# Patient Record
Sex: MALE | Race: OTHER | NOT HISPANIC OR LATINO | Employment: OTHER | URBAN - METROPOLITAN AREA
[De-identification: names, ages, dates, MRNs, and addresses within clinical notes are randomized per-mention and may not be internally consistent; named-entity substitution may affect disease eponyms.]

---

## 2017-07-03 DIAGNOSIS — N20.1 CALCULUS OF URETER: ICD-10-CM

## 2017-07-25 ENCOUNTER — ANESTHESIA EVENT (OUTPATIENT)
Dept: PERIOP | Facility: HOSPITAL | Age: 57
End: 2017-07-25
Payer: COMMERCIAL

## 2017-07-25 RX ORDER — SODIUM CHLORIDE 9 MG/ML
125 INJECTION, SOLUTION INTRAVENOUS CONTINUOUS
Status: CANCELLED | OUTPATIENT
Start: 2017-07-25

## 2017-07-26 ENCOUNTER — APPOINTMENT (OUTPATIENT)
Dept: PREADMISSION TESTING | Facility: HOSPITAL | Age: 57
End: 2017-07-26
Payer: COMMERCIAL

## 2017-07-26 ENCOUNTER — APPOINTMENT (OUTPATIENT)
Dept: LAB | Facility: HOSPITAL | Age: 57
End: 2017-07-26
Attending: UROLOGY
Payer: COMMERCIAL

## 2017-07-26 ENCOUNTER — TRANSCRIBE ORDERS (OUTPATIENT)
Dept: ADMINISTRATIVE | Facility: HOSPITAL | Age: 57
End: 2017-07-26

## 2017-07-26 ENCOUNTER — HOSPITAL ENCOUNTER (OUTPATIENT)
Dept: NON INVASIVE DIAGNOSTICS | Facility: HOSPITAL | Age: 57
Discharge: HOME | End: 2017-07-26
Attending: UROLOGY
Payer: COMMERCIAL

## 2017-07-26 VITALS
HEIGHT: 67 IN | BODY MASS INDEX: 41.65 KG/M2 | WEIGHT: 265.4 LBS | TEMPERATURE: 98.6 F | DIASTOLIC BLOOD PRESSURE: 78 MMHG | HEART RATE: 83 BPM | RESPIRATION RATE: 18 BRPM | SYSTOLIC BLOOD PRESSURE: 122 MMHG

## 2017-07-26 DIAGNOSIS — Z01.818 PREOP EXAMINATION: ICD-10-CM

## 2017-07-26 DIAGNOSIS — Z01.818 PREOP EXAMINATION: Primary | ICD-10-CM

## 2017-07-26 DIAGNOSIS — N20.1 CALCULUS OF URETER: ICD-10-CM

## 2017-07-26 LAB
ALBUMIN SERPL BCP-MCNC: 3.7 G/DL (ref 3.5–5)
ALP SERPL-CCNC: 52 U/L (ref 46–116)
ALT SERPL W P-5'-P-CCNC: 42 U/L (ref 12–78)
ANION GAP SERPL CALCULATED.3IONS-SCNC: 7 MMOL/L (ref 4–13)
APTT PPP: 29 SECONDS (ref 23–35)
AST SERPL W P-5'-P-CCNC: 25 U/L (ref 5–45)
BASOPHILS # BLD AUTO: 0.07 THOUSANDS/ΜL (ref 0–0.1)
BASOPHILS NFR BLD AUTO: 1 % (ref 0–1)
BILIRUB SERPL-MCNC: 0.51 MG/DL (ref 0.2–1)
BUN SERPL-MCNC: 18 MG/DL (ref 5–25)
CALCIUM SERPL-MCNC: 8.8 MG/DL (ref 8.3–10.1)
CHLORIDE SERPL-SCNC: 106 MMOL/L (ref 100–108)
CO2 SERPL-SCNC: 28 MMOL/L (ref 21–32)
CREAT SERPL-MCNC: 1.53 MG/DL (ref 0.6–1.3)
EOSINOPHIL # BLD AUTO: 0.54 THOUSAND/ΜL (ref 0–0.61)
EOSINOPHIL NFR BLD AUTO: 6 % (ref 0–6)
ERYTHROCYTE [DISTWIDTH] IN BLOOD BY AUTOMATED COUNT: 12.8 % (ref 11.6–15.1)
GFR SERPL CREATININE-BSD FRML MDRD: 49 ML/MIN/1.73SQ M
GLUCOSE SERPL-MCNC: 91 MG/DL (ref 65–140)
HCT VFR BLD AUTO: 40.7 % (ref 36.5–49.3)
HGB BLD-MCNC: 13.8 G/DL (ref 12–17)
INR PPP: 1.11 (ref 0.86–1.16)
LYMPHOCYTES # BLD AUTO: 2.51 THOUSANDS/ΜL (ref 0.6–4.47)
LYMPHOCYTES NFR BLD AUTO: 30 % (ref 14–44)
MCH RBC QN AUTO: 29.2 PG (ref 26.8–34.3)
MCHC RBC AUTO-ENTMCNC: 33.9 G/DL (ref 31.4–37.4)
MCV RBC AUTO: 86 FL (ref 82–98)
MONOCYTES # BLD AUTO: 0.76 THOUSAND/ΜL (ref 0.17–1.22)
MONOCYTES NFR BLD AUTO: 9 % (ref 4–12)
NEUTROPHILS # BLD AUTO: 4.51 THOUSANDS/ΜL (ref 1.85–7.62)
NEUTS SEG NFR BLD AUTO: 54 % (ref 43–75)
NRBC BLD AUTO-RTO: 0 /100 WBCS
PLATELET # BLD AUTO: 334 THOUSANDS/UL (ref 149–390)
PMV BLD AUTO: 10.6 FL (ref 8.9–12.7)
POTASSIUM SERPL-SCNC: 4.5 MMOL/L (ref 3.5–5.3)
PROT SERPL-MCNC: 7.8 G/DL (ref 6.4–8.2)
PROTHROMBIN TIME: 14.3 SECONDS (ref 12.1–14.4)
RBC # BLD AUTO: 4.73 MILLION/UL (ref 3.88–5.62)
SODIUM SERPL-SCNC: 141 MMOL/L (ref 136–145)
WBC # BLD AUTO: 8.39 THOUSAND/UL (ref 4.31–10.16)

## 2017-07-26 PROCEDURE — 36415 COLL VENOUS BLD VENIPUNCTURE: CPT

## 2017-07-26 PROCEDURE — 85730 THROMBOPLASTIN TIME PARTIAL: CPT

## 2017-07-26 PROCEDURE — 80053 COMPREHEN METABOLIC PANEL: CPT

## 2017-07-26 PROCEDURE — 85025 COMPLETE CBC W/AUTO DIFF WBC: CPT

## 2017-07-26 PROCEDURE — 85610 PROTHROMBIN TIME: CPT

## 2017-07-26 PROCEDURE — 93005 ELECTROCARDIOGRAM TRACING: CPT

## 2017-07-26 PROCEDURE — 87086 URINE CULTURE/COLONY COUNT: CPT

## 2017-07-26 RX ORDER — MULTIVITAMIN
2 TABLET ORAL DAILY
COMMUNITY

## 2017-07-26 RX ORDER — CETIRIZINE HYDROCHLORIDE 10 MG/1
10 TABLET ORAL DAILY
COMMUNITY

## 2017-07-26 RX ORDER — IBUPROFEN 200 MG
200-800 TABLET ORAL EVERY 6 HOURS PRN
COMMUNITY
End: 2019-03-06 | Stop reason: HOSPADM

## 2017-07-26 RX ORDER — SIMVASTATIN 40 MG
40 TABLET ORAL
COMMUNITY
End: 2018-08-24 | Stop reason: SDUPTHER

## 2017-07-26 RX ORDER — FLUTICASONE PROPIONATE 50 MCG
SPRAY, SUSPENSION (ML) NASAL
COMMUNITY
Start: 2015-04-24 | End: 2019-11-25

## 2017-07-27 LAB — BACTERIA UR CULT: NORMAL

## 2017-07-28 LAB
ATRIAL RATE: 83 BPM
P AXIS: 35 DEGREES
PR INTERVAL: 132 MS
QRS AXIS: -3 DEGREES
QRSD INTERVAL: 86 MS
QT INTERVAL: 364 MS
QTC INTERVAL: 427 MS
T WAVE AXIS: 7 DEGREES
VENTRICULAR RATE: 83 BPM

## 2017-08-04 ENCOUNTER — HOSPITAL ENCOUNTER (OUTPATIENT)
Facility: HOSPITAL | Age: 57
Setting detail: OUTPATIENT SURGERY
Discharge: HOME | End: 2017-08-04
Attending: UROLOGY | Admitting: UROLOGY
Payer: COMMERCIAL

## 2017-08-04 ENCOUNTER — ANESTHESIA (OUTPATIENT)
Dept: PERIOP | Facility: HOSPITAL | Age: 57
End: 2017-08-04
Payer: COMMERCIAL

## 2017-08-04 ENCOUNTER — APPOINTMENT (OUTPATIENT)
Dept: RADIOLOGY | Facility: HOSPITAL | Age: 57
End: 2017-08-04
Payer: COMMERCIAL

## 2017-08-04 VITALS
OXYGEN SATURATION: 96 % | HEART RATE: 76 BPM | BODY MASS INDEX: 40.49 KG/M2 | WEIGHT: 258 LBS | TEMPERATURE: 97.9 F | SYSTOLIC BLOOD PRESSURE: 134 MMHG | HEIGHT: 67 IN | DIASTOLIC BLOOD PRESSURE: 72 MMHG | RESPIRATION RATE: 16 BRPM

## 2017-08-04 DIAGNOSIS — N20.1 CALCULUS OF URETER: ICD-10-CM

## 2017-08-04 LAB
PLATELET # BLD AUTO: 284 THOUSANDS/UL (ref 149–390)
PMV BLD AUTO: 10.9 FL (ref 8.9–12.7)

## 2017-08-04 PROCEDURE — 85049 AUTOMATED PLATELET COUNT: CPT | Performed by: UROLOGY

## 2017-08-04 PROCEDURE — 82360 CALCULUS ASSAY QUANT: CPT | Performed by: UROLOGY

## 2017-08-04 PROCEDURE — C1769 GUIDE WIRE: HCPCS | Performed by: UROLOGY

## 2017-08-04 PROCEDURE — 74450 X-RAY URETHRA/BLADDER: CPT

## 2017-08-04 PROCEDURE — C2625 STENT, NON-COR, TEM W/DEL SY: HCPCS | Performed by: UROLOGY

## 2017-08-04 DEVICE — STENT KWART RETRO INJECT SET 7FR 145CM: Type: IMPLANTABLE DEVICE | Site: URETER | Status: FUNCTIONAL

## 2017-08-04 RX ORDER — SULFAMETHOXAZOLE AND TRIMETHOPRIM 800; 160 MG/1; MG/1
1 TABLET ORAL DAILY
Qty: 10 TABLET | Refills: 0 | Status: SHIPPED | OUTPATIENT
Start: 2017-08-04 | End: 2017-08-14

## 2017-08-04 RX ORDER — LIDOCAINE HYDROCHLORIDE 10 MG/ML
INJECTION, SOLUTION INFILTRATION; PERINEURAL AS NEEDED
Status: DISCONTINUED | OUTPATIENT
Start: 2017-08-04 | End: 2017-08-04 | Stop reason: SURG

## 2017-08-04 RX ORDER — OXYCODONE HYDROCHLORIDE AND ACETAMINOPHEN 5; 325 MG/1; MG/1
1 TABLET ORAL EVERY 4 HOURS PRN
Status: DISCONTINUED | OUTPATIENT
Start: 2017-08-04 | End: 2017-08-04 | Stop reason: HOSPADM

## 2017-08-04 RX ORDER — ONDANSETRON 2 MG/ML
4 INJECTION INTRAMUSCULAR; INTRAVENOUS EVERY 6 HOURS PRN
Status: DISCONTINUED | OUTPATIENT
Start: 2017-08-04 | End: 2017-08-04 | Stop reason: HOSPADM

## 2017-08-04 RX ORDER — SODIUM CHLORIDE 9 MG/ML
125 INJECTION, SOLUTION INTRAVENOUS CONTINUOUS
Status: DISCONTINUED | OUTPATIENT
Start: 2017-08-04 | End: 2017-08-04 | Stop reason: HOSPADM

## 2017-08-04 RX ORDER — ONDANSETRON 2 MG/ML
4 INJECTION INTRAMUSCULAR; INTRAVENOUS ONCE AS NEEDED
Status: DISCONTINUED | OUTPATIENT
Start: 2017-08-04 | End: 2017-08-04 | Stop reason: HOSPADM

## 2017-08-04 RX ORDER — ROCURONIUM BROMIDE 10 MG/ML
INJECTION, SOLUTION INTRAVENOUS AS NEEDED
Status: DISCONTINUED | OUTPATIENT
Start: 2017-08-04 | End: 2017-08-04 | Stop reason: SURG

## 2017-08-04 RX ORDER — FENTANYL CITRATE 50 UG/ML
INJECTION, SOLUTION INTRAMUSCULAR; INTRAVENOUS AS NEEDED
Status: DISCONTINUED | OUTPATIENT
Start: 2017-08-04 | End: 2017-08-04 | Stop reason: SURG

## 2017-08-04 RX ORDER — MORPHINE SULFATE 4 MG/ML
4 INJECTION, SOLUTION INTRAMUSCULAR; INTRAVENOUS EVERY 4 HOURS PRN
Status: DISCONTINUED | OUTPATIENT
Start: 2017-08-04 | End: 2017-08-04 | Stop reason: HOSPADM

## 2017-08-04 RX ORDER — HEPARIN SODIUM 5000 [USP'U]/ML
5000 INJECTION, SOLUTION INTRAVENOUS; SUBCUTANEOUS ONCE
Status: COMPLETED | OUTPATIENT
Start: 2017-08-04 | End: 2017-08-04

## 2017-08-04 RX ORDER — MAGNESIUM HYDROXIDE 1200 MG/15ML
LIQUID ORAL AS NEEDED
Status: DISCONTINUED | OUTPATIENT
Start: 2017-08-04 | End: 2017-08-04 | Stop reason: HOSPADM

## 2017-08-04 RX ORDER — GLYCOPYRROLATE 0.2 MG/ML
INJECTION INTRAMUSCULAR; INTRAVENOUS AS NEEDED
Status: DISCONTINUED | OUTPATIENT
Start: 2017-08-04 | End: 2017-08-04 | Stop reason: SURG

## 2017-08-04 RX ORDER — MIDAZOLAM HYDROCHLORIDE 1 MG/ML
INJECTION INTRAMUSCULAR; INTRAVENOUS AS NEEDED
Status: DISCONTINUED | OUTPATIENT
Start: 2017-08-04 | End: 2017-08-04 | Stop reason: SURG

## 2017-08-04 RX ORDER — FENTANYL CITRATE/PF 50 MCG/ML
25 SYRINGE (ML) INJECTION
Status: DISCONTINUED | OUTPATIENT
Start: 2017-08-04 | End: 2017-08-04 | Stop reason: HOSPADM

## 2017-08-04 RX ORDER — KETOROLAC TROMETHAMINE 30 MG/ML
INJECTION, SOLUTION INTRAMUSCULAR; INTRAVENOUS AS NEEDED
Status: DISCONTINUED | OUTPATIENT
Start: 2017-08-04 | End: 2017-08-04 | Stop reason: SURG

## 2017-08-04 RX ORDER — ONDANSETRON 2 MG/ML
INJECTION INTRAMUSCULAR; INTRAVENOUS AS NEEDED
Status: DISCONTINUED | OUTPATIENT
Start: 2017-08-04 | End: 2017-08-04 | Stop reason: SURG

## 2017-08-04 RX ORDER — PROPOFOL 10 MG/ML
INJECTION, EMULSION INTRAVENOUS AS NEEDED
Status: DISCONTINUED | OUTPATIENT
Start: 2017-08-04 | End: 2017-08-04 | Stop reason: SURG

## 2017-08-04 RX ORDER — HYDROCODONE BITARTRATE AND ACETAMINOPHEN 5; 325 MG/1; MG/1
1 TABLET ORAL EVERY 6 HOURS PRN
Qty: 20 TABLET | Refills: 0 | Status: SHIPPED | OUTPATIENT
Start: 2017-08-04 | End: 2018-10-16 | Stop reason: ALTCHOICE

## 2017-08-04 RX ORDER — SUCCINYLCHOLINE CHLORIDE 20 MG/ML
INJECTION INTRAMUSCULAR; INTRAVENOUS AS NEEDED
Status: DISCONTINUED | OUTPATIENT
Start: 2017-08-04 | End: 2017-08-04 | Stop reason: SURG

## 2017-08-04 RX ADMIN — ROCURONIUM BROMIDE 10 MG: 10 INJECTION, SOLUTION INTRAVENOUS at 07:45

## 2017-08-04 RX ADMIN — SODIUM CHLORIDE 125 ML/HR: 0.9 INJECTION, SOLUTION INTRAVENOUS at 06:26

## 2017-08-04 RX ADMIN — ROCURONIUM BROMIDE 5 MG: 10 INJECTION, SOLUTION INTRAVENOUS at 07:37

## 2017-08-04 RX ADMIN — FENTANYL CITRATE 50 MCG: 50 INJECTION, SOLUTION INTRAMUSCULAR; INTRAVENOUS at 07:37

## 2017-08-04 RX ADMIN — CEFAZOLIN SODIUM 2000 MG: 2 SOLUTION INTRAVENOUS at 07:42

## 2017-08-04 RX ADMIN — LIDOCAINE HYDROCHLORIDE 60 MG: 10 INJECTION, SOLUTION INFILTRATION; PERINEURAL at 07:37

## 2017-08-04 RX ADMIN — MIDAZOLAM HYDROCHLORIDE 2 MG: 1 INJECTION, SOLUTION INTRAMUSCULAR; INTRAVENOUS at 07:25

## 2017-08-04 RX ADMIN — GLYCOPYRROLATE 0.4 MG: 0.2 INJECTION, SOLUTION INTRAMUSCULAR; INTRAVENOUS at 08:45

## 2017-08-04 RX ADMIN — FENTANYL CITRATE 50 MCG: 50 INJECTION, SOLUTION INTRAMUSCULAR; INTRAVENOUS at 07:45

## 2017-08-04 RX ADMIN — SUCCINYLCHOLINE CHLORIDE 100 MG: 20 INJECTION, SOLUTION INTRAMUSCULAR; INTRAVENOUS at 07:37

## 2017-08-04 RX ADMIN — NEOSTIGMINE METHYLSULFATE 2 MG: 1 INJECTION, SOLUTION INTRAMUSCULAR; INTRAVENOUS; SUBCUTANEOUS at 08:45

## 2017-08-04 RX ADMIN — PROPOFOL 200 MG: 10 INJECTION, EMULSION INTRAVENOUS at 07:37

## 2017-08-04 RX ADMIN — ONDANSETRON HYDROCHLORIDE 4 MG: 2 INJECTION, SOLUTION INTRAVENOUS at 08:04

## 2017-08-04 RX ADMIN — TOBRAMYCIN 130 MG: 40 INJECTION INTRAMUSCULAR; INTRAVENOUS at 07:45

## 2017-08-04 RX ADMIN — HEPARIN SODIUM 5000 UNITS: 5000 INJECTION, SOLUTION INTRAVENOUS; SUBCUTANEOUS at 07:13

## 2017-08-04 RX ADMIN — SODIUM CHLORIDE: 0.9 INJECTION, SOLUTION INTRAVENOUS at 08:40

## 2017-08-04 RX ADMIN — KETOROLAC TROMETHAMINE 30 MG: 30 INJECTION, SOLUTION INTRAMUSCULAR at 08:50

## 2017-08-11 ENCOUNTER — ALLSCRIPTS OFFICE VISIT (OUTPATIENT)
Dept: OTHER | Facility: OTHER | Age: 57
End: 2017-08-11

## 2017-08-11 LAB
BILIRUB UR QL STRIP: NEGATIVE
CLARITY UR: NORMAL
COLOR UR: YELLOW
GLUCOSE (HISTORICAL): NEGATIVE
HGB UR QL STRIP.AUTO: NORMAL
KETONES UR STRIP-MCNC: NEGATIVE MG/DL
LEUKOCYTE ESTERASE UR QL STRIP: NORMAL
NITRITE UR QL STRIP: NEGATIVE
PH UR STRIP.AUTO: 5.5 [PH]
PROT UR STRIP-MCNC: 100 MG/DL
SP GR UR STRIP.AUTO: 1.02
UROBILINOGEN UR QL STRIP.AUTO: 0.2

## 2017-08-16 LAB
CA PHOS MFR STONE: 5 %
CALCIUM OXALATE DIHYDRATE MFR STONE IR: 25 %
COLOR STONE: NORMAL
COM MFR STONE: 55 %
COMMENT-STONE3: NORMAL
COMPOSITION: NORMAL
LABORATORY COMMENT REPORT: NORMAL
NIDUS STONE QL: NORMAL
PHOTO: NORMAL
SIZE STONE: NORMAL MM
STONE ANALYSIS-IMP: NORMAL
URATE MFR STONE: 15 %
WT STONE: 115.5 MG

## 2018-01-22 VITALS
BODY MASS INDEX: 40.49 KG/M2 | DIASTOLIC BLOOD PRESSURE: 82 MMHG | SYSTOLIC BLOOD PRESSURE: 146 MMHG | WEIGHT: 258 LBS | HEIGHT: 67 IN

## 2018-08-24 DIAGNOSIS — E78.5 HYPERLIPIDEMIA, UNSPECIFIED HYPERLIPIDEMIA TYPE: Primary | ICD-10-CM

## 2018-08-24 RX ORDER — SIMVASTATIN 40 MG
40 TABLET ORAL
Qty: 90 TABLET | Refills: 0 | Status: SHIPPED | OUTPATIENT
Start: 2018-08-24 | End: 2018-11-23 | Stop reason: SDUPTHER

## 2018-10-12 ENCOUNTER — OFFICE VISIT (OUTPATIENT)
Dept: FAMILY MEDICINE CLINIC | Facility: CLINIC | Age: 58
End: 2018-10-12
Payer: COMMERCIAL

## 2018-10-12 VITALS
RESPIRATION RATE: 16 BRPM | BODY MASS INDEX: 41.59 KG/M2 | DIASTOLIC BLOOD PRESSURE: 82 MMHG | WEIGHT: 265 LBS | HEART RATE: 90 BPM | OXYGEN SATURATION: 96 % | TEMPERATURE: 98 F | SYSTOLIC BLOOD PRESSURE: 122 MMHG | HEIGHT: 67 IN

## 2018-10-12 DIAGNOSIS — L23.7 POISON IVY DERMATITIS: Primary | ICD-10-CM

## 2018-10-12 PROBLEM — N20.1 CALCULI, URETER: Status: ACTIVE | Noted: 2017-07-03

## 2018-10-12 PROBLEM — K46.9 ABDOMINAL HERNIA: Status: ACTIVE | Noted: 2018-10-12

## 2018-10-12 PROBLEM — H40.9 GLAUCOMA: Status: ACTIVE | Noted: 2018-10-12

## 2018-10-12 PROCEDURE — 99213 OFFICE O/P EST LOW 20 MIN: CPT | Performed by: FAMILY MEDICINE

## 2018-10-12 RX ORDER — TRIAMCINOLONE ACETONIDE 5 MG/G
CREAM TOPICAL 3 TIMES DAILY
Qty: 30 G | Refills: 0 | Status: SHIPPED | OUTPATIENT
Start: 2018-10-12 | End: 2019-01-18 | Stop reason: SDUPTHER

## 2018-10-12 RX ORDER — HYDROXYZINE PAMOATE 25 MG/1
25 CAPSULE ORAL 3 TIMES DAILY PRN
Qty: 30 CAPSULE | Refills: 0 | Status: SHIPPED | OUTPATIENT
Start: 2018-10-12 | End: 2019-01-31

## 2018-10-12 RX ORDER — PREDNISONE 50 MG/1
50 TABLET ORAL DAILY
Qty: 5 TABLET | Refills: 0 | Status: SHIPPED | OUTPATIENT
Start: 2018-10-12 | End: 2018-10-16 | Stop reason: ALTCHOICE

## 2018-10-12 NOTE — PROGRESS NOTES
Assessment/Plan:     Diagnoses and all orders for this visit:    Poison ivy dermatitis  Comments:  Was given a prednisone, triamcinolone cream and Atarax  Orders:  -     predniSONE 50 mg tablet; Take 1 tablet (50 mg total) by mouth daily for 5 days  -     triamcinolone (KENALOG) 0 5 % cream; Apply topically 3 (three) times a day  -     hydrOXYzine pamoate (VISTARIL) 25 mg capsule; Take 1 capsule (25 mg total) by mouth 3 (three) times a day as needed for itching    Other orders  -     Cholecalciferol 2000 units CAPS; Take by mouth          There are no Patient Instructions on file for this visit  Return in about 1 week (around 10/19/2018), or if symptoms worsen or fail to improve  Subjective:      Patient ID: Luigi Oh is a 60 y o  male  Chief Complaint   Patient presents with   Yonas Will is a 60year old who presents complaining of an itchy rash to bilateral upper and lower extremities for the past 4 days  Patient denies fever, chills, chest pain, SOB, cough, or known bug bites  Patient was cutting down bushes 5-6 days ago that may have had poison ivy  Patient applied "Ivy-X" from [address] [place] [address] without relief of symptoms  Patient had poison ivy in the past and reports a similar rash  At that time, he used an OTC medication with resolution of symptoms  No known allergies  No recent changes in soaps or detergents  There are no other complaints at this time  The following portions of the patient's history were reviewed and updated as appropriate: allergies, current medications, past family history, past medical history, past social history, past surgical history and problem list     Review of Systems   Constitutional: Negative for chills, fatigue and fever  HENT: Negative for ear pain, mouth sores and sneezing  Congestion: mild, chronic     Eyes: Negative for pain and itching  Respiratory: Negative for shortness of breath      Cardiovascular: Negative for chest pain and leg swelling  Gastrointestinal: Negative for nausea and vomiting  Musculoskeletal: Negative for myalgias  Neurological: Negative for dizziness, light-headedness and headaches  Current Outpatient Prescriptions   Medication Sig Dispense Refill    Bioflavonoid Products (VITAMIN C) CHEW Chew 2 tablets daily      cetirizine (ZyrTEC) 10 mg tablet Take 10 mg by mouth daily      Cholecalciferol 2000 units CAPS Take by mouth      fluticasone (FLONASE) 50 mcg/act nasal spray Blow nose; shake bottle; place tip in each nostril and tilt towards eye; hold breath and press plunger; do this 2 times daily prn      ibuprofen (MOTRIN) 200 mg tablet Take 200-800 mg by mouth every 6 (six) hours as needed for mild pain      Multiple Vitamin (MULTIVITAMIN) tablet Take 2 tablets by mouth daily      Omega-3 Fatty Acids (FISH OIL PO) Take 1 capsule by mouth daily      simvastatin (ZOCOR) 40 mg tablet Take 1 tablet (40 mg total) by mouth daily at bedtime 90 tablet 0    HYDROcodone-acetaminophen (NORCO) 5-325 mg per tablet Take 1 tablet by mouth every 6 (six) hours as needed for pain for up to 20 doses Max Daily Amount: 4 tablets (Patient not taking: Reported on 10/12/2018 ) 20 tablet 0    hydrOXYzine pamoate (VISTARIL) 25 mg capsule Take 1 capsule (25 mg total) by mouth 3 (three) times a day as needed for itching 30 capsule 0    predniSONE 50 mg tablet Take 1 tablet (50 mg total) by mouth daily for 5 days 5 tablet 0    triamcinolone (KENALOG) 0 5 % cream Apply topically 3 (three) times a day 30 g 0     No current facility-administered medications for this visit  Objective:    /82 (BP Location: Left arm, Patient Position: Sitting, Cuff Size: Large)   Pulse 90   Temp 98 °F (36 7 °C) (Tympanic)   Resp 16   Ht 5' 7" (1 702 m)   Wt 120 kg (265 lb)   SpO2 96%   BMI 41 50 kg/m²        Physical Exam   Constitutional: He is oriented to person, place, and time  He appears well-developed and well-nourished   No distress  HENT:   Head: Normocephalic and atraumatic  Eyes: EOM are normal    Neck: Normal range of motion  Neck supple  Cardiovascular: Normal rate and regular rhythm  Pulmonary/Chest: Effort normal and breath sounds normal    Musculoskeletal: He exhibits no edema  Neurological: He is alert and oriented to person, place, and time  Skin: Skin is warm and dry  Rash (erythmatous confluent papules to bilateral upper and lower extremities) noted  He is not diaphoretic  No pallor                Brittney Kingston MD

## 2018-10-16 ENCOUNTER — OFFICE VISIT (OUTPATIENT)
Dept: FAMILY MEDICINE CLINIC | Facility: CLINIC | Age: 58
End: 2018-10-16
Payer: COMMERCIAL

## 2018-10-16 VITALS
SYSTOLIC BLOOD PRESSURE: 130 MMHG | TEMPERATURE: 97.9 F | BODY MASS INDEX: 41.75 KG/M2 | HEART RATE: 86 BPM | RESPIRATION RATE: 16 BRPM | HEIGHT: 67 IN | DIASTOLIC BLOOD PRESSURE: 70 MMHG | WEIGHT: 266 LBS | OXYGEN SATURATION: 97 %

## 2018-10-16 DIAGNOSIS — Z00.00 HEALTHCARE MAINTENANCE: Primary | ICD-10-CM

## 2018-10-16 DIAGNOSIS — E66.01 CLASS 3 SEVERE OBESITY DUE TO EXCESS CALORIES WITHOUT SERIOUS COMORBIDITY WITH BODY MASS INDEX (BMI) OF 40.0 TO 44.9 IN ADULT (HCC): ICD-10-CM

## 2018-10-16 DIAGNOSIS — L23.7 POISON IVY DERMATITIS: ICD-10-CM

## 2018-10-16 DIAGNOSIS — R73.9 HYPERGLYCEMIA: ICD-10-CM

## 2018-10-16 DIAGNOSIS — E55.9 VITAMIN D INSUFFICIENCY: ICD-10-CM

## 2018-10-16 PROCEDURE — 99396 PREV VISIT EST AGE 40-64: CPT | Performed by: FAMILY MEDICINE

## 2018-10-16 NOTE — PROGRESS NOTES
Assessment/Plan:     Diagnoses and all orders for this visit:    Healthcare maintenance  Comments: It was discussed about immunization, diet, exercise and safety measures  Hyperglycemia  Comments: It was discussed about low carb diet  I am going to check hemoglobin A1c  Orders:  -     Comprehensive metabolic panel; Future  -     Hemoglobin A1C; Future  -     TSH, 3rd generation with Free T4 reflex; Future    Poison ivy dermatitis  Comments:  Improved    Vitamin D insufficiency  -     Vitamin D 25 hydroxy; Future    Class 3 severe obesity due to excess calories without serious comorbidity with body mass index (BMI) of 40 0 to 44 9 in adult [facility])  Comments: It was discussed about low carb diet and regular exercise  There are no Patient Instructions on file for this visit  Return in about 6 months (around 4/16/2019)  Subjective:      Patient ID: Wilma Boswell is a 60 y o  male  Chief Complaint   Patient presents with   Lexie De León is a 60year old male who presents to the office for a physical   Patient denies fever, chills, nausea, vomiting, diarrhea, constipation, chest pain, SOB, dizziness, headache  He states he "feels like there is a hernia" to his mid left abdomen that radiates to the left chest which sometimes takes his breath away  He denies any pain  He does not know how often it happens or when it started  He never tried taking any medication for the sensation  He had an inguinal hernia in the past which felt different  Furthermore, patient is currently being treated for poison ivy which is resolving  Otherwise, patient has no complaints  Patient states he does not eat a healthy diet but tries to watch what he eats  He does not exercise which he attributes to his right knee needing a knee replacement  Appointment scheduled with orthopedic surgeon  Denies use of tobacco or illicit drugs  Patient states he drinks alcohol occasionally socially   He has not had a colonoscopy but understands the importance and plans to have one done  The following portions of the patient's history were reviewed and updated as appropriate: allergies, current medications, past family history, past medical history, past social history, past surgical history and problem list     Review of Systems   Constitutional: Negative for appetite change, chills, fatigue and fever  HENT: Positive for congestion ( occasional, mild)  Negative for ear pain, hearing loss, rhinorrhea and sore throat  Eyes: Negative for pain and discharge  Respiratory: Negative for cough and shortness of breath  Cardiovascular: Negative for chest pain  Gastrointestinal: Negative for abdominal pain, constipation, diarrhea, nausea and vomiting  Genitourinary: Negative for difficulty urinating and dysuria  Musculoskeletal: Negative for back pain  Skin: Positive for rash ( )  Neurological: Negative for dizziness, weakness, light-headedness and headaches           Current Outpatient Prescriptions   Medication Sig Dispense Refill    Bioflavonoid Products (VITAMIN C) CHEW Chew 2 tablets daily      cetirizine (ZyrTEC) 10 mg tablet Take 10 mg by mouth daily      Cholecalciferol 2000 units CAPS Take by mouth      fluticasone (FLONASE) 50 mcg/act nasal spray Blow nose; shake bottle; place tip in each nostril and tilt towards eye; hold breath and press plunger; do this 2 times daily prn      hydrOXYzine pamoate (VISTARIL) 25 mg capsule Take 1 capsule (25 mg total) by mouth 3 (three) times a day as needed for itching 30 capsule 0    ibuprofen (MOTRIN) 200 mg tablet Take 200-800 mg by mouth every 6 (six) hours as needed for mild pain      Multiple Vitamin (MULTIVITAMIN) tablet Take 2 tablets by mouth daily      Omega-3 Fatty Acids (FISH OIL PO) Take 1 capsule by mouth daily      simvastatin (ZOCOR) 40 mg tablet Take 1 tablet (40 mg total) by mouth daily at bedtime 90 tablet 0    triamcinolone (KENALOG) 0 5 % cream Apply topically 3 (three) times a day 30 g 0     No current facility-administered medications for this visit  Objective:    /70 (BP Location: Left arm, Patient Position: Sitting, Cuff Size: Large)   Pulse 86   Temp 97 9 °F (36 6 °C) (Tympanic)   Resp 16   Ht 5' 7" (1 702 m)   Wt 121 kg (266 lb)   SpO2 97%   BMI 41 66 kg/m²        Physical Exam   Constitutional: He is oriented to person, place, and time  He appears well-developed and well-nourished  No distress  HENT:   Head: Normocephalic and atraumatic  Right Ear: Tympanic membrane and ear canal normal    Left Ear: Tympanic membrane and ear canal normal    Mouth/Throat: Uvula is midline, oropharynx is clear and moist and mucous membranes are normal  Mucous membranes are not dry  No uvula swelling  No oropharyngeal exudate or posterior oropharyngeal erythema  Eyes: EOM are normal  Right eye exhibits no discharge  Left eye exhibits no discharge  Neck: Normal range of motion  Cardiovascular: Normal rate, regular rhythm and normal heart sounds  Exam reveals no gallop and no friction rub  No murmur heard  Pulmonary/Chest: Effort normal and breath sounds normal  No respiratory distress  He has no wheezes  He has no rales  He exhibits no tenderness  Abdominal: Soft  Bowel sounds are normal  He exhibits no distension and no mass  There is no tenderness  There is no rebound and no guarding  Musculoskeletal: Normal range of motion  He exhibits no edema or tenderness  Neurological: He is alert and oriented to person, place, and time  No cranial nerve deficit or sensory deficit  He exhibits normal muscle tone  He displays a negative Romberg sign  Skin: Skin is warm and dry  Rash noted  No bruising, no ecchymosis, no laceration and no petechiae noted  Rash is vesicular   He is not diaphoretic      resolving vesicular rash to bilateral upper and lower extremities, no signs of infection              Serena Heath MD

## 2018-10-19 ENCOUNTER — APPOINTMENT (OUTPATIENT)
Dept: LAB | Facility: IMAGING CENTER | Age: 58
End: 2018-10-19
Payer: COMMERCIAL

## 2018-10-19 ENCOUNTER — TRANSCRIBE ORDERS (OUTPATIENT)
Dept: ADMINISTRATIVE | Facility: HOSPITAL | Age: 58
End: 2018-10-19

## 2018-10-19 DIAGNOSIS — R73.9 HYPERGLYCEMIA: ICD-10-CM

## 2018-10-19 DIAGNOSIS — E55.9 VITAMIN D INSUFFICIENCY: ICD-10-CM

## 2018-10-19 LAB
25(OH)D3 SERPL-MCNC: 40 NG/ML (ref 30–100)
ALBUMIN SERPL BCP-MCNC: 3.7 G/DL (ref 3.5–5)
ALP SERPL-CCNC: 52 U/L (ref 46–116)
ALT SERPL W P-5'-P-CCNC: 74 U/L (ref 12–78)
ANION GAP SERPL CALCULATED.3IONS-SCNC: 5 MMOL/L (ref 4–13)
AST SERPL W P-5'-P-CCNC: 31 U/L (ref 5–45)
BILIRUB SERPL-MCNC: 0.89 MG/DL (ref 0.2–1)
BUN SERPL-MCNC: 16 MG/DL (ref 5–25)
CALCIUM SERPL-MCNC: 9.1 MG/DL (ref 8.3–10.1)
CHLORIDE SERPL-SCNC: 106 MMOL/L (ref 100–108)
CO2 SERPL-SCNC: 27 MMOL/L (ref 21–32)
CREAT SERPL-MCNC: 1 MG/DL (ref 0.6–1.3)
GFR SERPL CREATININE-BSD FRML MDRD: 82 ML/MIN/1.73SQ M
GLUCOSE P FAST SERPL-MCNC: 113 MG/DL (ref 65–99)
POTASSIUM SERPL-SCNC: 4.5 MMOL/L (ref 3.5–5.3)
PROT SERPL-MCNC: 7.4 G/DL (ref 6.4–8.2)
SODIUM SERPL-SCNC: 138 MMOL/L (ref 136–145)
TSH SERPL DL<=0.05 MIU/L-ACNC: 1.8 UIU/ML (ref 0.36–3.74)

## 2018-10-19 PROCEDURE — 82306 VITAMIN D 25 HYDROXY: CPT

## 2018-10-19 PROCEDURE — 80053 COMPREHEN METABOLIC PANEL: CPT

## 2018-10-19 PROCEDURE — 36415 COLL VENOUS BLD VENIPUNCTURE: CPT

## 2018-10-19 PROCEDURE — 84443 ASSAY THYROID STIM HORMONE: CPT

## 2018-10-19 PROCEDURE — 83036 HEMOGLOBIN GLYCOSYLATED A1C: CPT

## 2018-10-20 LAB
EST. AVERAGE GLUCOSE BLD GHB EST-MCNC: 148 MG/DL
HBA1C MFR BLD: 6.8 % (ref 4.2–6.3)

## 2018-10-23 ENCOUNTER — OFFICE VISIT (OUTPATIENT)
Dept: OBGYN CLINIC | Facility: HOSPITAL | Age: 58
End: 2018-10-23
Payer: COMMERCIAL

## 2018-10-23 ENCOUNTER — HOSPITAL ENCOUNTER (OUTPATIENT)
Dept: RADIOLOGY | Facility: HOSPITAL | Age: 58
Discharge: HOME | End: 2018-10-23
Attending: ORTHOPAEDIC SURGERY
Payer: COMMERCIAL

## 2018-10-23 VITALS — HEIGHT: 67 IN | BODY MASS INDEX: 42.06 KG/M2 | WEIGHT: 268 LBS

## 2018-10-23 DIAGNOSIS — M25.561 ACUTE PAIN OF BOTH KNEES: ICD-10-CM

## 2018-10-23 DIAGNOSIS — M17.12 PRIMARY OSTEOARTHRITIS OF LEFT KNEE: ICD-10-CM

## 2018-10-23 DIAGNOSIS — M17.11 PRIMARY OSTEOARTHRITIS OF RIGHT KNEE: ICD-10-CM

## 2018-10-23 DIAGNOSIS — M25.562 ACUTE PAIN OF BOTH KNEES: Primary | ICD-10-CM

## 2018-10-23 DIAGNOSIS — M25.561 ACUTE PAIN OF BOTH KNEES: Primary | ICD-10-CM

## 2018-10-23 DIAGNOSIS — M25.562 ACUTE PAIN OF BOTH KNEES: ICD-10-CM

## 2018-10-23 PROCEDURE — 20610 DRAIN/INJ JOINT/BURSA W/O US: CPT | Performed by: ORTHOPAEDIC SURGERY

## 2018-10-23 PROCEDURE — 99203 OFFICE O/P NEW LOW 30 MIN: CPT | Performed by: ORTHOPAEDIC SURGERY

## 2018-10-23 PROCEDURE — 73562 X-RAY EXAM OF KNEE 3: CPT

## 2018-10-23 RX ORDER — BUPIVACAINE HYDROCHLORIDE 2.5 MG/ML
2 INJECTION, SOLUTION INFILTRATION; PERINEURAL
Status: COMPLETED | OUTPATIENT
Start: 2018-10-23 | End: 2018-10-23

## 2018-10-23 RX ORDER — METHYLPREDNISOLONE ACETATE 40 MG/ML
2 INJECTION, SUSPENSION INTRA-ARTICULAR; INTRALESIONAL; INTRAMUSCULAR; SOFT TISSUE
Status: COMPLETED | OUTPATIENT
Start: 2018-10-23 | End: 2018-10-23

## 2018-10-23 RX ADMIN — METHYLPREDNISOLONE ACETATE 2 ML: 40 INJECTION, SUSPENSION INTRA-ARTICULAR; INTRALESIONAL; INTRAMUSCULAR; SOFT TISSUE at 16:49

## 2018-10-23 RX ADMIN — METHYLPREDNISOLONE ACETATE 2 ML: 40 INJECTION, SUSPENSION INTRA-ARTICULAR; INTRALESIONAL; INTRAMUSCULAR; SOFT TISSUE at 16:48

## 2018-10-23 RX ADMIN — BUPIVACAINE HYDROCHLORIDE 2 ML: 2.5 INJECTION, SOLUTION INFILTRATION; PERINEURAL at 16:49

## 2018-10-23 RX ADMIN — BUPIVACAINE HYDROCHLORIDE 2 ML: 2.5 INJECTION, SOLUTION INFILTRATION; PERINEURAL at 16:48

## 2018-10-23 NOTE — PROGRESS NOTES
Assessment/Plan:  Assessment/Plan   Problem List Items Addressed This Visit     None      Visit Diagnoses     Acute pain of both knees    -  Primary    Relevant Orders    XR knee 3 vw right non injury    XR knee 3 vw left non injury    Primary osteoarthritis of right knee        Relevant Orders    Large joint arthrocentesis    Primary osteoarthritis of left knee        Relevant Orders    Large joint arthrocentesis        Bilateral knee DJD with right greater than left  Both knees with varus deformity    · WBAT  · Bilateral knee steroid injection in the office today  · Consider right total knee replacement in the future  · Follow up in 3 months   · Discussed pros and cons of operative and non operative intervention with patient and he will follow up with me within the next 3 months        Subjective:   Patient ID: Theresa Mosquera is a 60 y o  male  HPI     Patient comes in today for evaluation for bilateral knee pain, worse on the right  He notes his pain has been going on since 2005 with no injury  Patient he has a left knee scope 2001 and had right knee visco supplementation with no relief  Patient states he is having constant stabbing sharp medial and lateral joint line bilaterally  Pain is worse walking or standing for a long period of time, and going up and down steps  He has tried OTC NSAIDs with no relief  The following portions of the patient's history were reviewed and updated as appropriate: allergies, current medications, past family history, past medical history, past social history, past surgical history and problem list     Review of Systems   Constitutional: Negative  HENT: Negative  Eyes: Negative  Respiratory: Negative  Cardiovascular: Negative  Gastrointestinal: Negative  Endocrine: Negative  Musculoskeletal: Negative  Skin: Negative  Neurological: Negative  Psychiatric/Behavioral: Negative          Objective:  Past Medical History:   Diagnosis Date    Anesthesia complication     difficulty awakening- dyspnea    Anxiety     Arthritis     Cleft hard palate     Glaucoma suspect, both eyes     Hyperlipidemia     Kidney stone     left    Right inguinal hernia     Right ureteral stone     Seasonal allergies     Wears dentures     partial upper    Wears glasses      Past Surgical History:   Procedure Laterality Date    CLEFT PALATE REPAIR      INGUINAL HERNIA REPAIR Right     KNEE CARTILAGE SURGERY Left     [place] CYSTO/URETERO W/LITHOTRIPSY &INDWELL STENT INSRT Right 8/4/2017    Procedure: CYSTOSCOPY URETEROSCOPY WITH LITHOTRIPSY HOLMIUM LASER, RETROGRADE PYELOGRAM AND INSERTION STENT URETERAL;  Surgeon: Malena Dias MD;  Location: [facility];  Service: Urology   [place]   Family History   Problem Relation Age of Onset    Diabetes Mother     Heart disease Mother     Hypertension Mother    [facility] Esophageal cancer Father     Diabetes Sister     Other Sister      Social History     Social History    Marital status: /Civil Union     Spouse name: N/A    Number of children: N/A    Years of education: N/A     Occupational History    Not on file       Social History Main Topics    Smoking status: Never Smoker    Smokeless tobacco: Never Used      Comment: no passive smoke exposure    Alcohol use Yes      Comment: 1x wk    Drug use: No    Sexual activity: Not on file     Other Topics Concern    Not on file     Social History Narrative    No narrative on file     Current Outpatient Prescriptions:     Bioflavonoid Products (VITAMIN C) CHEW, Chew 2 tablets daily, Disp: , Rfl:     cetirizine (ZyrTEC) 10 mg tablet, Take 10 mg by mouth daily, Disp: , Rfl:     Cholecalciferol 2000 units CAPS, Take by mouth, Disp: , Rfl:     fluticasone (FLONASE) 50 mcg/act nasal spray, Blow nose; shake bottle; place tip in each nostril and tilt towards eye; hold breath and press plunger; do this 2 times daily prn, Disp: , Rfl:     hydrOXYzine pamoate (VISTARIL) 25 mg capsule, Take 1 capsule (25 mg total) by mouth 3 (three) times a day as needed for itching, Disp: 30 capsule, Rfl: 0    ibuprofen (MOTRIN) 200 mg tablet, Take 200-800 mg by mouth every 6 (six) hours as needed for mild pain, Disp: , Rfl:     Multiple Vitamin (MULTIVITAMIN) tablet, Take 2 tablets by mouth daily, Disp: , Rfl:     Omega-3 Fatty Acids (FISH OIL PO), Take 1 capsule by mouth daily, Disp: , Rfl:     simvastatin (ZOCOR) 40 mg tablet, Take 1 tablet (40 mg total) by mouth daily at bedtime, Disp: 90 tablet, Rfl: 0    triamcinolone (KENALOG) 0 5 % cream, Apply topically 3 (three) times a day, Disp: 30 g, Rfl: 0   Allergies   Allergen Reactions    No Active Allergies      Ortho Exam  Right knee exam:  No lacerations, no abrasions  No erythema  Straight leg with crepitus  Mild TTP over medial and lateral joint line  Positive Patella grind test  Varus deformity B/L   NVID     Left knee exam:  No abrasions, no open wounds  Stable to coronal sagittal plane stress  Medial joint line tenderness to palpation  Full range of motion  Neurologically and vascularly intact distally     Physical Exam   Constitutional: He is oriented to person, place, and time  He appears well-developed and well-nourished  HENT:   Right Ear: External ear normal    Left Ear: External ear normal    Eyes: Pupils are equal, round, and reactive to light  Cardiovascular: Normal rate and regular rhythm  Pulmonary/Chest: Effort normal and breath sounds normal    Neurological: He is alert and oriented to person, place, and time  Skin: Skin is warm and dry  Psychiatric: He has a normal mood and affect  His behavior is normal  Thought content normal        I have personally reviewed pertinent films in PACS  XR Right knee findings: Severe arthritis  XR Left knee findings:  Moderate arthritis   Both of the arthritic changes of mostly over the medial tibial femoral compartment    Large joint arthrocentesis  Date/Time: 10/23/2018 4:48 PM  Consent given by: patient  Site marked: site marked  Timeout: Immediately prior to procedure a time out was called to verify the correct patient, procedure, equipment, support staff and site/side marked as required   Supporting Documentation  Indications: pain   Procedure Details  Location: knee - R knee  Preparation: Patient was prepped and draped in the usual sterile fashion  Needle size: 22 G  Medications administered: 2 mL bupivacaine 0 25 %; 2 mL methylPREDNISolone acetate 40 mg/mL    Patient tolerance: patient tolerated the procedure well with no immediate complications  Dressing:  Sterile dressing applied  Large joint arthrocentesis  Date/Time: 10/23/2018 4:49 PM  Consent given by: patient  Site marked: site marked  Timeout: Immediately prior to procedure a time out was called to verify the correct patient, procedure, equipment, support staff and site/side marked as required   Supporting Documentation  Indications: pain   Procedure Details  Location: knee - L knee  Preparation: Patient was prepped and draped in the usual sterile fashion  Approach: anterolateral  Medications administered: 2 mL bupivacaine 0 25 %; 2 mL methylPREDNISolone acetate 40 mg/mL    Patient tolerance: patient tolerated the procedure well with no immediate complications  Dressing:  Sterile dressing applied      Scribe Attestation    I,:   Yolanda Dang am acting as a scribe while in the presence of the attending physician :        I,:   Aba Cox MD personally performed the services described in this documentation    as scribed in my presence :

## 2018-11-02 ENCOUNTER — OFFICE VISIT (OUTPATIENT)
Dept: FAMILY MEDICINE CLINIC | Facility: CLINIC | Age: 58
End: 2018-11-02
Payer: COMMERCIAL

## 2018-11-02 VITALS
RESPIRATION RATE: 16 BRPM | HEIGHT: 67 IN | WEIGHT: 263.8 LBS | HEART RATE: 81 BPM | SYSTOLIC BLOOD PRESSURE: 138 MMHG | TEMPERATURE: 98.1 F | OXYGEN SATURATION: 93 % | BODY MASS INDEX: 41.4 KG/M2 | DIASTOLIC BLOOD PRESSURE: 70 MMHG

## 2018-11-02 DIAGNOSIS — E78.49 OTHER HYPERLIPIDEMIA: ICD-10-CM

## 2018-11-02 DIAGNOSIS — Z23 INFLUENZA VACCINE NEEDED: Primary | ICD-10-CM

## 2018-11-02 DIAGNOSIS — I10 ESSENTIAL HYPERTENSION: ICD-10-CM

## 2018-11-02 DIAGNOSIS — E66.01 CLASS 3 SEVERE OBESITY DUE TO EXCESS CALORIES WITH SERIOUS COMORBIDITY AND BODY MASS INDEX (BMI) OF 40.0 TO 44.9 IN ADULT (HCC): ICD-10-CM

## 2018-11-02 DIAGNOSIS — E11.9 TYPE 2 DIABETES MELLITUS WITHOUT COMPLICATION, WITHOUT LONG-TERM CURRENT USE OF INSULIN (HCC): ICD-10-CM

## 2018-11-02 PROCEDURE — 1036F TOBACCO NON-USER: CPT | Performed by: FAMILY MEDICINE

## 2018-11-02 PROCEDURE — 99214 OFFICE O/P EST MOD 30 MIN: CPT | Performed by: FAMILY MEDICINE

## 2018-11-02 PROCEDURE — 3075F SYST BP GE 130 - 139MM HG: CPT | Performed by: FAMILY MEDICINE

## 2018-11-02 PROCEDURE — 3008F BODY MASS INDEX DOCD: CPT | Performed by: FAMILY MEDICINE

## 2018-11-02 PROCEDURE — 90686 IIV4 VACC NO PRSV 0.5 ML IM: CPT

## 2018-11-02 PROCEDURE — 90471 IMMUNIZATION ADMIN: CPT

## 2018-11-02 PROCEDURE — 3078F DIAST BP <80 MM HG: CPT | Performed by: FAMILY MEDICINE

## 2018-11-02 NOTE — PROGRESS NOTES
Assessment/Plan:     Diagnoses and all orders for this visit:    Type 2 diabetes mellitus without complication, without long-term current use of insulin ([facility] [address] )  Comments: It was discussed with patient about following low carb diet  Repeat blood work in 3 months and consider metformin next visit  Orders:  -     Hemoglobin A1C; Future  -     Comprehensive metabolic panel; Future    Other hyperlipidemia  Comments:  Stable, continue same  Essential hypertension  Comments:  Stable, continue same  Class 3 severe obesity due to excess calories with serious comorbidity and body mass index (BMI) of 40 0 to 44 9 in adult [facility])          Patient Instructions   Basic Carbohydrate Counting   AMBULATORY CARE:   Carbohydrate counting  is a way to plan your meals by counting the amount of carbohydrate in foods  Carbohydrates are the sugars, starches, and fiber found in fruit, grains, vegetables, and milk products  Carbohydrates increase your blood sugar levels  Carbohydrate counting can help you eat the right amount of carbohydrate to keep your blood sugar levels under control  What you need to know about planning meals using carbohydrate counting:  · A dietitian or healthcare provider will help you develop a healthy meal plan that works best for you  You will be taught how much carbohydrate to eat or drink for each meal and snack  Your meal plan will be based on your age, weight, usual food intake, and physical activity level  If you have diabetes, it will also include your blood sugar levels and diabetes medicine  Once you know how much carbohydrate you should eat, you can decide what type of food you want to eat  · You will need to know what foods contain carbohydrate and how much they contain  Keep track of the amount of carbohydrate in meals and snacks in order to follow your meal plan  Do not avoid carbohydrates or skip meals   Your blood sugar may fall too low if you do not eat enough carbohydrate or you skip meals   Foods that contain carbohydrate:   · Breads:  Each serving of food listed below contains about 15 g of carbohydrate   ¨ 1 slice of bread (1 ounce) or 1 flour or corn tortilla (6 inch)    ¨ ½ of a hamburger bun or ¼ of a large bagel (about 1 ounce)    ¨ 1 pancake (about 4 inches across and ¼ inch thick)    · Cereals and grains:  Serving sizes of ready-to-eat cereals vary  Look at the serving size and the total carbohydrate amount listed on the food label  Each serving of food listed below contains about 15 g of carbohydrate   ¨ ¾ cup of dry, unsweetened, ready-to-eat cereal or ¼ cup of low-fat granola     ¨ ½ cup of oatmeal or other cooked cereal     ¨ ? cup of cooked rice or pasta    · Starchy vegetables and beans:  Each serving of food listed below contains about 15 g of carbohydrate   ¨ ½ cup of corn, green peas, sweet potatoes, or mashed potatoes    ¨ ¼ of a large baked potato    ¨ ½ cup of beans, lentils, and peas (garbanzo, dolan, kidney, white, split, black-eyed)    · Crackers and snacks:  Each serving of food listed below contains about 15 g of carbohydrate   ¨ 3 marycruz cracker squares or 8 animal crackers     ¨ 6 saltine-type crackers    ¨ 3 cups of popcorn or ¾ ounce of pretzels, potato chips, or tortilla chips    · Fruit:  Each serving of food listed below contains about 15 g of carbohydrate   ¨ 1 small (4 ounce) piece of fresh fruit or ¾ to 1 cup of fresh fruit    ¨ ½ cup of canned or frozen fruit, packed in natural juice    ¨ ½ cup (4 ounces) of unsweetened fruit juice    ¨ 2 tablespoons of dried fruit    · Desserts or sugary foods:  Each serving of food listed below contains about 15 g of carbohydrate       ¨ 2-inch square unfrosted cake or brownie     ¨ 2 small cookies    ¨ ½ cup of ice cream, frozen yogurt, or nondairy frozen yogurt    ¨ ¼ cup of sherbet or sorbet    ¨ 1 tablespoon of regular syrup, jam, or jelly    ¨ 2 tablespoons of light syrup    · Milk and yogurt:  Foods from the milk group contain about 12 g of carbohydrate per serving  ¨ 1 cup of fat-free or low-fat milk    ¨ 1 cup of soy milk    ¨ ? cup of fat-free, yogurt sweetened with artificial sweetener    · Non-starchy vegetables:  Each serving contains about 5 g of carbohydrate   Three servings of non-starch vegetables count as 1 carbohydrate serving  ¨ ½ cup of cooked vegetables or 1 cup of raw vegetables  This includes beets, broccoli, cabbage, cauliflower, cucumber, mushrooms, tomatoes, and zucchini    ¨ ½ cup of vegetable juice  How to use carbohydrate counting to plan meals:   · Count carbohydrate amounts using serving sizes:      ¨ Pasta dinner example: You plan to have pasta, tossed salad, and an 8-ounce glass of milk  Your healthcare provider tells you that you may have 4 carbohydrate servings for dinner  One carbohydrate serving of pasta is ? cup  One cup of pasta will equal 3 carbohydrate servings  An 8-ounce glass of milk will count as 1 carbohydrate serving  These amounts of food would equal 4 carbohydrate servings  One cup of tossed salad does not count toward your carbohydrate servings  · Count carbohydrate amounts using food labels:  Find the total amount of carbohydrate in a packaged food by reading the food label  Food labels tell you the serving size of the food and the total carbohydrate amount in each serving  Find the serving size on the food label and then decide how many servings you will eat  Multiply the number of servings you plan to eat by the carbohydrate amount per serving  ¨ Granola bar snack example: Your meal plan allows you to have 2 carbohydrate servings (30 grams) of carbohydrate for a snack  You plan to eat 1 package of granola bars, which contains 2 bars  According to the food label, the serving size of food in this package is 1 bar  Each serving (1 bar) contains 25 grams of carbohydrate  The total amount of carbohydrate in this package of granola bars would be 50 g   Based on your meal plan, you should eat only 1 bar  Follow up with your healthcare provider as directed:  Write down your questions so you remember to ask them during your visits  © 2017 [address]  Information is for End User's use only and may not be sold, redistributed or otherwise used for commercial purposes  All illustrations and images included in CareNotes® are the copyrighted property of [employer]  or Ady Johnson  The above information is an  only  It is not intended as medical advice for individual conditions or treatments  Talk to your doctor, nurse or pharmacist before following any medical regimen to see if it is safe and effective for you  Return in about 3 months (around 2/2/2019)  Subjective:      Patient ID: Garrett Shearer is a 60 y o  male  Chief Complaint   Patient presents with    discuss b/w results       He is here today to discuss his blood work results the he had blood work done recently showed A1c 6 8  He was never diagnosed with diabetes before  He does not watch his diet and he does not exercise  All the rest of the blood work came back stable  The following portions of the patient's history were reviewed and updated as appropriate: allergies, current medications, past family history, past medical history, past social history, past surgical history and problem list     Review of Systems   Constitutional: Negative for chills and fever  HENT: Negative for trouble swallowing  Eyes: Negative for visual disturbance  Respiratory: Negative for cough and shortness of breath  Cardiovascular: Negative for chest pain and palpitations  Gastrointestinal: Negative for abdominal pain, blood in stool and vomiting  Endocrine: Negative for cold intolerance and heat intolerance  Genitourinary: Negative for difficulty urinating and dysuria  Musculoskeletal: Negative for gait problem  Skin: Negative for rash  Neurological: Negative for dizziness, syncope and headaches  Hematological: Negative for adenopathy  Psychiatric/Behavioral: Negative for behavioral problems  Current Outpatient Prescriptions   Medication Sig Dispense Refill    Bioflavonoid Products (VITAMIN C) CHEW Chew 2 tablets daily      cetirizine (ZyrTEC) 10 mg tablet Take 10 mg by mouth daily      Cholecalciferol 2000 units CAPS Take by mouth      fluticasone (FLONASE) 50 mcg/act nasal spray Blow nose; shake bottle; place tip in each nostril and tilt towards eye; hold breath and press plunger; do this 2 times daily prn      ibuprofen (MOTRIN) 200 mg tablet Take 200-800 mg by mouth every 6 (six) hours as needed for mild pain      Multiple Vitamin (MULTIVITAMIN) tablet Take 2 tablets by mouth daily      Omega-3 Fatty Acids (FISH OIL PO) Take 1 capsule by mouth daily      simvastatin (ZOCOR) 40 mg tablet Take 1 tablet (40 mg total) by mouth daily at bedtime 90 tablet 0    hydrOXYzine pamoate (VISTARIL) 25 mg capsule Take 1 capsule (25 mg total) by mouth 3 (three) times a day as needed for itching (Patient not taking: Reported on 11/2/2018 ) 30 capsule 0    triamcinolone (KENALOG) 0 5 % cream Apply topically 3 (three) times a day (Patient not taking: Reported on 11/2/2018 ) 30 g 0     No current facility-administered medications for this visit  Objective:    /70 (BP Location: Left arm, Patient Position: Sitting, Cuff Size: Large)   Pulse 81   Temp 98 1 °F (36 7 °C) (Tympanic)   Resp 16   Ht 5' 7" (1 702 m)   Wt 120 kg (263 lb 12 8 oz)   SpO2 93%   BMI 41 32 kg/m²        Physical Exam   Constitutional: He is oriented to person, place, and time  He appears well-developed and well-nourished  HENT:   Head: Normocephalic and atraumatic  Eyes: Pupils are equal, round, and reactive to light  EOM are normal    Neck: Normal range of motion  Neck supple  Cardiovascular: Normal rate, regular rhythm and normal heart sounds  Pulmonary/Chest: Effort normal and breath sounds normal    Abdominal: Soft  Bowel sounds are normal    Musculoskeletal: Normal range of motion  He exhibits no edema  Lymphadenopathy:     He has no cervical adenopathy  Neurological: He is alert and oriented to person, place, and time  No cranial nerve deficit  Skin: Skin is warm  No rash noted  Psychiatric: He has a normal mood and affect  Nursing note and vitals reviewed               Serena Heath MD

## 2018-11-02 NOTE — PATIENT INSTRUCTIONS
Basic Carbohydrate Counting   AMBULATORY CARE:   Carbohydrate counting  is a way to plan your meals by counting the amount of carbohydrate in foods  Carbohydrates are the sugars, starches, and fiber found in fruit, grains, vegetables, and milk products  Carbohydrates increase your blood sugar levels  Carbohydrate counting can help you eat the right amount of carbohydrate to keep your blood sugar levels under control  What you need to know about planning meals using carbohydrate counting:  · A dietitian or healthcare provider will help you develop a healthy meal plan that works best for you  You will be taught how much carbohydrate to eat or drink for each meal and snack  Your meal plan will be based on your age, weight, usual food intake, and physical activity level  If you have diabetes, it will also include your blood sugar levels and diabetes medicine  Once you know how much carbohydrate you should eat, you can decide what type of food you want to eat  · You will need to know what foods contain carbohydrate and how much they contain  Keep track of the amount of carbohydrate in meals and snacks in order to follow your meal plan  Do not avoid carbohydrates or skip meals  Your blood sugar may fall too low if you do not eat enough carbohydrate or you skip meals  Foods that contain carbohydrate:   · Breads:  Each serving of food listed below contains about 15 g of carbohydrate   ¨ 1 slice of bread (1 ounce) or 1 flour or corn tortilla (6 inch)    ¨ ½ of a hamburger bun or ¼ of a large bagel (about 1 ounce)    ¨ 1 pancake (about 4 inches across and ¼ inch thick)    · Cereals and grains:  Serving sizes of ready-to-eat cereals vary  Look at the serving size and the total carbohydrate amount listed on the food label  Each serving of food listed below contains about 15 g of carbohydrate       ¨ ¾ cup of dry, unsweetened, ready-to-eat cereal or ¼ cup of low-fat granola     ¨ ½ cup of oatmeal or other cooked cereal ¨ ? cup of cooked rice or pasta    · Starchy vegetables and beans:  Each serving of food listed below contains about 15 g of carbohydrate   ¨ ½ cup of corn, green peas, sweet potatoes, or mashed potatoes    ¨ ¼ of a large baked potato    ¨ ½ cup of beans, lentils, and peas (garbanzo, dolan, kidney, white, split, black-eyed)    · Crackers and snacks:  Each serving of food listed below contains about 15 g of carbohydrate   ¨ 3 marycruz cracker squares or 8 animal crackers     ¨ 6 saltine-type crackers    ¨ 3 cups of popcorn or ¾ ounce of pretzels, potato chips, or tortilla chips    · Fruit:  Each serving of food listed below contains about 15 g of carbohydrate   ¨ 1 small (4 ounce) piece of fresh fruit or ¾ to 1 cup of fresh fruit    ¨ ½ cup of canned or frozen fruit, packed in natural juice    ¨ ½ cup (4 ounces) of unsweetened fruit juice    ¨ 2 tablespoons of dried fruit    · Desserts or sugary foods:  Each serving of food listed below contains about 15 g of carbohydrate   ¨ 2-inch square unfrosted cake or brownie     ¨ 2 small cookies    ¨ ½ cup of ice cream, frozen yogurt, or nondairy frozen yogurt    ¨ ¼ cup of sherbet or sorbet    ¨ 1 tablespoon of regular syrup, jam, or jelly    ¨ 2 tablespoons of light syrup    · Milk and yogurt:  Foods from the milk group contain about 12 g of carbohydrate per serving  ¨ 1 cup of fat-free or low-fat milk    ¨ 1 cup of soy milk    ¨ ? cup of fat-free, yogurt sweetened with artificial sweetener    · Non-starchy vegetables:  Each serving contains about 5 g of carbohydrate   Three servings of non-starch vegetables count as 1 carbohydrate serving  ¨ ½ cup of cooked vegetables or 1 cup of raw vegetables  This includes beets, broccoli, cabbage, cauliflower, cucumber, mushrooms, tomatoes, and zucchini    ¨ ½ cup of vegetable juice  How to use carbohydrate counting to plan meals:   · Count carbohydrate amounts using serving sizes:      ¨ Pasta dinner example:   You plan to have pasta, tossed salad, and an 8-ounce glass of milk  Your healthcare provider tells you that you may have 4 carbohydrate servings for dinner  One carbohydrate serving of pasta is ? cup  One cup of pasta will equal 3 carbohydrate servings  An 8-ounce glass of milk will count as 1 carbohydrate serving  These amounts of food would equal 4 carbohydrate servings  One cup of tossed salad does not count toward your carbohydrate servings  · Count carbohydrate amounts using food labels:  Find the total amount of carbohydrate in a packaged food by reading the food label  Food labels tell you the serving size of the food and the total carbohydrate amount in each serving  Find the serving size on the food label and then decide how many servings you will eat  Multiply the number of servings you plan to eat by the carbohydrate amount per serving  ¨ Granola bar snack example: Your meal plan allows you to have 2 carbohydrate servings (30 grams) of carbohydrate for a snack  You plan to eat 1 package of granola bars, which contains 2 bars  According to the food label, the serving size of food in this package is 1 bar  Each serving (1 bar) contains 25 grams of carbohydrate  The total amount of carbohydrate in this package of granola bars would be 50 g  Based on your meal plan, you should eat only 1 bar  Follow up with your healthcare provider as directed:  Write down your questions so you remember to ask them during your visits  © 2017 [address] [place] Information is for End User's use only and may not be sold, redistributed or otherwise used for commercial purposes  All illustrations and images included in CareNotes® are the copyrighted property of [employer]  or Ady Johnson  The above information is an  only  It is not intended as medical advice for individual conditions or treatments   Talk to your doctor, nurse or pharmacist before following any medical regimen to see if it is safe and effective for you

## 2018-11-23 DIAGNOSIS — E78.5 HYPERLIPIDEMIA, UNSPECIFIED HYPERLIPIDEMIA TYPE: ICD-10-CM

## 2018-11-26 RX ORDER — SIMVASTATIN 40 MG
40 TABLET ORAL
Qty: 90 TABLET | Refills: 0 | Status: SHIPPED | OUTPATIENT
Start: 2018-11-26 | End: 2019-03-08 | Stop reason: SDUPTHER

## 2019-01-18 ENCOUNTER — TELEPHONE (OUTPATIENT)
Dept: FAMILY MEDICINE CLINIC | Facility: CLINIC | Age: 59
End: 2019-01-18

## 2019-01-18 DIAGNOSIS — L23.7 POISON IVY DERMATITIS: ICD-10-CM

## 2019-01-19 RX ORDER — TRIAMCINOLONE ACETONIDE 5 MG/G
CREAM TOPICAL 3 TIMES DAILY
Qty: 30 G | Refills: 0 | Status: SHIPPED | OUTPATIENT
Start: 2019-01-19 | End: 2019-01-31

## 2019-01-30 DIAGNOSIS — B37.9 CANDIDIASIS: Primary | ICD-10-CM

## 2019-01-30 RX ORDER — KETOCONAZOLE 20 MG/G
CREAM TOPICAL DAILY
Qty: 60 G | Refills: 1 | Status: SHIPPED | OUTPATIENT
Start: 2019-01-30 | End: 2019-01-31

## 2019-01-31 ENCOUNTER — TELEPHONE (OUTPATIENT)
Dept: PREADMISSION TESTING | Facility: HOSPITAL | Age: 59
End: 2019-01-31

## 2019-01-31 ENCOUNTER — OFFICE VISIT (OUTPATIENT)
Dept: OBGYN CLINIC | Facility: HOSPITAL | Age: 59
End: 2019-01-31
Payer: COMMERCIAL

## 2019-01-31 VITALS
BODY MASS INDEX: 41.57 KG/M2 | WEIGHT: 265.4 LBS | SYSTOLIC BLOOD PRESSURE: 125 MMHG | HEART RATE: 85 BPM | DIASTOLIC BLOOD PRESSURE: 83 MMHG

## 2019-01-31 DIAGNOSIS — M17.11 PRIMARY OSTEOARTHRITIS OF RIGHT KNEE: Primary | ICD-10-CM

## 2019-01-31 DIAGNOSIS — M17.12 PRIMARY OSTEOARTHRITIS OF LEFT KNEE: ICD-10-CM

## 2019-01-31 PROCEDURE — 99213 OFFICE O/P EST LOW 20 MIN: CPT | Performed by: ORTHOPAEDIC SURGERY

## 2019-01-31 RX ORDER — TRAMADOL HYDROCHLORIDE 50 MG/1
50 TABLET ORAL EVERY 6 HOURS PRN
Qty: 30 TABLET | Refills: 0 | Status: SHIPPED | OUTPATIENT
Start: 2019-01-31 | End: 2019-03-06 | Stop reason: HOSPADM

## 2019-01-31 RX ORDER — ASCORBIC ACID 500 MG
500 TABLET ORAL DAILY
Qty: 30 TABLET | Refills: 0 | Status: SHIPPED | OUTPATIENT
Start: 2019-01-31 | End: 2019-02-13

## 2019-01-31 RX ORDER — FERROUS SULFATE TAB EC 324 MG (65 MG FE EQUIVALENT) 324 (65 FE) MG
324 TABLET DELAYED RESPONSE ORAL
Qty: 60 TABLET | Refills: 0 | Status: CANCELLED | OUTPATIENT
Start: 2019-01-31

## 2019-01-31 RX ORDER — FOLIC ACID 1 MG/1
1 TABLET ORAL DAILY
Qty: 30 TABLET | Refills: 0 | Status: SHIPPED | OUTPATIENT
Start: 2019-01-31 | End: 2019-02-27 | Stop reason: SDUPTHER

## 2019-01-31 RX ORDER — FOLIC ACID 1 MG/1
1 TABLET ORAL DAILY
Qty: 30 TABLET | Refills: 0 | Status: CANCELLED | OUTPATIENT
Start: 2019-01-31

## 2019-01-31 RX ORDER — ASCORBIC ACID 500 MG
500 TABLET ORAL DAILY
Qty: 30 TABLET | Refills: 0 | Status: CANCELLED | OUTPATIENT
Start: 2019-01-31

## 2019-01-31 RX ORDER — FERROUS SULFATE TAB EC 324 MG (65 MG FE EQUIVALENT) 324 (65 FE) MG
324 TABLET DELAYED RESPONSE ORAL
Qty: 60 TABLET | Refills: 0 | Status: SHIPPED | OUTPATIENT
Start: 2019-01-31 | End: 2019-05-21

## 2019-01-31 NOTE — PROGRESS NOTES
Assessment:  1  Primary osteoarthritis of right knee  Case request operating room: ARTHROPLASTY KNEE TOTAL    Basic metabolic panel    CBC and differential    APTT    Protime-INR    Type and screen    Case request operating room: ARTHROPLASTY KNEE TOTAL   2  Primary osteoarthritis of left knee         Plan:      Weight Bearing  as Tolerated   Discussed with patient and his wife in detail surgery for right total knee replacement  Discussed risks and benefits about the surgery  Patient agrees with plans and would like to proceed forward   Patient will need to get medical clearance for surgery    Follow  Up PO   Patient told of pros and cons of operative and non-operative intervention  Complications include infection, bleeding, scaring, nerve injury, vascular injury, continued pain, decreased range of motion, fracture, need for further surgery, DVT, PE, death, loss of limb, not obtaining results patient wished  he understood the pros and cons and consented for operative intervention  Will plan for primary right total knee arthroplasty and all associated procedures    Medical clearance    Will follow up with patient post-operatively     Discussed plan with patient and he is in agreement with treatment plan  The above stated was discussed in layman's terms and the patient expressed understanding  All questions were answered to the patient's satisfaction  Subjective:   Eliazar Kennedy is a 60 y o  male who presents bilateral knee pain pain is worse on the right  Patient had bilateral knee steroid injections on 10/23/2018 and states he had about 2 months relief from the injections  Patient states he is having constant anterior medial achy pain  Pain is worse with standing, kneeling, going up and down steps  Patient has taken NSAIDs with no relief  Patient did have right knee visco supplementation injection with no relief         Review of systems negative unless otherwise specified in HPI    Past Medical History:   Diagnosis Date    Anesthesia complication     difficulty awakening- dyspnea    Anxiety     Arthritis     Cleft hard palate     Glaucoma suspect, both eyes     Hyperlipidemia     Kidney stone     left    Right inguinal hernia     Right ureteral stone     Seasonal allergies     Wears dentures     partial upper    Wears glasses        Past Surgical History:   Procedure Laterality Date    CLEFT PALATE REPAIR      INGUINAL HERNIA REPAIR Right     KNEE CARTILAGE SURGERY Left     WY CYSTO/URETERO W/LITHOTRIPSY &INDWELL STENT INSRT Right 8/4/2017    Procedure: CYSTOSCOPY URETEROSCOPY WITH LITHOTRIPSY HOLMIUM LASER, RETROGRADE PYELOGRAM AND INSERTION STENT URETERAL;  Surgeon: Glo Humphreys MD;  Location: [facility];  Service: Urology       Family History   Problem Relation Age of Onset    Diabetes Mother     Heart disease Mother     Hypertension Mother    [facility] Esophageal cancer Father     Diabetes Sister     Other Sister        Social History     Occupational History    Not on file       Social History Main Topics    Smoking status: Never Smoker    Smokeless tobacco: Never Used      Comment: no passive smoke exposure    Alcohol use Yes      Comment: 1x wk    Drug use: No    Sexual activity: Not on file         Current Outpatient Prescriptions:     Bioflavonoid Products (VITAMIN C) CHEW, Chew 2 tablets daily, Disp: , Rfl:     cetirizine (ZyrTEC) 10 mg tablet, Take 10 mg by mouth daily, Disp: , Rfl:     Cholecalciferol 2000 units CAPS, Take by mouth, Disp: , Rfl:     fluticasone (FLONASE) 50 mcg/act nasal spray, Blow nose; shake bottle; place tip in each nostril and tilt towards eye; hold breath and press plunger; do this 2 times daily prn, Disp: , Rfl:     ibuprofen (MOTRIN) 200 mg tablet, Take 200-800 mg by mouth every 6 (six) hours as needed for mild pain, Disp: , Rfl:     Multiple Vitamin (MULTIVITAMIN) tablet, Take 2 tablets by mouth daily, Disp: , Rfl:     Omega-3 Fatty Acids (FISH OIL PO), Take 1 capsule by mouth daily, Disp: , Rfl:     simvastatin (ZOCOR) 40 mg tablet, TAKE 1 TABLET (40 MG TOTAL) BY MOUTH DAILY AT BEDTIME, Disp: 90 tablet, Rfl: 0    Allergies   Allergen Reactions    No Active Allergies             Vitals:    01/31/19 1356   BP: 125/83   Pulse: 85       Objective:            Physical Exam  · General: Awake, Alert, Oriented  · Eyes: Pupils equal, round and reactive to light  · Heart: regular rate and rhythm  · Lungs: No audible wheezing  · Abdomen: soft                    Ortho Exam  Right knee exam   No lacerations, no abrasions,  No erythema  No effusion  Tenderness to palpation over the medial lateral joint line  Stable to varus  valgus stress  There is a varus deformity  Gets near full extension  Neurovascularly Intact Distally     Diagnostics, reviewed and taken today if performed as documented:    None performed        Procedures, if performed today:    Procedures    None performed      Scribe Attestation    I,:   Joey Miller am acting as a scribe while in the presence of the attending physician :        I,:   Lucy Lambert MD personally performed the services described in this documentation    as scribed in my presence :              Portions of the record may have been created with voice recognition software  Occasional wrong word or "sound a like" substitutions may have occurred due to the inherent limitations of voice recognition software  Read the chart carefully and recognize, using context, where substitutions have occurred

## 2019-02-11 ENCOUNTER — OFFICE VISIT (OUTPATIENT)
Dept: LAB | Facility: HOSPITAL | Age: 59
End: 2019-02-11
Attending: ORTHOPAEDIC SURGERY
Payer: COMMERCIAL

## 2019-02-11 ENCOUNTER — APPOINTMENT (OUTPATIENT)
Dept: LAB | Facility: HOSPITAL | Age: 59
End: 2019-02-11
Payer: COMMERCIAL

## 2019-02-11 DIAGNOSIS — E11.9 TYPE 2 DIABETES MELLITUS WITHOUT COMPLICATION, WITHOUT LONG-TERM CURRENT USE OF INSULIN (HCC): ICD-10-CM

## 2019-02-11 DIAGNOSIS — M17.11 PRIMARY OSTEOARTHRITIS OF RIGHT KNEE: ICD-10-CM

## 2019-02-11 LAB
ABO GROUP BLD: NORMAL
ALBUMIN SERPL BCP-MCNC: 4.1 G/DL (ref 3.5–5)
ALP SERPL-CCNC: 53 U/L (ref 46–116)
ALT SERPL W P-5'-P-CCNC: 45 U/L (ref 12–78)
ANION GAP SERPL CALCULATED.3IONS-SCNC: 5 MMOL/L (ref 4–13)
APTT PPP: 28 SECONDS (ref 26–38)
AST SERPL W P-5'-P-CCNC: 24 U/L (ref 5–45)
ATRIAL RATE: 73 BPM
BASOPHILS # BLD AUTO: 0.08 THOUSANDS/ΜL (ref 0–0.1)
BASOPHILS NFR BLD AUTO: 1 % (ref 0–1)
BILIRUB SERPL-MCNC: 0.72 MG/DL (ref 0.2–1)
BLD GP AB SCN SERPL QL: NEGATIVE
BUN SERPL-MCNC: 15 MG/DL (ref 5–25)
CALCIUM SERPL-MCNC: 9.1 MG/DL (ref 8.3–10.1)
CHLORIDE SERPL-SCNC: 107 MMOL/L (ref 100–108)
CO2 SERPL-SCNC: 26 MMOL/L (ref 21–32)
CREAT SERPL-MCNC: 0.92 MG/DL (ref 0.6–1.3)
CRP SERPL QL: <3 MG/L
EOSINOPHIL # BLD AUTO: 0.46 THOUSAND/ΜL (ref 0–0.61)
EOSINOPHIL NFR BLD AUTO: 6 % (ref 0–6)
ERYTHROCYTE [DISTWIDTH] IN BLOOD BY AUTOMATED COUNT: 12.5 % (ref 11.6–15.1)
EST. AVERAGE GLUCOSE BLD GHB EST-MCNC: 146 MG/DL
FERRITIN SERPL-MCNC: 108 NG/ML (ref 8–388)
GFR SERPL CREATININE-BSD FRML MDRD: 91 ML/MIN/1.73SQ M
GLUCOSE SERPL-MCNC: 126 MG/DL (ref 65–140)
HBA1C MFR BLD: 6.7 % (ref 4.2–6.3)
HCT VFR BLD AUTO: 46 % (ref 36.5–49.3)
HGB BLD-MCNC: 15.3 G/DL (ref 12–17)
IMM GRANULOCYTES # BLD AUTO: 0.02 THOUSAND/UL (ref 0–0.2)
IMM GRANULOCYTES NFR BLD AUTO: 0 % (ref 0–2)
INR PPP: 1.01 (ref 0.86–1.17)
IRON SATN MFR SERPL: 39 %
IRON SERPL-MCNC: 152 UG/DL (ref 65–175)
LYMPHOCYTES # BLD AUTO: 2.37 THOUSANDS/ΜL (ref 0.6–4.47)
LYMPHOCYTES NFR BLD AUTO: 31 % (ref 14–44)
MCH RBC QN AUTO: 29.9 PG (ref 26.8–34.3)
MCHC RBC AUTO-ENTMCNC: 33.3 G/DL (ref 31.4–37.4)
MCV RBC AUTO: 90 FL (ref 82–98)
MONOCYTES # BLD AUTO: 0.55 THOUSAND/ΜL (ref 0.17–1.22)
MONOCYTES NFR BLD AUTO: 7 % (ref 4–12)
NEUTROPHILS # BLD AUTO: 4.18 THOUSANDS/ΜL (ref 1.85–7.62)
NEUTS SEG NFR BLD AUTO: 55 % (ref 43–75)
NRBC BLD AUTO-RTO: 0 /100 WBCS
P AXIS: 16 DEGREES
PLATELET # BLD AUTO: 257 THOUSANDS/UL (ref 149–390)
PMV BLD AUTO: 11.8 FL (ref 8.9–12.7)
POTASSIUM SERPL-SCNC: 4.7 MMOL/L (ref 3.5–5.3)
PR INTERVAL: 124 MS
PROT SERPL-MCNC: 7.9 G/DL (ref 6.4–8.2)
PROTHROMBIN TIME: 13.4 SECONDS (ref 11.8–14.2)
QRS AXIS: -5 DEGREES
QRSD INTERVAL: 90 MS
QT INTERVAL: 380 MS
QTC INTERVAL: 418 MS
RBC # BLD AUTO: 5.12 MILLION/UL (ref 3.88–5.62)
RH BLD: POSITIVE
SODIUM SERPL-SCNC: 138 MMOL/L (ref 136–145)
SPECIMEN EXPIRATION DATE: NORMAL
T WAVE AXIS: 16 DEGREES
TIBC SERPL-MCNC: 387 UG/DL (ref 250–450)
VENTRICULAR RATE: 73 BPM
WBC # BLD AUTO: 7.66 THOUSAND/UL (ref 4.31–10.16)

## 2019-02-11 PROCEDURE — 83540 ASSAY OF IRON: CPT

## 2019-02-11 PROCEDURE — 82728 ASSAY OF FERRITIN: CPT

## 2019-02-11 PROCEDURE — 85610 PROTHROMBIN TIME: CPT

## 2019-02-11 PROCEDURE — 93005 ELECTROCARDIOGRAM TRACING: CPT

## 2019-02-11 PROCEDURE — 80053 COMPREHEN METABOLIC PANEL: CPT

## 2019-02-11 PROCEDURE — 85025 COMPLETE CBC W/AUTO DIFF WBC: CPT

## 2019-02-11 PROCEDURE — 86900 BLOOD TYPING SEROLOGIC ABO: CPT

## 2019-02-11 PROCEDURE — 86850 RBC ANTIBODY SCREEN: CPT

## 2019-02-11 PROCEDURE — 86901 BLOOD TYPING SEROLOGIC RH(D): CPT

## 2019-02-11 PROCEDURE — 36415 COLL VENOUS BLD VENIPUNCTURE: CPT

## 2019-02-11 PROCEDURE — 83550 IRON BINDING TEST: CPT

## 2019-02-11 PROCEDURE — 85730 THROMBOPLASTIN TIME PARTIAL: CPT

## 2019-02-11 PROCEDURE — 83036 HEMOGLOBIN GLYCOSYLATED A1C: CPT

## 2019-02-11 PROCEDURE — 86140 C-REACTIVE PROTEIN: CPT

## 2019-02-11 PROCEDURE — 93010 ELECTROCARDIOGRAM REPORT: CPT | Performed by: INTERNAL MEDICINE

## 2019-02-11 NOTE — PRE-PROCEDURE INSTRUCTIONS
Pre-Surgery Instructions:   Medication Instructions    ascorbic acid (VITAMIN C) 500 mg tablet Instructed patient per Anesthesia Guidelines  LAST DOSE 3/3/19    Bioflavonoid Products (VITAMIN C) CHEW Instructed patient per Anesthesia Guidelines  LAST DOSE 3/3/19    cetirizine (ZyrTEC) 10 mg tablet Instructed patient per Anesthesia Guidelines  MAY TAKE THE DAY OF SURGERY WITH SMALL SIP OF WATER    Cholecalciferol 2000 units CAPS Instructed patient per Anesthesia Guidelines  LAST DOSE 2/24/19    ferrous sulfate 324 (65 Fe) mg Instructed patient per Anesthesia Guidelines  LAST DOSE 3/3/19    fluticasone (FLONASE) 50 mcg/act nasal spray Instructed patient per Anesthesia Guidelines  MAY USE THE DAY OF SURGERY     folic acid (FOLVITE) 1 mg tablet Instructed patient per Anesthesia Guidelines  LAST DOSE 3/3/19    ibuprofen (MOTRIN) 200 mg tablet Instructed patient per Anesthesia Guidelines  LAST DOSE 2/24/19    Multiple Vitamin (MULTIVITAMIN) tablet Instructed patient per Anesthesia Guidelines  LAST DOSE 2/24/19    Omega-3 Fatty Acids (FISH OIL PO) Instructed patient per Anesthesia Guidelines  LAST DOSE 2/24/19    simvastatin (ZOCOR) 40 mg tablet Instructed patient per Anesthesia Guidelines  LAST DOSE 3/3/19 PM     traMADol (ULTRAM) 50 mg tablet Instructed patient per Anesthesia Guidelines  LAST DOSE 3/3/19    NO MOTRIN ALEVE ADVIL IBUPROFEN OR ASPIRIN AFTER 2/24/19  FOLLOW BATHING INSTRUCTIONS PROVIDED AND INCENTIVE SPIROMETER INSTRUCTIONS  NSAID Med Class     Stop taking this medication at least 3 days prior to surgery/procedure  Opioid Med Class     Continue to take this medication on your normal schedule  If this is an oral medication and you take it in the morning, then you may take this medicine with a sip of water  Statin Med Class     Continue to take this medication on your normal schedule    If this is an oral medication and you take it in the morning, then you may take this medicine with a sip of water

## 2019-02-13 ENCOUNTER — TELEPHONE (OUTPATIENT)
Dept: FAMILY MEDICINE CLINIC | Facility: CLINIC | Age: 59
End: 2019-02-13

## 2019-02-13 ENCOUNTER — TELEPHONE (OUTPATIENT)
Dept: OBGYN CLINIC | Facility: HOSPITAL | Age: 59
End: 2019-02-13

## 2019-02-13 ENCOUNTER — OFFICE VISIT (OUTPATIENT)
Dept: FAMILY MEDICINE CLINIC | Facility: CLINIC | Age: 59
End: 2019-02-13
Payer: COMMERCIAL

## 2019-02-13 VITALS
RESPIRATION RATE: 16 BRPM | HEART RATE: 86 BPM | DIASTOLIC BLOOD PRESSURE: 70 MMHG | SYSTOLIC BLOOD PRESSURE: 124 MMHG | HEIGHT: 67 IN | OXYGEN SATURATION: 94 % | BODY MASS INDEX: 41.63 KG/M2 | TEMPERATURE: 98 F | WEIGHT: 265.25 LBS

## 2019-02-13 DIAGNOSIS — M17.11 PRIMARY OSTEOARTHRITIS OF RIGHT KNEE: Primary | ICD-10-CM

## 2019-02-13 DIAGNOSIS — Z23 IMMUNIZATION DUE: ICD-10-CM

## 2019-02-13 DIAGNOSIS — Z12.11 COLON CANCER SCREENING: ICD-10-CM

## 2019-02-13 DIAGNOSIS — Z01.818 PRE-OP EXAMINATION: ICD-10-CM

## 2019-02-13 DIAGNOSIS — Z91.89 ENCOUNTER FOR HEPATITIS C VIRUS SCREENING TEST FOR HIGH RISK PATIENT: ICD-10-CM

## 2019-02-13 DIAGNOSIS — Z11.59 ENCOUNTER FOR HEPATITIS C VIRUS SCREENING TEST FOR HIGH RISK PATIENT: ICD-10-CM

## 2019-02-13 PROCEDURE — 99214 OFFICE O/P EST MOD 30 MIN: CPT | Performed by: FAMILY MEDICINE

## 2019-02-13 NOTE — PROGRESS NOTES
Assessment/Plan:     Diagnoses and all orders for this visit:    Primary osteoarthritis of right knee  Comments:    He is getting surgery on  March 4th    Pre-op examination  Comments:   he is an acceptable risk for the proposed procedure    Colon cancer screening  -     Occult Blood, Fecal Immunochemical; Future    Immunization due  -     PNEUMOCOCCAL POLYSACCHARIDE VACCINE 23-VALENT =>3YO SQ IM    Encounter for hepatitis C virus screening test for high risk patient  -     Hepatitis C antibody; Future          There are no Patient Instructions on file for this visit  Return if symptoms worsen or fail to improve  Subjective:      Patient ID: Theresa Mosquera is a 60 y o  male  Chief Complaint   Patient presents with    Pre-op Exam     Right knee replacement 3/4/19       Patient states that his right knee will be operating on March 4th, total knee operation  Patient states that since 2005 he's been having difficulty ambulating  He states that x-rays were preformed and no cartilage in joint space  EKG was performed Monday, normal sinus rhythm, normal results  Patient states he's been experiencing some mild congestion since Sunday  Has runny nose, and occasional productive cough  Denies otalgia, HA  Denies: SOB, syncopal episodes, chest pain  Abdominal discomfort, N/V/D    P: Tramadol, Ibuprofen, and alieve  All of which would bring minor relief but requires surgery  The following portions of the patient's history were reviewed and updated as appropriate: allergies, current medications, past family history, past medical history, past social history, past surgical history and problem list     Review of Systems   Constitutional: Negative for chills and fever  HENT: Negative for trouble swallowing  Eyes: Negative for visual disturbance  Respiratory: Negative for cough and shortness of breath  Cardiovascular: Negative for chest pain and palpitations     Gastrointestinal: Negative for abdominal pain, blood in stool and vomiting  Endocrine: Negative for cold intolerance and heat intolerance  Genitourinary: Negative for difficulty urinating and dysuria  Musculoskeletal: Positive for gait problem  Skin: Negative for rash  Neurological: Negative for dizziness, syncope and headaches  Hematological: Negative for adenopathy  Psychiatric/Behavioral: Negative for behavioral problems  Current Outpatient Medications   Medication Sig Dispense Refill    Bioflavonoid Products (VITAMIN C) CHEW Chew 2 tablets daily      cetirizine (ZyrTEC) 10 mg tablet Take 10 mg by mouth daily      Cholecalciferol 2000 units CAPS Take by mouth      enoxaparin (LOVENOX) 30 mg/0 3 mL Inject 0 4 mL (40 mg total) under the skin daily 28 Syringe 0    ferrous sulfate 324 (65 Fe) mg Take 1 tablet (324 mg total) by mouth 2 (two) times a day before meals 60 tablet 0    fluticasone (FLONASE) 50 mcg/act nasal spray Blow nose; shake bottle; place tip in each nostril and tilt towards eye; hold breath and press plunger; do this 2 times daily prn      folic acid (FOLVITE) 1 mg tablet Take 1 tablet (1 mg total) by mouth daily 30 tablet 0    ibuprofen (MOTRIN) 200 mg tablet Take 200-800 mg by mouth every 6 (six) hours as needed for mild pain      Multiple Vitamin (MULTIVITAMIN) tablet Take 2 tablets by mouth daily      Omega-3 Fatty Acids (FISH OIL PO) Take 1 capsule by mouth daily      simvastatin (ZOCOR) 40 mg tablet TAKE 1 TABLET (40 MG TOTAL) BY MOUTH DAILY AT BEDTIME 90 tablet 0    traMADol (ULTRAM) 50 mg tablet Take 1 tablet (50 mg total) by mouth every 6 (six) hours as needed for moderate pain 30 tablet 0     No current facility-administered medications for this visit          Objective:    /70 (BP Location: Left arm, Patient Position: Sitting, Cuff Size: Large)   Pulse 86   Temp 98 °F (36 7 °C) (Tympanic)   Resp 16   Ht 5' 7" (1 702 m)   Wt 120 kg (265 lb 4 oz)   SpO2 94%   BMI 41 54 kg/m² Physical Exam   Constitutional: He is oriented to person, place, and time  He appears well-developed and well-nourished  HENT:   Head: Normocephalic and atraumatic  Eyes: Pupils are equal, round, and reactive to light  EOM are normal    Neck: Normal range of motion  Neck supple  Cardiovascular: Normal rate, regular rhythm and normal heart sounds  Pulmonary/Chest: Effort normal and breath sounds normal    Abdominal: Soft  Bowel sounds are normal    Musculoskeletal: Normal range of motion  He exhibits no edema  Lymphadenopathy:     He has no cervical adenopathy  Neurological: He is alert and oriented to person, place, and time  No cranial nerve deficit  Skin: Skin is warm  No rash noted  Psychiatric: He has a normal mood and affect  Nursing note and vitals reviewed               Luis Fernando Whiteside MD

## 2019-02-13 NOTE — H&P (VIEW-ONLY)
Assessment/Plan:     Diagnoses and all orders for this visit:    Primary osteoarthritis of right knee  Comments:    He is getting surgery on  March 4th    Pre-op examination  Comments:   he is an acceptable risk for the proposed procedure    Colon cancer screening  -     Occult Blood, Fecal Immunochemical; Future    Immunization due  -     PNEUMOCOCCAL POLYSACCHARIDE VACCINE 23-VALENT =>3YO SQ IM    Encounter for hepatitis C virus screening test for high risk patient  -     Hepatitis C antibody; Future          There are no Patient Instructions on file for this visit  Return if symptoms worsen or fail to improve  Subjective:      Patient ID: Miladys Pérez is a 60 y o  male  Chief Complaint   Patient presents with    Pre-op Exam     Right knee replacement 3/4/19       Patient states that his right knee will be operating on March 4th, total knee operation  Patient states that since 2005 he's been having difficulty ambulating  He states that x-rays were preformed and no cartilage in joint space  EKG was performed Monday, normal sinus rhythm, normal results  Patient states he's been experiencing some mild congestion since Sunday  Has runny nose, and occasional productive cough  Denies otalgia, HA  Denies: SOB, syncopal episodes, chest pain  Abdominal discomfort, N/V/D    P: Tramadol, Ibuprofen, and alieve  All of which would bring minor relief but requires surgery  The following portions of the patient's history were reviewed and updated as appropriate: allergies, current medications, past family history, past medical history, past social history, past surgical history and problem list     Review of Systems   Constitutional: Negative for chills and fever  HENT: Negative for trouble swallowing  Eyes: Negative for visual disturbance  Respiratory: Negative for cough and shortness of breath  Cardiovascular: Negative for chest pain and palpitations     Gastrointestinal: Negative for abdominal pain, blood in stool and vomiting  Endocrine: Negative for cold intolerance and heat intolerance  Genitourinary: Negative for difficulty urinating and dysuria  Musculoskeletal: Positive for gait problem  Skin: Negative for rash  Neurological: Negative for dizziness, syncope and headaches  Hematological: Negative for adenopathy  Psychiatric/Behavioral: Negative for behavioral problems  Current Outpatient Medications   Medication Sig Dispense Refill    Bioflavonoid Products (VITAMIN C) CHEW Chew 2 tablets daily      cetirizine (ZyrTEC) 10 mg tablet Take 10 mg by mouth daily      Cholecalciferol 2000 units CAPS Take by mouth      enoxaparin (LOVENOX) 30 mg/0 3 mL Inject 0 4 mL (40 mg total) under the skin daily 28 Syringe 0    ferrous sulfate 324 (65 Fe) mg Take 1 tablet (324 mg total) by mouth 2 (two) times a day before meals 60 tablet 0    fluticasone (FLONASE) 50 mcg/act nasal spray Blow nose; shake bottle; place tip in each nostril and tilt towards eye; hold breath and press plunger; do this 2 times daily prn      folic acid (FOLVITE) 1 mg tablet Take 1 tablet (1 mg total) by mouth daily 30 tablet 0    ibuprofen (MOTRIN) 200 mg tablet Take 200-800 mg by mouth every 6 (six) hours as needed for mild pain      Multiple Vitamin (MULTIVITAMIN) tablet Take 2 tablets by mouth daily      Omega-3 Fatty Acids (FISH OIL PO) Take 1 capsule by mouth daily      simvastatin (ZOCOR) 40 mg tablet TAKE 1 TABLET (40 MG TOTAL) BY MOUTH DAILY AT BEDTIME 90 tablet 0    traMADol (ULTRAM) 50 mg tablet Take 1 tablet (50 mg total) by mouth every 6 (six) hours as needed for moderate pain 30 tablet 0     No current facility-administered medications for this visit          Objective:    /70 (BP Location: Left arm, Patient Position: Sitting, Cuff Size: Large)   Pulse 86   Temp 98 °F (36 7 °C) (Tympanic)   Resp 16   Ht 5' 7" (1 702 m)   Wt 120 kg (265 lb 4 oz)   SpO2 94%   BMI 41 54 kg/m² Physical Exam   Constitutional: He is oriented to person, place, and time  He appears well-developed and well-nourished  HENT:   Head: Normocephalic and atraumatic  Eyes: Pupils are equal, round, and reactive to light  EOM are normal    Neck: Normal range of motion  Neck supple  Cardiovascular: Normal rate, regular rhythm and normal heart sounds  Pulmonary/Chest: Effort normal and breath sounds normal    Abdominal: Soft  Bowel sounds are normal    Musculoskeletal: Normal range of motion  He exhibits no edema  Lymphadenopathy:     He has no cervical adenopathy  Neurological: He is alert and oriented to person, place, and time  No cranial nerve deficit  Skin: Skin is warm  No rash noted  Psychiatric: He has a normal mood and affect  Nursing note and vitals reviewed               Gonsalo Diamond MD

## 2019-02-14 ENCOUNTER — TELEPHONE (OUTPATIENT)
Dept: OBGYN CLINIC | Facility: HOSPITAL | Age: 59
End: 2019-02-14

## 2019-02-14 NOTE — TELEPHONE ENCOUNTER
RX for enoxaparin doseage conflicts with directions  I clarified with Santos Jane, per him the RX should be for lovenox 40mg/0 4mL, SIG inject 0 4mL under skin daily after surgery  Called [facility] in [place] and spoke with their Rp  I updated him that the dosage is supposed to be lovenox 40mg/0 4mL with the SIG reading 'inject 0 4mL under skin daily after surgery'  [facility] was able to update Rx with 40mg dose and SIG  Rph deneis having any questions  Preoperative Elective Admission Assessment-Spoke with pt's macy     Living Situation: Pt lives in a ranch home with his Tuyet cavazos  Home Layout: Handicap accessible Ranch style home, walk-in shower with grab bars                      Steps: None, pt has a handicap ramp to enter the home  First Floor Setup: Yes  Post-op Caregiver: Ramirez Kerns  Post-op Transport: Ramirez Kerns  Outpatient Physical Therapy Site: [facility]  DME: Pt has a RW, cane and BSC  Patient's Current Level of Function: Pt currently is ambulating independently and is independent with his ADLs  Medication Management: Pt's macy puts meds in a pillbox for pt                      Preferred Pharmacy: [facility] in [place]                     Blood Management Vitamins: Pt confirms he is taking daily iron, MV, vitamin c and folic acid                     Post-op anticoagulant: Lovenox at [facility] to be picked up, Tuyet Saldaña advised to pick this up prior to pt arrival on DOS  DC Plan: Pt plans to be DCd to home and plans to attend outpatient PT at [facility]                     Barriers to DC identified preoperatively:     BMI: 41 57 at [facility] on 1/31  Caresense: Pt enrolled                     RAPT: Score 12, already in caresense                      ACE/ARB Form: N/A GFR 91                     HOOS/KOOS: Score 36 931, already in caresense       Patient Education:  Pt educated on post-op pain, early mobilization (POD0), indication for/use of incentive spirometer (10x/hour while awake) and indication for/use of foot/leg pumps (18 hours/day)  educated that our goal, if at all possible, is to appropriately discharge patient based off their post-op function while striving to maintain maximal independence  If possible, the goal is to discharge patient to home and for them to attend outpatient physical therapy  I educated patient on the many benefits of outpt PT(Including maintaining independence, additional resources at outpt site, better outcomes etc  )  Also educated on how home PT vs  outpt PT is determined (while inpt)  Pt denies having any questions at this time  Pt encouraged to call me with any questions, concerns or issues

## 2019-02-16 ENCOUNTER — APPOINTMENT (OUTPATIENT)
Dept: LAB | Facility: IMAGING CENTER | Age: 59
End: 2019-02-16
Payer: COMMERCIAL

## 2019-02-16 ENCOUNTER — TRANSCRIBE ORDERS (OUTPATIENT)
Dept: ADMINISTRATIVE | Facility: HOSPITAL | Age: 59
End: 2019-02-16

## 2019-02-16 DIAGNOSIS — Z11.59 ENCOUNTER FOR HEPATITIS C VIRUS SCREENING TEST FOR HIGH RISK PATIENT: ICD-10-CM

## 2019-02-16 DIAGNOSIS — Z12.11 COLON CANCER SCREENING: ICD-10-CM

## 2019-02-16 DIAGNOSIS — Z91.89 ENCOUNTER FOR HEPATITIS C VIRUS SCREENING TEST FOR HIGH RISK PATIENT: ICD-10-CM

## 2019-02-16 PROCEDURE — 36415 COLL VENOUS BLD VENIPUNCTURE: CPT

## 2019-02-16 PROCEDURE — 86803 HEPATITIS C AB TEST: CPT

## 2019-02-17 LAB — HCV AB SER QL: NORMAL

## 2019-02-25 ENCOUNTER — EVALUATION (OUTPATIENT)
Dept: PHYSICAL THERAPY | Age: 59
End: 2019-02-25
Payer: COMMERCIAL

## 2019-02-25 DIAGNOSIS — G89.29 CHRONIC PAIN OF RIGHT KNEE: Primary | ICD-10-CM

## 2019-02-25 DIAGNOSIS — Z01.818 PRE-OP EXAM: ICD-10-CM

## 2019-02-25 DIAGNOSIS — M25.561 CHRONIC PAIN OF RIGHT KNEE: Primary | ICD-10-CM

## 2019-02-25 PROCEDURE — 97161 PT EVAL LOW COMPLEX 20 MIN: CPT | Performed by: PHYSICAL THERAPIST

## 2019-02-25 PROCEDURE — 97110 THERAPEUTIC EXERCISES: CPT | Performed by: PHYSICAL THERAPIST

## 2019-02-25 NOTE — PROGRESS NOTES
PT Evaluation     Today's date: 2019  Patient name: Azeem Wilson  : 1960  MRN: [id number]  Referring provider: Antonino Miranda MD  Dx:   Encounter Diagnosis     ICD-10-CM    1  Chronic pain of right knee M25 561     G89 29    2  Pre-op exam Z01 818        Start Time: 1500  Stop Time: 1545  Total time in clinic (min): 45 minutes    Assessment  Assessment details: Azeem Wilson is a 60 y o  male who presents with signs and symptoms consistent of right hip OA  Patient presents with pain, decreased strength, decreased ROM and decreased joint mobility  Due to these impairments, Patient has difficulty performing a/iadls and work-related activities  Pt will undergo RAMON on 3/6/19 and be re-evaluated on initial therapy session  TUG (11 seconds), Virtual home assessment, ambulation/stair training, and HEP were performed during this session  Pt vocalized understanding of all education this session  Patient would benefit from skilled physical therapy to address the impairments, improve their level of function, and to improve their overall quality of life  Impairments: abnormal or restricted ROM, abnormal movement, impaired physical strength, lacks appropriate home exercise program, pain with function and weight-bearing intolerance  Understanding of Dx/Px/POC: good   Prognosis: good    Goals  Short Term Goals: to be achieved by 4 weeks  1) Patient to be independent with basic HEP  2) Decrease pain to 1/10 at its worst   3) Increase Flexion ROM by 5-10 degrees   4) Increase LE strength by 1/2 MMT grade in all deficient planes      Long Term Goals: to be achieved by discharge  1) FOTO equal to or greater than 54   2) Ambulation to improve to maximal level of function  3) Stair negotiation will improve to reciprocal   4) Sit to stand transfers will improve to maximal level of function     Plan  Patient would benefit from: skilled physical therapy  Planned modality interventions: cryotherapy  Planned therapy interventions: patient education, strengthening, stretching, therapeutic activities, therapeutic exercise, home exercise program, neuromuscular re-education, functional ROM exercises and transfer training  Frequency: Twice a week for 8 weeks follwoing surgery  Treatment plan discussed with: patient        Subjective Evaluation    History of Present Illness  Mechanism of injury: Pt has had knee pain for 15 years  Pt has had bilateral knee pain starting in October  Pt has had 2 injections which relieved symptoms for 2 months but they symptoms have returned   Symptoms are worse in right knee and patient will undergo R TKA surgery on 3/4/19 performed by Dr Christian Gonzalez  Pt has PMH of BMI, and elevated A1C  Pain  Current pain ratin  At best pain ratin  At worst pain ratin  Quality: sharp    Patient Goals  Patient goals for therapy: increased strength and decreased pain  Patient goal: Dancing, squatting         Objective     Active Range of Motion   Left Knee   Flexion: 115 degrees   Extension: 0 degrees     Right Knee   Flexion: 100 degrees   Extension: 0 degrees     Passive Range of Motion   Left Knee   Flexion: 135 degrees     Right Knee   Flexion: 125 degrees with pain    Strength/Myotome Testing     Left Hip   Planes of Motion   Flexion: 4+  Extension: 4+  Abduction: 4+  Adduction: 4+  External rotation: 4+  Internal rotation: 4+    Right Hip   Planes of Motion   Flexion: 5  Extension: 5  Abduction: 5  Adduction: 5  External rotation: 5  Internal rotation: 5    Functional Assessment        Comments  Pt demonstrates poor form during squats with pain around 75 degrees of knee flexion     TU sec  SLS: Unable on RLE          Precautions: HTN    Daily Treatment Diary     Manual                                                                                   Exercise Diary              LAQ HEP            Heel slides HEP            Quad sets HEP            Glute sets HEP            Ankle pumps HEP Modalities

## 2019-02-27 DIAGNOSIS — M17.11 PRIMARY OSTEOARTHRITIS OF RIGHT KNEE: ICD-10-CM

## 2019-02-27 RX ORDER — FOLIC ACID 1 MG/1
TABLET ORAL
Qty: 30 TABLET | Refills: 0 | Status: SHIPPED | OUTPATIENT
Start: 2019-02-27 | End: 2019-05-21

## 2019-02-28 ENCOUNTER — TELEPHONE (OUTPATIENT)
Dept: OBGYN CLINIC | Facility: HOSPITAL | Age: 59
End: 2019-02-28

## 2019-02-28 NOTE — TELEPHONE ENCOUNTER
Caller: patient  Call back number: [phone]  Patient's doctor: Dr Amber Mejia    Patient is asking if he can have a script for xanax until his surgery on 3/5  He states he is very nervous about it   Please advise

## 2019-03-03 ENCOUNTER — ANESTHESIA EVENT (OUTPATIENT)
Dept: PERIOP | Facility: HOSPITAL | Age: 59
DRG: 470 | End: 2019-03-03
Payer: COMMERCIAL

## 2019-03-04 ENCOUNTER — HOSPITAL ENCOUNTER (INPATIENT)
Facility: HOSPITAL | Age: 59
LOS: 2 days | Discharge: HOME | DRG: 470 | End: 2019-03-06
Attending: ORTHOPAEDIC SURGERY | Admitting: ORTHOPAEDIC SURGERY
Payer: COMMERCIAL

## 2019-03-04 ENCOUNTER — ANESTHESIA (OUTPATIENT)
Dept: PERIOP | Facility: HOSPITAL | Age: 59
DRG: 470 | End: 2019-03-04
Payer: COMMERCIAL

## 2019-03-04 DIAGNOSIS — Z96.651 STATUS POST TOTAL RIGHT KNEE REPLACEMENT: Primary | ICD-10-CM

## 2019-03-04 LAB
ABO GROUP BLD: NORMAL
BLD GP AB SCN SERPL QL: NEGATIVE
GLUCOSE SERPL-MCNC: 122 MG/DL (ref 65–140)
RH BLD: POSITIVE
SPECIMEN EXPIRATION DATE: NORMAL

## 2019-03-04 PROCEDURE — 86901 BLOOD TYPING SEROLOGIC RH(D): CPT | Performed by: ORTHOPAEDIC SURGERY

## 2019-03-04 PROCEDURE — 97110 THERAPEUTIC EXERCISES: CPT

## 2019-03-04 PROCEDURE — 82948 REAGENT STRIP/BLOOD GLUCOSE: CPT

## 2019-03-04 PROCEDURE — G8978 MOBILITY CURRENT STATUS: HCPCS

## 2019-03-04 PROCEDURE — 86850 RBC ANTIBODY SCREEN: CPT | Performed by: ORTHOPAEDIC SURGERY

## 2019-03-04 PROCEDURE — C1776 JOINT DEVICE (IMPLANTABLE): HCPCS | Performed by: ORTHOPAEDIC SURGERY

## 2019-03-04 PROCEDURE — 97163 PT EVAL HIGH COMPLEX 45 MIN: CPT

## 2019-03-04 PROCEDURE — C1713 ANCHOR/SCREW BN/BN,TIS/BN: HCPCS | Performed by: ORTHOPAEDIC SURGERY

## 2019-03-04 PROCEDURE — 27447 TOTAL KNEE ARTHROPLASTY: CPT | Performed by: ORTHOPAEDIC SURGERY

## 2019-03-04 PROCEDURE — 0SRC0J9 REPLACEMENT OF RIGHT KNEE JOINT WITH SYNTHETIC SUBSTITUTE, CEMENTED, OPEN APPROACH: ICD-10-PCS | Performed by: ORTHOPAEDIC SURGERY

## 2019-03-04 PROCEDURE — 86900 BLOOD TYPING SEROLOGIC ABO: CPT | Performed by: ORTHOPAEDIC SURGERY

## 2019-03-04 PROCEDURE — G8979 MOBILITY GOAL STATUS: HCPCS

## 2019-03-04 PROCEDURE — C9290 INJ, BUPIVACAINE LIPOSOME: HCPCS | Performed by: STUDENT IN AN ORGANIZED HEALTH CARE EDUCATION/TRAINING PROGRAM

## 2019-03-04 DEVICE — SMARTSET HV HIGH VISCOSITY BONE CEMENT 40G
Type: IMPLANTABLE DEVICE | Site: KNEE | Status: FUNCTIONAL
Brand: SMARTSET

## 2019-03-04 DEVICE — ATTUNE KNEE SYSTEM FEMORAL POSTERIOR STABILIZED SIZE 8 RIGHT CEMENTED
Type: IMPLANTABLE DEVICE | Site: KNEE | Status: FUNCTIONAL
Brand: ATTUNE

## 2019-03-04 DEVICE — ATTUNE KNEE SYSTEM TIBIAL INSERT ROTATING PLATFORM POSTERIOR STABILIZED SIZE 8 8MM AOX
Type: IMPLANTABLE DEVICE | Site: KNEE | Status: FUNCTIONAL
Brand: ATTUNE

## 2019-03-04 DEVICE — ATTUNE PATELLA MEDIALIZED DOME 38MM CEMENTED AOX
Type: IMPLANTABLE DEVICE | Site: KNEE | Status: FUNCTIONAL
Brand: ATTUNE

## 2019-03-04 DEVICE — ATTUNE KNEE SYSTEM TIBIAL BASE ROTATING PLATFORM SIZE 8 CEMENTED
Type: IMPLANTABLE DEVICE | Site: KNEE | Status: FUNCTIONAL
Brand: ATTUNE

## 2019-03-04 RX ORDER — DOCUSATE SODIUM 100 MG/1
100 CAPSULE, LIQUID FILLED ORAL 2 TIMES DAILY
Qty: 10 CAPSULE | Refills: 0 | Status: SHIPPED | OUTPATIENT
Start: 2019-03-04 | End: 2019-05-21

## 2019-03-04 RX ORDER — MIDAZOLAM HYDROCHLORIDE 1 MG/ML
INJECTION INTRAMUSCULAR; INTRAVENOUS AS NEEDED
Status: DISCONTINUED | OUTPATIENT
Start: 2019-03-04 | End: 2019-03-04 | Stop reason: SURG

## 2019-03-04 RX ORDER — LIDOCAINE HYDROCHLORIDE 10 MG/ML
INJECTION, SOLUTION INFILTRATION; PERINEURAL AS NEEDED
Status: DISCONTINUED | OUTPATIENT
Start: 2019-03-04 | End: 2019-03-04 | Stop reason: SURG

## 2019-03-04 RX ORDER — DOCUSATE SODIUM 100 MG/1
100 CAPSULE, LIQUID FILLED ORAL 2 TIMES DAILY
Status: DISCONTINUED | OUTPATIENT
Start: 2019-03-04 | End: 2019-03-06 | Stop reason: HOSPADM

## 2019-03-04 RX ORDER — BUPIVACAINE HYDROCHLORIDE 5 MG/ML
INJECTION, SOLUTION PERINEURAL
Status: COMPLETED | OUTPATIENT
Start: 2019-03-04 | End: 2019-03-04

## 2019-03-04 RX ORDER — SODIUM CHLORIDE, SODIUM LACTATE, POTASSIUM CHLORIDE, CALCIUM CHLORIDE 600; 310; 30; 20 MG/100ML; MG/100ML; MG/100ML; MG/100ML
125 INJECTION, SOLUTION INTRAVENOUS CONTINUOUS
Status: DISCONTINUED | OUTPATIENT
Start: 2019-03-04 | End: 2019-03-06 | Stop reason: HOSPADM

## 2019-03-04 RX ORDER — FENTANYL CITRATE 50 UG/ML
INJECTION, SOLUTION INTRAMUSCULAR; INTRAVENOUS AS NEEDED
Status: DISCONTINUED | OUTPATIENT
Start: 2019-03-04 | End: 2019-03-04 | Stop reason: SURG

## 2019-03-04 RX ORDER — TRAMADOL HYDROCHLORIDE 50 MG/1
50 TABLET ORAL EVERY 6 HOURS PRN
Qty: 30 TABLET | Refills: 0 | Status: SHIPPED | OUTPATIENT
Start: 2019-03-04 | End: 2019-03-14

## 2019-03-04 RX ORDER — SENNOSIDES 8.6 MG
650 CAPSULE ORAL EVERY 8 HOURS PRN
Qty: 30 TABLET | Refills: 0 | Status: SHIPPED | OUTPATIENT
Start: 2019-03-04 | End: 2019-04-03

## 2019-03-04 RX ORDER — FERROUS SULFATE 325(65) MG
325 TABLET ORAL 2 TIMES DAILY WITH MEALS
Status: DISCONTINUED | OUTPATIENT
Start: 2019-03-04 | End: 2019-03-06 | Stop reason: HOSPADM

## 2019-03-04 RX ORDER — CALCIUM CARBONATE 200(500)MG
1000 TABLET,CHEWABLE ORAL DAILY PRN
Status: DISCONTINUED | OUTPATIENT
Start: 2019-03-04 | End: 2019-03-06 | Stop reason: HOSPADM

## 2019-03-04 RX ORDER — TRANEXAMIC ACID 100 MG/ML
INJECTION, SOLUTION INTRAVENOUS AS NEEDED
Status: DISCONTINUED | OUTPATIENT
Start: 2019-03-04 | End: 2019-03-04 | Stop reason: HOSPADM

## 2019-03-04 RX ORDER — SODIUM CHLORIDE 9 MG/ML
100 INJECTION, SOLUTION INTRAVENOUS CONTINUOUS
Status: DISPENSED | OUTPATIENT
Start: 2019-03-04 | End: 2019-03-05

## 2019-03-04 RX ORDER — OXYCODONE HYDROCHLORIDE 10 MG/1
10 TABLET ORAL EVERY 4 HOURS PRN
Status: DISCONTINUED | OUTPATIENT
Start: 2019-03-04 | End: 2019-03-06 | Stop reason: HOSPADM

## 2019-03-04 RX ORDER — LORATADINE 10 MG/1
10 TABLET ORAL DAILY
Status: DISCONTINUED | OUTPATIENT
Start: 2019-03-04 | End: 2019-03-06 | Stop reason: HOSPADM

## 2019-03-04 RX ORDER — OXYCODONE HYDROCHLORIDE 5 MG/1
5 TABLET ORAL EVERY 4 HOURS PRN
Status: DISCONTINUED | OUTPATIENT
Start: 2019-03-04 | End: 2019-03-06 | Stop reason: HOSPADM

## 2019-03-04 RX ORDER — SENNOSIDES 8.6 MG
1 TABLET ORAL DAILY
Status: DISCONTINUED | OUTPATIENT
Start: 2019-03-05 | End: 2019-03-06 | Stop reason: HOSPADM

## 2019-03-04 RX ORDER — ACETAMINOPHEN 325 MG/1
975 TABLET ORAL ONCE
Status: COMPLETED | OUTPATIENT
Start: 2019-03-04 | End: 2019-03-04

## 2019-03-04 RX ORDER — ACETAMINOPHEN 325 MG/1
650 TABLET ORAL EVERY 6 HOURS PRN
Status: DISCONTINUED | OUTPATIENT
Start: 2019-03-04 | End: 2019-03-06 | Stop reason: HOSPADM

## 2019-03-04 RX ORDER — GABAPENTIN 300 MG/1
300 CAPSULE ORAL ONCE
Status: COMPLETED | OUTPATIENT
Start: 2019-03-04 | End: 2019-03-04

## 2019-03-04 RX ORDER — CHLORHEXIDINE GLUCONATE 0.12 MG/ML
15 RINSE ORAL ONCE
Status: COMPLETED | OUTPATIENT
Start: 2019-03-04 | End: 2019-03-04

## 2019-03-04 RX ORDER — METOCLOPRAMIDE HYDROCHLORIDE 5 MG/ML
10 INJECTION INTRAMUSCULAR; INTRAVENOUS ONCE AS NEEDED
Status: DISCONTINUED | OUTPATIENT
Start: 2019-03-04 | End: 2019-03-04 | Stop reason: HOSPADM

## 2019-03-04 RX ORDER — ONDANSETRON 2 MG/ML
4 INJECTION INTRAMUSCULAR; INTRAVENOUS ONCE AS NEEDED
Status: DISCONTINUED | OUTPATIENT
Start: 2019-03-04 | End: 2019-03-04 | Stop reason: HOSPADM

## 2019-03-04 RX ORDER — CEFAZOLIN SODIUM 1 G/3ML
INJECTION, POWDER, FOR SOLUTION INTRAMUSCULAR; INTRAVENOUS AS NEEDED
Status: DISCONTINUED | OUTPATIENT
Start: 2019-03-04 | End: 2019-03-04 | Stop reason: SURG

## 2019-03-04 RX ORDER — FENTANYL CITRATE/PF 50 MCG/ML
25 SYRINGE (ML) INJECTION
Status: DISCONTINUED | OUTPATIENT
Start: 2019-03-04 | End: 2019-03-04 | Stop reason: HOSPADM

## 2019-03-04 RX ORDER — OXYCODONE HYDROCHLORIDE 5 MG/1
TABLET ORAL
Qty: 30 TABLET | Refills: 0 | Status: SHIPPED | OUTPATIENT
Start: 2019-03-04 | End: 2019-05-21

## 2019-03-04 RX ORDER — FOLIC ACID 1 MG/1
1000 TABLET ORAL DAILY
Status: DISCONTINUED | OUTPATIENT
Start: 2019-03-04 | End: 2019-03-06 | Stop reason: HOSPADM

## 2019-03-04 RX ORDER — ONDANSETRON 2 MG/ML
INJECTION INTRAMUSCULAR; INTRAVENOUS AS NEEDED
Status: DISCONTINUED | OUTPATIENT
Start: 2019-03-04 | End: 2019-03-04 | Stop reason: SURG

## 2019-03-04 RX ORDER — PROPOFOL 10 MG/ML
INJECTION, EMULSION INTRAVENOUS CONTINUOUS PRN
Status: DISCONTINUED | OUTPATIENT
Start: 2019-03-04 | End: 2019-03-04 | Stop reason: SURG

## 2019-03-04 RX ORDER — HYDROMORPHONE HCL/PF 1 MG/ML
0.2 SYRINGE (ML) INJECTION
Status: DISCONTINUED | OUTPATIENT
Start: 2019-03-04 | End: 2019-03-04 | Stop reason: HOSPADM

## 2019-03-04 RX ORDER — TRAMADOL HYDROCHLORIDE 50 MG/1
50 TABLET ORAL EVERY 6 HOURS SCHEDULED
Status: DISCONTINUED | OUTPATIENT
Start: 2019-03-04 | End: 2019-03-06 | Stop reason: HOSPADM

## 2019-03-04 RX ORDER — ASCORBIC ACID 500 MG
500 TABLET ORAL DAILY
Status: DISCONTINUED | OUTPATIENT
Start: 2019-03-04 | End: 2019-03-06 | Stop reason: HOSPADM

## 2019-03-04 RX ORDER — BUPIVACAINE HYDROCHLORIDE 7.5 MG/ML
INJECTION, SOLUTION INTRASPINAL AS NEEDED
Status: DISCONTINUED | OUTPATIENT
Start: 2019-03-04 | End: 2019-03-04 | Stop reason: SURG

## 2019-03-04 RX ORDER — MAGNESIUM HYDROXIDE 1200 MG/15ML
LIQUID ORAL AS NEEDED
Status: DISCONTINUED | OUTPATIENT
Start: 2019-03-04 | End: 2019-03-04 | Stop reason: HOSPADM

## 2019-03-04 RX ORDER — ROPIVACAINE HYDROCHLORIDE 2 MG/ML
INJECTION, SOLUTION EPIDURAL; INFILTRATION; PERINEURAL
Status: COMPLETED | OUTPATIENT
Start: 2019-03-04 | End: 2019-03-04

## 2019-03-04 RX ADMIN — CEFAZOLIN SODIUM 2000 MG: 10 INJECTION, POWDER, FOR SOLUTION INTRAVENOUS at 23:18

## 2019-03-04 RX ADMIN — DOCUSATE SODIUM 100 MG: 100 CAPSULE, LIQUID FILLED ORAL at 18:04

## 2019-03-04 RX ADMIN — FENTANYL CITRATE 50 MCG: 50 INJECTION, SOLUTION INTRAMUSCULAR; INTRAVENOUS at 07:31

## 2019-03-04 RX ADMIN — SODIUM CHLORIDE, SODIUM LACTATE, POTASSIUM CHLORIDE, AND CALCIUM CHLORIDE: .6; .31; .03; .02 INJECTION, SOLUTION INTRAVENOUS at 09:10

## 2019-03-04 RX ADMIN — BUPIVACAINE 20 ML: 13.3 INJECTION, SUSPENSION, LIPOSOMAL INFILTRATION at 07:35

## 2019-03-04 RX ADMIN — OXYCODONE HYDROCHLORIDE 10 MG: 10 TABLET ORAL at 20:43

## 2019-03-04 RX ADMIN — OXYCODONE HYDROCHLORIDE 10 MG: 10 TABLET ORAL at 12:31

## 2019-03-04 RX ADMIN — GABAPENTIN 300 MG: 300 CAPSULE ORAL at 06:04

## 2019-03-04 RX ADMIN — BUPIVACAINE HYDROCHLORIDE IN DEXTROSE 1.8 ML: 7.5 INJECTION, SOLUTION SUBARACHNOID at 07:55

## 2019-03-04 RX ADMIN — MIDAZOLAM 2 MG: 1 INJECTION INTRAMUSCULAR; INTRAVENOUS at 07:31

## 2019-03-04 RX ADMIN — TRAMADOL HYDROCHLORIDE 50 MG: 50 TABLET, COATED ORAL at 12:31

## 2019-03-04 RX ADMIN — BUPIVACAINE HYDROCHLORIDE 5 ML: 5 INJECTION, SOLUTION PERINEURAL at 07:30

## 2019-03-04 RX ADMIN — SODIUM CHLORIDE 100 ML/HR: 0.9 INJECTION, SOLUTION INTRAVENOUS at 23:00

## 2019-03-04 RX ADMIN — FENTANYL CITRATE 25 MCG: 50 INJECTION, SOLUTION INTRAMUSCULAR; INTRAVENOUS at 11:05

## 2019-03-04 RX ADMIN — PHENYLEPHRINE HYDROCHLORIDE 100 MCG: 10 INJECTION INTRAVENOUS at 09:14

## 2019-03-04 RX ADMIN — CHLORHEXIDINE GLUCONATE 0.12% ORAL RINSE 15 ML: 1.2 LIQUID ORAL at 06:04

## 2019-03-04 RX ADMIN — PHENYLEPHRINE HYDROCHLORIDE 100 MCG: 10 INJECTION INTRAVENOUS at 09:46

## 2019-03-04 RX ADMIN — PHENYLEPHRINE HYDROCHLORIDE 30 MCG/MIN: 10 INJECTION INTRAVENOUS at 08:04

## 2019-03-04 RX ADMIN — ROPIVACAINE HYDROCHLORIDE 20 ML: 2 INJECTION, SOLUTION EPIDURAL; INFILTRATION at 07:30

## 2019-03-04 RX ADMIN — PHENYLEPHRINE HYDROCHLORIDE 100 MCG: 10 INJECTION INTRAVENOUS at 08:58

## 2019-03-04 RX ADMIN — TRAMADOL HYDROCHLORIDE 50 MG: 50 TABLET, COATED ORAL at 23:18

## 2019-03-04 RX ADMIN — PHENYLEPHRINE HYDROCHLORIDE 100 MCG: 10 INJECTION INTRAVENOUS at 08:30

## 2019-03-04 RX ADMIN — TRANEXAMIC ACID 1500 MG: 100 INJECTION, SOLUTION INTRAVENOUS at 08:10

## 2019-03-04 RX ADMIN — ONDANSETRON 4 MG: 2 INJECTION INTRAMUSCULAR; INTRAVENOUS at 09:51

## 2019-03-04 RX ADMIN — LIDOCAINE HYDROCHLORIDE 3 ML: 10 INJECTION, SOLUTION INFILTRATION; PERINEURAL at 07:55

## 2019-03-04 RX ADMIN — SODIUM CHLORIDE 100 ML/HR: 0.9 INJECTION, SOLUTION INTRAVENOUS at 11:00

## 2019-03-04 RX ADMIN — TRAMADOL HYDROCHLORIDE 50 MG: 50 TABLET, COATED ORAL at 18:04

## 2019-03-04 RX ADMIN — PROPOFOL 80 MCG/KG/MIN: 10 INJECTION, EMULSION INTRAVENOUS at 08:00

## 2019-03-04 RX ADMIN — OXYCODONE HYDROCHLORIDE 10 MG: 10 TABLET ORAL at 16:32

## 2019-03-04 RX ADMIN — FOLIC ACID 1000 MCG: 1 TABLET ORAL at 12:30

## 2019-03-04 RX ADMIN — FENTANYL CITRATE 50 MCG: 50 INJECTION, SOLUTION INTRAMUSCULAR; INTRAVENOUS at 07:46

## 2019-03-04 RX ADMIN — CEFAZOLIN 2000 MG: 1 INJECTION, POWDER, FOR SOLUTION INTRAVENOUS at 07:46

## 2019-03-04 RX ADMIN — PHENYLEPHRINE HYDROCHLORIDE 100 MCG: 10 INJECTION INTRAVENOUS at 08:35

## 2019-03-04 RX ADMIN — ACETAMINOPHEN 975 MG: 325 TABLET ORAL at 06:04

## 2019-03-04 RX ADMIN — PHENYLEPHRINE HYDROCHLORIDE 100 MCG: 10 INJECTION INTRAVENOUS at 08:18

## 2019-03-04 RX ADMIN — SODIUM CHLORIDE, SODIUM LACTATE, POTASSIUM CHLORIDE, AND CALCIUM CHLORIDE: .6; .31; .03; .02 INJECTION, SOLUTION INTRAVENOUS at 07:30

## 2019-03-04 RX ADMIN — CEFAZOLIN SODIUM 2000 MG: 10 INJECTION, POWDER, FOR SOLUTION INTRAVENOUS at 16:32

## 2019-03-04 RX ADMIN — FENTANYL CITRATE 25 MCG: 50 INJECTION, SOLUTION INTRAMUSCULAR; INTRAVENOUS at 10:53

## 2019-03-04 NOTE — PLAN OF CARE
Problem: PHYSICAL THERAPY ADULT  Goal: Performs mobility at highest level of function for planned discharge setting  See evaluation for individualized goals  Description  Treatment/Interventions: Functional transfer training, LE strengthening/ROM, Therapeutic exercise, Endurance training, Patient/family training, Equipment eval/education, Bed mobility, Gait training, Spoke to nursing, OT  Equipment Recommended: Obie Castleman       See flowsheet documentation for full assessment, interventions and recommendations  Note:   Prognosis: Good  Problem List: Decreased strength, Decreased range of motion, Decreased endurance, Impaired balance, Decreased mobility, Decreased coordination, Orthopedic restrictions, Pain  Assessment: Pt is 60 y o  male seen for PT evaluation s/p admit to [facility] on 3/4/2019 w/ Primary osteoarthritis of right knee  S/p elective right total knee arthroplasty on 3/4/2019  PT consulted to assess pt's functional mobility and d/c needs  Order placed for PT eval and tx, w/ up with A, ambulate with walker order  Per Ortho, pt cleared for activity - beginning POD #0  Comorbidities affecting pt's physical performance at time of assessment include: hyperglycemia, obesity, glaucoma  PTA, pt was ambulates unrestricted distances and all terrain and works full time  Personal factors affecting pt at time of IE include: ambulating w/ assistive device, inability to ambulate household distances, inability to perform current job functions, inability to perform IADLs and inability to perform ADLs  Please find objective findings from PT assessment regarding body systems outlined above with impairments and limitations including weakness, decreased ROM, impaired balance, decreased endurance, impaired coordination, gait deviations, pain, decreased activity tolerance, decreased functional mobility tolerance, fall risk and orthopedic restrictions   The following objective measures performed on IE also reveal limitations: Barthel Index: 45/100  Pt's clinical presentation is currently unstable/unpredictable seen in pt's presentation of abnormal labs, pain, telemetry, supplemental oxygen, drains  Pt tolerated ambulation short distance with RW and A x1  He reported increased pain and "burning" on the anterior aspect of his knee with mobility  Pt to benefit from continued PT tx to address deficits as defined above and maximize level of functional independent mobility and consistency  From PT/mobility standpoint, recommendation at time of d/c would be OP PT pending progress in order to facilitate return to PLOF  Barriers to Discharge: None     Recommendation: Outpatient PT          See flowsheet documentation for full assessment

## 2019-03-04 NOTE — ANESTHESIA PROCEDURE NOTES
Spinal Block    Patient location during procedure: OR  Start time: 3/4/2019 7:55 AM  Reason for block: primary anesthetic  Staffing  Anesthesiologist: Prabha Schafer MD  Resident/CRNA: Melchor Martines CRNA  Performed: anesthesiologist   Preanesthetic Checklist  Completed: patient identified, site marked, surgical consent, pre-op evaluation, timeout performed, IV checked, risks and benefits discussed and monitors and equipment checked  Spinal Block  Patient position: sitting  Prep: Betadine  Patient monitoring: heart rate, continuous pulse ox and frequent blood pressure checks  Approach: midline  Location: L3-4  Injection technique: single-shot  Needle  Needle type: pencil-tip   Needle gauge: 24 G  Needle length: 10 cm  Assessment  Sensory level: T4  Injection Assessment:  negative aspiration for heme, no paresthesia on injection and positive aspiration for clear CSF    Post-procedure:  site cleaned  Additional Notes  x2 attempts

## 2019-03-04 NOTE — ANESTHESIA PREPROCEDURE EVALUATION
Review of Systems/Medical History  Patient summary reviewed  Chart reviewed  History of anesthetic complications ("slow wake up" after lithotripsy)     Cardiovascular  Hyperlipidemia,    Pulmonary  Negative pulmonary ROS No sleep apnea (denies) ,        GI/Hepatic  Negative GI/hepatic ROS          Kidney stones,        Endo/Other  Diabetes well controlled type 2 Diet controlled,   Comment: Hx cleft palate repair Obesity (BMI 41)  morbid obesity   GYN       Hematology  Negative hematology ROS      Musculoskeletal    Arthritis     Neurology  Negative neurology ROS      Psychology   Anxiety,              Physical Exam    Airway    Mallampati score: I  TM Distance: >3 FB  Neck ROM: full     Dental   Comment: Extremely loose upper incisors; partial removed  Residual palate defects,     Cardiovascular      Pulmonary      Other Findings      Lab Results   Component Value Date    WBC 7 66 02/11/2019    HGB 15 3 02/11/2019     02/11/2019     Lab Results   Component Value Date    K 4 7 02/11/2019    BUN 15 02/11/2019    CREATININE 0 92 02/11/2019     Lab Results   Component Value Date    PTT 28 02/11/2019      Lab Results   Component Value Date    INR 1 01 02/11/2019     Blood type A+/antibody neg  Lab Results   Component Value Date    HGBA1C 6 7 (H) 02/11/2019       Anesthesia Plan  ASA Score- 3     Anesthesia Type- spinal and regional with ASA Monitors  Additional Monitors:   Airway Plan:     Comment: Backup GA/airway instrumentation discussed  Plan Factors-    Induction- intravenous  Postoperative Plan-     Informed Consent- Anesthetic plan and risks discussed with patient and spouse  I personally reviewed this patient with the CRNA  Discussed and agreed on the Anesthesia Plan with the CRNA  James Jones

## 2019-03-04 NOTE — SOCIAL WORK
[employer] CM for DC Planning:     CM received voicemail from Efrain with [employer]  The patient's [employer] RN CM for DC planning is Avinash Morgan  He is available for assistance Monday thru Friday 8 am to 4:30 pm EST at [phone]

## 2019-03-04 NOTE — PHYSICAL THERAPY NOTE
Physical Therapy Evaluation     Patient's Name: Eliazar Prime    Admitting Diagnosis  Primary osteoarthritis of right knee [M17 11]    Problem List  Patient Active Problem List   Diagnosis    Poison ivy dermatitis    Calculi, ureter    Candidiasis    Glaucoma    Abdominal hernia    Hyperlipidemia    Class 3 severe obesity due to excess calories without serious comorbidity with body mass index (BMI) of 40 0 to 44 9 in adult ([facility] [address] )    Osteoarthritis    Hyperglycemia    Vitamin D insufficiency    Healthcare maintenance    Primary osteoarthritis of right knee       Past Medical History  Past Medical History:   Diagnosis Date    Anesthesia complication     difficulty awakening- dyspnea    Anxiety     Arthritis     Cleft hard palate     Glaucoma suspect, both eyes     Hyperlipidemia     Kidney stone     left    Right inguinal hernia     Right ureteral stone     Seasonal allergies     Wears dentures     partial upper    Wears glasses        Past Surgical History  Past Surgical History:   Procedure Laterality Date    CLEFT PALATE REPAIR      INGUINAL HERNIA REPAIR Right     KNEE CARTILAGE SURGERY Left     DC CYSTO/URETERO W/LITHOTRIPSY &INDWELL STENT INSRT Right 8/4/2017    Procedure: CYSTOSCOPY URETEROSCOPY WITH LITHOTRIPSY HOLMIUM LASER, RETROGRADE PYELOGRAM AND INSERTION STENT URETERAL;  Surgeon: Corina Donnelly MD;  Location: [facility];  Service: Urology          03/04/19 5316   Note Type   Note type Eval/Treat   Pain Assessment   Pain Assessment 0-10   Pain Score Worst Possible Pain   Pain Type Surgical pain   Pain Location Knee   Pain Orientation Right   Home Living   Type of [facility] One level;Ramped entrance; Able to live on main level with bedroom/bathroom   Bathroom Shower/Tub Walk-in shower   Bathroom Toilet Raised   Bathroom Equipment Grab bars in shower; Shower chair;Grab bars around toilet;Commode   Bathroom Accessibility Accessible via wheelchair   [address] Walker;Cane   Prior Function   Level of [place] Independent with ADLs and functional mobility   Lives With Significant other   Receives Help From Family   ADL Assistance Independent   IADLs Independent   Falls in the last 6 months 0   Vocational Full time employment   Comments PTA, pt was I with ADLs, IADLs, driving, and functional mobility without the use of an AD  He resides with his fiance who is home 24/7 and able to assist on d/c  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  Restrictions/Precautions   Weight Bearing Precautions Per Order Yes   RLE Weight Bearing Per Order WBAT   General   Family/Caregiver Present Yes   Cognition   Overall Cognitive Status WFL   Arousal/Participation Alert   Orientation Level Oriented X4   Memory Within functional limits   Following Commands Follows all commands and directions without difficulty   RLE Assessment   RLE Assessment X   Strength RLE   RLE Overall Strength 3-/5   LLE Assessment   LLE Assessment WFL   Bed Mobility   Supine to Sit 4  Minimal assistance   Additional items Assist x 1; Increased time required;LE management   Additional Comments Seated in bedside chair at end of session with all needs within reach   Transfers   Sit to Stand 4  Minimal assistance   Additional items Assist x 1; Increased time required;Verbal cues   Stand to Sit 4  Minimal assistance   Additional items Assist x 1; Increased time required;Verbal cues   Stand pivot 4  Minimal assistance   Additional items Assist x 1; Increased time required;Verbal cues   Ambulation/Elevation   Gait pattern Antalgic; Improper Weight shift;Decreased R stance; Short stride; Step to;Excessively slow   Gait Assistance 4  Minimal assist   Additional items Assist x 1;Verbal cues; Tactile cues   Assistive Device Rolling walker   Distance 15'   Balance   Static Sitting Good   Dynamic Sitting Fair +   Static Standing Fair   Dynamic Standing Poor +   Ambulatory Poor +   Endurance Deficit   Endurance Deficit Yes Endurance Deficit Description pain   Activity Tolerance   Activity Tolerance Patient limited by pain   Nurse Made Aware Yes, RN cleared pt for PT    Assessment   Prognosis Good   Problem List Decreased strength;Decreased range of motion;Decreased endurance; Impaired balance;Decreased mobility; Decreased coordination;Orthopedic restrictions;Pain   Assessment Pt is 60 y o  male seen for PT evaluation s/p admit to [facility] on 3/4/2019 w/ Primary osteoarthritis of right knee  S/p elective right total knee arthroplasty on 3/4/2019  PT consulted to assess pt's functional mobility and d/c needs  Order placed for PT eval and tx, w/ up with A, ambulate with walker order  Per Ortho, pt cleared for activity - beginning POD #0  Comorbidities affecting pt's physical performance at time of assessment include: hyperglycemia, obesity, glaucoma  PTA, pt was ambulates unrestricted distances and all terrain and works full time  Personal factors affecting pt at time of IE include: ambulating w/ assistive device, inability to ambulate household distances, inability to perform current job functions, inability to perform IADLs and inability to perform ADLs  Please find objective findings from PT assessment regarding body systems outlined above with impairments and limitations including weakness, decreased ROM, impaired balance, decreased endurance, impaired coordination, gait deviations, pain, decreased activity tolerance, decreased functional mobility tolerance, fall risk and orthopedic restrictions  The following objective measures performed on IE also reveal limitations: Barthel Index: 45/100  Pt's clinical presentation is currently unstable/unpredictable seen in pt's presentation of abnormal labs, pain, telemetry, supplemental oxygen, drains  Pt tolerated ambulation short distance with RW and A x1  He reported increased pain and "burning" on the anterior aspect of his knee with mobility    Pt to benefit from continued PT tx to address deficits as defined above and maximize level of functional independent mobility and consistency  From PT/mobility standpoint, recommendation at time of d/c would be OP PT pending progress in order to facilitate return to PLOF  Barriers to Discharge None   Goals   Patient Goals For the pain to go away   STG Expiration Date 03/14/19   Short Term Goal #1 Pt will demonstrate: 1) increased BLE strength >/= 1 grade for improved transfers 2) supine <> sit transfer with mod I 3) sit <> stand transfer with mod I 4) increased dynamic balance >/= 1 grade to decrease risk for falls 5) Amb >/= 300' with mod I using least restrictive AD 6) I with HEP for RLE strengthening 7) R knee flexion ROM >/=90 degrees for improved gait 8) R knee extension ROM </=-5 degrees for improved gait   Treatment Day 1   Plan   Treatment/Interventions Functional transfer training;LE strengthening/ROM; Therapeutic exercise; Endurance training;Patient/family training;Equipment eval/education; Bed mobility;Gait training;Spoke to nursing;OT   PT Frequency Twice a day;7x/wk   Recommendation   Recommendation Outpatient PT   Equipment Recommended Walker   Barthel Index   Feeding 10   Bathing 0   Grooming Score 5   Dressing Score 5   Bladder Score 0   Bowels Score 10   Toilet Use Score 5   Transfers (Bed/Chair) Score 10   Mobility (Level Surface) Score 0   Stairs Score 0   Barthel Index Score 45       PT Treatment  Time In: 1312  Time Out: 1327  Total Time: 15    Pt was agreeable to PT treatment session  He tolerated supine TKR therex 1 set, 5 reps with AAROM  He demonstrated increased difficulty with hip abduction requiring multiple rest breaks  He also demonstrated ER of the RLE and required cues improved posturing through exercise  Pt would continue to benefit from skilled PT to improve strength, endurance, balance, and mobility to return to PLOF      Jose Rabago, PT, DPT

## 2019-03-04 NOTE — PLAN OF CARE
Problem: DISCHARGE PLANNING - CARE MANAGEMENT  Goal: Discharge to post-acute care or home with appropriate resources  Description  INTERVENTIONS:  - Conduct assessment to determine patient/family and health care team treatment goals, and need for post-acute services based on payer coverage, community resources, and patient preferences, and barriers to discharge  - Address psychosocial, clinical, and financial barriers to discharge as identified in assessment in conjunction with the patient/family and health care team  - Arrange appropriate level of post-acute services according to patient's   needs and preference and payer coverage in collaboration with the physician and health care team  - Communicate with and update the patient/family, physician, and health care team regarding progress on the discharge plan  - Arrange appropriate transportation to post-acute venues  -D/C with appropriate resources when medically stable   Outcome: Progressing

## 2019-03-04 NOTE — ANESTHESIA PROCEDURE NOTES
Peripheral Block    Patient location during procedure: holding area  Start time: 3/4/2019 7:35 AM  Reason for block: at surgeon's request and post-op pain management  Staffing  Anesthesiologist: Lucia Gasca MD  Performed: anesthesiologist   Preanesthetic Checklist  Completed: patient identified, site marked, surgical consent, pre-op evaluation, timeout performed, IV checked, risks and benefits discussed and monitors and equipment checked  Peripheral Block  Patient position: left lateral decubitus  Prep: ChloraPrep  Patient monitoring: heart rate, continuous pulse ox and frequent blood pressure checks  Anesthesia block type: IPACK    Laterality: right  Injection technique: single-shot  Procedures: ultrasound guidedropivacaine (NAROPIN) 0 2% perineural infiltration, 20 mL  Needle  Needle type: Stimuplex   Needle gauge: 22 G  Needle length: 10 cm  Needle localization: ultrasound guidance  Assessment  Paresthesia pain: none  Heart rate change: no  Slow fractionated injection: yes  Post-procedure:  site cleaned  patient tolerated the procedure well with no immediate complications

## 2019-03-04 NOTE — SOCIAL WORK
CM met with pt and his fiance, Robin Shane ([phone])  Pt reported that he lived at home and was [place]  Pt denied a LW/POA  Pt stated that he lives in a one story handicap accessible house with ramp  Pt reported that he has a RW, BSC, and shower chair  Pt denied any previous VNA or SNF  Pt stated that he uses [facility] in [place] and his PCP is through [facility]  Pt denied any previous MH or SA inpatient stays  Pt is employed and plans to return home with fiance to transport  CM will follow for D/C needs  CM reviewed d/c planning process including the following: identifying help at home, patient preference for d/c planning needs, Discharge Lounge, [facility] Meds to Bed program, availability of treatment team to discuss questions or concerns patient and/or family may have regarding understanding medications and recognizing signs and symptoms once discharged  CM also encouraged patient to follow up with all recommended appointments after discharge  Patient advised of importance for patient and family to participate in managing patients medical well being

## 2019-03-04 NOTE — PLAN OF CARE
Problem: Potential for Falls  Goal: Patient will remain free of falls  Description  INTERVENTIONS:  - Assess patient frequently for physical needs  -  Identify cognitive and physical deficits and behaviors that affect risk of falls    -  [place] fall precautions as indicated by assessment   - Educate patient/family on patient safety including physical limitations  - Instruct patient to call for assistance with activity based on assessment  - Modify environment to reduce risk of injury  - Consider OT/PT consult to assist with strengthening/mobility  Outcome: Progressing     Problem: MUSCULOSKELETAL - ADULT  Goal: Maintain or return mobility to safest level of function  Description  INTERVENTIONS:  - Assess patient's ability to carry out ADLs; assess patient's baseline for ADL function and identify physical deficits which impact ability to perform ADLs (bathing, care of mouth/teeth, toileting, grooming, dressing, etc )  - Assess/evaluate cause of self-care deficits   - Assess range of motion  - Assess patient's mobility; develop plan if impaired  - Assess patient's need for assistive devices and provide as appropriate  - Encourage maximum independence but intervene and supervise when necessary  - Involve family in performance of ADLs  - Assess for home care needs following discharge   - Request OT consult to assist with ADL evaluation and planning for discharge  - Provide patient education as appropriate  Outcome: Progressing  Goal: Maintain proper alignment of affected body part  Description  INTERVENTIONS:  - Support, maintain and protect limb and body alignment  - Provide pt/fam with appropriate education  Outcome: Progressing     Problem: PAIN - ADULT  Goal: Verbalizes/displays adequate comfort level or baseline comfort level  Description  Interventions:  - Encourage patient to monitor pain and request assistance  - Assess pain using appropriate pain scale  - Administer analgesics based on type and severity of pain and evaluate response  - Implement non-pharmacological measures as appropriate and evaluate response  - Consider cultural and social influences on pain and pain management  - Notify physician/advanced practitioner if interventions unsuccessful or patient reports new pain  Outcome: Progressing

## 2019-03-04 NOTE — OP NOTE
Brief Op Note  Robert Perez  MRN: [id number]      Unit/Bed#: Operating Room    PreOp Dx: right knee DJD      Postop Dx: right knee DJD      Procedure: right total knee arthroplasty      Surgeon: MD Abbi Barry MD ,Dejon Anaya PA-C ,        Fluids:       EBL:       Drains: Hemovac x 1      Specimens: No       Complications: No       Condition: stable transferred to postanesthesia care unit      Implants: Depuy Attune RP  Femoral, size: 8  Tibial tray: 8  Polyethylene: 8  Patellar button: 33      79 y o male, presents at this time, secondary to treatment of right knee DJD with varus deformity which has failed conservative treatment  The patient was told of all the pros and cons of operative and nonoperative intervention  Some of the complications of operative intervention include infection, bleeding, scarring, nerve injury, vascular injury, fracture, continued pain, decreased range of motion, DVT, PE death, loss of limb, need for further surgery, not obtain an results  The patient wished  Patient did consent for operative intervention for this pathology  Patient was brought to the operating room  Patient was anesthetized as anesthesia team  Patient was prepped and draped in normal sterile fashion  After this was done, we did conduct a time out to make sure correct  Patient was in the room, prepped marked and draped  Implants were in the room, Rep  For the implants were present, DVT prophylaxis and antibiotics were addressed  Midline incision was made over the anterior knee after going through skin, fatty tissue, fascia was identified  Flaps were created both medially and laterally  Medial parapatellar incision was made  Excision of Hoffa fat pad was conducted  At this time because this was a varus knee, we did release over the medial aspect including medial osteophytes and deep MCL   We're able to excise the fatty tissue from the anterior aspect of the distal femur  We were able to at this time, flex the knee with the patella everted  Intramedullary reamer was used  Intramedullary guide for the femur was then placed to determine how much of the distal femur to cut and also, valgus cut angle  Pins were then placed  Intramedullary guide was removed  Distal femoral cut was made  Attention was now to the proximal tibia  Extramedullary guide was used  After making sure that we took the appropriate amount from the medial and lateral side with the aid of a measuring device, the jig was held in place with pins  Protection of the medial and lateral ligaments, as well as the popliteal region, was done  Proximal tibial cut was made  Extension gaps were evaluated  Size of the femur was then determined with the aid of a guide  After this was done, we placed the appropriate sized block  These were held down with pins  Anterior and posterior cuts were made  Anterior, posterior, chamfer cuts were made  We did check our flexion gap and was noted to be appropriate  This was a PCL sacrificing device being used Therefore a box cut was made  Tibial tray size was determined  This was held down with 2 small screws  The reamer and the punch was then used with the appropriate guide to make sure that these were all done, the appropriate manner  Guide was removed  Trial femoral component was placed  Trial tibial polyethylene was placed  Patient was noted to be stable  On coronal and sagittal planes  Patellar button size was determined  This was placed on the medial aspect of the patella  Holes were drilled for our true patella button to be cemented on  We tracked the knee, and we noted that the patella was tracking appropriately over the trochlear of the trial implants  After this was done we did drill holes in our trial femoral component  We then removed  All trial components  Copious irrigation was used at the operative site   We did place some bone within the intramedullary canal of the femur  I discussed the cement was used and all components were placed, including the femur, tibia, and patella button  A trial tibial Polyethylene was placed  After cement had dried, removed the trial polyethylene and removed any excess cement that was in the popliteal region  Copious irrigation was used  The tibial polyethylene was then placed  Patient was placed in the appropriate ranges of motion and patient's Knee was noted to be stable  Tourniquet was discontinued  Hemostasis was obtained  Hemovac was placed, exiting the lateral distal thigh region  #1 Vicryl sutures were used to reapproximate the parapatellar incision  2-0 Vicryl sutures for the subcutaneous closure  This was reinforced with staples  Wounds were cleaned and dried  Hemovac was placed to suction  Acticoat, 4 x 4, ABDs and web roll was placed prior to Ace bandage be placed from the foot  All the way to the mid thigh region    Patient was awakened as anesthesia team noted to be a stable condition and transferred to postanesthesia care unit

## 2019-03-04 NOTE — ANESTHESIA POSTPROCEDURE EVALUATION
Post-Op Assessment Note    CV Status:  Stable  Pain Score: 0    Pain management: adequate  Multimodal analgesia used between 6 hours prior to anesthesia start to PACU discharge    Mental Status:  Awake and somnolent   Hydration Status:  Euvolemic   PONV Controlled:  Controlled   Airway Patency:  Patent   Post Op Vitals Reviewed: Yes      Staff: CRNA   Comments: Pt in PACU, answering questions appropriately  No c/o pain or nausea  Airway patent, VSS             BP   91/64 (72)   Temp   97 9   Pulse   82   Resp   18   SpO2   94% on 3LNC

## 2019-03-04 NOTE — ANESTHESIA PROCEDURE NOTES
Peripheral Block    Patient location during procedure: holding area  Start time: 3/4/2019 7:35 AM  Reason for block: at surgeon's request and post-op pain management  Staffing  Anesthesiologist: Neena Moss MD  Performed: anesthesiologist   Preanesthetic Checklist  Completed: patient identified, site marked, surgical consent, pre-op evaluation, timeout performed, IV checked, risks and benefits discussed and monitors and equipment checked  Peripheral Block  Patient position: supine  Prep: ChloraPrep  Patient monitoring: heart rate, continuous pulse ox and frequent blood pressure checks  Block type: adductor canal block  Laterality: right  Injection technique: single-shot  Procedures: ultrasound guided  Ultrasound permanent image savedbupivacaine (MARCAINE) 0 5 % perineural infiltration, 5 mL  Needle  Needle type: Stimuplex   Needle gauge: 22 G  Needle length: 10 cm  Needle localization: ultrasound guidance  Assessment  Injection assessment: incremental injection, local visualized surrounding nerve on ultrasound, no paresthesia on injection and negative aspiration for heme  Paresthesia pain: none  Heart rate change: no  Slow fractionated injection: yes  Post-procedure:  site cleaned  patient tolerated the procedure well with no immediate complications

## 2019-03-05 LAB
ERYTHROCYTE [DISTWIDTH] IN BLOOD BY AUTOMATED COUNT: 12.7 % (ref 11.6–15.1)
HCT VFR BLD AUTO: 39.2 % (ref 36.5–49.3)
HGB BLD-MCNC: 12.6 G/DL (ref 12–17)
MCH RBC QN AUTO: 29.6 PG (ref 26.8–34.3)
MCHC RBC AUTO-ENTMCNC: 32.1 G/DL (ref 31.4–37.4)
MCV RBC AUTO: 92 FL (ref 82–98)
PLATELET # BLD AUTO: 220 THOUSANDS/UL (ref 149–390)
PMV BLD AUTO: 10.8 FL (ref 8.9–12.7)
RBC # BLD AUTO: 4.26 MILLION/UL (ref 3.88–5.62)
WBC # BLD AUTO: 12.02 THOUSAND/UL (ref 4.31–10.16)

## 2019-03-05 PROCEDURE — G8987 SELF CARE CURRENT STATUS: HCPCS

## 2019-03-05 PROCEDURE — 97535 SELF CARE MNGMENT TRAINING: CPT

## 2019-03-05 PROCEDURE — 97530 THERAPEUTIC ACTIVITIES: CPT

## 2019-03-05 PROCEDURE — G8989 SELF CARE D/C STATUS: HCPCS

## 2019-03-05 PROCEDURE — 99024 POSTOP FOLLOW-UP VISIT: CPT | Performed by: ORTHOPAEDIC SURGERY

## 2019-03-05 PROCEDURE — 97110 THERAPEUTIC EXERCISES: CPT

## 2019-03-05 PROCEDURE — G8988 SELF CARE GOAL STATUS: HCPCS

## 2019-03-05 PROCEDURE — 97166 OT EVAL MOD COMPLEX 45 MIN: CPT

## 2019-03-05 PROCEDURE — 97116 GAIT TRAINING THERAPY: CPT

## 2019-03-05 PROCEDURE — 85027 COMPLETE CBC AUTOMATED: CPT | Performed by: PHYSICIAN ASSISTANT

## 2019-03-05 RX ADMIN — ENOXAPARIN SODIUM 40 MG: 40 INJECTION SUBCUTANEOUS at 08:21

## 2019-03-05 RX ADMIN — TRAMADOL HYDROCHLORIDE 50 MG: 50 TABLET, COATED ORAL at 23:09

## 2019-03-05 RX ADMIN — OXYCODONE HYDROCHLORIDE 10 MG: 10 TABLET ORAL at 03:14

## 2019-03-05 RX ADMIN — TRAMADOL HYDROCHLORIDE 50 MG: 50 TABLET, COATED ORAL at 05:26

## 2019-03-05 RX ADMIN — OXYCODONE HYDROCHLORIDE 10 MG: 10 TABLET ORAL at 12:22

## 2019-03-05 RX ADMIN — FOLIC ACID 1000 MCG: 1 TABLET ORAL at 08:20

## 2019-03-05 RX ADMIN — DOCUSATE SODIUM 100 MG: 100 CAPSULE, LIQUID FILLED ORAL at 17:35

## 2019-03-05 RX ADMIN — TRAMADOL HYDROCHLORIDE 50 MG: 50 TABLET, COATED ORAL at 12:22

## 2019-03-05 RX ADMIN — TRAMADOL HYDROCHLORIDE 50 MG: 50 TABLET, COATED ORAL at 17:35

## 2019-03-05 RX ADMIN — FERROUS SULFATE TAB 325 MG (65 MG ELEMENTAL FE) 325 MG: 325 (65 FE) TAB at 08:20

## 2019-03-05 RX ADMIN — STANDARDIZED SENNA CONCENTRATE 8.6 MG: 8.6 TABLET ORAL at 08:20

## 2019-03-05 RX ADMIN — OXYCODONE HYDROCHLORIDE 10 MG: 10 TABLET ORAL at 08:20

## 2019-03-05 RX ADMIN — OXYCODONE HYDROCHLORIDE 10 MG: 10 TABLET ORAL at 16:52

## 2019-03-05 RX ADMIN — OXYCODONE HYDROCHLORIDE AND ACETAMINOPHEN 500 MG: 500 TABLET ORAL at 08:22

## 2019-03-05 RX ADMIN — DOCUSATE SODIUM 100 MG: 100 CAPSULE, LIQUID FILLED ORAL at 08:20

## 2019-03-05 RX ADMIN — FERROUS SULFATE TAB 325 MG (65 MG ELEMENTAL FE) 325 MG: 325 (65 FE) TAB at 16:52

## 2019-03-05 RX ADMIN — LORATADINE 10 MG: 10 TABLET ORAL at 08:20

## 2019-03-05 NOTE — PHYSICAL THERAPY NOTE
Physical Therapy Progress Note     03/05/19 3991   Pain Assessment   Pain Assessment 0-10   Pain Score 8   Pain Location Knee   Pain Orientation Right   Restrictions/Precautions   RLE Weight Bearing Per Order WBAT   Other Precautions Pain; Fall Risk;Telemetry   Subjective   Subjective Pt encountered seated in recliner  Reports increased stiffness at beginning of session, but otherwise pain is controlled  Pt confident about ability to go home when medically cleared  Transfers   Sit to Stand 5  Supervision   Additional items Assist x 1; Armrests; Increased time required   Stand to Sit 5  Supervision   Additional items Assist x 1; Armrests; Increased time required;Verbal cues   Ambulation/Elevation   Gait pattern Antalgic;Decreased foot clearance;Decreased R stance; Inconsistent erna; Short stride; Step to;Excessively slow   Gait Assistance 5  Supervision   Additional items Assist x 1;Verbal cues   Assistive Device Rolling walker   Distance 90' x 2   Balance   Static Sitting Good   Static Standing Fair   Ambulatory Fair -   Endurance Deficit   Endurance Deficit Yes   Endurance Deficit Description pain, fatigue, observed SOB with mobility   Activity Tolerance   Activity Tolerance Patient tolerated treatment well;Patient limited by pain; Patient limited by fatigue   Nurse Made Aware NIDIA Castro   Assessment   Prognosis Good   Problem List Decreased strength;Decreased range of motion;Decreased endurance; Impaired balance;Decreased mobility; Decreased coordination;Orthopedic restrictions;Pain   Assessment Pt continues to demonstrate improvement with functional mobility & progressed ambulation during this session  Performed all transfers without physical assist, but did require instructions for sequencing and hand placement to sit to improve safety  Pt ambulated increased distances with extended seated rest in between to recover    Pt demonstrated muscle guarding RLE throughout, but was able to progress to slight K flexion during swing phase after instruction  Pt demonstrated increased SOB & elevated HR after 1st trial, but did not complain of symptoms of syncope  Improved during 2nd session with instructions for pacing and deep breathing throughout  Upon return to room, discussed home setup, POC, HEP exercises, & other potential obstacles to discharge  Pt with no questions or concerns at this time  Pt has demonstrated sufficient progress to return home with family support and outpatient PT follow up to progress mobiilty & strength to PLOF  Barriers to Discharge None   Goals   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 and go to car shows   STG Expiration Date 03/14/19   Short Term Goal #1 as per PT assessment:  Pt will demonstrate: 1) increased BLE strength >/= 1 grade for improved transfers 2) supine <> sit transfer with mod I 3) sit <> stand transfer with mod I 4) increased dynamic balance >/= 1 grade to decrease risk for falls 5) Amb >/= 300' with mod I using least restrictive AD 6) I with HEP for RLE strengthening 7) R knee flexion ROM >/=90 degrees for improved gait 8) R knee extension ROM </=-5 degrees for improved gait   Treatment Day 3   Plan   Treatment/Interventions Functional transfer training;LE strengthening/ROM; Therapeutic exercise; Endurance training;Patient/family training;Bed mobility; Equipment eval/education;Gait training   Progress Progressing toward goals   PT Frequency 7x/wk; Twice a day   Recommendation   Recommendation Outpatient PT; Home with family support   Equipment Recommended Walker   PT - OK to Discharge Yes     Treatment time:  [address], PTA

## 2019-03-05 NOTE — PHYSICAL THERAPY NOTE
PHYSICAL THERAPY TREATMENT NOTE    Patient Name: Reji Rowe  SDJYA'U Date: 3/5/2019     03/05/19 0955   Pain Assessment   Pain Assessment 0-10   Pain Score 7   Pain Type Surgical pain   Pain Location Knee   Hospital Pain Intervention(s) Ambulation/increased activity;Repositioned   Response to Interventions tolerated   Restrictions/Precautions   Weight Bearing Precautions Per Order Yes   RLE Weight Bearing Per Order WBAT   Other Precautions Telemetry; Fall Risk;Pain   General   Chart Reviewed Yes   Response to Previous Treatment Patient with no complaints from previous session  Family/Caregiver Present Yes   Cognition   Overall Cognitive Status WFL   Arousal/Participation Cooperative   Attention Within functional limits   Comments Pt identified by name and   Subjective   Subjective Pt agrees to PT treatment  Bed Mobility   Supine to Sit Unable to assess  (OOB in chair pre/post session)   Transfers   Sit to Stand 4  Minimal assistance   Additional items Assist x 1; Increased time required;Verbal cues   Stand to Sit 4  Minimal assistance   Additional items Assist x 1; Increased time required;Verbal cues   Stand pivot 4  Minimal assistance   Additional items Assist x 1; Increased time required;Verbal cues  (with RW)   Ambulation/Elevation   Gait pattern Antalgic; Improper Weight shift; Short stride; Step to;Excessively slow;Decreased foot clearance   Gait Assistance 4  Minimal assist   Additional items Assist x 1;Verbal cues   Assistive Device Rolling walker   Distance 100` x1   Balance   Static Sitting Good   Dynamic Sitting Fair +   Static Standing Fair   Dynamic Standing Fair -   Ambulatory Fair -   Endurance Deficit   Endurance Deficit Yes   Endurance Deficit Description limited ambulation distance, fatigue   Activity Tolerance   Activity Tolerance Patient limited by fatigue;Patient limited by pain   Nurse Made Aware Per RN, pt appropriate to treat   Exercises   Quad Sets Supine;5 reps;AROM; Bilateral Heelslides Supine;5 reps;AROM; Right   Glute Sets Supine;5 reps;AROM; Bilateral   Hip Abduction Supine;10 reps;AROM; Bilateral   Hip Adduction Supine;10 reps;AROM; Bilateral   Ankle Pumps Supine;5 reps;AROM; Bilateral   Assessment   Prognosis Good   Problem List Decreased strength;Decreased range of motion;Decreased endurance; Impaired balance;Decreased mobility; Decreased coordination;Orthopedic restrictions;Pain   Assessment Pt tolerated treatment well and is progressing with overall functional mobility  Able to ambulate increased distance with min A and use of RW  Educated on importance of heel toe pattern during gait cycle  HEP handout given in room and educated on all exercises  Able to perform minimal exercises for instruction/recall purposes  Educated pt on car transfers with demonstration  Will continue to benefit from ongoing skilled PT to maximize his functional mobility and increase his level of independence  Anticipating pt will be able to go home with family support and OPPT at time of discharge  Goals   Patient Goals Pt would like to go home  STG Expiration Date 03/14/19   Treatment Day 2   Plan   Treatment/Interventions Functional transfer training;LE strengthening/ROM; Therapeutic exercise; Endurance training;Patient/family training;Equipment eval/education; Bed mobility;Gait training   Progress Progressing toward goals   PT Frequency Twice a day;7x/wk   Recommendation   Recommendation Outpatient PT; Home with family support   Equipment Recommended Waleska Cantu PT,DPT

## 2019-03-05 NOTE — RESTORATIVE TECHNICIAN NOTE
Restorative Specialist Mobility Note       Activity: Bedrest, Dangle, Stand at bedside, Turn, Chair     Assistive Device: Front wheel walker     Ambulation Response: Tolerated well  Repositioned: Sitting, Up in chair              Anti-Embolism Device On:  Bilateral, Foot pump

## 2019-03-05 NOTE — UTILIZATION REVIEW
Initial Clinical Review    Age/Sex: 60 y o  male admitted on 3/4 for elective surgery - OR    Surgery Date: 3/4    Procedure: S/P Right Total Knee Arthroplasty    Anesthesia: Spinal, regional    Admission Orders: Date/Time/Statement: Inpatient 3/4/19 @ 1122 Med Surg    Orders Placed This Encounter   Procedures    Inpatient Admission     Standing Status:   Standing     Number of Occurrences:   1     Order Specific Question:   Admitting Physician     Answer:   Dawson Mckeon [[id number]]     Order Specific Question:   Level of Care     Answer:   Med Surg [[id number]]     Order Specific Question:   Estimated length of stay     Answer:   More than 2 Midnights     Order Specific Question:   Certification     Answer:   I certify that inpatient services are medically necessary for this patient for a duration of greater than two midnights  See H&P and MD Progress Notes for additional information about the patient's course of treatment  Vital Signs: /77   Pulse 99   Temp 98 4 °F (36 9 °C)   Resp 18   Ht 5' 7" (1 702 m)   Wt 120 kg (265 lb)   SpO2 (!) 87%   BMI 41 50 kg/m²      Diet:        Diet Orders   (From admission, onward)            Start     Ordered    03/04/19 1122  Diet Regular; Regular House  Diet effective now     Question Answer Comment   Diet Type Regular    Regular Regular House    RD to adjust diet per protocol?  Yes        03/04/19 1121        Mobility: Activity as tolerated  PT/OT eval and treat    DVT Prophylaxis: Foot Pump    Scheduled Meds:  Current Facility-Administered Medications:  Cefazolin  Intravenous x2   ascorbic acid 500 mg Oral Daily   docusate sodium 100 mg Oral BID   enoxaparin 40 mg Subcutaneous Daily   ferrous sulfate 325 mg Oral BID With Meals   folic acid 7,923 mcg Oral Daily   loratadine 10 mg Oral Daily   senna 1 tablet Oral Daily   traMADol 50 mg Oral Q6H [address]     Continuous Infusions:  lactated ringers 125 mL/hr Last Rate: Stopped (03/04/19 1021)     PRN Meds:  Rosa De La Garza acetaminophen    calcium carbonate    morphine injection    oxyCODONE po x5

## 2019-03-05 NOTE — OCCUPATIONAL THERAPY NOTE
[address]  Evaluation     Patient Name: Luh Stacy  Today's Date: 3/5/2019  Problem List  Patient Active Problem List   Diagnosis    Poison ivy dermatitis    Calculi, ureter    Candidiasis    Glaucoma    Abdominal hernia    Hyperlipidemia    Class 3 severe obesity due to excess calories without serious comorbidity with body mass index (BMI) of 40 0 to 44 9 in adult ([facility] [address] )    Osteoarthritis    Hyperglycemia    Vitamin D insufficiency    Healthcare maintenance    Primary osteoarthritis of right knee     Past Medical History  Past Medical History:   Diagnosis Date    Anesthesia complication     difficulty awakening- dyspnea    Anxiety     Arthritis     Cleft hard palate     Glaucoma suspect, both eyes     Hyperlipidemia     Kidney stone     left    Right inguinal hernia     Right ureteral stone     Seasonal allergies     Wears dentures     partial upper    Wears glasses      Past Surgical History  Past Surgical History:   Procedure Laterality Date    CLEFT PALATE REPAIR      INGUINAL HERNIA REPAIR Right     KNEE CARTILAGE SURGERY Left     [place] CYSTO/URETERO W/LITHOTRIPSY &INDWELL STENT INSRT Right 8/4/2017    Procedure: CYSTOSCOPY URETEROSCOPY WITH LITHOTRIPSY HOLMIUM LASER, RETROGRADE PYELOGRAM AND INSERTION STENT URETERAL;  Surgeon: Sandeep Rivas MD;  [facility];  Service: Urology    [place] TOTAL KNEE ARTHROPLASTY Right 3/4/2019    Procedure: TOTAL KNEE ARTHROPLASTY;  Surgeon: Britt Aiken MD;  [facility];  Service: Orthopedics             03/05/19 0938   Note Type   Note type Eval only   Restrictions/Precautions   Weight Bearing Precautions Per Order Yes   RLE Weight Bearing Per Order WBAT   Other Precautions Telemetry; Fall Risk;Pain   Pain Assessment   Pain Assessment 0-10   Pain Score 7   Pain Type Surgical pain;Acute pain   Pain Location Knee   Pain Orientation Right; Anterior   Hospital Pain Intervention(s) Ambulation/increased activity;Repositioned;Cold applied   Response to Interventions Tolerated   Home Living   Type of [address] One level;Ramped entrance   Bathroom Shower/Tub Walk-in shower   Bathroom Toilet Raised   Bathroom Equipment Grab bars in shower;Commode   Home Equipment Walker;Cane   Prior Function   Level of [place] Independent with ADLs and functional mobility   Lives With Spouse; Family   Receives Help From Family;Friend(s)   ADL Assistance Independent   IADLs Independent   Falls in the last 6 months 0   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[employer] )   Lifestyle   Autonomy Independent with ADls and IADls   Reciprocal Relationships Lives with supportive fiance   Service to Others Works full-time   Intrinsic Gratification Spending time with friends and family   Subjective   Subjective "it hurts when i move more"   ADL   Where Assessed Chair   Eating Assistance 7  Independent   Grooming Assistance 5  Supervision/Setup   Grooming Deficit Supervision/safety; Increased time to complete;Standing with assistive device   UB Bathing Assistance 5  Supervision/Setup   UB Bathing Deficit Steadying;Supervision/safety; Increased time to complete;Setup   LB Bathing Assistance 4  Minimal Assistance   LB Bathing Deficit Steadying;Setup;Supervision/safety; Increased time to complete   UB Dressing Assistance 5  Supervision/Setup   UB Dressing Deficit Supervision/safety; Increased time to complete   LB Dressing Assistance 4  Minimal Assistance   LB Dressing Deficit Requires assistive device for steadying;Steadying;Setup; Increased time to complete;Pull up over hips;Don/doff R sock   Toileting Assistance  4  Minimal Assistance   Toileting Deficit Increased time to complete;Steadying;Setup   Functional Assistance 4  Minimal Assistance   Functional Deficit Steadying;Setup; Increased time to complete   Bed Mobility   Additional Comments Pt received OOB in recliner chair and seated in recliner end of session Transfers   Sit to Stand 4  Minimal assistance   Additional items Assist x 1; Increased time required;Verbal cues   Stand to Sit 4  Minimal assistance   Additional items Assist x 1; Increased time required;Verbal cues   Additional Comments VCs for hand placement   Functional Mobility   Functional Mobility 4  Minimal assistance   Additional items Rolling walker   Balance   Static Sitting Good   Dynamic Sitting Fair +   Static Standing Fair   Dynamic Standing Fair   Ambulatory Fair -   Activity Tolerance   Activity Tolerance Patient limited by pain   Nurse Made Aware yes   RUE Assessment   RUE Assessment WFL   LUE Assessment   LUE Assessment WFL   Cognition   Overall Cognitive Status WFL   Arousal/Participation Cooperative; Alert   Attention Within functional limits   Orientation Level Oriented X4   Memory Within functional limits   Following Commands Follows all commands and directions without difficulty   Assessment   Limitation Decreased ADL status; Decreased endurance;Decreased self-care trans;Decreased high-level ADLs   Prognosis Good   Assessment Pt is a 59 yo male admitted with a primary diagnosis of osteoarthritis of the R knee  S/p R TKR 3/6/19  Pt has a PMH of anxiety, arthritis, cleft hard palate, glaucoma suspect, R inguinal hernia, R ureteral stone, and kidney stone  Pt lives in a 2 story house with fiance who will be home and will assist as needed  PTA pt was independent with ADLs/IADLs and worked full-time  Currently, pt is overall min assist with ADLs 2* fatigue, sitting/standing balance, risk of falls, actvitiy tolerance/endurance, pain, and home setup  Pt has adequete home support and does not have acute OT needs  D/c OT  From OT standpoint, pt is recommended home with family support upon d/c     Recommendation   OT Discharge Recommendation Home with family support   OT - OK to Discharge Yes  (when medically cleared)   Barthel Index   Feeding 10   Bathing 0   Grooming Score 5   Dressing Score 5 Bladder Score 10   Bowels Score 10   Toilet Use Score 5   Transfers (Bed/Chair) Score 10   Mobility (Level Surface) Score 0   Stairs Score 0   Barthel Index Score 55   Modified Douglas Scale   Modified Douglas Scale 4     CHARI Batista

## 2019-03-05 NOTE — PROGRESS NOTES
Subjective: No acute events overnight  No acute distress        Objective:  Lab Results   Component Value Date/Time    WBC 12 02 (H) 03/05/2019 05:24 AM    HGB 12 6 03/05/2019 05:24 AM       Vitals:    03/05/19 0308   BP: 108/78   Pulse: 99   Resp: 19   Temp: 99 32 °F (37 4 °C)   SpO2: 91%     Right lower extremity  Dressing c/d/i at knee   Drain intact holding suction  + ankle df/pf, ehl/fhl motor functions  Sensation intact sp/dp distribution  Foot warm/well perfused    Assessment: 58M POD1 s/p R TKA    Plan:  WBAt RLE  Drain out later today  Pain control  DVT ppx - lovenox, mechanical  PT  Dispo pending PT plan, pain control    Noel Melrose  03/05/19

## 2019-03-05 NOTE — CONSULTS
Consultation - Acute Pain Service   Nghia Awan 60 y o  male MRN: [id number]  Unit/Bed#: CW3 340-01 Encounter: [id number]               Assessment/Plan     Assessment:   50-year-old male status post right TKA postoperative day 1, with right adductor canal and Ipack block for postop pain control  I have reviewed the patient's controlled substance dispensing history in the Prescription Drug Monitoring Program before prescribing any controlled substances: no active prescriptions  Has had tramadol in the past  Tolerates tylenol and motrin products in the past      Current pain regimen includes:  - scheduled Tramadol 50 mg p o  Q 6 hours  - Tylenol p r n   - morphine 2 mg IV q 3 hours p r n   - oxycodone 5-10 mg p o  Q 4 hours p r n  Moderate to severe pain    right adductor canal block and Ipack blocks are still in effect  Patient has tolerated PT session  With use of Exparel, patient may receive pain control for up to 3 days time postop  He does ask for oxycodone regularly to "stay on top of the pain" since he arrived on the floor  He asks for it when he has moderate pain  He was informed that his blocks may wear off within 24-48 hrs, and his pain escalate, and that he should rely more on PO pain regimen  Pt is willing to continue tramadol while admitted    Plan:   - continue scheduled tramadol  - modify Tylenol order to schedule dosing  - continue oxycodone p r n   - continue morphine IV p r n  severe breakthrough pain    APS sign off  Thank you for the Consult    Please call  / Consuelo Reid ( [facility] [phone]) with any further questions    History of Present Illness    Admit Date:  3/4/2019  Hospital Day:  1 day  Primary Service:  Orthopedic Surgery  Attending Provider:  Haleigh García MD  Reason for Consult / Principal Problem:  Acute postop pain, opiate naive patient, received regional blocks for postop pain control  Hx and PE limited by:  None  HPI: Nghia Awan is a 60y o  year old male who presents with (see above assessment section)    Inpatient consult to Acute Pain Service  Consult performed by:  Yanely Doty MD  Consult ordered by: Arelis Mendez MD          Review of Systems    Historical Information   Past Medical History:   Diagnosis Date    Anesthesia complication     difficulty awakening- dyspnea    Anxiety     Arthritis     Cleft hard palate     Glaucoma suspect, both eyes     Hyperlipidemia     Kidney stone     left    Right inguinal hernia     Right ureteral stone     Seasonal allergies     Wears dentures     partial upper    Wears glasses      Past Surgical History:   Procedure Laterality Date    CLEFT PALATE REPAIR      INGUINAL HERNIA REPAIR Right     KNEE CARTILAGE SURGERY Left     [place] CYSTO/URETERO W/LITHOTRIPSY &INDWELL STENT INSRT Right 8/4/2017    Procedure: CYSTOSCOPY URETEROSCOPY WITH LITHOTRIPSY HOLMIUM LASER, RETROGRADE PYELOGRAM AND INSERTION STENT URETERAL;  Surgeon: Lianet Baltazar MD;  [facility];  Service: Urology    [place] TOTAL KNEE ARTHROPLASTY Right 3/4/2019    Procedure: TOTAL KNEE ARTHROPLASTY;  Surgeon: Jessy Manning MD;  [facility];  Service: Orthopedics     Social History   Social History     Substance and Sexual Activity   Alcohol Use Yes    Alcohol/week: 0 6 oz    Types: 1 Glasses of wine per week    Frequency: 2-4 times a month    Drinks per session: 1 or 2    Comment: 1x wk     Social History     Substance and Sexual Activity   Drug Use No     Social History     Tobacco Use   Smoking Status Never Smoker   Smokeless Tobacco Never Used   Tobacco Comment    no passive smoke exposure     Family History: non-contributory    Meds/Allergies   all current active meds have been reviewed, current meds:   Current Facility-Administered Medications   Medication Dose Route Frequency    acetaminophen (TYLENOL) tablet 650 mg  650 mg Oral Q6H PRN    ascorbic acid (VITAMIN C) tablet 500 mg  500 mg Oral Daily    calcium carbonate (TUMS) chewable tablet 1,000 mg  1,000 mg Oral Daily PRN    docusate sodium (COLACE) capsule 100 mg  100 mg Oral BID    enoxaparin (LOVENOX) subcutaneous injection 40 mg  40 mg Subcutaneous Daily    ferrous sulfate tablet 325 mg  325 mg Oral BID With Meals    folic acid (FOLVITE) tablet 1,000 mcg  1,000 mcg Oral Daily    lactated ringers infusion  125 mL/hr Intravenous Continuous    loratadine (CLARITIN) tablet 10 mg  10 mg Oral Daily    morphine injection 2 mg  2 mg Intravenous Q3H PRN    oxyCODONE (ROXICODONE) immediate release tablet 10 mg  10 mg Oral Q4H PRN    oxyCODONE (ROXICODONE) IR tablet 5 mg  5 mg Oral Q4H PRN    senna (SENOKOT) tablet 8 6 mg  1 tablet Oral Daily    sodium chloride 0 9 % infusion  100 mL/hr Intravenous Continuous    traMADol (ULTRAM) tablet 50 mg  50 mg Oral Q6H [address]    and PTA meds:   Prior to Admission Medications   Prescriptions Last Dose Informant Patient Reported? Taking?    Bioflavonoid Products (VITAMIN C) CHEW 3/3/2019 at Unknown time Self Yes Yes   Sig: Chew 2 tablets daily   Cholecalciferol 2000 units CAPS 2/25/2019 Self Yes Yes   Sig: Take by mouth   Multiple Vitamin (MULTIVITAMIN) tablet 2/25/2019 Self Yes Yes   Sig: Take 2 tablets by mouth daily   Omega-3 Fatty Acids (FISH OIL PO) 2/25/2019 Self Yes Yes   Sig: Take 1 capsule by mouth daily   cetirizine (ZyrTEC) 10 mg tablet 3/4/2019 at Unknown time Self Yes Yes   Sig: Take 10 mg by mouth daily   enoxaparin (LOVENOX) 30 mg/0 3 mL  Self No No   Sig: Inject 0 4 mL (40 mg total) under the skin daily   ferrous sulfate 324 (65 Fe) mg 3/3/2019 at Unknown time Self No Yes   Sig: Take 1 tablet (324 mg total) by mouth 2 (two) times a day before meals   fluticasone (FLONASE) 50 mcg/act nasal spray More than a month at Unknown time Self Yes No   Sig: Blow nose; shake bottle; place tip in each nostril and tilt towards eye; hold breath and press plunger; do this 2 times daily prn   folic acid (FOLVITE) 1 mg tablet 3/3/2019 at Unknown time  No Yes   Sig: TAKE 1 TABLET BY MOUTH EVERY DAY   ibuprofen (MOTRIN) 200 mg tablet 2/25/2019 Self Yes Yes   Sig: Take 200-800 mg by mouth every 6 (six) hours as needed for mild pain   simvastatin (ZOCOR) 40 mg tablet 3/3/2019 at Unknown time Self No Yes   Sig: TAKE 1 TABLET (40 MG TOTAL) BY MOUTH DAILY AT BEDTIME   traMADol (ULTRAM) 50 mg tablet 2/27/2019 Self No Yes   Sig: Take 1 tablet (50 mg total) by mouth every 6 (six) hours as needed for moderate pain      Facility-Administered Medications: None       Allergies   Allergen Reactions    No Active Allergies        Objective   Temp:  [97 2 °F (36 2 °C)-99 32 °F (37 4 °C)] 98 4 °F (36 9 °C)  HR:  [] 103  Resp:  [15-21] 18  BP: ()/(60-85) 116/78    Intake/Output Summary (Last 24 hours) at 3/5/2019 0904  Last data filed at 3/5/2019 2023  Gross per 24 hour   Intake 3651 67 ml   Output 2580 ml   Net 1071 67 ml       Physical Exam    Lab Results:   I have personally reviewed pertinent labs  , CBC:   Lab Results   Component Value Date    WBC 12 02 (H) 03/05/2019    HGB 12 6 03/05/2019    HCT 39 2 03/05/2019    MCV 92 03/05/2019     03/05/2019    MCH 29 6 03/05/2019    MCHC 32 1 03/05/2019    RDW 12 7 03/05/2019    MPV 10 8 03/05/2019   , CMP: No results found for: SODIUM, K, CL, CO2, ANIONGAP, BUN, CREATININE, GLUCOSE, CALCIUM, AST, ALT, ALKPHOS, PROT, BILITOT, EGFR  Imaging Studies: I have personally reviewed pertinent reports  EKG, Pathology, and Other Studies: I have personally reviewed pertinent reports  Counseling / Coordination of Care  Total floor / unit time spent today 20 minutes  Greater than 50% of total time was spent with the patient and / or family counseling and / or coordination of care   A description of the counseling / coordination of care:

## 2019-03-05 NOTE — PLAN OF CARE
Problem: PHYSICAL THERAPY ADULT  Goal: Performs mobility at highest level of function for planned discharge setting  See evaluation for individualized goals  Description  Treatment/Interventions: Functional transfer training, LE strengthening/ROM, Therapeutic exercise, Endurance training, Patient/family training, Equipment eval/education, Bed mobility, Gait training, Spoke to nursing, OT  Equipment Recommended: Spring Montero       See flowsheet documentation for full assessment, interventions and recommendations     Outcome: Progressing

## 2019-03-05 NOTE — PLAN OF CARE
Problem: PHYSICAL THERAPY ADULT  Goal: Performs mobility at highest level of function for planned discharge setting  See evaluation for individualized goals  Description  Treatment/Interventions: Functional transfer training, LE strengthening/ROM, Therapeutic exercise, Endurance training, Patient/family training, Equipment eval/education, Bed mobility, Gait training, Spoke to nursing, OT  Equipment Recommended: Carla Jackson       See flowsheet documentation for full assessment, interventions and recommendations  Note:   Prognosis: Good  Problem List: Decreased strength, Decreased range of motion, Decreased endurance, Impaired balance, Decreased mobility, Decreased coordination, Orthopedic restrictions, Pain  Assessment: Pt tolerated treatment well and is progressing with overall functional mobility  Able to ambulate increased distance with min A and use of RW  Educated on importance of heel toe pattern during gait cycle  HEP handout given in room and educated on all exercises  Able to perform minimal exercises for instruction/recall purposes  Educated pt on car transfers with demonstration  Will continue to benefit from ongoing skilled PT to maximize his functional mobility and increase his level of independence  Anticipating pt will be able to go home with family support and OPPT at time of discharge  Barriers to Discharge: None     Recommendation: Outpatient PT, Home with family support          See flowsheet documentation for full assessment

## 2019-03-05 NOTE — MALNUTRITION/BMI
This medical record reflects one or more clinical indicators suggestive of malnutrition and/or morbid obesity  Malnutrition Findings:              BMI Findings:  BMI Classifications: Morbid Obesity 40-44 9     Body mass index is 41 5 kg/m²  See Nutrition note dated 3/5/19 for additional details  Completed nutrition assessment is viewable in the nutrition documentation

## 2019-03-05 NOTE — PHYSICAL THERAPY NOTE
Physical Therapy Progress Note     03/05/19 8139   Pain Assessment   Pain Assessment 0-10   Pain Score 8   Pain Location Knee   Pain Orientation Right   Restrictions/Precautions   RLE Weight Bearing Per Order WBAT   Other Precautions Pain; Fall Risk;Telemetry   Subjective   Subjective Pt encountered seated in recliner  Reports increased stiffness at beginning of session, but otherwise pain is controlled  Pt confident about ability to go home when medically cleared  Transfers   Sit to Stand 5  Supervision   Additional items Assist x 1; Armrests; Increased time required   Stand to Sit 5  Supervision   Additional items Assist x 1; Armrests; Increased time required;Verbal cues   Ambulation/Elevation   Gait pattern Antalgic;Decreased foot clearance;Decreased R stance; Inconsistent erna; Short stride; Step to;Excessively slow   Gait Assistance 5  Supervision   Additional items Assist x 1;Verbal cues   Assistive Device Rolling walker   Distance 90' x 2   Balance   Static Sitting Good   Static Standing Fair   Ambulatory Fair -   Endurance Deficit   Endurance Deficit Yes   Endurance Deficit Description pain, fatigue, observed SOB with mobility   Activity Tolerance   Activity Tolerance Patient tolerated treatment well;Patient limited by pain; Patient limited by fatigue   Nurse Made Aware NIDIA Castro   Assessment   Prognosis Good   Problem List Decreased strength;Decreased range of motion;Decreased endurance; Impaired balance;Decreased mobility; Decreased coordination;Orthopedic restrictions;Pain   Assessment Pt continues to demonstrate improvement with functional mobility & progressed ambulation during this session  Performed all transfers without physical assist, but did require instructions for sequencing and hand placement to sit to improve safety  Pt ambulated increased distances with extended seated rest in between to recover    Pt demonstrated muscle guarding RLE throughout, but was able to progress to slight K flexion during swing phase after instruction  Pt demonstrated increased SOB & elevated HR after 1st trial, but did not complain of symptoms of syncope  Improved during 2nd session with instructions for pacing and deep breathing throughout  Upon return to room, discussed home setup, POC, HEP exercises, & other potential obstacles to discharge  Pt with no questions or concerns at this time  Pt has demonstrated sufficient progress to return home with family support and outpatient PT follow up to progress mobiilty & strength to PLOF  Barriers to Discharge None   Goals   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 and go to car shows   STG Expiration Date 03/14/19   Short Term Goal #1 as per PT assessment:  Pt will demonstrate: 1) increased BLE strength >/= 1 grade for improved transfers 2) supine <> sit transfer with mod I 3) sit <> stand transfer with mod I 4) increased dynamic balance >/= 1 grade to decrease risk for falls 5) Amb >/= 300' with mod I using least restrictive AD 6) I with HEP for RLE strengthening 7) R knee flexion ROM >/=90 degrees for improved gait 8) R knee extension ROM </=-5 degrees for improved gait   Treatment Day 3   Plan   Treatment/Interventions Functional transfer training;LE strengthening/ROM; Therapeutic exercise; Endurance training;Patient/family training;Bed mobility; Equipment eval/education;Gait training   Progress Progressing toward goals   PT Frequency 7x/wk; Twice a day   Recommendation   Recommendation Outpatient PT; Home with family support   Equipment Recommended Peng Gill   PT - OK to Discharge Yes     Ervin Newman, PTA

## 2019-03-06 VITALS
SYSTOLIC BLOOD PRESSURE: 118 MMHG | DIASTOLIC BLOOD PRESSURE: 69 MMHG | BODY MASS INDEX: 41.59 KG/M2 | WEIGHT: 265 LBS | OXYGEN SATURATION: 93 % | HEIGHT: 67 IN | RESPIRATION RATE: 18 BRPM | TEMPERATURE: 98.6 F | HEART RATE: 98 BPM

## 2019-03-06 PROBLEM — Z96.651 STATUS POST RIGHT KNEE REPLACEMENT: Status: ACTIVE | Noted: 2019-03-06

## 2019-03-06 LAB
ERYTHROCYTE [DISTWIDTH] IN BLOOD BY AUTOMATED COUNT: 12.8 % (ref 11.6–15.1)
HCT VFR BLD AUTO: 37.8 % (ref 36.5–49.3)
HGB BLD-MCNC: 12.4 G/DL (ref 12–17)
MCH RBC QN AUTO: 29.6 PG (ref 26.8–34.3)
MCHC RBC AUTO-ENTMCNC: 32.8 G/DL (ref 31.4–37.4)
MCV RBC AUTO: 90 FL (ref 82–98)
PLATELET # BLD AUTO: 185 THOUSANDS/UL (ref 149–390)
PMV BLD AUTO: 11.1 FL (ref 8.9–12.7)
RBC # BLD AUTO: 4.19 MILLION/UL (ref 3.88–5.62)
WBC # BLD AUTO: 11.96 THOUSAND/UL (ref 4.31–10.16)

## 2019-03-06 PROCEDURE — 85027 COMPLETE CBC AUTOMATED: CPT | Performed by: PHYSICIAN ASSISTANT

## 2019-03-06 RX ORDER — SENNOSIDES 8.6 MG
1 TABLET ORAL DAILY
Qty: 120 EACH | Refills: 0 | Status: SHIPPED | OUTPATIENT
Start: 2019-03-06 | End: 2019-05-21

## 2019-03-06 RX ORDER — DOCUSATE SODIUM 100 MG/1
100 CAPSULE, LIQUID FILLED ORAL 2 TIMES DAILY
Qty: 10 CAPSULE | Refills: 0 | Status: SHIPPED | OUTPATIENT
Start: 2019-03-06 | End: 2019-03-11

## 2019-03-06 RX ADMIN — STANDARDIZED SENNA CONCENTRATE 8.6 MG: 8.6 TABLET ORAL at 08:06

## 2019-03-06 RX ADMIN — OXYCODONE HYDROCHLORIDE 10 MG: 10 TABLET ORAL at 03:14

## 2019-03-06 RX ADMIN — LORATADINE 10 MG: 10 TABLET ORAL at 08:06

## 2019-03-06 RX ADMIN — TRAMADOL HYDROCHLORIDE 50 MG: 50 TABLET, COATED ORAL at 05:43

## 2019-03-06 RX ADMIN — ACETAMINOPHEN 650 MG: 325 TABLET ORAL at 03:14

## 2019-03-06 RX ADMIN — FOLIC ACID 1000 MCG: 1 TABLET ORAL at 08:06

## 2019-03-06 RX ADMIN — OXYCODONE HYDROCHLORIDE AND ACETAMINOPHEN 500 MG: 500 TABLET ORAL at 08:06

## 2019-03-06 RX ADMIN — ENOXAPARIN SODIUM 40 MG: 40 INJECTION SUBCUTANEOUS at 08:06

## 2019-03-06 RX ADMIN — FERROUS SULFATE TAB 325 MG (65 MG ELEMENTAL FE) 325 MG: 325 (65 FE) TAB at 08:06

## 2019-03-06 RX ADMIN — OXYCODONE HYDROCHLORIDE 5 MG: 5 TABLET ORAL at 12:05

## 2019-03-06 RX ADMIN — DOCUSATE SODIUM 100 MG: 100 CAPSULE, LIQUID FILLED ORAL at 08:06

## 2019-03-06 NOTE — DISCHARGE SUMMARY
ORTHOPEDICS DISCHARGE SUMMARY  Reji Rowe 60 y o  male MRN: [id number]  Unit/Bed#: CW3 340-01    Attending Physician: Andrew Thompson    Admitting diagnosis: Primary osteoarthritis of right knee [M17 11]    Discharge diagnosis: Primary osteoarthritis of right knee [M17 11]    Date of admission: 3/4/2019    Date of discharge: 03/06/19         Procedure: R TKA    HPI:  This is a 60y o  year old male that presented to the office with signs and symptoms of right knee osteoarthritis  They tried and failed conservative treatment measures and wished to proceed with surgical intervention  The risks, benefits, and complications of the procedure were discussed with the patient and informed consent was obtained  Hospital Course: The patient was admitted to the hospital on 3/4/2019 and underwent an uncomplicated right total knee arthroplasty  They were transferred to the floor after a brief stay in the post-anesthesia care unit  Their pain was well managed with IV and oral pain medications  They began therapy on post operative day #1  Lovenox was also started for DVT prophylaxis post operative day #1  Hemovac drain was removed on POD1  His post operative course was uneventful  On discharge date pt was cleared by PT and the medicine team and determined to be safe for discharge  Daily discussion was had with the patient, nursing staff, orthopaedic team, and family members if present  All questions were answered to the patients satisifaction  0   Lab Value Date/Time    HGB 12 4 03/06/2019 0542    HGB 12 6 03/05/2019 0524    HGB 15 3 02/11/2019 1052    HGB 13 8 07/26/2017 1553       Greater than 2 gram drop which qualifies for diagnosis of acute blood loss anemia  Vital signs remained stable and pt was resuscitated with IVF as needed     Discharge Instructions: The patient was discharged weight bearing as tolerated to the right lower extremity  Lovenox will be continued for 28 days  Continue PT/OT   Take pain medications as instructed  Discharge Medications: For the complete list of discharge medications, please refer to the patient's medication reconciliation

## 2019-03-06 NOTE — PLAN OF CARE
Problem: Potential for Falls  Goal: Patient will remain free of falls  Description  INTERVENTIONS:  - Assess patient frequently for physical needs  -  Identify cognitive and physical deficits and behaviors that affect risk of falls    -  Hugo fall precautions as indicated by assessment   - Educate patient/family on patient safety including physical limitations  - Instruct patient to call for assistance with activity based on assessment  - Modify environment to reduce risk of injury  - Consider OT/PT consult to assist with strengthening/mobility  Outcome: Progressing     Problem: MUSCULOSKELETAL - ADULT  Goal: Maintain or return mobility to safest level of function  Description  INTERVENTIONS:  - Assess patient's ability to carry out ADLs; assess patient's baseline for ADL function and identify physical deficits which impact ability to perform ADLs (bathing, care of mouth/teeth, toileting, grooming, dressing, etc )  - Assess/evaluate cause of self-care deficits   - Assess range of motion  - Assess patient's mobility; develop plan if impaired  - Assess patient's need for assistive devices and provide as appropriate  - Encourage maximum independence but intervene and supervise when necessary  - Involve family in performance of ADLs  - Assess for home care needs following discharge   - Request OT consult to assist with ADL evaluation and planning for discharge  - Provide patient education as appropriate  Outcome: Progressing  Goal: Maintain proper alignment of affected body part  Description  INTERVENTIONS:  - Support, maintain and protect limb and body alignment  - Provide pt/fam with appropriate education  Outcome: Progressing     Problem: PAIN - ADULT  Goal: Verbalizes/displays adequate comfort level or baseline comfort level  Description  Interventions:  - Encourage patient to monitor pain and request assistance  - Assess pain using appropriate pain scale  - Administer analgesics based on type and severity of pain and evaluate response  - Implement non-pharmacological measures as appropriate and evaluate response  - Consider cultural and social influences on pain and pain management  - Notify physician/advanced practitioner if interventions unsuccessful or patient reports new pain  Outcome: Progressing     Problem: DISCHARGE PLANNING - CARE MANAGEMENT  Goal: Discharge to post-acute care or home with appropriate resources  Description  INTERVENTIONS:  - Conduct assessment to determine patient/family and health care team treatment goals, and need for post-acute services based on payer coverage, community resources, and patient preferences, and barriers to discharge  - Address psychosocial, clinical, and financial barriers to discharge as identified in assessment in conjunction with the patient/family and health care team  - Arrange appropriate level of post-acute services according to patient's   needs and preference and payer coverage in collaboration with the physician and health care team  - Communicate with and update the patient/family, physician, and health care team regarding progress on the discharge plan  - Arrange appropriate transportation to post-acute venues  -D/C with appropriate resources when medically stable   Outcome: Progressing     Problem: Nutrition/Hydration-ADULT  Goal: Nutrient/Hydration intake appropriate for improving, restoring or maintaining nutritional needs  Description  Monitor and assess patient's nutrition/hydration status for malnutrition (ex- brittle hair, bruises, dry skin, pale skin and conjunctiva, muscle wasting, smooth red tongue, and disorientation)  Collaborate with interdisciplinary team and initiate plan and interventions as ordered  Monitor patient's weight and dietary intake as ordered or per policy  Utilize nutrition screening tool and intervene per policy  Determine patient's food preferences and provide high-protein, high-caloric foods as appropriate       INTERVENTIONS:  - Monitor oral intake, urinary output, labs, and treatment plans  - Assess nutrition and hydration status and recommend course of action  - Evaluate amount of meals eaten  - Assist patient with eating if necessary   - Allow adequate time for meals  - Recommend/ encourage appropriate diets, oral nutritional supplements, and vitamin/mineral supplements  - Order, calculate, and assess calorie counts as needed  - Recommend, monitor, and adjust tube feedings and TPN/PPN based on assessed needs  - Assess need for intravenous fluids  - Provide specific nutrition/hydration education as appropriate  - Include patient/family/caregiver in decisions related to nutrition  Outcome: Progressing

## 2019-03-07 ENCOUNTER — TELEPHONE (OUTPATIENT)
Dept: OBGYN CLINIC | Facility: HOSPITAL | Age: 59
End: 2019-03-07

## 2019-03-07 ENCOUNTER — OFFICE VISIT (OUTPATIENT)
Dept: PHYSICAL THERAPY | Age: 59
End: 2019-03-07
Payer: COMMERCIAL

## 2019-03-07 DIAGNOSIS — M25.561 CHRONIC PAIN OF RIGHT KNEE: Primary | ICD-10-CM

## 2019-03-07 DIAGNOSIS — Z96.651 STATUS POST TOTAL RIGHT KNEE REPLACEMENT: ICD-10-CM

## 2019-03-07 DIAGNOSIS — G89.29 CHRONIC PAIN OF RIGHT KNEE: Primary | ICD-10-CM

## 2019-03-07 PROCEDURE — 97110 THERAPEUTIC EXERCISES: CPT | Performed by: PHYSICAL THERAPIST

## 2019-03-07 PROCEDURE — 97161 PT EVAL LOW COMPLEX 20 MIN: CPT | Performed by: PHYSICAL THERAPIST

## 2019-03-07 NOTE — PROGRESS NOTES
PT Evaluation     Today's date: 3/7/2019  Patient name: Aman Lazo  : 1960  MRN: [id number]  Referring provider: Obey Gleason MD  Dx:   Encounter Diagnosis     ICD-10-CM    1  Chronic pain of right knee M25 561     G89 29    2  Status post total right knee replacement Z96 651        Start Time: 1000  Stop Time: 1100  Total time in clinic (min): 60 minutes    Assessment  Assessment details: Aman Lazo is a 60 y o  male who presents with decreased ROM, strength and experiences pain secondary to Left TKA  Pt is currently ambulating with RW and was advised to bring [facility] for gait training next session  Due to impairments, patient has difficulty ambulating community distances, and navigating stairs  Pt shows no signs of DVT or infection at incision sight  Girth measurements will be taken next visit  Patient would benefit from skilled physical therapy to address the impairments, improve their level of function, and to improve their overall quality of life  Impairments: abnormal or restricted ROM, abnormal movement, impaired physical strength, lacks appropriate home exercise program, pain with function and weight-bearing intolerance  Understanding of Dx/Px/POC: good   Prognosis: good    Goals  Short Term Goals: to be achieved by 4 weeks  1) Patient to be independent with basic HEP  2) Decrease pain to 1/10 at its worst   3) Increase Flexion ROM by 5-10 degrees   4) Increase LE strength by 1/2 MMT grade in all deficient planes      Long Term Goals: to be achieved by discharge  1) FOTO equal to or greater than 54   2) Ambulation to improve to maximal level of function  3) Stair negotiation will improve to reciprocal   4) Sit to stand transfers will improve to maximal level of function     Plan  Patient would benefit from: skilled physical therapy  Planned modality interventions: cryotherapy  Planned therapy interventions: patient education, strengthening, stretching, therapeutic activities, therapeutic exercise, home exercise program, neuromuscular re-education, functional ROM exercises and transfer training  Frequency: Twice a week for 8 weeks   Treatment plan discussed with: patient        Subjective Evaluation    History of Present Illness  Mechanism of injury: Pt has had knee pain for 15 years  Pt has had bilateral knee pain starting in October  Pt has had 2 injections which relieved symptoms for 2 months but they symptoms have returned   Symptoms are worse in right knee and patient will undergo R TKA surgery on 3/6/19 performed by Dr Pedro Luis Leone  Pt has PMH of BMI, and elevated A1C      3/7/19: Pt underwent TKA yesterday  Pt reports no signs of DVT's or infections  PT reports anticipated surgical pain  Pain  Current pain ratin  At best pain ratin  At worst pain ratin  Quality: throbbing    Patient Goals  Patient goals for therapy: increased strength and decreased pain  Patient goal: Dancing, squatting         Objective     Active Range of Motion   Left Knee   Flexion: 115 degrees   Extension: 0 degrees     Right Knee   Flexion: 75 degrees with pain  Extension: -13 degrees with pain    Passive Range of Motion   Left Knee   Flexion: 135 degrees     Right Knee   Flexion: 90 degrees with pain  Extension: -10 degrees with pain    Strength/Myotome Testing     Left Hip   Planes of Motion   Flexion: 4-  Extension: 4-  Abduction: 4-  Adduction: 4-  External rotation: 4-  Internal rotation: 4-    Right Hip   Planes of Motion   Flexion: 5  Extension: 5  Abduction: 5  Adduction: 5  External rotation: 5  Internal rotation: 5    Left Knee   Flexion: 5  Extension: 5    Right Knee   Flexion: 4-  Extension: 4-    Functional Assessment        Comments  Pt demonstrates poor form during squats with pain around 60 degrees of knee flexion     TU sec with RW  SLS: Unable on RLE      Flowsheet Rows      Most Recent Value   PT/OT G-Codes   Current Score  29   Projected Score  60   G code set  Other PT/OT Primary Precautions: HTN    Daily Treatment Diary     Manual                                                                                   Exercise Diary  3/7            LAQ HEP            Heel slides HEP            Quad sets HEP            Glute sets HEP            Ankle pumps HEP            Mini squats HEP                                                                                                                                                                                                      Modalities

## 2019-03-07 NOTE — TELEPHONE ENCOUNTER
Pt contacted today to conduct a postoperative follow up call assessment  Pt reports "I feel like i'm doing well " Pt reports current 8 out of 10 pain scale, taking tramadol and tylenol, has not taken oxycodone today and took it last night before bed  Pt reports swelling is the "same" as time of DC and reports currently elevating and "i'm going to ice it up " Pt reports incision is dry, denies drainage, reports "no bloody spots "     Pt reports ambulating with the walker and reports it is going "good" pt reports knee is "sore in the back " Pt denies any falls since DC  Pt had first PT today and reports it went "good, it hurt, they worked me pretty good "  Pt reports at PT they got "90% bend" of the knee  Pt reports taking post op lovenox injections and that they are going "good " Pt denies any bleeding or any questions  Pt reports having LBM today, "finally," and taking colace 2x a day for assistance  Pt denies nausea, vomiting, or abdominal pain  Pt denies chest pain, SOB, dizziness, fever, and/or calf pain  Pt denies any questions or concerns at this time  pt encouraged to call me with questions, concerns or issues

## 2019-03-08 ENCOUNTER — TRANSITIONAL CARE MANAGEMENT (OUTPATIENT)
Dept: FAMILY MEDICINE CLINIC | Facility: CLINIC | Age: 59
End: 2019-03-08

## 2019-03-08 DIAGNOSIS — E78.5 HYPERLIPIDEMIA, UNSPECIFIED HYPERLIPIDEMIA TYPE: ICD-10-CM

## 2019-03-08 RX ORDER — SIMVASTATIN 40 MG
40 TABLET ORAL
Qty: 90 TABLET | Refills: 0 | Status: SHIPPED | OUTPATIENT
Start: 2019-03-08 | End: 2019-06-23 | Stop reason: SDUPTHER

## 2019-03-11 ENCOUNTER — TRANSCRIBE ORDERS (OUTPATIENT)
Dept: ADMINISTRATIVE | Facility: HOSPITAL | Age: 59
End: 2019-03-11

## 2019-03-11 ENCOUNTER — APPOINTMENT (OUTPATIENT)
Dept: LAB | Facility: IMAGING CENTER | Age: 59
End: 2019-03-11
Payer: COMMERCIAL

## 2019-03-11 ENCOUNTER — OFFICE VISIT (OUTPATIENT)
Dept: FAMILY MEDICINE CLINIC | Facility: CLINIC | Age: 59
End: 2019-03-11
Payer: COMMERCIAL

## 2019-03-11 VITALS
HEART RATE: 98 BPM | DIASTOLIC BLOOD PRESSURE: 78 MMHG | HEIGHT: 67 IN | BODY MASS INDEX: 40.3 KG/M2 | SYSTOLIC BLOOD PRESSURE: 138 MMHG | TEMPERATURE: 98.1 F | WEIGHT: 256.8 LBS | OXYGEN SATURATION: 94 % | RESPIRATION RATE: 16 BRPM

## 2019-03-11 DIAGNOSIS — Z96.651 STATUS POST RIGHT KNEE REPLACEMENT: ICD-10-CM

## 2019-03-11 DIAGNOSIS — M17.11 PRIMARY OSTEOARTHRITIS OF RIGHT KNEE: Primary | ICD-10-CM

## 2019-03-11 DIAGNOSIS — D72.828 OTHER ELEVATED WHITE BLOOD CELL (WBC) COUNT: ICD-10-CM

## 2019-03-11 DIAGNOSIS — M17.11 PRIMARY OSTEOARTHRITIS OF RIGHT KNEE: ICD-10-CM

## 2019-03-11 DIAGNOSIS — R03.0 ELEVATED BLOOD-PRESSURE READING WITHOUT DIAGNOSIS OF HYPERTENSION: ICD-10-CM

## 2019-03-11 DIAGNOSIS — K59.03 DRUG-INDUCED CONSTIPATION: ICD-10-CM

## 2019-03-11 PROBLEM — D72.829 LEUCOCYTOSIS: Status: ACTIVE | Noted: 2019-03-11

## 2019-03-11 LAB
BASOPHILS # BLD AUTO: 0.09 THOUSANDS/ΜL (ref 0–0.1)
BASOPHILS NFR BLD AUTO: 1 % (ref 0–1)
EOSINOPHIL # BLD AUTO: 0.54 THOUSAND/ΜL (ref 0–0.61)
EOSINOPHIL NFR BLD AUTO: 5 % (ref 0–6)
ERYTHROCYTE [DISTWIDTH] IN BLOOD BY AUTOMATED COUNT: 12.6 % (ref 11.6–15.1)
HCT VFR BLD AUTO: 43.8 % (ref 36.5–49.3)
HGB BLD-MCNC: 14.2 G/DL (ref 12–17)
IMM GRANULOCYTES # BLD AUTO: 0.06 THOUSAND/UL (ref 0–0.2)
IMM GRANULOCYTES NFR BLD AUTO: 1 % (ref 0–2)
LYMPHOCYTES # BLD AUTO: 2.08 THOUSANDS/ΜL (ref 0.6–4.47)
LYMPHOCYTES NFR BLD AUTO: 20 % (ref 14–44)
MCH RBC QN AUTO: 29.5 PG (ref 26.8–34.3)
MCHC RBC AUTO-ENTMCNC: 32.4 G/DL (ref 31.4–37.4)
MCV RBC AUTO: 91 FL (ref 82–98)
MONOCYTES # BLD AUTO: 0.83 THOUSAND/ΜL (ref 0.17–1.22)
MONOCYTES NFR BLD AUTO: 8 % (ref 4–12)
NEUTROPHILS # BLD AUTO: 6.62 THOUSANDS/ΜL (ref 1.85–7.62)
NEUTS SEG NFR BLD AUTO: 65 % (ref 43–75)
NRBC BLD AUTO-RTO: 0 /100 WBCS
PLATELET # BLD AUTO: 374 THOUSANDS/UL (ref 149–390)
PMV BLD AUTO: 11.4 FL (ref 8.9–12.7)
RBC # BLD AUTO: 4.82 MILLION/UL (ref 3.88–5.62)
WBC # BLD AUTO: 10.22 THOUSAND/UL (ref 4.31–10.16)

## 2019-03-11 PROCEDURE — 99496 TRANSJ CARE MGMT HIGH F2F 7D: CPT | Performed by: FAMILY MEDICINE

## 2019-03-11 PROCEDURE — 85025 COMPLETE CBC W/AUTO DIFF WBC: CPT

## 2019-03-11 PROCEDURE — 36415 COLL VENOUS BLD VENIPUNCTURE: CPT

## 2019-03-11 NOTE — PROGRESS NOTES
Assessment/Plan:     Diagnoses and all orders for this visit:    Primary osteoarthritis of right knee  Comments:  Status post right knee replacement  Continue with pain medication  Continue to follow up with Ortho  Orders:  -     CBC and differential; Future    Status post right knee replacement  Comments:  Continue with physical therapy and pain medication  He is to continue to follow up with Ortho  Orders:  -     CBC and differential; Future    Other elevated white blood cell (WBC) count  Comments:  I am going to repeat his blood work  Elevated blood-pressure reading without diagnosis of hypertension  Comments: It was discussed about low-sodium diet  He is to monitor his blood pressure at home  Come back if it continues to be elevated  Drug-induced constipation  Comments:  He was told to take stool softener and encourage protein vegetable  Increase oral hydration  I would consider laxative if not better  There are no Patient Instructions on file for this visit  Return if symptoms worsen or fail to improve  Subjective:      Patient ID: Robert Perez is a 60 y o  male  Chief Complaint   Patient presents with    Transition of Care Management     right tkr at [facility] from 3/4-3/6       He is here today for follow-up post hospital status post right knee replacement  His white blood count was elevated upon discharge  He continues with pain in his right knee  He was given pain medication from his orthopedic  He complained of constipation since started pain medication  His blood pressure was slightly elevated today but he stated it because he is in pain  The following portions of the patient's history were reviewed and updated as appropriate: allergies, current medications, past family history, past medical history, past social history, past surgical history and problem list     Review of Systems   Constitutional: Negative for chills and fever     HENT: Negative for trouble swallowing  Eyes: Negative for visual disturbance  Respiratory: Negative for cough and shortness of breath  Cardiovascular: Negative for chest pain and palpitations  Gastrointestinal: Positive for constipation  Negative for abdominal pain, blood in stool and vomiting  Endocrine: Negative for cold intolerance and heat intolerance  Genitourinary: Negative for difficulty urinating and dysuria  Musculoskeletal: Positive for arthralgias  Negative for gait problem  Skin: Negative for rash  Neurological: Negative for dizziness, syncope and headaches  Hematological: Negative for adenopathy  Psychiatric/Behavioral: Negative for behavioral problems           Current Outpatient Medications   Medication Sig Dispense Refill    acetaminophen (TYLENOL) 650 mg CR tablet Take 1 tablet (650 mg total) by mouth every 8 (eight) hours as needed for mild pain for up to 30 days 30 tablet 0    Bioflavonoid Products (VITAMIN C) CHEW Chew 2 tablets daily      cetirizine (ZyrTEC) 10 mg tablet Take 10 mg by mouth daily      Cholecalciferol 2000 units CAPS Take by mouth      docusate sodium (COLACE) 100 mg capsule Take 1 capsule (100 mg total) by mouth 2 (two) times a day 10 capsule 0    enoxaparin (LOVENOX) 40 mg/0 4 mL 1 subcutaneous injection daily x 28 days AFTER surgery 28 Syringe 0    ferrous sulfate 324 (65 Fe) mg Take 1 tablet (324 mg total) by mouth 2 (two) times a day before meals 60 tablet 0    fluticasone (FLONASE) 50 mcg/act nasal spray Blow nose; shake bottle; place tip in each nostril and tilt towards eye; hold breath and press plunger; do this 2 times daily prn      Multiple Vitamin (MULTIVITAMIN) tablet Take 2 tablets by mouth daily      Omega-3 Fatty Acids (FISH OIL PO) Take 1 capsule by mouth daily      oxyCODONE (ROXICODONE) 5 mg immediate release tablet 1-2 tabs po q 6 hrs prn pain 30 tablet 0    senna (SENOKOT) 8 6 mg Take 1 tablet (8 6 mg total) by mouth daily 120 each 0    simvastatin (ZOCOR) 40 mg tablet Take 1 tablet (40 mg total) by mouth daily at bedtime 90 tablet 0    traMADol (ULTRAM) 50 mg tablet Take 1 tablet (50 mg total) by mouth every 6 (six) hours as needed for moderate pain for up to 10 days 30 tablet 0    folic acid (FOLVITE) 1 mg tablet TAKE 1 TABLET BY MOUTH EVERY DAY (Patient not taking: Reported on 3/11/2019) 30 tablet 0     No current facility-administered medications for this visit  Objective:    /78 (BP Location: Left arm, Patient Position: Sitting, Cuff Size: Large)   Pulse 98   Temp 98 1 °F (36 7 °C) (Tympanic)   Resp 16   Ht 5' 7" (1 702 m)   Wt 116 kg (256 lb 12 8 oz)   SpO2 94%   BMI 40 22 kg/m²        Physical Exam   Constitutional: He is oriented to person, place, and time  He appears well-developed and well-nourished  HENT:   Head: Normocephalic and atraumatic  Eyes: Pupils are equal, round, and reactive to light  EOM are normal    Neck: Normal range of motion  Neck supple  Cardiovascular: Normal rate, regular rhythm and normal heart sounds  Pulmonary/Chest: Effort normal and breath sounds normal    Abdominal: Soft  Bowel sounds are normal    Musculoskeletal: Normal range of motion  He exhibits no edema  Lymphadenopathy:     He has no cervical adenopathy  Neurological: He is alert and oriented to person, place, and time  No cranial nerve deficit  Skin: Skin is warm  No rash noted  Psychiatric: He has a normal mood and affect  Nursing note and vitals reviewed               Henrene Libman, MD

## 2019-03-12 ENCOUNTER — OFFICE VISIT (OUTPATIENT)
Dept: PHYSICAL THERAPY | Age: 59
End: 2019-03-12
Payer: COMMERCIAL

## 2019-03-12 DIAGNOSIS — M25.561 CHRONIC PAIN OF RIGHT KNEE: Primary | ICD-10-CM

## 2019-03-12 DIAGNOSIS — G89.29 CHRONIC PAIN OF RIGHT KNEE: Primary | ICD-10-CM

## 2019-03-12 DIAGNOSIS — Z96.651 STATUS POST TOTAL RIGHT KNEE REPLACEMENT: ICD-10-CM

## 2019-03-12 DIAGNOSIS — Z01.818 PRE-OP EXAM: ICD-10-CM

## 2019-03-12 PROCEDURE — 97110 THERAPEUTIC EXERCISES: CPT | Performed by: PHYSICAL THERAPIST

## 2019-03-12 PROCEDURE — 97112 NEUROMUSCULAR REEDUCATION: CPT | Performed by: PHYSICAL THERAPIST

## 2019-03-12 PROCEDURE — 97140 MANUAL THERAPY 1/> REGIONS: CPT | Performed by: PHYSICAL THERAPIST

## 2019-03-12 NOTE — PROGRESS NOTES
Daily Note     Today's date: 3/12/2019  Patient name: Sarah Beth Berry  : 1960  MRN: [id number]  Referring provider: Ora Jenkins MD  Dx:   Encounter Diagnosis     ICD-10-CM    1  Chronic pain of right knee M25 561     G89 29    2  Status post total right knee replacement Z96 651    3  Pre-op exam Z01 818        Start Time: 9443  Stop Time: 1165  Total time in clinic (min): 45 minutes    Subjective: Pt reports no new symptoms  Objective: See treatment diary below  Girth measurements:  Top of scar: 51 cm  Joint line: 48 cm      Assessment: Tolerated treatment well  Pt's POC was advanced to include more ROM exercises to increase flexion and extension to improve patient's tolerance to ambulation  Pt demonstrates improvements in ROM in all planes  Patient demonstrated fatigue post treatment and would benefit from continued PT      Plan: Continue per plan of care        Precautions: HTN    Daily Treatment Diary     Manual  3/12            PROM JF                                                                    Exercise Diary  3/7 3/12           LAQ HEP nv           Heel slides HEP 20*5"           Quad sets HEP 20*5"           Glute sets HEP nv           Ankle pumps HEP D/c           Mini squats HEP 2*10           Standing hip abd  2*10           RB  rocking 5 min                                                                                                                                                                           Modalities

## 2019-03-14 ENCOUNTER — OFFICE VISIT (OUTPATIENT)
Dept: PHYSICAL THERAPY | Age: 59
End: 2019-03-14
Payer: COMMERCIAL

## 2019-03-14 DIAGNOSIS — Z96.651 STATUS POST TOTAL RIGHT KNEE REPLACEMENT: ICD-10-CM

## 2019-03-14 DIAGNOSIS — M25.561 CHRONIC PAIN OF RIGHT KNEE: Primary | ICD-10-CM

## 2019-03-14 DIAGNOSIS — G89.29 CHRONIC PAIN OF RIGHT KNEE: Primary | ICD-10-CM

## 2019-03-14 PROCEDURE — 97112 NEUROMUSCULAR REEDUCATION: CPT | Performed by: PHYSICAL THERAPIST

## 2019-03-14 PROCEDURE — 97110 THERAPEUTIC EXERCISES: CPT | Performed by: PHYSICAL THERAPIST

## 2019-03-14 PROCEDURE — 97140 MANUAL THERAPY 1/> REGIONS: CPT | Performed by: PHYSICAL THERAPIST

## 2019-03-14 NOTE — PROGRESS NOTES
Daily Note     Today's date: 3/14/2019  Patient name: Sarah Beth Berry  : 1960  MRN: [id number]  Referring provider: Ora Jenkins MD  Dx:   Encounter Diagnosis     ICD-10-CM    1  Chronic pain of right knee M25 561     G89 29    2  Status post total right knee replacement Z96 651        Start Time: 1445  Stop Time: 1530  Total time in clinic (min): 45 minutes    Subjective: Patient reports that he was able to lift his leg up      Objective: See treatment diary below  Girth measurements:  Top of scar: 51 cm  Joint line: 48 cm      Assessment: Tolerated treatment well  Improved knee motion with being able to perform near full revolutions forward position on recumbent bike  Normal knee positioning with feet over side of table  Plan: Continue per plan of care  Precautions: HTN    Daily Treatment Diary     Manual  3/12 3/14           PROM JF FB                                                                   Exercise Diary  3/7 3/12 3/14          LAQ HEP nv 2*20          Heel slides HEP 20*5" 20*5"          Quad sets HEP 20*5" 20*5"          Glute sets HEP nv 20*5"          Ankle pumps HEP D/c           Mini squats HEP 2*10 2*10          Standing hip abd  2*10 2*10 ea  RB  rocking 5 min 8 min   rock                                                                                                                                                                          Modalities

## 2019-03-18 ENCOUNTER — OFFICE VISIT (OUTPATIENT)
Dept: PHYSICAL THERAPY | Age: 59
End: 2019-03-18
Payer: COMMERCIAL

## 2019-03-18 DIAGNOSIS — Z01.818 PRE-OP EXAM: ICD-10-CM

## 2019-03-18 DIAGNOSIS — M25.561 CHRONIC PAIN OF RIGHT KNEE: Primary | ICD-10-CM

## 2019-03-18 DIAGNOSIS — Z96.651 STATUS POST TOTAL RIGHT KNEE REPLACEMENT: ICD-10-CM

## 2019-03-18 DIAGNOSIS — G89.29 CHRONIC PAIN OF RIGHT KNEE: Primary | ICD-10-CM

## 2019-03-18 PROCEDURE — 97140 MANUAL THERAPY 1/> REGIONS: CPT | Performed by: PHYSICAL THERAPIST

## 2019-03-18 PROCEDURE — 97110 THERAPEUTIC EXERCISES: CPT | Performed by: PHYSICAL THERAPIST

## 2019-03-18 PROCEDURE — 97112 NEUROMUSCULAR REEDUCATION: CPT | Performed by: PHYSICAL THERAPIST

## 2019-03-18 NOTE — PROGRESS NOTES
Daily Note     Today's date: 3/18/2019  Patient name: Lupe Reno  : 1960  MRN: [id number]  Referring provider: Felisha Jacques MD  Dx:   Encounter Diagnosis     ICD-10-CM    1  Chronic pain of right knee M25 561     G89 29    2  Status post total right knee replacement Z96 651    3  Pre-op exam Z01 818        Start Time: 8466  Stop Time: 1302  Total time in clinic (min): 39 minutes    Subjective: Pt reports no new symptoms  Objective: See treatment diary below      Assessment: Tolerated treatment well  Pt's POC was advanced to include gait training with SPC  Pt was able to ambulate without increase symptoms but ambulates with increased knee flexion and required verbal cues to ambulate without compensation  Patient demonstrated fatigue post treatment and would benefit from continued PT      Plan: Continue per plan of care  Precautions: HTN    Daily Treatment Diary     Manual  3/12 3/14 3/18          PROM JF FB JF                                                                  Exercise Diary  3/7 3/12 3/14 3/18         LAQ HEP nv 2*20 3*10         Heel slides HEP 20*5" 20*5" 20*5"         Quad sets HEP 20*5" 20*5" 20*5"         Glute sets HEP nv 20*5"          Ankle pumps HEP D/c           Mini squats HEP 2*10 2*10 2*10         Standing hip abd  2*10 2*10 ea  2*10         RB  rocking 5 min 8 min   rock 5 min rock         Gait training SPC    10 mins                                                                                                                                                            Modalities

## 2019-03-20 ENCOUNTER — OFFICE VISIT (OUTPATIENT)
Dept: PHYSICAL THERAPY | Age: 59
End: 2019-03-20
Payer: COMMERCIAL

## 2019-03-20 DIAGNOSIS — M25.561 CHRONIC PAIN OF RIGHT KNEE: Primary | ICD-10-CM

## 2019-03-20 DIAGNOSIS — Z96.651 STATUS POST TOTAL RIGHT KNEE REPLACEMENT: ICD-10-CM

## 2019-03-20 DIAGNOSIS — G89.29 CHRONIC PAIN OF RIGHT KNEE: Primary | ICD-10-CM

## 2019-03-20 PROCEDURE — 97140 MANUAL THERAPY 1/> REGIONS: CPT

## 2019-03-20 PROCEDURE — 97112 NEUROMUSCULAR REEDUCATION: CPT

## 2019-03-20 PROCEDURE — 97110 THERAPEUTIC EXERCISES: CPT

## 2019-03-20 NOTE — PROGRESS NOTES
Daily Note     Today's date: 3/20/2019  Patient name: Reji Rowe  : 1960  MRN: [id number]  Referring provider: Marvin Jones MD  Dx:   Encounter Diagnosis     ICD-10-CM    1  Chronic pain of right knee M25 561     G89 29    2  Status post total right knee replacement Z96 651                   Subjective:  Pt c/o R knee pain, pt reports sleeping better last night, about 4 hrs    Objective: See treatment diary below      Assessment:  Fair quad recruitment noted on R, staples intact(to be removed tomorrow), mod swelling noted, pt felt better and more mobile after treatment, gait with SPC steady with decreased wt shift to R      Plan: Continue per plan of care  Progress treament per protocol  Precautions: HTN    Daily Treatment Diary     Manual  3/12 3/14 3/18 3/20/19         PROM JF FB JF VK                                                                 Exercise Diary  3/7 3/12 3/14 3/18 3/20/19        LAQ HEP nv 2*20 3*10 3x10x5"        Heel slides HEP 20*5" 20*5" 20*5" 20x5"        Quad sets HEP 20*5" 20*5" 20*5" 25x5"        Glute sets HEP nv 20*5"  25x5"        Ankle pumps HEP D/c           Mini squats HEP 2*10 2*10 2*10 2x10x5"        Standing hip abd  2*10 2*10 ea  2*10 2x10        RB  rocking 5 min 8 min   rock 5 min rock upright bike 5 min        Gait training SPC    10 mins 10 min        HR/TR     3x10 ea                                                                                                                                              Modalities

## 2019-03-21 ENCOUNTER — OFFICE VISIT (OUTPATIENT)
Dept: OBGYN CLINIC | Facility: HOSPITAL | Age: 59
End: 2019-03-21

## 2019-03-21 ENCOUNTER — HOSPITAL ENCOUNTER (OUTPATIENT)
Dept: RADIOLOGY | Facility: HOSPITAL | Age: 59
Discharge: HOME | End: 2019-03-21
Attending: ORTHOPAEDIC SURGERY
Payer: COMMERCIAL

## 2019-03-21 VITALS
WEIGHT: 249 LBS | BODY MASS INDEX: 39 KG/M2 | DIASTOLIC BLOOD PRESSURE: 79 MMHG | SYSTOLIC BLOOD PRESSURE: 119 MMHG | HEART RATE: 118 BPM

## 2019-03-21 DIAGNOSIS — Z47.1 AFTERCARE FOLLOWING RIGHT KNEE JOINT REPLACEMENT SURGERY: ICD-10-CM

## 2019-03-21 DIAGNOSIS — Z96.651 AFTERCARE FOLLOWING RIGHT KNEE JOINT REPLACEMENT SURGERY: Primary | ICD-10-CM

## 2019-03-21 DIAGNOSIS — Z96.651 AFTERCARE FOLLOWING RIGHT KNEE JOINT REPLACEMENT SURGERY: ICD-10-CM

## 2019-03-21 DIAGNOSIS — Z47.1 AFTERCARE FOLLOWING RIGHT KNEE JOINT REPLACEMENT SURGERY: Primary | ICD-10-CM

## 2019-03-21 PROCEDURE — 73560 X-RAY EXAM OF KNEE 1 OR 2: CPT

## 2019-03-21 PROCEDURE — 99024 POSTOP FOLLOW-UP VISIT: CPT | Performed by: ORTHOPAEDIC SURGERY

## 2019-03-21 RX ORDER — NAPROXEN 500 MG/1
500 TABLET ORAL 2 TIMES DAILY WITH MEALS
Qty: 60 TABLET | Refills: 1 | Status: SHIPPED | OUTPATIENT
Start: 2019-03-21 | End: 2019-08-16

## 2019-03-21 RX ORDER — TRAMADOL HYDROCHLORIDE 50 MG/1
50 TABLET ORAL EVERY 6 HOURS PRN
Qty: 60 TABLET | Refills: 0 | Status: SHIPPED | OUTPATIENT
Start: 2019-03-21 | End: 2019-08-16

## 2019-03-21 RX ORDER — DIPHENHYDRAMINE HCL 25 MG
25 TABLET ORAL
Qty: 30 TABLET | Refills: 0 | Status: SHIPPED | OUTPATIENT
Start: 2019-03-21 | End: 2019-11-25

## 2019-03-21 NOTE — PROGRESS NOTES
Assessment:  1  Aftercare following right knee joint replacement surgery  XR knee 1 or 2 vw right       Plan:     Weight Bearing  as Tolerated   Staples removed in the office today and steri-strips were applied   Continue physical therapy    Refilled Tramadol 50 mg    Prescribed Naprosyn 500 prn for pain and benadryl at night    Follow up in 2 months         The above stated was discussed in layman's terms and the patient expressed understanding  All questions were answered to the patient's satisfaction  Subjective:   Wilma Boswell is a 60 y o  male who presents right total knee  2 weeks arthoplasty on 3/4/19  Patient states he is doing better from the surgery  He is currently in physical therapy with sessions well  He is ambulating with a cane at home and a walker outside the house  He notes some numbness over the lateral knee  He denies any chills or fevers         Review of systems negative unless otherwise specified in HPI    Past Medical History:   Diagnosis Date    Anesthesia complication     difficulty awakening- dyspnea    Anxiety     Arthritis     Cleft hard palate     Glaucoma suspect, both eyes     Hyperlipidemia     Kidney stone     left    Right inguinal hernia     Right ureteral stone     Seasonal allergies     Wears dentures     partial upper    Wears glasses        Past Surgical History:   Procedure Laterality Date    CLEFT PALATE REPAIR      INGUINAL HERNIA REPAIR Right     KNEE CARTILAGE SURGERY Left     ID CYSTO/URETERO W/LITHOTRIPSY &INDWELL STENT INSRT Right 8/4/2017    Procedure: CYSTOSCOPY URETEROSCOPY WITH LITHOTRIPSY HOLMIUM LASER, RETROGRADE PYELOGRAM AND INSERTION STENT URETERAL;  Surgeon: Svetlana Redding MD;  Location: [facility];  Service: Urology    ID TOTAL KNEE ARTHROPLASTY Right 3/4/2019    Procedure: TOTAL KNEE ARTHROPLASTY;  Surgeon: Nora Mayberry MD;  Location: [facility];  Service: Orthopedics       Family History   Problem Relation Age of Onset    Diabetes Mother     Heart disease Mother     Hypertension Mother     Esophageal cancer Father     Diabetes Sister     Other Sister        Social History     Occupational History    Not on file   Tobacco Use    Smoking status: Never Smoker    Smokeless tobacco: Never Used    Tobacco comment: no passive smoke exposure   Substance and Sexual Activity    Alcohol use:  Yes     Alcohol/week: 0 6 oz     Types: 1 Glasses of wine per week     Frequency: 2-4 times a month     Drinks per session: 1 or 2     Comment: 1x wk    Drug use: No    Sexual activity: Yes     Partners: Female         Current Outpatient Medications:     acetaminophen (TYLENOL) 650 mg CR tablet, Take 1 tablet (650 mg total) by mouth every 8 (eight) hours as needed for mild pain for up to 30 days, Disp: 30 tablet, Rfl: 0    Bioflavonoid Products (VITAMIN C) CHEW, Chew 2 tablets daily, Disp: , Rfl:     cetirizine (ZyrTEC) 10 mg tablet, Take 10 mg by mouth daily, Disp: , Rfl:     Cholecalciferol 2000 units CAPS, Take by mouth, Disp: , Rfl:     docusate sodium (COLACE) 100 mg capsule, Take 1 capsule (100 mg total) by mouth 2 (two) times a day, Disp: 10 capsule, Rfl: 0    enoxaparin (LOVENOX) 40 mg/0 4 mL, 1 subcutaneous injection daily x 28 days AFTER surgery, Disp: 28 Syringe, Rfl: 0    ferrous sulfate 324 (65 Fe) mg, Take 1 tablet (324 mg total) by mouth 2 (two) times a day before meals, Disp: 60 tablet, Rfl: 0    fluticasone (FLONASE) 50 mcg/act nasal spray, Blow nose; shake bottle; place tip in each nostril and tilt towards eye; hold breath and press plunger; do this 2 times daily prn, Disp: , Rfl:     folic acid (FOLVITE) 1 mg tablet, TAKE 1 TABLET BY MOUTH EVERY DAY, Disp: 30 tablet, Rfl: 0    Multiple Vitamin (MULTIVITAMIN) tablet, Take 2 tablets by mouth daily, Disp: , Rfl:     Omega-3 Fatty Acids (FISH OIL PO), Take 1 capsule by mouth daily, Disp: , Rfl:     oxyCODONE (ROXICODONE) 5 mg immediate release tablet, 1-2 tabs po q 6 hrs prn pain, Disp: 30 tablet, Rfl: 0    senna (SENOKOT) 8 6 mg, Take 1 tablet (8 6 mg total) by mouth daily, Disp: 120 each, Rfl: 0    simvastatin (ZOCOR) 40 mg tablet, Take 1 tablet (40 mg total) by mouth daily at bedtime, Disp: 90 tablet, Rfl: 0    Allergies   Allergen Reactions    No Active Allergies             Vitals:    03/21/19 1326   BP: 119/79   Pulse: (!) 118       Objective:            Physical Exam  · General: Awake, Alert, Oriented  · Eyes: Pupils equal, round and reactive to light  · Heart: regular rate and rhythm  · Lungs: No audible wheezing  · Abdomen: soft                    Ortho Exam  Left knee:  Staples intact over the incision site  No sign of drainage  Incision healing well   Flexion 90°  Patient lacks about 10° of extension which is a little better than preop  Mild  swelling   Neurovascularly Intact Distally     Diagnostics, reviewed and taken today if performed as documented: The attending physician has personally reviewed the pertinent films in PACS and interpretation is as follows:  XR Right knee: adequate alignment of implant  S/P TKA     Procedures, if performed today:    Procedures    None performed      Scribe Attestation    I,:   Dolores Ireland am acting as a scribe while in the presence of the attending physician :        I,:   Christopher Rios MD personally performed the services described in this documentation    as scribed in my presence :              Portions of the record may have been created with voice recognition software  Occasional wrong word or "sound a like" substitutions may have occurred due to the inherent limitations of voice recognition software  Read the chart carefully and recognize, using context, where substitutions have occurred

## 2019-03-25 ENCOUNTER — OFFICE VISIT (OUTPATIENT)
Dept: PHYSICAL THERAPY | Age: 59
End: 2019-03-25
Payer: COMMERCIAL

## 2019-03-25 DIAGNOSIS — G89.29 CHRONIC PAIN OF RIGHT KNEE: Primary | ICD-10-CM

## 2019-03-25 DIAGNOSIS — M25.561 CHRONIC PAIN OF RIGHT KNEE: Primary | ICD-10-CM

## 2019-03-25 DIAGNOSIS — Z96.651 STATUS POST TOTAL RIGHT KNEE REPLACEMENT: ICD-10-CM

## 2019-03-25 DIAGNOSIS — Z01.818 PRE-OP EXAM: ICD-10-CM

## 2019-03-25 PROCEDURE — 97110 THERAPEUTIC EXERCISES: CPT | Performed by: PHYSICAL THERAPIST

## 2019-03-25 PROCEDURE — 97140 MANUAL THERAPY 1/> REGIONS: CPT | Performed by: PHYSICAL THERAPIST

## 2019-03-25 PROCEDURE — 97112 NEUROMUSCULAR REEDUCATION: CPT | Performed by: PHYSICAL THERAPIST

## 2019-03-25 NOTE — PROGRESS NOTES
Daily Note     Today's date: 3/25/2019  Patient name: Denise Abraham  : 1960  MRN: [id number]  Referring provider: Hendrick Goltz, MD  Dx:   Encounter Diagnosis     ICD-10-CM    1  Chronic pain of right knee M25 561     G89 29    2  Status post total right knee replacement Z96 651    3  Pre-op exam Z01 818        Start Time: 9767  Stop Time: 1100  Total time in clinic (min): 45 minutes    Subjective: Pt reports improvements in symptoms  Objective: See treatment diary below      Assessment: Tolerated treatment well  Pt's POC was advanced to include more closed chain functional strengthening exercises to increase tolerance to transfers  Patient demonstrated fatigue post treatment and would benefit from continued PT      Plan: Continue per plan of care  Precautions: HTN    Daily Treatment Diary     Manual  3/12 3/14 3/18 3/20/19 3/25        PROM JF FB JF VK JF                                                                Exercise Diary  3/7 3/12 3/14 3/18 3/20/19 3/25       LAQ HEP nv 2*20 3*10 3x10x5" 3*10*5" 2#       Heel slides HEP 20*5" 20*5" 20*5" 20x5"        Quad sets HEP 20*5" 20*5" 20*5" 25x5" 20*5"       Glute sets HEP nv 20*5"  25x5" D/c       Ankle pumps HEP D/c    D/c       Mini squats HEP 2*10 2*10 2*10 2x10x5" 3*10       Standing hip abd  2*10 2*10 ea  2*10 2x10 2*10       RB  rocking 5 min 8 min   rock 5 min rock upright bike 5 min Upright 10 mins       Gait training SPC    10 mins 10 min        HR/TR     3x10 ea        Step-ups      6" 3*10       STS      2*10                                                                                                                   Modalities

## 2019-03-28 ENCOUNTER — OFFICE VISIT (OUTPATIENT)
Dept: PHYSICAL THERAPY | Age: 59
End: 2019-03-28
Payer: COMMERCIAL

## 2019-03-28 DIAGNOSIS — Z96.651 STATUS POST TOTAL RIGHT KNEE REPLACEMENT: ICD-10-CM

## 2019-03-28 DIAGNOSIS — Z01.818 PRE-OP EXAM: ICD-10-CM

## 2019-03-28 DIAGNOSIS — G89.29 CHRONIC PAIN OF RIGHT KNEE: Primary | ICD-10-CM

## 2019-03-28 DIAGNOSIS — M25.561 CHRONIC PAIN OF RIGHT KNEE: Primary | ICD-10-CM

## 2019-03-28 PROCEDURE — 97110 THERAPEUTIC EXERCISES: CPT | Performed by: PHYSICAL THERAPIST

## 2019-03-28 PROCEDURE — 97112 NEUROMUSCULAR REEDUCATION: CPT | Performed by: PHYSICAL THERAPIST

## 2019-03-28 PROCEDURE — 97140 MANUAL THERAPY 1/> REGIONS: CPT | Performed by: PHYSICAL THERAPIST

## 2019-03-28 NOTE — PROGRESS NOTES
Daily Note     Today's date: 3/28/2019  Patient name: Luh Stacy  : 1960  MRN: [id number]  Referring provider: Colt Ochoa MD  Dx:   Encounter Diagnosis     ICD-10-CM    1  Chronic pain of right knee M25 561     G89 29    2  Status post total right knee replacement Z96 651    3  Pre-op exam Z01 818        Start Time: 4303  Stop Time: 1230  Total time in clinic (min): 45 minutes    Subjective: Pt reports improvements with knee  Objective: See treatment diary below      Assessment: Tolerated treatment well  Pt's POC was progressed to include more closed chain exercises to increase tolerance to functional tasks  Pt's improvements are demonstrated in [address]  Patient demonstrated fatigue post treatment and would benefit from continued PT      Plan: Continue per plan of care  Precautions: HTN    Daily Treatment Diary     Manual  3/12 3/14 3/18 3/20/19 3/25 3/28       PROM JF FB JF VK JF JF                                                               Exercise Diary  3/7 3/12 3/14 3/18 3/20/19 3/25 3/28      LAQ HEP nv 2*20 3*10 3x10x5" 3*10*5" 2# 3*10*5" 2#      Heel slides HEP 20*5" 20*5" 20*5" 20x5"  20*5"      Quad sets HEP 20*5" 20*5" 20*5" 25x5" 20*5" 20*5"      Glute sets HEP nv 20*5"  25x5" D/c       Ankle pumps HEP D/c    D/c       Mini squats HEP 2*10 2*10 2*10 2x10x5" 3*10 3*10      Standing hip abd  2*10 2*10 ea  2*10 2x10 2*10 2*10 b/l      RB  rocking 5 min 8 min   rock 5 min rock upright bike 5 min Upright 10 mins Upright 5 mins lvl 9      Gait training SPC    10 mins 10 min        HR/TR     3x10 ea        Step-ups      6" 3*10 6" 3*10      STS      2*10 3*10      Hamstring curls       3*10* 2#                                                                                                     Modalities

## 2019-04-01 ENCOUNTER — OFFICE VISIT (OUTPATIENT)
Dept: PHYSICAL THERAPY | Age: 59
End: 2019-04-01
Payer: COMMERCIAL

## 2019-04-01 DIAGNOSIS — M25.561 CHRONIC PAIN OF RIGHT KNEE: Primary | ICD-10-CM

## 2019-04-01 DIAGNOSIS — Z01.818 PRE-OP EXAM: ICD-10-CM

## 2019-04-01 DIAGNOSIS — G89.29 CHRONIC PAIN OF RIGHT KNEE: Primary | ICD-10-CM

## 2019-04-01 DIAGNOSIS — Z96.651 STATUS POST TOTAL RIGHT KNEE REPLACEMENT: ICD-10-CM

## 2019-04-01 PROCEDURE — 97110 THERAPEUTIC EXERCISES: CPT | Performed by: PHYSICAL THERAPIST

## 2019-04-01 PROCEDURE — 97140 MANUAL THERAPY 1/> REGIONS: CPT | Performed by: PHYSICAL THERAPIST

## 2019-04-01 PROCEDURE — 97112 NEUROMUSCULAR REEDUCATION: CPT | Performed by: PHYSICAL THERAPIST

## 2019-04-02 ENCOUNTER — TELEPHONE (OUTPATIENT)
Dept: OBGYN CLINIC | Facility: HOSPITAL | Age: 59
End: 2019-04-02

## 2019-04-03 ENCOUNTER — TELEPHONE (OUTPATIENT)
Dept: FAMILY MEDICINE CLINIC | Facility: CLINIC | Age: 59
End: 2019-04-03

## 2019-04-04 ENCOUNTER — OFFICE VISIT (OUTPATIENT)
Dept: PHYSICAL THERAPY | Age: 59
End: 2019-04-04
Payer: COMMERCIAL

## 2019-04-04 DIAGNOSIS — Z96.651 STATUS POST TOTAL RIGHT KNEE REPLACEMENT: ICD-10-CM

## 2019-04-04 DIAGNOSIS — Z01.818 PRE-OP EXAM: ICD-10-CM

## 2019-04-04 DIAGNOSIS — M25.561 CHRONIC PAIN OF RIGHT KNEE: Primary | ICD-10-CM

## 2019-04-04 DIAGNOSIS — G89.29 CHRONIC PAIN OF RIGHT KNEE: Primary | ICD-10-CM

## 2019-04-04 PROCEDURE — 97112 NEUROMUSCULAR REEDUCATION: CPT | Performed by: PHYSICAL THERAPIST

## 2019-04-04 PROCEDURE — 97140 MANUAL THERAPY 1/> REGIONS: CPT | Performed by: PHYSICAL THERAPIST

## 2019-04-04 PROCEDURE — 97110 THERAPEUTIC EXERCISES: CPT | Performed by: PHYSICAL THERAPIST

## 2019-04-08 ENCOUNTER — OFFICE VISIT (OUTPATIENT)
Dept: PHYSICAL THERAPY | Age: 59
End: 2019-04-08
Payer: COMMERCIAL

## 2019-04-08 DIAGNOSIS — M25.561 CHRONIC PAIN OF RIGHT KNEE: Primary | ICD-10-CM

## 2019-04-08 DIAGNOSIS — G89.29 CHRONIC PAIN OF RIGHT KNEE: Primary | ICD-10-CM

## 2019-04-08 DIAGNOSIS — Z01.818 PRE-OP EXAM: ICD-10-CM

## 2019-04-08 DIAGNOSIS — Z96.651 STATUS POST TOTAL RIGHT KNEE REPLACEMENT: ICD-10-CM

## 2019-04-08 PROCEDURE — 97140 MANUAL THERAPY 1/> REGIONS: CPT | Performed by: PHYSICAL THERAPIST

## 2019-04-08 PROCEDURE — 97110 THERAPEUTIC EXERCISES: CPT | Performed by: PHYSICAL THERAPIST

## 2019-04-08 PROCEDURE — 97112 NEUROMUSCULAR REEDUCATION: CPT | Performed by: PHYSICAL THERAPIST

## 2019-04-11 ENCOUNTER — OFFICE VISIT (OUTPATIENT)
Dept: PHYSICAL THERAPY | Age: 59
End: 2019-04-11
Payer: COMMERCIAL

## 2019-04-11 DIAGNOSIS — G89.29 CHRONIC PAIN OF RIGHT KNEE: Primary | ICD-10-CM

## 2019-04-11 DIAGNOSIS — Z96.651 STATUS POST TOTAL RIGHT KNEE REPLACEMENT: ICD-10-CM

## 2019-04-11 DIAGNOSIS — Z01.818 PRE-OP EXAM: ICD-10-CM

## 2019-04-11 DIAGNOSIS — M25.561 CHRONIC PAIN OF RIGHT KNEE: Primary | ICD-10-CM

## 2019-04-11 PROCEDURE — 97110 THERAPEUTIC EXERCISES: CPT | Performed by: PHYSICAL THERAPIST

## 2019-04-11 PROCEDURE — 97112 NEUROMUSCULAR REEDUCATION: CPT | Performed by: PHYSICAL THERAPIST

## 2019-04-11 PROCEDURE — 97140 MANUAL THERAPY 1/> REGIONS: CPT | Performed by: PHYSICAL THERAPIST

## 2019-04-15 ENCOUNTER — OFFICE VISIT (OUTPATIENT)
Dept: PHYSICAL THERAPY | Age: 59
End: 2019-04-15
Payer: COMMERCIAL

## 2019-04-15 DIAGNOSIS — Z01.818 PRE-OP EXAM: ICD-10-CM

## 2019-04-15 DIAGNOSIS — G89.29 CHRONIC PAIN OF RIGHT KNEE: Primary | ICD-10-CM

## 2019-04-15 DIAGNOSIS — M25.561 CHRONIC PAIN OF RIGHT KNEE: Primary | ICD-10-CM

## 2019-04-15 DIAGNOSIS — Z96.651 STATUS POST TOTAL RIGHT KNEE REPLACEMENT: ICD-10-CM

## 2019-04-15 PROCEDURE — 97112 NEUROMUSCULAR REEDUCATION: CPT | Performed by: PHYSICAL THERAPIST

## 2019-04-15 PROCEDURE — 97140 MANUAL THERAPY 1/> REGIONS: CPT | Performed by: PHYSICAL THERAPIST

## 2019-04-15 PROCEDURE — 97110 THERAPEUTIC EXERCISES: CPT | Performed by: PHYSICAL THERAPIST

## 2019-04-18 ENCOUNTER — EVALUATION (OUTPATIENT)
Dept: PHYSICAL THERAPY | Age: 59
End: 2019-04-18
Payer: COMMERCIAL

## 2019-04-18 DIAGNOSIS — Z01.818 PRE-OP EXAM: ICD-10-CM

## 2019-04-18 DIAGNOSIS — G89.29 CHRONIC PAIN OF RIGHT KNEE: Primary | ICD-10-CM

## 2019-04-18 DIAGNOSIS — M25.561 CHRONIC PAIN OF RIGHT KNEE: Primary | ICD-10-CM

## 2019-04-18 DIAGNOSIS — Z96.651 STATUS POST TOTAL RIGHT KNEE REPLACEMENT: ICD-10-CM

## 2019-04-18 PROCEDURE — 97140 MANUAL THERAPY 1/> REGIONS: CPT | Performed by: PHYSICAL THERAPIST

## 2019-04-18 PROCEDURE — 97110 THERAPEUTIC EXERCISES: CPT | Performed by: PHYSICAL THERAPIST

## 2019-04-18 PROCEDURE — 97112 NEUROMUSCULAR REEDUCATION: CPT | Performed by: PHYSICAL THERAPIST

## 2019-04-22 ENCOUNTER — OFFICE VISIT (OUTPATIENT)
Dept: PHYSICAL THERAPY | Age: 59
End: 2019-04-22

## 2019-04-22 DIAGNOSIS — M25.561 CHRONIC PAIN OF RIGHT KNEE: Primary | ICD-10-CM

## 2019-04-22 DIAGNOSIS — Z96.651 STATUS POST TOTAL RIGHT KNEE REPLACEMENT: ICD-10-CM

## 2019-04-22 DIAGNOSIS — G89.29 CHRONIC PAIN OF RIGHT KNEE: Primary | ICD-10-CM

## 2019-04-22 DIAGNOSIS — Z01.818 PRE-OP EXAM: ICD-10-CM

## 2019-04-26 ENCOUNTER — TELEPHONE (OUTPATIENT)
Dept: OBGYN CLINIC | Facility: HOSPITAL | Age: 59
End: 2019-04-26

## 2019-05-20 ENCOUNTER — TELEPHONE (OUTPATIENT)
Dept: OBGYN CLINIC | Facility: HOSPITAL | Age: 59
End: 2019-05-20

## 2019-05-21 ENCOUNTER — HOSPITAL ENCOUNTER (OUTPATIENT)
Dept: RADIOLOGY | Facility: HOSPITAL | Age: 59
Discharge: HOME | End: 2019-05-21
Attending: ORTHOPAEDIC SURGERY
Payer: COMMERCIAL

## 2019-05-21 ENCOUNTER — OFFICE VISIT (OUTPATIENT)
Dept: OBGYN CLINIC | Facility: HOSPITAL | Age: 59
End: 2019-05-21

## 2019-05-21 VITALS
WEIGHT: 265 LBS | SYSTOLIC BLOOD PRESSURE: 150 MMHG | DIASTOLIC BLOOD PRESSURE: 83 MMHG | BODY MASS INDEX: 41.5 KG/M2 | HEART RATE: 77 BPM

## 2019-05-21 DIAGNOSIS — M25.561 ACUTE PAIN OF RIGHT KNEE: ICD-10-CM

## 2019-05-21 DIAGNOSIS — Z47.1 AFTERCARE FOLLOWING RIGHT KNEE JOINT REPLACEMENT SURGERY: Primary | ICD-10-CM

## 2019-05-21 DIAGNOSIS — Z96.651 AFTERCARE FOLLOWING RIGHT KNEE JOINT REPLACEMENT SURGERY: Primary | ICD-10-CM

## 2019-05-21 PROCEDURE — 73560 X-RAY EXAM OF KNEE 1 OR 2: CPT

## 2019-05-21 PROCEDURE — 99024 POSTOP FOLLOW-UP VISIT: CPT | Performed by: ORTHOPAEDIC SURGERY

## 2019-05-21 RX ORDER — NAPROXEN 500 MG/1
500 TABLET ORAL 2 TIMES DAILY WITH MEALS
Qty: 60 TABLET | Refills: 1 | Status: SHIPPED | OUTPATIENT
Start: 2019-05-21 | End: 2019-07-19 | Stop reason: SDUPTHER

## 2019-05-21 RX ORDER — NAPROXEN 500 MG/1
500 TABLET ORAL 2 TIMES DAILY WITH MEALS
Qty: 60 TABLET | Refills: 1 | Status: CANCELLED | OUTPATIENT
Start: 2019-05-21

## 2019-05-28 ENCOUNTER — EVALUATION (OUTPATIENT)
Dept: PHYSICAL THERAPY | Age: 59
End: 2019-05-28
Payer: COMMERCIAL

## 2019-05-28 DIAGNOSIS — M25.561 ACUTE PAIN OF RIGHT KNEE: Primary | ICD-10-CM

## 2019-05-28 DIAGNOSIS — Z47.1 AFTERCARE FOLLOWING RIGHT KNEE JOINT REPLACEMENT SURGERY: ICD-10-CM

## 2019-05-28 DIAGNOSIS — Z96.651 AFTERCARE FOLLOWING RIGHT KNEE JOINT REPLACEMENT SURGERY: ICD-10-CM

## 2019-05-28 PROCEDURE — 97164 PT RE-EVAL EST PLAN CARE: CPT | Performed by: PHYSICAL THERAPIST

## 2019-05-28 PROCEDURE — 97110 THERAPEUTIC EXERCISES: CPT | Performed by: PHYSICAL THERAPIST

## 2019-05-31 ENCOUNTER — OFFICE VISIT (OUTPATIENT)
Dept: PHYSICAL THERAPY | Age: 59
End: 2019-05-31
Payer: COMMERCIAL

## 2019-05-31 DIAGNOSIS — Z47.1 AFTERCARE FOLLOWING RIGHT KNEE JOINT REPLACEMENT SURGERY: ICD-10-CM

## 2019-05-31 DIAGNOSIS — M25.561 ACUTE PAIN OF RIGHT KNEE: Primary | ICD-10-CM

## 2019-05-31 DIAGNOSIS — Z96.651 AFTERCARE FOLLOWING RIGHT KNEE JOINT REPLACEMENT SURGERY: ICD-10-CM

## 2019-05-31 PROCEDURE — 97112 NEUROMUSCULAR REEDUCATION: CPT | Performed by: PHYSICAL THERAPIST

## 2019-05-31 PROCEDURE — 97110 THERAPEUTIC EXERCISES: CPT | Performed by: PHYSICAL THERAPIST

## 2019-05-31 PROCEDURE — 97140 MANUAL THERAPY 1/> REGIONS: CPT | Performed by: PHYSICAL THERAPIST

## 2019-06-03 ENCOUNTER — OFFICE VISIT (OUTPATIENT)
Dept: PHYSICAL THERAPY | Age: 59
End: 2019-06-03
Payer: COMMERCIAL

## 2019-06-03 DIAGNOSIS — Z47.1 AFTERCARE FOLLOWING RIGHT KNEE JOINT REPLACEMENT SURGERY: ICD-10-CM

## 2019-06-03 DIAGNOSIS — M25.561 ACUTE PAIN OF RIGHT KNEE: Primary | ICD-10-CM

## 2019-06-03 DIAGNOSIS — Z96.651 AFTERCARE FOLLOWING RIGHT KNEE JOINT REPLACEMENT SURGERY: ICD-10-CM

## 2019-06-03 PROCEDURE — 97140 MANUAL THERAPY 1/> REGIONS: CPT

## 2019-06-03 PROCEDURE — 97110 THERAPEUTIC EXERCISES: CPT

## 2019-06-03 PROCEDURE — 97112 NEUROMUSCULAR REEDUCATION: CPT

## 2019-06-06 ENCOUNTER — OFFICE VISIT (OUTPATIENT)
Dept: PHYSICAL THERAPY | Age: 59
End: 2019-06-06
Payer: COMMERCIAL

## 2019-06-06 DIAGNOSIS — Z96.651 AFTERCARE FOLLOWING RIGHT KNEE JOINT REPLACEMENT SURGERY: ICD-10-CM

## 2019-06-06 DIAGNOSIS — Z47.1 AFTERCARE FOLLOWING RIGHT KNEE JOINT REPLACEMENT SURGERY: ICD-10-CM

## 2019-06-06 DIAGNOSIS — M25.561 ACUTE PAIN OF RIGHT KNEE: Primary | ICD-10-CM

## 2019-06-06 PROCEDURE — 97140 MANUAL THERAPY 1/> REGIONS: CPT

## 2019-06-06 PROCEDURE — 97112 NEUROMUSCULAR REEDUCATION: CPT

## 2019-06-06 PROCEDURE — 97110 THERAPEUTIC EXERCISES: CPT

## 2019-06-10 ENCOUNTER — OFFICE VISIT (OUTPATIENT)
Dept: PHYSICAL THERAPY | Age: 59
End: 2019-06-10
Payer: COMMERCIAL

## 2019-06-10 DIAGNOSIS — Z47.1 AFTERCARE FOLLOWING RIGHT KNEE JOINT REPLACEMENT SURGERY: ICD-10-CM

## 2019-06-10 DIAGNOSIS — Z96.651 AFTERCARE FOLLOWING RIGHT KNEE JOINT REPLACEMENT SURGERY: ICD-10-CM

## 2019-06-10 DIAGNOSIS — M25.561 ACUTE PAIN OF RIGHT KNEE: Primary | ICD-10-CM

## 2019-06-10 PROCEDURE — 97140 MANUAL THERAPY 1/> REGIONS: CPT | Performed by: PHYSICAL THERAPIST

## 2019-06-10 PROCEDURE — 97110 THERAPEUTIC EXERCISES: CPT | Performed by: PHYSICAL THERAPIST

## 2019-06-10 PROCEDURE — 97112 NEUROMUSCULAR REEDUCATION: CPT | Performed by: PHYSICAL THERAPIST

## 2019-06-13 ENCOUNTER — OFFICE VISIT (OUTPATIENT)
Dept: PHYSICAL THERAPY | Age: 59
End: 2019-06-13
Payer: COMMERCIAL

## 2019-06-13 DIAGNOSIS — Z96.651 AFTERCARE FOLLOWING RIGHT KNEE JOINT REPLACEMENT SURGERY: ICD-10-CM

## 2019-06-13 DIAGNOSIS — Z47.1 AFTERCARE FOLLOWING RIGHT KNEE JOINT REPLACEMENT SURGERY: ICD-10-CM

## 2019-06-13 DIAGNOSIS — M25.561 ACUTE PAIN OF RIGHT KNEE: Primary | ICD-10-CM

## 2019-06-13 PROCEDURE — 97110 THERAPEUTIC EXERCISES: CPT | Performed by: PHYSICAL THERAPIST

## 2019-06-13 PROCEDURE — 97140 MANUAL THERAPY 1/> REGIONS: CPT | Performed by: PHYSICAL THERAPIST

## 2019-06-13 PROCEDURE — 97112 NEUROMUSCULAR REEDUCATION: CPT | Performed by: PHYSICAL THERAPIST

## 2019-06-17 ENCOUNTER — OFFICE VISIT (OUTPATIENT)
Dept: PHYSICAL THERAPY | Age: 59
End: 2019-06-17
Payer: COMMERCIAL

## 2019-06-17 DIAGNOSIS — M25.561 ACUTE PAIN OF RIGHT KNEE: Primary | ICD-10-CM

## 2019-06-17 DIAGNOSIS — Z96.651 AFTERCARE FOLLOWING RIGHT KNEE JOINT REPLACEMENT SURGERY: ICD-10-CM

## 2019-06-17 DIAGNOSIS — Z47.1 AFTERCARE FOLLOWING RIGHT KNEE JOINT REPLACEMENT SURGERY: ICD-10-CM

## 2019-06-17 PROCEDURE — 97112 NEUROMUSCULAR REEDUCATION: CPT

## 2019-06-17 PROCEDURE — 97110 THERAPEUTIC EXERCISES: CPT

## 2019-06-17 PROCEDURE — 97140 MANUAL THERAPY 1/> REGIONS: CPT

## 2019-06-18 ENCOUNTER — TELEPHONE (OUTPATIENT)
Dept: OBGYN CLINIC | Facility: HOSPITAL | Age: 59
End: 2019-06-18

## 2019-06-20 ENCOUNTER — OFFICE VISIT (OUTPATIENT)
Dept: PHYSICAL THERAPY | Age: 59
End: 2019-06-20
Payer: COMMERCIAL

## 2019-06-20 DIAGNOSIS — M25.561 ACUTE PAIN OF RIGHT KNEE: Primary | ICD-10-CM

## 2019-06-20 DIAGNOSIS — Z47.1 AFTERCARE FOLLOWING RIGHT KNEE JOINT REPLACEMENT SURGERY: ICD-10-CM

## 2019-06-20 DIAGNOSIS — Z96.651 AFTERCARE FOLLOWING RIGHT KNEE JOINT REPLACEMENT SURGERY: ICD-10-CM

## 2019-06-20 PROCEDURE — 97140 MANUAL THERAPY 1/> REGIONS: CPT | Performed by: PHYSICAL THERAPIST

## 2019-06-20 PROCEDURE — 97110 THERAPEUTIC EXERCISES: CPT | Performed by: PHYSICAL THERAPIST

## 2019-06-20 PROCEDURE — 97112 NEUROMUSCULAR REEDUCATION: CPT | Performed by: PHYSICAL THERAPIST

## 2019-06-23 DIAGNOSIS — E78.5 HYPERLIPIDEMIA, UNSPECIFIED HYPERLIPIDEMIA TYPE: ICD-10-CM

## 2019-06-24 ENCOUNTER — OFFICE VISIT (OUTPATIENT)
Dept: PHYSICAL THERAPY | Age: 59
End: 2019-06-24
Payer: COMMERCIAL

## 2019-06-24 DIAGNOSIS — M25.561 ACUTE PAIN OF RIGHT KNEE: Primary | ICD-10-CM

## 2019-06-24 DIAGNOSIS — Z96.651 AFTERCARE FOLLOWING RIGHT KNEE JOINT REPLACEMENT SURGERY: ICD-10-CM

## 2019-06-24 DIAGNOSIS — Z47.1 AFTERCARE FOLLOWING RIGHT KNEE JOINT REPLACEMENT SURGERY: ICD-10-CM

## 2019-06-24 PROCEDURE — 97140 MANUAL THERAPY 1/> REGIONS: CPT | Performed by: PHYSICAL THERAPIST

## 2019-06-24 PROCEDURE — 97112 NEUROMUSCULAR REEDUCATION: CPT | Performed by: PHYSICAL THERAPIST

## 2019-06-24 PROCEDURE — 97110 THERAPEUTIC EXERCISES: CPT | Performed by: PHYSICAL THERAPIST

## 2019-06-24 RX ORDER — SIMVASTATIN 40 MG
40 TABLET ORAL
Qty: 90 TABLET | Refills: 0 | Status: SHIPPED | OUTPATIENT
Start: 2019-06-24 | End: 2019-09-22 | Stop reason: SDUPTHER

## 2019-06-27 ENCOUNTER — OFFICE VISIT (OUTPATIENT)
Dept: PHYSICAL THERAPY | Age: 59
End: 2019-06-27
Payer: COMMERCIAL

## 2019-06-27 DIAGNOSIS — M25.561 ACUTE PAIN OF RIGHT KNEE: Primary | ICD-10-CM

## 2019-06-27 DIAGNOSIS — Z47.1 AFTERCARE FOLLOWING RIGHT KNEE JOINT REPLACEMENT SURGERY: ICD-10-CM

## 2019-06-27 DIAGNOSIS — Z96.651 AFTERCARE FOLLOWING RIGHT KNEE JOINT REPLACEMENT SURGERY: ICD-10-CM

## 2019-06-27 PROCEDURE — 97110 THERAPEUTIC EXERCISES: CPT | Performed by: PHYSICAL THERAPIST

## 2019-06-27 PROCEDURE — 97140 MANUAL THERAPY 1/> REGIONS: CPT | Performed by: PHYSICAL THERAPIST

## 2019-06-27 PROCEDURE — 97112 NEUROMUSCULAR REEDUCATION: CPT | Performed by: PHYSICAL THERAPIST

## 2019-07-01 ENCOUNTER — OFFICE VISIT (OUTPATIENT)
Dept: PHYSICAL THERAPY | Age: 59
End: 2019-07-01
Payer: COMMERCIAL

## 2019-07-01 DIAGNOSIS — Z47.1 AFTERCARE FOLLOWING RIGHT KNEE JOINT REPLACEMENT SURGERY: ICD-10-CM

## 2019-07-01 DIAGNOSIS — Z96.651 AFTERCARE FOLLOWING RIGHT KNEE JOINT REPLACEMENT SURGERY: ICD-10-CM

## 2019-07-01 DIAGNOSIS — M25.561 ACUTE PAIN OF RIGHT KNEE: Primary | ICD-10-CM

## 2019-07-01 PROCEDURE — 97112 NEUROMUSCULAR REEDUCATION: CPT | Performed by: PHYSICAL THERAPY ASSISTANT

## 2019-07-01 PROCEDURE — 97140 MANUAL THERAPY 1/> REGIONS: CPT | Performed by: PHYSICAL THERAPY ASSISTANT

## 2019-07-01 PROCEDURE — 97110 THERAPEUTIC EXERCISES: CPT | Performed by: PHYSICAL THERAPY ASSISTANT

## 2019-07-03 ENCOUNTER — OFFICE VISIT (OUTPATIENT)
Dept: PHYSICAL THERAPY | Age: 59
End: 2019-07-03
Payer: COMMERCIAL

## 2019-07-03 DIAGNOSIS — M25.561 ACUTE PAIN OF RIGHT KNEE: Primary | ICD-10-CM

## 2019-07-03 DIAGNOSIS — Z47.1 AFTERCARE FOLLOWING RIGHT KNEE JOINT REPLACEMENT SURGERY: ICD-10-CM

## 2019-07-03 DIAGNOSIS — Z96.651 AFTERCARE FOLLOWING RIGHT KNEE JOINT REPLACEMENT SURGERY: ICD-10-CM

## 2019-07-03 PROCEDURE — 97112 NEUROMUSCULAR REEDUCATION: CPT

## 2019-07-03 PROCEDURE — 97110 THERAPEUTIC EXERCISES: CPT

## 2019-07-03 NOTE — PROGRESS NOTES
Daily Note     Today's date: 7/3/2019  Patient name: Oral Jj  : 1960  MRN: [id number]  Referring provider: Hugo Moreno MD  Dx:   Encounter Diagnosis     ICD-10-CM    1  Acute pain of right knee M25 561    2  Aftercare following right knee joint replacement surgery Z47 1     Z96 651                   Subjective: jpt reports he will be scheduling L TKR soon    Objective: See treatment diary below      Assessment: Tolerated treatment well  Patient would benefit from continued PT      Plan: Continue per plan of care  Progress treatment as tolerated             Precautions: Right knee TKA    Daily Treatment Diary     Manual              PROM JF                                                                    Exercise Diary  6/13/19 6/17/19 6/20 6/24 6/27 7/1 7/3/19   SLR 7 5# 3*10 7 5# 7 5# 3*10 7 5# 3*10 7 5# 3*10 7 5#  3x10 7 5# 3x10   Sidelying hip abd 1# 3x10 1# 3x10 2 5 3*10 2 5# 3*10 2 5# 3*10 2 5#  3x10 3# 3x10   Concetric/eccentric LAQ PT resist 3/10 3x10 3x10 3*10 3*10 3x10 b/l 3x10 b/l   Foot taps 2*10 ea 2x10 ea 3*10 ea 3*10 ea 3*10 ea 3x10 ea 3x10 ea   Leg press  B 125-155#/  SLS 75-95# 3x10 ea Y028-418# 3x10; 65#-85# 3x10  [id number]# 3x10; 65#-85# 3x10  H694-963-692# 3x10; 65#-85# 3x1 [id number]# 3x10; 65#-85# 3x1 B 125,155,  200# 3x10  SL 65,75,85#  3x10 B 200# 3x10; SLS each 75#   Monster walks + lateral  gtb 3 laps ea gtb 3 laps ea gtb 3 laps ea gtb 3 laps ea gtb 3 laps ea Blue TB  3 laps ea BTB 3 laps ea   TKE 40# 3x10x5" 40# 3x10x5" 40# 3x10x5" 40# 3x10x5" 40# 3x10x5" 40#  3x10 5" 40# 3x10x5"   Bosu Squats 3*10 3x10 3*10 3*10 3*10 3x10 3x10   LAQ blk 3x10 blk 3x10 blk 3x10       STS SLS    2*10 b/l 3*10 b/l 3x10 b/l 3x10 b/l   Upright bike      7' 7'                                                                                                 Modalities

## 2019-07-08 ENCOUNTER — OFFICE VISIT (OUTPATIENT)
Dept: PHYSICAL THERAPY | Age: 59
End: 2019-07-08
Payer: COMMERCIAL

## 2019-07-08 DIAGNOSIS — Z47.1 AFTERCARE FOLLOWING RIGHT KNEE JOINT REPLACEMENT SURGERY: ICD-10-CM

## 2019-07-08 DIAGNOSIS — M25.561 ACUTE PAIN OF RIGHT KNEE: Primary | ICD-10-CM

## 2019-07-08 DIAGNOSIS — Z96.651 AFTERCARE FOLLOWING RIGHT KNEE JOINT REPLACEMENT SURGERY: ICD-10-CM

## 2019-07-08 PROCEDURE — 97140 MANUAL THERAPY 1/> REGIONS: CPT | Performed by: PHYSICAL THERAPIST

## 2019-07-08 PROCEDURE — 97112 NEUROMUSCULAR REEDUCATION: CPT | Performed by: PHYSICAL THERAPIST

## 2019-07-08 PROCEDURE — 97110 THERAPEUTIC EXERCISES: CPT | Performed by: PHYSICAL THERAPIST

## 2019-07-08 NOTE — PROGRESS NOTES
Daily Note     Today's date: 2019  Patient name: Santos Marie  : 1960  MRN: [id number]  Referring provider: Darrel Jenkins MD  Dx:   Encounter Diagnosis     ICD-10-CM    1  Acute pain of right knee M25 561    2  Aftercare following right knee joint replacement surgery Z47 1     Z96 651        Start Time: 0845  Stop Time: 09  Total time in clinic (min): 45 minutes    Subjective: Pt reports no new symptoms  Objective: See treatment diary below      Assessment: Tolerated treatment well  Pt demonstrates improvements with quad strength during stair navigation  Patient demonstrated fatigue post treatment and would benefit from continued PT      Plan: Continue per plan of care        Precautions: Right knee TKA    Daily Treatment Diary     Manual              PROM JF                                                                    Exercise Diary  7/8      7/3/19   SLR 7 5# 3x10      7 5# 3x10   Sidelying hip abd 3# 3x10      3# 3x10   Concetric/eccentric LAQ PT resist 3*10 b/l      3x10 b/l   Foot taps       3x10 ea   Leg press  B 200# 3x10; SLS each 75#      B 200# 3x10; SLS each 75#   Monster walks + lateral  BTB 3 laps ea      BTB 3 laps ea   TKE       40# 3x10x5"   Bosu Squats 3*10      3x10   LAQ          STS SLS 3x10 b/l      3x10 b/l   Upright bike 7'      7'                                                                                                 Modalities

## 2019-07-09 ENCOUNTER — TELEPHONE (OUTPATIENT)
Dept: OBGYN CLINIC | Facility: HOSPITAL | Age: 59
End: 2019-07-09

## 2019-07-09 NOTE — TELEPHONE ENCOUNTER
Due to the many appointments needed and scheduling issues regarding total joint arthroplasties patient should come to office for scheduling, please let me know if you are having issues finding a spot on the schedule

## 2019-07-09 NOTE — TELEPHONE ENCOUNTER
Caller: Gina Precise  Call Back Number: [phone]  Provider: Dr Светлана Alvarez    Patient has called and knows he would like to start the process for Total Knee Arthroplasty of Left Knee    Patient is asking for either Dr Adrian Johnson, or nurse to call him if this is possible      Please advise, thank you

## 2019-07-10 NOTE — TELEPHONE ENCOUNTER
Left patient a detailed message regarding follow up needed to be scheduled prior to scheduling LT TKA

## 2019-07-11 ENCOUNTER — EVALUATION (OUTPATIENT)
Dept: PHYSICAL THERAPY | Age: 59
End: 2019-07-11
Payer: COMMERCIAL

## 2019-07-11 DIAGNOSIS — M25.561 ACUTE PAIN OF RIGHT KNEE: Primary | ICD-10-CM

## 2019-07-11 DIAGNOSIS — Z47.1 AFTERCARE FOLLOWING RIGHT KNEE JOINT REPLACEMENT SURGERY: ICD-10-CM

## 2019-07-11 DIAGNOSIS — Z96.651 AFTERCARE FOLLOWING RIGHT KNEE JOINT REPLACEMENT SURGERY: ICD-10-CM

## 2019-07-11 PROCEDURE — 97112 NEUROMUSCULAR REEDUCATION: CPT | Performed by: PHYSICAL THERAPIST

## 2019-07-11 PROCEDURE — 97110 THERAPEUTIC EXERCISES: CPT | Performed by: PHYSICAL THERAPIST

## 2019-07-11 NOTE — PROGRESS NOTES
PT Re-Evaluation     Today's date: 2019  Patient name: Bhavani Gilman  : 1960  MRN: [id number]  Referring provider: Shonda Rodrigues MD  Dx:   Encounter Diagnosis     ICD-10-CM    1  Acute pain of right knee M25 561 Ambulatory referral to Physical Therapy   2  Aftercare following right knee joint replacement surgery Z47 1 Ambulatory referral to Physical Therapy    Z96 651        Start Time: 845  Stop Time: 930  Total time in clinic (min): 45 minutes    Assessment  Assessment details: Bhavani Gilman is a 60 y o  male who presents with signs and symptoms consistent of  Right TKA  Patient presents with improvements in pain,  strength and  ROM  Due to these improvments, Patient has less difficulty performing a/iadls and work-related activities  Pt demonstrates decreased balance during SLS and durng tandem stance  Patient would benefit from skilled physical therapy to address the impairments, improve their level of function, and to improve their overall quality of life  Impairments: abnormal or restricted ROM, impaired balance, impaired physical strength, lacks appropriate home exercise program and pain with function  Understanding of Dx/Px/POC: good   Prognosis: good    Goals  Short Term Goals: to be achieved by 4 weeks MET  1) Patient to be independent with basic HEP  2) Decrease pain to 1/10 at its worst   3) Increase LE strength by 1/2 MMT grade in all deficient planes  Long Term Goals: to be achieved by discharge Partially met   1) FOTO equal to or greater than 58   2) Ambulation to improve to maximal level of function  3) Stair negotiation will improve to reciprocal   4) Sit to stand transfers will improve to maximal level of function     Plan  Patient would benefit from: skilled physical therapy, increased balance training to reduce risk of falls     Planned therapy interventions: abdominal trunk stabilization, neuromuscular re-education, manual therapy, transfer training, therapeutic training, therapeutic exercise, therapeutic activities, strengthening, stretching, home exercise program and gait training  Frequency: Twice a week for 12 weeks  Treatment plan discussed with: patient        Subjective Evaluation    History of Present Illness  Mechanism of injury: Pt is 10 weeks post R TKA  Pt is doing well functionally but still has quad weakness and would like to get L TKA surgery performed as soon as surgeon would be willing to perform surgery  Pt's main deficits are with community ambulation and high level activities  19: Pt reports 75% improvements in functional mobility and pain in knee since starting physical therapy  Pain  Current pain ratin  At best pain ratin  At worst pain ratin    Patient Goals  Patient goals for therapy: decreased pain, independence with ADLs/IADLs, improved balance and increased strength          Objective     Active Range of Motion     Right Knee   Flexion: 125 degrees   Extension: 0 degrees     Strength/Myotome Testing     Right Hip   Planes of Motion   Flexion: 4+  Extension: 5  Abduction: 5  Adduction: 5  External rotation: 5  Internal rotation: 5    Right Knee   Flexion: 4+  Extension: 4+  Quadriceps contraction: good    Functional Assessment        Comments  Pt ambulates with externally rotated hip to compensate for weakness       Functional assessment:  SLS: 12 secs mod sway  Tandem stance: 22 secs mod sway      Precautions: Right knee TKA    Daily Treatment Diary     Precautions: Right knee TKA    Daily Treatment Diary     Manual              PROM JF                                                                    Exercise Diary  7/11      7/3/19   SLR 7 5# 3x10      7 5# 3x10   Sidelying hip abd 3# 3x10      3# 3x10   Concetric/eccentric LAQ PT resist 3*10       3x10 b/l   Foot taps       3x10 ea   Leg press  B 200# 3x10; SLS each 75#      B 200# 3x10; SLS each 75#   Monster walks + lateral  BTB 3 laps ea      BTB 3 laps ea   TKE       40# 3x10x5"   Bosu Squats 3*10      3x10   LAQ          STS SLS 3x10 b/l      3x10 b/l   Upright bike 7'      7'                                                                                                 Modalities

## 2019-07-15 ENCOUNTER — OFFICE VISIT (OUTPATIENT)
Dept: PHYSICAL THERAPY | Age: 59
End: 2019-07-15
Payer: COMMERCIAL

## 2019-07-15 DIAGNOSIS — Z96.651 AFTERCARE FOLLOWING RIGHT KNEE JOINT REPLACEMENT SURGERY: ICD-10-CM

## 2019-07-15 DIAGNOSIS — Z47.1 AFTERCARE FOLLOWING RIGHT KNEE JOINT REPLACEMENT SURGERY: ICD-10-CM

## 2019-07-15 DIAGNOSIS — M25.561 ACUTE PAIN OF RIGHT KNEE: Primary | ICD-10-CM

## 2019-07-15 PROCEDURE — 97112 NEUROMUSCULAR REEDUCATION: CPT | Performed by: PHYSICAL THERAPIST

## 2019-07-15 PROCEDURE — 97140 MANUAL THERAPY 1/> REGIONS: CPT | Performed by: PHYSICAL THERAPIST

## 2019-07-15 PROCEDURE — 97110 THERAPEUTIC EXERCISES: CPT | Performed by: PHYSICAL THERAPIST

## 2019-07-15 NOTE — PROGRESS NOTES
Daily Note     Today's date: 7/15/2019  Patient name: Christine Fregoso  : 1960  MRN: [id number]  Referring provider: Sheila Oreilly MD  Dx:   Encounter Diagnosis     ICD-10-CM    1  Acute pain of right knee M25 561    2  Aftercare following right knee joint replacement surgery Z47 1     Z96 651        Start Time: 0845  Stop Time: 930  Total time in clinic (min): 45 minutes    Subjective: Pt reports improvements with strength during functional transfers  Objective: See treatment diary below      Assessment: Tolerated treatment well  Pt's POC was progressed to include more dynamic balance activities to decrease risk of falls  Patient demonstrated fatigue post treatment and would benefit from continued PT      Plan: Continue per plan of care        Precautions: Right knee TKA    Daily Treatment Diary     Manual              PROM JF                                                                    Exercise Diary  7/8 7/15     7/3/19   SLR 7 5# 3x10 8# 3*10     7 5# 3x10   Sidelying hip abd 3# 3x10 3# 3*10     3# 3x10   Concetric/eccentric LAQ PT resist 3*10 b/l 3*10 b/l     3x10 b/l   Foot taps       3x10 ea   Leg press  B 200# 3x10; SLS each 75# B 200# 3x10; SLS each 75#     B 200# 3x10; SLS each 75#   Monster walks + lateral  BTB 3 laps ea BTB 3 laps ea     BTB 3 laps ea   TKE  3*10*35#     40# 3x10x5"   Bosu Squats 3*10 3*10     3x10   LAQ          STS SLS 3x10 b/l 3x10 b/l     3x10 b/l   Upright bike 7' 7'     7'   Biodex   4x LOS lv 3                                                                                             Modalities

## 2019-07-18 ENCOUNTER — OFFICE VISIT (OUTPATIENT)
Dept: PHYSICAL THERAPY | Age: 59
End: 2019-07-18
Payer: COMMERCIAL

## 2019-07-18 DIAGNOSIS — Z47.1 AFTERCARE FOLLOWING RIGHT KNEE JOINT REPLACEMENT SURGERY: ICD-10-CM

## 2019-07-18 DIAGNOSIS — M25.561 ACUTE PAIN OF RIGHT KNEE: Primary | ICD-10-CM

## 2019-07-18 DIAGNOSIS — Z96.651 AFTERCARE FOLLOWING RIGHT KNEE JOINT REPLACEMENT SURGERY: ICD-10-CM

## 2019-07-18 PROCEDURE — 97112 NEUROMUSCULAR REEDUCATION: CPT | Performed by: PHYSICAL MEDICINE & REHABILITATION

## 2019-07-18 PROCEDURE — 97110 THERAPEUTIC EXERCISES: CPT | Performed by: PHYSICAL MEDICINE & REHABILITATION

## 2019-07-18 NOTE — PROGRESS NOTES
Daily Note     Today's date: 2019  Patient name: Sky Appiah  : 1960  MRN: [id number]  Referring provider: Gabby Cain MD  Dx:   Encounter Diagnosis     ICD-10-CM    1  Acute pain of right knee M25 561    2  Aftercare following right knee joint replacement surgery Z47 1     Z96 [id number]                   Subjective: Patient offers no new complaints  Objective: See treatment diary below      Assessment: Tolerated treatment well  Patient demonstrates fatigue post intervention  Patient has f/u with surgeon next week, is likely ready for transition to independent [facility] at that time  Plan: Continue per plan of care        Precautions: Right knee TKA    Daily Treatment Diary     Manual              PROM JF                                                                    Exercise Diary  7/8 7/15 7/18    7/3/19   SLR 7 5# 3x10 8# 3*10 8# 3x10    7 5# 3x10   Sidelying hip abd 3# 3x10 3# 3*10 3#, 3x10    3# 3x10   Concetric/eccentric LAQ PT resist 3*10 b/l 3*10 b/l 3x10    3x10 b/l   Foot taps       3x10 ea   Leg press  B 200# 3x10; SLS each 75# B 200# 3x10; SLS each 75# B 125# x10, 155# x10, 200# x10, SLS 85-95# 3x10     B 200# 3x10; SLS each 75#   Monster walks + lateral  BTB 3 laps ea BTB 3 laps ea BTB 3 laps ea    BTB 3 laps ea   TKE  3*10*35# 3x10, 40#    40# 3x10x5"   Bosu Squats 3*10 3*10 3x10    3x10   LAQ          STS SLS 3x10 b/l 3x10 b/l 3x10 ea    3x10 b/l   Upright bike 7' 7' 5'    7'   Biodex   4x LOS lv 3  4x, LOS L6 adjusted by pt                                                                                           Modalities

## 2019-07-19 DIAGNOSIS — Z47.1 AFTERCARE FOLLOWING RIGHT KNEE JOINT REPLACEMENT SURGERY: ICD-10-CM

## 2019-07-19 DIAGNOSIS — Z96.651 AFTERCARE FOLLOWING RIGHT KNEE JOINT REPLACEMENT SURGERY: ICD-10-CM

## 2019-07-22 ENCOUNTER — EVALUATION (OUTPATIENT)
Dept: PHYSICAL THERAPY | Age: 59
End: 2019-07-22
Payer: COMMERCIAL

## 2019-07-22 DIAGNOSIS — Z96.651 AFTERCARE FOLLOWING RIGHT KNEE JOINT REPLACEMENT SURGERY: ICD-10-CM

## 2019-07-22 DIAGNOSIS — M25.561 ACUTE PAIN OF RIGHT KNEE: Primary | ICD-10-CM

## 2019-07-22 DIAGNOSIS — Z47.1 AFTERCARE FOLLOWING RIGHT KNEE JOINT REPLACEMENT SURGERY: ICD-10-CM

## 2019-07-22 PROCEDURE — 97112 NEUROMUSCULAR REEDUCATION: CPT | Performed by: PHYSICAL THERAPIST

## 2019-07-22 PROCEDURE — 97110 THERAPEUTIC EXERCISES: CPT | Performed by: PHYSICAL THERAPIST

## 2019-07-22 PROCEDURE — 97140 MANUAL THERAPY 1/> REGIONS: CPT | Performed by: PHYSICAL THERAPIST

## 2019-07-22 NOTE — PROGRESS NOTES
Daily Note     Today's date: 2019  Patient name: Colt Wing  : 1960  MRN: [id number]  Referring provider: Jina Potter MD  Dx:   Encounter Diagnosis     ICD-10-CM    1  Acute pain of right knee M25 561    2  Aftercare following right knee joint replacement surgery Z47 1     Z96 [id number]        Start Time: 0845  Stop Time: 0935  Total time in clinic (min): 50 minutes    Subjective: Pt reports improvements with functional transfers  Objective: See treatment diary below      Assessment: Tolerated treatment well  Pt's POC was progressed to include increased reps of closed chain resistance training to increase tolerance to community ambulation  Patient demonstrated fatigue post treatment and would benefit from continued PT      Plan: Continue per plan of care        Precautions: Right knee TKA    Daily Treatment Diary     Manual             PROM JF                                                                    Exercise Diary  7/8 7/15 7/18 7/22   7/3/19   SLR 7 5# 3x10 8# 3*10 8# 3x10 8# 3x10   7 5# 3x10   Sidelying hip abd 3# 3x10 3# 3*10 3#, 3x10 3#, 3x10   3# 3x10   Concetric/eccentric LAQ PT resist 3*10 b/l 3*10 b/l 3x10 3x10   3x10 b/l   Foot taps       3x10 ea   Leg press  B 200# 3x10; SLS each 75# B 200# 3x10; SLS each 75# B 125# x10, 155# x10, 200# x10, SLS 85-95# 3x10  B 125# x10, 155# x10, 200# x10, SLS 85-95# 3x10    B 200# 3x10; SLS each 75#   Monster walks + lateral  BTB 3 laps ea BTB 3 laps ea BTB 3 laps ea BTB 3 laps ea   BTB 3 laps ea   TKE  3*10*35# 3x10, 40# 3x10, 40#   40# 3x10x5"   Bosu Squats 3*10 3*10 3x10 3*10   3x10   LAQ          STS SLS 3x10 b/l 3x10 b/l 3x10 ea 3x10 ea   3x10 b/l   Upright bike 7' 7' 5'    7'   Biodex   4x LOS lv 3  4x, LOS L6 adjusted by pt 4x, LOS L12 adjusted by pt                                                                                          Modalities

## 2019-07-23 ENCOUNTER — OFFICE VISIT (OUTPATIENT)
Dept: OBGYN CLINIC | Facility: HOSPITAL | Age: 59
End: 2019-07-23
Payer: COMMERCIAL

## 2019-07-23 ENCOUNTER — HOSPITAL ENCOUNTER (OUTPATIENT)
Dept: RADIOLOGY | Facility: HOSPITAL | Age: 59
Discharge: HOME | End: 2019-07-23
Attending: ORTHOPAEDIC SURGERY
Payer: COMMERCIAL

## 2019-07-23 VITALS
WEIGHT: 271.6 LBS | SYSTOLIC BLOOD PRESSURE: 144 MMHG | BODY MASS INDEX: 42.54 KG/M2 | DIASTOLIC BLOOD PRESSURE: 81 MMHG | HEART RATE: 84 BPM

## 2019-07-23 DIAGNOSIS — M25.561 ACUTE PAIN OF RIGHT KNEE: ICD-10-CM

## 2019-07-23 DIAGNOSIS — M17.12 PRIMARY OSTEOARTHRITIS OF LEFT KNEE: Primary | ICD-10-CM

## 2019-07-23 PROCEDURE — 99214 OFFICE O/P EST MOD 30 MIN: CPT | Performed by: ORTHOPAEDIC SURGERY

## 2019-07-23 PROCEDURE — 73560 X-RAY EXAM OF KNEE 1 OR 2: CPT

## 2019-07-23 RX ORDER — CHLORHEXIDINE GLUCONATE 4 G/100ML
SOLUTION TOPICAL DAILY PRN
Status: CANCELLED | OUTPATIENT
Start: 2019-07-23

## 2019-07-23 RX ORDER — ACETAMINOPHEN 325 MG/1
975 TABLET ORAL ONCE
Status: CANCELLED | OUTPATIENT
Start: 2019-07-23 | End: 2019-07-23

## 2019-07-23 RX ORDER — ASCORBIC ACID 500 MG
500 TABLET ORAL DAILY
Qty: 60 TABLET | Refills: 0 | Status: SHIPPED | OUTPATIENT
Start: 2019-07-23 | End: 2019-09-05 | Stop reason: SDUPTHER

## 2019-07-23 RX ORDER — FOLIC ACID 1 MG/1
1 TABLET ORAL DAILY
Qty: 30 TABLET | Refills: 0 | Status: SHIPPED | OUTPATIENT
Start: 2019-07-23 | End: 2019-09-05 | Stop reason: SDUPTHER

## 2019-07-23 RX ORDER — SODIUM CHLORIDE 9 MG/ML
125 INJECTION, SOLUTION INTRAVENOUS CONTINUOUS
Status: CANCELLED | OUTPATIENT
Start: 2019-07-23

## 2019-07-23 RX ORDER — FERROUS SULFATE TAB EC 324 MG (65 MG FE EQUIVALENT) 324 (65 FE) MG
324 TABLET DELAYED RESPONSE ORAL
Qty: 60 TABLET | Refills: 0 | Status: SHIPPED | OUTPATIENT
Start: 2019-07-23 | End: 2019-09-05 | Stop reason: SDUPTHER

## 2019-07-23 RX ORDER — CEFAZOLIN SODIUM 2 G/50ML
2000 SOLUTION INTRAVENOUS ONCE
Status: CANCELLED | OUTPATIENT
Start: 2019-07-23 | End: 2019-07-23

## 2019-07-23 RX ORDER — CHLORHEXIDINE GLUCONATE 0.12 MG/ML
15 RINSE ORAL ONCE
Status: CANCELLED | OUTPATIENT
Start: 2019-07-23 | End: 2019-07-23

## 2019-07-23 NOTE — LETTER
July 23, 2019     Patient: Lisa Rene   YOB: 1960   Date of Visit: 7/23/2019       To Whom it May Concern:    Lisa Rene is under my professional care  He was seen in my office on 7/23/2019  No work until further follow-up  If you have any questions or concerns, please don't hesitate to call           Sincerely,          Tomas Ventura MD        CC: No Recipients

## 2019-07-23 NOTE — PROGRESS NOTES
Orthopedics          Jerald Live 60 y o  male MRN: [id number]      Chief Complaint:   bilateral knee pain    HPI:   60 y o male complaining of bilateral knee pain  Patient presents office today regarding bilateral knee pain patient is approximately 5 months out from his right total knee arthroplasty  States he has had significant decreasing pain is right knee following his surgery  He is happy regarding his right knee pain  He does have occasional swelling of his right knee limiting his walking however is very happy with his knee symptoms at this time status post right total knee arthroplasty  Patient however is experiencing pain in his left knee  States the pain is aching nature worse weight-bearing mildly relieved with rest   He has had intra-articular steroid injections with minimal pain relief  States the pain is worse after standing for long periods of time denies any instability clicking popping catching of his left knee denies any radiation pain denies any changes numbness tingling is lower extremities                  Review Of Systems:   · Skin: Normal  · Neuro: See HPI  · Musculoskeletal: See HPI  · All other systems reviewed and are negative    Past Medical History:   Past Medical History:   Diagnosis Date    Anesthesia complication     difficulty awakening- dyspnea    Anxiety     Arthritis     Cleft hard palate     Glaucoma suspect, both eyes     Hyperlipidemia     Kidney stone     left    Right inguinal hernia     Right ureteral stone     Seasonal allergies     Wears dentures     partial upper    Wears glasses        Past Surgical History:   Past Surgical History:   Procedure Laterality Date    CLEFT PALATE REPAIR      INGUINAL HERNIA REPAIR Right     KNEE CARTILAGE SURGERY Left     [place] CYSTO/URETERO W/LITHOTRIPSY &INDWELL STENT INSRT Right 8/4/2017    Procedure: CYSTOSCOPY URETEROSCOPY WITH LITHOTRIPSY HOLMIUM LASER, RETROGRADE PYELOGRAM AND INSERTION STENT URETERAL;  Surgeon: Carol Root MD;  Location: [facility];  Service: Urology    OR TOTAL KNEE ARTHROPLASTY Right 3/4/2019    Procedure: TOTAL KNEE ARTHROPLASTY;  Surgeon: Annie Neville MD;  Location:  MAIN OR;  Service: Orthopedics       Family History:  Family history reviewed and non-contributory  Family History   Problem Relation Age of Onset    Diabetes Mother     Heart disease Mother     Hypertension Mother     Esophageal cancer Father     Diabetes Sister     Other Sister          Social History:  Social History     Socioeconomic History    Marital status: /Civil Union     Spouse name: None    Number of children: None    Years of education: None    Highest education level: None   Occupational History    None   Social Needs    Financial resource strain: None    Food insecurity:     Worry: None     Inability: None    Transportation needs:     Medical: None     Non-medical: None   Tobacco Use    Smoking status: Never Smoker    Smokeless tobacco: Never Used    Tobacco comment: no passive smoke exposure   Substance and Sexual Activity    Alcohol use: Yes     Alcohol/week: 1 0 standard drinks     Types: 1 Glasses of wine per week     Frequency: 2-4 times a month     Drinks per session: 1 or 2     Comment: 1x wk    Drug use: No    Sexual activity: Yes     Partners: Female   Lifestyle    Physical activity:     Days per week: None     Minutes per session: None    Stress: None   Relationships    Social connections:     Talks on phone: None     Gets together: None     Attends Jain service: None     Active member of club or organization: None     Attends meetings of clubs or organizations: None     Relationship status: None    Intimate partner violence:     Fear of current or ex partner: None     Emotionally abused: None     Physically abused: None     Forced sexual activity: None   Other Topics Concern    None   Social History Narrative    None       Allergies:    Allergies   Allergen Reactions    No Active Allergies        Labs:  0   Lab Value Date/Time    HCT 43 8 03/11/2019 1146    HCT 37 8 03/06/2019 0542    HCT 39 2 03/05/2019 0524    HGB 14 2 03/11/2019 1146    HGB 12 4 03/06/2019 0542    HGB 12 6 03/05/2019 0524    INR 1 01 02/11/2019 1052    WBC 10 22 (H) 03/11/2019 1146    WBC 11 96 (H) 03/06/2019 0542    WBC 12 02 (H) 03/05/2019 0524    CRP <3 0 02/11/2019 1052       Meds:    Current Outpatient Medications:     Bioflavonoid Products (VITAMIN C) CHEW, Chew 2 tablets daily, Disp: , Rfl:     cetirizine (ZyrTEC) 10 mg tablet, Take 10 mg by mouth daily, Disp: , Rfl:     Cholecalciferol 2000 units CAPS, Take by mouth, Disp: , Rfl:     diphenhydrAMINE (BENADRYL) 25 mg tablet, Take 1 tablet (25 mg total) by mouth daily at bedtime as needed for itching, Disp: 30 tablet, Rfl: 0    fluticasone (FLONASE) 50 mcg/act nasal spray, Blow nose; shake bottle; place tip in each nostril and tilt towards eye; hold breath and press plunger; do this 2 times daily prn, Disp: , Rfl:     Multiple Vitamin (MULTIVITAMIN) tablet, Take 2 tablets by mouth daily, Disp: , Rfl:     naproxen (EC NAPROSYN) 500 MG EC tablet, Take 1 tablet (500 mg total) by mouth 2 (two) times a day with meals, Disp: 60 tablet, Rfl: 1    NAPROXEN  MG EC tablet, TAKE 1 TABLET BY MOUTH TWICE A DAY WITH MEALS, Disp: 60 tablet, Rfl: 1    Omega-3 Fatty Acids (FISH OIL PO), Take 1 capsule by mouth daily, Disp: , Rfl:     simvastatin (ZOCOR) 40 mg tablet, TAKE 1 TABLET (40 MG TOTAL) BY MOUTH DAILY AT BEDTIME, Disp: 90 tablet, Rfl: 0    traMADol (ULTRAM) 50 mg tablet, Take 1 tablet (50 mg total) by mouth every 6 (six) hours as needed for severe pain, Disp: 60 tablet, Rfl: 0      Physical Exam:     General Appearance:    Alert, cooperative, no distress, appears stated age   Head:    Normocephalic, without obvious abnormality, atraumatic   Eyes:    conjunctiva/corneas clear, both eyes        Nose:   Nares normal, septum midline, no drainage    Throat:   Lips normal; teeth and gums normal   Neck:    symmetrical, trachea midline, ;     thyroid:  no enlargement/   Back:     Symmetric, no curvature, ROM normal   Lungs:   No audible wheezing or labored breathing   Chest Wall:    No tenderness or deformity    Heart:    Regular rate and rhythm               Pulses:   2+ and symmetric all extremities   Skin:   Skin color, texture, turgor normal, no rashes or lesions   Neurologic:   normal strength, sensation and reflexes     throughout       Musculoskeletal: bilateral lower extremity  · On examination of the left knee there is no effusion, no erythema  Range of motion to full active extension and flexion to greater than 120°  Pain on palpation medial and lateral joint lines  There is crepitus with range of motion, no warmth to palpation, bony enlargement noted  No pain on palpation pes anserine bursa region or distal iliotibial band  Stable to varus and valgus stress without pain or gapping  Negative anterior and posterior drawer testing  Sensation intact distal pulses present  · Examination patient's right knee well-healed anterior incision full active extension flexion greater than 120° quadrant hamstring strength 4+ out of 5 in no erythema no effusion sensation intact distal pulses present    Radiology:   I personally reviewed the films    X-rays right knee shows no loosening right total knee implants in excellent alignment     _*_*_*_*_*_*_*_*_*_*_*_*_*_*_*_*_*_*_*_*_*_*_*_*_*_*_*_*_*_*_*_*_*_*_*_*_*_*_*_*_*    Assessment:  60 y o male with bilateral knee pain left worse than right status post 5 months right total knee arthroplasty with improving symptoms left knee osteoarthritis with worsening symptoms    Plan:   · Weight bearing as tolerated  bilateral lower extremity  · Patient is candidate for left total knee arthroplasty  · Patient interviewed and examined by Dr Galo Olivarez and myself  · Operative and non operative intervention reviewed with patient  Patient wishes to pursue operative intervention including total knee arthroplasty  Risks and benefits of the procedure reviewed with the patient in great length  Risks include, but are not limited to, infection, bleeding, DVT/PE, gait abnormality, Limp, chronic pain, stiffness, dislocation, instability, fracture, failure of hardware, neurovascular injury, compartment syndrome, decreased range of motion, loss of limb, leg length discrepancy and failure to achieve the desired results  Patient is aware of the risks and is willing to proceed  We'll followup with the patient in the postoperative period    · The patient will need appropriate preoperative laboratory testing as well as preoperative clearance prior to be scheduled for total knee arthroplasty      Leah Santana PA-C

## 2019-07-25 ENCOUNTER — OFFICE VISIT (OUTPATIENT)
Dept: PHYSICAL THERAPY | Age: 59
End: 2019-07-25
Payer: COMMERCIAL

## 2019-07-25 DIAGNOSIS — Z47.1 AFTERCARE FOLLOWING RIGHT KNEE JOINT REPLACEMENT SURGERY: ICD-10-CM

## 2019-07-25 DIAGNOSIS — Z96.651 AFTERCARE FOLLOWING RIGHT KNEE JOINT REPLACEMENT SURGERY: ICD-10-CM

## 2019-07-25 DIAGNOSIS — M25.561 ACUTE PAIN OF RIGHT KNEE: Primary | ICD-10-CM

## 2019-07-25 PROCEDURE — 97110 THERAPEUTIC EXERCISES: CPT | Performed by: PHYSICAL THERAPIST

## 2019-07-25 PROCEDURE — 97140 MANUAL THERAPY 1/> REGIONS: CPT | Performed by: PHYSICAL THERAPIST

## 2019-07-25 PROCEDURE — 97112 NEUROMUSCULAR REEDUCATION: CPT | Performed by: PHYSICAL THERAPIST

## 2019-07-25 NOTE — PROGRESS NOTES
Daily Note     Today's date: 2019  Patient name: Oral Clap  : 1960  MRN: [id number]  Referring provider: Hugo Moreno MD  Dx:   Encounter Diagnosis     ICD-10-CM    1  Acute pain of right knee M25 561    2  Aftercare following right knee joint replacement surgery Z47 1     Z96 [id number]        Start Time: 0845  Stop Time: 0935  Total time in clinic (min): 50 minutes    Subjective: Pt's reports doctor was pleased with progress  Objective: See treatment diary below      Assessment: Tolerated treatment well  Pt's POC was progressed to include increased reps of closed chain resistance exercises to increase tolerance to work related activities  Patient demonstrated fatigue post treatment and would benefit from continued PT      Plan: Continue per plan of care        Precautions: Right knee TKA    Daily Treatment Diary     Manual             PROM JF                                                                    Exercise Diary  7/8 7/15 7/18 7/22 7/25  7/3/19   SLR 7 5# 3x10 8# 3*10 8# 3x10 8# 3x10 8# 3*10  7 5# 3x10   Sidelying hip abd 3# 3x10 3# 3*10 3#, 3x10 3#, 3x10 3#, 3x10  3# 3x10   Concetric/eccentric LAQ PT resist 3*10 b/l 3*10 b/l 3x10 3x10 3*10  3x10 b/l   Foot taps       3x10 ea   Leg press  B 200# 3x10; SLS each 75# B 200# 3x10; SLS each 75# B 125# x10, 155# x10, 200# x10, SLS 85-95# 3x10  B 125# x10, 155# x10, 200# x10, SLS 85-95# 3x10  B 125# x10, 155# x10, 200# x10, SLS 85-95# 3x10   B 200# 3x10; SLS each 75#   Monster walks + lateral  BTB 3 laps ea BTB 3 laps ea BTB 3 laps ea BTB 3 laps ea BTB 4 laps  BTB 3 laps ea   TKE  3*10*35# 3x10, 40# 3x10, 40# 3x10, 40#  40# 3x10x5"   Bosu Squats 3*10 3*10 3x10 3*10 3*10  3x10   LAQ          STS SLS 3x10 b/l 3x10 b/l 3x10 ea 3x10 ea 3*10  3x10 b/l   Upright bike 7' 7' 5'    7'   Biodex   4x LOS lv 3  4x, LOS L6 adjusted by pt 4x, LOS L12 adjusted by pt 4x, LOS L12 adjusted by pt Modalities

## 2019-07-29 ENCOUNTER — OFFICE VISIT (OUTPATIENT)
Dept: PHYSICAL THERAPY | Age: 59
End: 2019-07-29
Payer: COMMERCIAL

## 2019-07-29 DIAGNOSIS — Z96.651 AFTERCARE FOLLOWING RIGHT KNEE JOINT REPLACEMENT SURGERY: ICD-10-CM

## 2019-07-29 DIAGNOSIS — M25.561 ACUTE PAIN OF RIGHT KNEE: Primary | ICD-10-CM

## 2019-07-29 DIAGNOSIS — Z47.1 AFTERCARE FOLLOWING RIGHT KNEE JOINT REPLACEMENT SURGERY: ICD-10-CM

## 2019-07-29 PROCEDURE — 97112 NEUROMUSCULAR REEDUCATION: CPT

## 2019-07-29 PROCEDURE — 97140 MANUAL THERAPY 1/> REGIONS: CPT

## 2019-07-29 PROCEDURE — 97110 THERAPEUTIC EXERCISES: CPT

## 2019-07-29 NOTE — PROGRESS NOTES
Daily Note     Today's date: 2019  Patient name: Chandana Roy  : 1960  MRN: [id number]  Referring provider: Gracie Harden MD  Dx:   Encounter Diagnosis     ICD-10-CM    1  Acute pain of right knee M25 561    2  Aftercare following right knee joint replacement surgery Z47 1     Z96 [id number]                   Subjective: pt reports R lateral knee has been sore but no change in activities      Objective: See treatment diary below      Assessment: Tolerated treatment well  Patient would benefit from continued PT      Plan: Continue per plan of care  Progress treatment as tolerated         Precautions: Right knee TKA    Daily Treatment Diary     Manual             PROM JF                                                                    Exercise Diary  7/8 7/15 7/18 7/22 7/25 7/29/19   SLR 7 5# 3x10 8# 3*10 8# 3x10 8# 3x10 8# 3*10 8# 3x10   Sidelying hip abd 3# 3x10 3# 3*10 3#, 3x10 3#, 3x10 3#, 3x10 3# 3x10   Concetric/eccentric LAQ PT resist 3*10 b/l 3*10 b/l 3x10 3x10 3*10 3x10 b/l   Foot taps         Leg press  B 200# 3x10; SLS each 75# B 200# 3x10; SLS each 75# B 125# x10, 155# x10, 200# x10, SLS 85-95# 3x10  B 125# x10, 155# x10, 200# x10, SLS 85-95# 3x10  B 125# x10, 155# x10, 200# x10, SLS 85-95# 3x10  125/155/200# 10x ea; 85# SLS 3x10 ea   Monster walks + lateral  BTB 3 laps ea BTB 3 laps ea BTB 3 laps ea BTB 3 laps ea BTB 4 laps BTB 4 laps each   TKE  3*10*35# 3x10, 40# 3x10, 40# 3x10, 40# 40# 3x10   Bosu Squats 3*10 3*10 3x10 3*10 3*10 3x10   LAQ         STS SLS 3x10 b/l 3x10 b/l 3x10 ea 3x10 ea 3*10 3x10 ea   Upright bike 7' 7' 5'      Biodex   4x LOS lv 3  4x, LOS L6 adjusted by pt 4x, LOS L12 adjusted by pt 4x, LOS L12 adjusted by pt 4x LOS L 12                                                                               Modalities

## 2019-08-01 ENCOUNTER — OFFICE VISIT (OUTPATIENT)
Dept: PHYSICAL THERAPY | Age: 59
End: 2019-08-01
Payer: COMMERCIAL

## 2019-08-01 DIAGNOSIS — Z96.651 AFTERCARE FOLLOWING RIGHT KNEE JOINT REPLACEMENT SURGERY: ICD-10-CM

## 2019-08-01 DIAGNOSIS — Z47.1 AFTERCARE FOLLOWING RIGHT KNEE JOINT REPLACEMENT SURGERY: ICD-10-CM

## 2019-08-01 DIAGNOSIS — M25.561 ACUTE PAIN OF RIGHT KNEE: Primary | ICD-10-CM

## 2019-08-01 PROCEDURE — 97110 THERAPEUTIC EXERCISES: CPT

## 2019-08-01 PROCEDURE — 97140 MANUAL THERAPY 1/> REGIONS: CPT

## 2019-08-01 PROCEDURE — 97112 NEUROMUSCULAR REEDUCATION: CPT

## 2019-08-01 NOTE — PROGRESS NOTES
Daily Note     Today's date: 2019  Patient name: Ruben Almonte  : 1960  MRN: [id number]  Referring provider: Dequan Juarez MD  Dx:   Encounter Diagnosis     ICD-10-CM    1  Acute pain of right knee M25 561    2  Aftercare following right knee joint replacement surgery Z47 1     Z96 [id number]                   Subjective:  Pt reports he's ready to go for L TKA, pt reports R knee still swells at times    Objective: See treatment diary below      Assessment: Tolerated treatment well  Patient would benefit from continued PT      Plan: Continue per plan of care  Progress treatment as tolerated         Precautions: Right knee TKA    Daily Treatment Diary     Manual             PROM JF                                                                    Exercise Diary  7/8 7/15 7/18 7/22 7/25 7/29/19 8/1/19   SLR 7 5# 3x10 8# 3*10 8# 3x10 8# 3x10 8# 3*10 8# 3x10 8# 3x10   Sidelying hip abd 3# 3x10 3# 3*10 3#, 3x10 3#, 3x10 3#, 3x10 3# 3x10 3# 3x10   Concetric/eccentric LAQ PT resist 3*10 b/l 3*10 b/l 3x10 3x10 3*10 3x10 b/l 3x10 b/l   Foot taps          Leg press  B 200# 3x10; SLS each 75# B 200# 3x10; SLS each 75# B 125# x10, 155# x10, 200# x10, SLS 85-95# 3x10  B 125# x10, 155# x10, 200# x10, SLS 85-95# 3x10  B 125# x10, 155# x10, 200# x10, SLS 85-95# 3x10  125/155/200# 10x ea; 85# SLS 3x10 ea 125#/155#/200# 10x ea; SLS 85# 3x10   Monster walks + lateral  BTB 3 laps ea BTB 3 laps ea BTB 3 laps ea BTB 3 laps ea BTB 4 laps BTB 4 laps each btb 4 laps ea   TKE  3*10*35# 3x10, 40# 3x10, 40# 3x10, 40# 40# 3x10 40# 3x10x5"   Bosu Squats 3*10 3*10 3x10 3*10 3*10 3x10 3x10   LAQ          STS SLS 3x10 b/l 3x10 b/l 3x10 ea 3x10 ea 3*10 3x10 ea 3x10 ea   Upright bike 7' 7' 5'       Biodex   4x LOS lv 3  4x, LOS L6 adjusted by pt 4x, LOS L12 adjusted by pt 4x, LOS L12 adjusted by pt 4x LOS L 12 4X LOS dynamic level 12                                                                                       Modalities

## 2019-08-05 ENCOUNTER — TELEPHONE (OUTPATIENT)
Dept: OBGYN CLINIC | Facility: HOSPITAL | Age: 59
End: 2019-08-05

## 2019-08-05 ENCOUNTER — OFFICE VISIT (OUTPATIENT)
Dept: PHYSICAL THERAPY | Age: 59
End: 2019-08-05
Payer: COMMERCIAL

## 2019-08-05 DIAGNOSIS — Z47.1 AFTERCARE FOLLOWING RIGHT KNEE JOINT REPLACEMENT SURGERY: ICD-10-CM

## 2019-08-05 DIAGNOSIS — M25.561 ACUTE PAIN OF RIGHT KNEE: Primary | ICD-10-CM

## 2019-08-05 DIAGNOSIS — Z96.651 AFTERCARE FOLLOWING RIGHT KNEE JOINT REPLACEMENT SURGERY: ICD-10-CM

## 2019-08-05 PROCEDURE — 97112 NEUROMUSCULAR REEDUCATION: CPT

## 2019-08-05 PROCEDURE — 97140 MANUAL THERAPY 1/> REGIONS: CPT

## 2019-08-05 PROCEDURE — 97110 THERAPEUTIC EXERCISES: CPT

## 2019-08-05 NOTE — PROGRESS NOTES
Daily Note     Today's date: 2019  Patient name: Gabriella Diaz  : 1960  MRN: [id number]  Referring provider: Lucila Goldstein MD  Dx:   Encounter Diagnosis     ICD-10-CM    1  Acute pain of right knee M25 561    2  Aftercare following right knee joint replacement surgery Z47 1     Z96 [id number]        Start Time: 0800  Stop Time: 0845  Total time in clinic (min): 45 minutes    Subjective: Patient reports that his R knee is sore this morning  He also notes some swelling  Objective: See treatment diary below    Assessment: Tolerated treatment well  Patient would benefit from continued PT  Patient demonstrated good form and intensity t/o therapy today with min cueing required  Patient is fatiguing appropriately and responding well to the current PT POC  Plan: Continue per plan of care  Progress treatment as tolerated         Precautions: Right knee TKA    Daily Treatment Diary     Manual             PROM JF                                                                  Exercise Diary  19   SLR 8# 3x10  8# 3x10 8# 3x10 8# 3*10 8# 3x10 8# 3x10   Sidelying hip abd 3#  3x10  3#, 3x10 3#, 3x10 3#, 3x10 3# 3x10 3# 3x10   Concetric/eccentric LAQ PT resist 3x10 b/l  3x10 3x10 3*10 3x10 b/l 3x10 b/l   Foot taps          Leg press  125#/155#/200# 10x ea; SLS 85# 3x10  B 125# x10, 155# x10, 200# x10, SLS 85-95# 3x10  B 125# x10, 155# x10, 200# x10, SLS 85-95# 3x10  B 125# x10, 155# x10, 200# x10, SLS 85-95# 3x10  125/155/200# 10x ea; 85# SLS 3x10 ea 125#/155#/200# 10x ea; SLS 85# 3x10   Monster walks + lateral  BTB  4 laps ea  BTB 3 laps ea BTB 3 laps ea BTB 4 laps BTB 4 laps each btb 4 laps ea   TKE 40#  3x10x5"  3x10, 40# 3x10, 40# 3x10, 40# 40# 3x10 40# 3x10x5"   Bosu Squats 3x10  3x10 3*10 3*10 3x10 3x10   LAQ          STS SLS 3x10 ea  3x10 ea 3x10 ea 3*10 3x10 ea 3x10 ea   Upright bike 7'  5'       Biodex  4x LOS dynamic  4x, LOS L6 adjusted by pt 4x, LOS L12 adjusted by pt 4x, LOS L12 adjusted by pt 4x LOS L 12 4X LOS dynamic level 12                                                                                     Modalities

## 2019-08-05 NOTE — TELEPHONE ENCOUNTER
Patient called to let you know that a form was faxed over from the insurance company about his disability  He just wanted you to know to watch for it

## 2019-08-08 ENCOUNTER — OFFICE VISIT (OUTPATIENT)
Dept: PHYSICAL THERAPY | Age: 59
End: 2019-08-08
Payer: COMMERCIAL

## 2019-08-08 DIAGNOSIS — Z47.1 AFTERCARE FOLLOWING RIGHT KNEE JOINT REPLACEMENT SURGERY: ICD-10-CM

## 2019-08-08 DIAGNOSIS — Z96.651 AFTERCARE FOLLOWING RIGHT KNEE JOINT REPLACEMENT SURGERY: ICD-10-CM

## 2019-08-08 DIAGNOSIS — M25.561 ACUTE PAIN OF RIGHT KNEE: Primary | ICD-10-CM

## 2019-08-08 PROCEDURE — 97140 MANUAL THERAPY 1/> REGIONS: CPT | Performed by: PHYSICAL THERAPIST

## 2019-08-08 PROCEDURE — 97112 NEUROMUSCULAR REEDUCATION: CPT | Performed by: PHYSICAL THERAPIST

## 2019-08-08 PROCEDURE — 97110 THERAPEUTIC EXERCISES: CPT | Performed by: PHYSICAL THERAPIST

## 2019-08-08 NOTE — TELEPHONE ENCOUNTER
I called Ashish Bennett and left a message for him  I let him know that I called [employer] and had them fax me over the form that I needed to complete  I completed the forms today and faxed them to [employer] along with the most recent office visit notes

## 2019-08-08 NOTE — TELEPHONE ENCOUNTER
Patient called stating he needs to talk to someone about the disability paperwork  Please call him back at [phone]

## 2019-08-08 NOTE — PROGRESS NOTES
Daily Note     Today's date: 2019  Patient name: Hillary Julien  : 1960  MRN: [id number]  Referring provider: Dariel Leonard MD  Dx:   Encounter Diagnosis     ICD-10-CM    1  Acute pain of right knee M25 561    2  Aftercare following right knee joint replacement surgery Z47 1     Z96 [id number]        Start Time: 0845  Stop Time: 930  Total time in clinic (min): 45 minutes    Subjective: Pt reports improvements with functional transfers  Pt reports being able to walk 2 blocks before getting fatigued  Objective: See treatment diary below      Assessment: Tolerated treatment well  Pt's POC was progressed to include more resistance during closed chian exercises to improve community ambulation  Patient demonstrated fatigue post treatment and would benefit from continued PT      Plan: Continue per plan of care        Precautions: Right knee TKA    Daily Treatment Diary     Manual             PROM JF                                                                /  Exercise Diary  19        SLR 8# 3x10 8# 3x10        Sidelying hip abd 3#  3x10 3#  3x10        Concetric/eccentric LAQ PT resist 3x10 b/l 3x10 b/l        Foot taps  3*10        Leg press  125#/155#/200# 10x ea; SLS 85# 3x10 135#/165#/200# 10x ea; SLS 95# 3x10        Monster walks + lateral  BTB  4 laps ea BTB  4 laps ea        TKE 40#  3x10x5" 40#  3x10x5"        Bosu Squats 3x10 4*10        LAQ          STS SLS 3x10 ea 3*10 ea        Upright bike 7' 7'        Biodex  4x LOS dynamic                                                                                           Modalities

## 2019-08-12 ENCOUNTER — APPOINTMENT (OUTPATIENT)
Dept: LAB | Age: 59
End: 2019-08-12
Payer: COMMERCIAL

## 2019-08-12 ENCOUNTER — OFFICE VISIT (OUTPATIENT)
Dept: PHYSICAL THERAPY | Age: 59
End: 2019-08-12
Payer: COMMERCIAL

## 2019-08-12 DIAGNOSIS — Z96.651 AFTERCARE FOLLOWING RIGHT KNEE JOINT REPLACEMENT SURGERY: ICD-10-CM

## 2019-08-12 DIAGNOSIS — M25.561 ACUTE PAIN OF RIGHT KNEE: Primary | ICD-10-CM

## 2019-08-12 DIAGNOSIS — M17.12 PRIMARY OSTEOARTHRITIS OF LEFT KNEE: Primary | ICD-10-CM

## 2019-08-12 DIAGNOSIS — Z47.1 AFTERCARE FOLLOWING RIGHT KNEE JOINT REPLACEMENT SURGERY: ICD-10-CM

## 2019-08-12 LAB
ABO GROUP BLD: NORMAL
ALBUMIN SERPL BCP-MCNC: 3.7 G/DL (ref 3.5–5)
ALP SERPL-CCNC: 49 U/L (ref 46–116)
ALT SERPL W P-5'-P-CCNC: 38 U/L (ref 12–78)
ANION GAP SERPL CALCULATED.3IONS-SCNC: 7 MMOL/L (ref 4–13)
APTT PPP: 28 SECONDS (ref 23–37)
AST SERPL W P-5'-P-CCNC: 22 U/L (ref 5–45)
BASOPHILS # BLD AUTO: 0.06 THOUSANDS/ΜL (ref 0–0.1)
BASOPHILS NFR BLD AUTO: 1 % (ref 0–1)
BILIRUB SERPL-MCNC: 0.57 MG/DL (ref 0.2–1)
BLD GP AB SCN SERPL QL: NEGATIVE
BUN SERPL-MCNC: 11 MG/DL (ref 5–25)
CALCIUM SERPL-MCNC: 8.8 MG/DL (ref 8.3–10.1)
CHLORIDE SERPL-SCNC: 109 MMOL/L (ref 100–108)
CO2 SERPL-SCNC: 25 MMOL/L (ref 21–32)
CREAT SERPL-MCNC: 0.98 MG/DL (ref 0.6–1.3)
CRP SERPL QL: 3.2 MG/L
EOSINOPHIL # BLD AUTO: 0.36 THOUSAND/ΜL (ref 0–0.61)
EOSINOPHIL NFR BLD AUTO: 6 % (ref 0–6)
ERYTHROCYTE [DISTWIDTH] IN BLOOD BY AUTOMATED COUNT: 13.2 % (ref 11.6–15.1)
EST. AVERAGE GLUCOSE BLD GHB EST-MCNC: 146 MG/DL
FERRITIN SERPL-MCNC: 91 NG/ML (ref 8–388)
GFR SERPL CREATININE-BSD FRML MDRD: 83 ML/MIN/1.73SQ M
GLUCOSE P FAST SERPL-MCNC: 129 MG/DL (ref 65–99)
HBA1C MFR BLD: 6.7 % (ref 4.2–6.3)
HCT VFR BLD AUTO: 44.3 % (ref 36.5–49.3)
HGB BLD-MCNC: 14.5 G/DL (ref 12–17)
IMM GRANULOCYTES # BLD AUTO: 0.02 THOUSAND/UL (ref 0–0.2)
IMM GRANULOCYTES NFR BLD AUTO: 0 % (ref 0–2)
INR PPP: 1.06 (ref 0.84–1.19)
IRON SATN MFR SERPL: 25 %
IRON SERPL-MCNC: 90 UG/DL (ref 65–175)
LYMPHOCYTES # BLD AUTO: 2.63 THOUSANDS/ΜL (ref 0.6–4.47)
LYMPHOCYTES NFR BLD AUTO: 42 % (ref 14–44)
MCH RBC QN AUTO: 28.2 PG (ref 26.8–34.3)
MCHC RBC AUTO-ENTMCNC: 32.7 G/DL (ref 31.4–37.4)
MCV RBC AUTO: 86 FL (ref 82–98)
MONOCYTES # BLD AUTO: 0.49 THOUSAND/ΜL (ref 0.17–1.22)
MONOCYTES NFR BLD AUTO: 8 % (ref 4–12)
NEUTROPHILS # BLD AUTO: 2.74 THOUSANDS/ΜL (ref 1.85–7.62)
NEUTS SEG NFR BLD AUTO: 43 % (ref 43–75)
NRBC BLD AUTO-RTO: 0 /100 WBCS
PLATELET # BLD AUTO: 268 THOUSANDS/UL (ref 149–390)
PMV BLD AUTO: 12.4 FL (ref 8.9–12.7)
POTASSIUM SERPL-SCNC: 3.8 MMOL/L (ref 3.5–5.3)
PROT SERPL-MCNC: 7.8 G/DL (ref 6.4–8.2)
PROTHROMBIN TIME: 13.4 SECONDS (ref 11.6–14.5)
RBC # BLD AUTO: 5.14 MILLION/UL (ref 3.88–5.62)
RH BLD: POSITIVE
SODIUM SERPL-SCNC: 141 MMOL/L (ref 136–145)
SPECIMEN EXPIRATION DATE: NORMAL
TIBC SERPL-MCNC: 353 UG/DL (ref 250–450)
WBC # BLD AUTO: 6.3 THOUSAND/UL (ref 4.31–10.16)

## 2019-08-12 PROCEDURE — 85025 COMPLETE CBC W/AUTO DIFF WBC: CPT

## 2019-08-12 PROCEDURE — 83036 HEMOGLOBIN GLYCOSYLATED A1C: CPT

## 2019-08-12 PROCEDURE — 85730 THROMBOPLASTIN TIME PARTIAL: CPT

## 2019-08-12 PROCEDURE — 85610 PROTHROMBIN TIME: CPT

## 2019-08-12 PROCEDURE — 97112 NEUROMUSCULAR REEDUCATION: CPT

## 2019-08-12 PROCEDURE — 82728 ASSAY OF FERRITIN: CPT

## 2019-08-12 PROCEDURE — 97110 THERAPEUTIC EXERCISES: CPT

## 2019-08-12 PROCEDURE — 36415 COLL VENOUS BLD VENIPUNCTURE: CPT

## 2019-08-12 PROCEDURE — 86900 BLOOD TYPING SEROLOGIC ABO: CPT

## 2019-08-12 PROCEDURE — 86901 BLOOD TYPING SEROLOGIC RH(D): CPT

## 2019-08-12 PROCEDURE — 80053 COMPREHEN METABOLIC PANEL: CPT

## 2019-08-12 PROCEDURE — 86850 RBC ANTIBODY SCREEN: CPT

## 2019-08-12 PROCEDURE — 83540 ASSAY OF IRON: CPT

## 2019-08-12 PROCEDURE — 86140 C-REACTIVE PROTEIN: CPT

## 2019-08-12 PROCEDURE — 83550 IRON BINDING TEST: CPT

## 2019-08-12 NOTE — PROGRESS NOTES
Daily Note     Today's date: 2019  Patient name: Alejandro Carlson  : 1960  MRN: [id number]  Referring provider: Herberth Turcios MD  Dx:   Encounter Diagnosis     ICD-10-CM    1  Acute pain of right knee M25 561    2  Aftercare following right knee joint replacement surgery Z47 1     [id number]                   Subjective: Patient states he has moderate soreness from last visit which has continued through today  Objective: See treatment diary below      Assessment: Tolerated treatment well  Patient demonstrated good form and intensity with exercises this visit, needing minimal cues for correction  Good stability on biodex at 91% average  Challenged with STS needing frequent rest breaks to complete  Patient demonstrated fatigue post treatment and would benefit from continued PT      Plan: Continue per plan of care  Progress treatment as tolerated         Precautions: Right knee TKA    Daily Treatment Diary     Manual            PROM JF                                                                /  Exercise Diary  19       SLR 8# 3x10 8# 3x10 8#  3x10       Sidelying hip abd 3#  3x10 3#  3x10 3#  3x10       Concetric/eccentric LAQ PT resist 3x10 b/l 3x10 b/l 3x10  b/l       Foot taps  3*10 3x10       Leg press  125#/155#/200# 10x ea; SLS 85# 3x10 135#/165#/200# 10x ea; SLS 95# 3x10 135#  65#/20#  10 ea  SLS  95#  3x10       Monster walks + lateral  BTB  4 laps ea BTB  4 laps ea BTB  4 laps   ea       TKE 40#  3x10x5" 40#  3x10x5" 40#  3x10  5"       Bosu Squats 3x10 4*10 4x10       LAQ          STS SLS 3x10 ea 3*10 ea 3x10  ea       Upright bike 7' 7' 7'       Biodex  4x LOS dynamic  3x  LOS  dynamic                                                                                         Modalities

## 2019-08-15 ENCOUNTER — OFFICE VISIT (OUTPATIENT)
Dept: PHYSICAL THERAPY | Age: 59
End: 2019-08-15
Payer: COMMERCIAL

## 2019-08-15 DIAGNOSIS — Z96.651 AFTERCARE FOLLOWING RIGHT KNEE JOINT REPLACEMENT SURGERY: ICD-10-CM

## 2019-08-15 DIAGNOSIS — Z47.1 AFTERCARE FOLLOWING RIGHT KNEE JOINT REPLACEMENT SURGERY: ICD-10-CM

## 2019-08-15 DIAGNOSIS — M25.561 ACUTE PAIN OF RIGHT KNEE: Primary | ICD-10-CM

## 2019-08-15 PROCEDURE — 97112 NEUROMUSCULAR REEDUCATION: CPT | Performed by: PHYSICAL THERAPIST

## 2019-08-15 PROCEDURE — 97110 THERAPEUTIC EXERCISES: CPT | Performed by: PHYSICAL THERAPIST

## 2019-08-15 NOTE — PROGRESS NOTES
Daily Note     Today's date: 8/15/2019  Patient name: Tim Batista  : 1960  MRN: [id number]  Referring provider: Michaela Pathak MD  Dx:   Encounter Diagnosis     ICD-10-CM    1  Acute pain of right knee M25 561    2  Aftercare following right knee joint replacement surgery Z47 1     [id number]        Start Time: 0845  Stop Time: 935  Total time in clinic (min): 50 minutes    Subjective: Pt reports improvements in symptoms  Objective: See treatment diary below      Assessment: Tolerated treatment well  Pt's POC was progressed to include more proximal strengthening to increase tolerance to community ambulation  Patient demonstrated fatigue post treatment and would benefit from continued PT      Plan: Continue per plan of care        Precautions: Right knee TKA    Daily Treatment Diary     Manual            PROM JF                                                                /  Exercise Diary  8/5/19 8/8 8/15       SLR 8# 3x10 8# 3x10 9#  3x10       Sidelying hip abd 3#  3x10 3#  3x10 5#  3x10       Concetric/eccentric LAQ PT resist 3x10 b/l 3x10 b/l 3x10  b/l       Foot taps  3*10 3x10       Leg press  125#/155#/200# 10x ea; SLS 85# 3x10 135#/165#/200# 10x ea; SLS 95# 3x10 135#  65#/20#  10 ea  SLS  95#  3x10       Monster walks + lateral  BTB  4 laps ea BTB  4 laps ea BTB  4 laps   ea       TKE 40#  3x10x5" 40#  3x10x5" 40#  3x10  5"       Bosu Squats 3x10 4*10 4x10       LAQ          STS SLS 3x10 ea 3*10 ea 3x10  ea       Upright bike 7' 7' 7'       Biodex  4x LOS dynamic  3x  LOS  dynamic                                                                                         Modalities

## 2019-08-16 NOTE — PRE-PROCEDURE INSTRUCTIONS
Pre-Surgery Instructions:   Medication Instructions    ascorbic acid (VITAMIN C) 500 MG tablet Instructed patient per Anesthesia Guidelines   cetirizine (ZyrTEC) 10 mg tablet Instructed patient per Anesthesia Guidelines   Cholecalciferol 2000 units CAPS Instructed patient per Anesthesia Guidelines   diphenhydrAMINE (BENADRYL) 25 mg tablet Instructed patient per Anesthesia Guidelines   ferrous sulfate 324 (65 Fe) mg Instructed patient per Anesthesia Guidelines   fluticasone (FLONASE) 50 mcg/act nasal spray Instructed patient per Anesthesia Guidelines   folic acid (FOLVITE) 1 mg tablet Instructed patient per Anesthesia Guidelines   Multiple Vitamin (MULTIVITAMIN) tablet Instructed patient per Anesthesia Guidelines   Omega-3 Fatty Acids (FISH OIL PO) Instructed patient per Anesthesia Guidelines   simvastatin (ZOCOR) 40 mg tablet Instructed patient per Anesthesia Guidelines  Spoke to pt  Medication list reviewed & instructed  Had RTKA 3/2019  As of 3/06 taking folic acid, vit C, iron and will continue along with MV until 9/8  As of 9/2 pt to stop fish oil, vit D, OTC vitamins  Instructed on tylenol only   Pt takes simvastatin at hs  Am DOS zyrtec ok with 1-2 sips of water  Pt states he has lovenox rx @ home and will get filled prior to sx  Pt aware for post op use only   Pt reports he has all showering instructions and soap @ home  Advised no alcohol 24 hour prior  Pt understands  All instructions verbally understood by patient  No further questions  Callback number given  Statin Med Class     Continue to take this medication on your normal schedule  If this is an oral medication and you take it in the morning, then you may take this medicine with a sip of water

## 2019-08-19 ENCOUNTER — APPOINTMENT (OUTPATIENT)
Dept: PHYSICAL THERAPY | Age: 59
End: 2019-08-19
Payer: COMMERCIAL

## 2019-08-19 ENCOUNTER — CONSULT (OUTPATIENT)
Dept: FAMILY MEDICINE CLINIC | Facility: CLINIC | Age: 59
End: 2019-08-19
Payer: COMMERCIAL

## 2019-08-19 VITALS
TEMPERATURE: 98.2 F | HEART RATE: 96 BPM | SYSTOLIC BLOOD PRESSURE: 132 MMHG | HEIGHT: 67 IN | DIASTOLIC BLOOD PRESSURE: 70 MMHG | RESPIRATION RATE: 16 BRPM | BODY MASS INDEX: 42.69 KG/M2 | OXYGEN SATURATION: 96 % | WEIGHT: 272 LBS

## 2019-08-19 DIAGNOSIS — E66.01 MORBID OBESITY WITH BMI OF 40.0-44.9, ADULT (HCC): ICD-10-CM

## 2019-08-19 DIAGNOSIS — M17.12 PRIMARY OSTEOARTHRITIS OF LEFT KNEE: Primary | ICD-10-CM

## 2019-08-19 DIAGNOSIS — Z01.818 PRE-OPERATIVE CLEARANCE: ICD-10-CM

## 2019-08-19 PROCEDURE — 93000 ELECTROCARDIOGRAM COMPLETE: CPT | Performed by: FAMILY MEDICINE

## 2019-08-19 PROCEDURE — 99214 OFFICE O/P EST MOD 30 MIN: CPT | Performed by: FAMILY MEDICINE

## 2019-08-19 NOTE — PATIENT INSTRUCTIONS
Obesity   AMBULATORY CARE:   Obesity  is when your body mass index (BMI) is greater than 30  Your healthcare provider will use your height and weight to measure your BMI  The risks of obesity include  many health problems, such as injuries or physical disability  You may need tests to check for the following:  · Diabetes     · High blood pressure or high cholesterol     · Heart disease     · Gallbladder or liver disease     · Cancer of the colon, breast, prostate, liver, or kidney     · Sleep apnea     · Arthritis or gout  Seek care immediately if:   · You have a severe headache, confusion, or difficulty speaking  · You have weakness on one side of your body  · You have chest pain, sweating, or shortness of breath  Contact your healthcare provider if:   · You have symptoms of gallbladder or liver disease, such as pain in your upper abdomen  · You have knee or hip pain and discomfort while walking  · You have symptoms of diabetes, such as intense hunger and thirst, and frequent urination  · You have symptoms of sleep apnea, such as snoring or daytime sleepiness  · You have questions or concerns about your condition or care  Treatment for obesity  focuses on helping you lose weight to improve your health  Even a small decrease in BMI can reduce the risk for many health problems  Your healthcare provider will help you set a weight-loss goal   · Lifestyle changes  are the first step in treating obesity  These include making healthy food choices and getting regular physical activity  Your healthcare provider may suggest a weight-loss program that involves coaching, education, and therapy  · Medicine  may help you lose weight when it is used with a healthy diet and physical activity  · Surgery  can help you lose weight if you are very obese and have other health problems  There are several types of weight-loss surgery  Ask your healthcare provider for more information    Be successful losing weight:   · Set small, realistic goals  An example of a small goal is to walk for 20 minutes 5 days a week  Anther goal is to lose 5% of your body weight  · Tell friends, family members, and coworkers about your goals  and ask for their support  Ask a friend to lose weight with you, or join a weight-loss support group  · Identify foods or triggers that may cause you to overeat , and find ways to avoid them  Remove tempting high-calorie foods from your home and workplace  Place a bowl of fresh fruit on your kitchen counter  If stress causes you to eat, then find other ways to cope with stress  · Keep a diary to track what you eat and drink  Also write down how many minutes of physical activity you do each day  Weigh yourself once a week and record it in your diary  Eating changes: You will need to eat 500 to 1,000 fewer calories each day than you currently eat to lose 1 to 2 pounds a week  The following changes will help you cut calories:  · Eat smaller portions  Use small plates, no larger than 9 inches in diameter  Fill your plate half full of fruits and vegetables  Measure your food using measuring cups until you know what a serving size looks like  · Eat 3 meals and 1 or 2 snacks each day  Plan your meals in advance  Linda Bilberry and eat at home most of the time  Eat slowly  · Eat fruits and vegetables at every meal   They are low in calories and high in fiber, which makes you feel full  Do not add butter, margarine, or cream sauce to vegetables  Use herbs to season steamed vegetables  · Eat less fat and fewer fried foods  Eat more baked or grilled chicken and fish  These protein sources are lower in calories and fat than red meat  Limit fast food  Dress your salads with olive oil and vinegar instead of bottled dressing  · Limit the amount of sugar you eat  Do not drink sugary beverages  Limit alcohol  Activity changes:  Physical activity is good for your body in many ways   It helps you burn calories and build strong muscles  It decreases stress and depression, and improves your mood  It can also help you sleep better  Talk to your healthcare provider before you begin an exercise program   · Exercise for at least 30 minutes 5 days a week  Start slowly  Set aside time each day for physical activity that you enjoy and that is convenient for you  It is best to do both weight training and an activity that increases your heart rate, such as walking, bicycling, or swimming  · Find ways to be more active  Do yard work and housecleaning  Walk up the stairs instead of using elevators  Spend your leisure time going to events that require walking, such as outdoor festivals or fairs  This extra physical activity can help you lose weight and keep it off  Follow up with your healthcare provider as directed: You may need to meet with a dietitian  Write down your questions so you remember to ask them during your visits  © 2017 [employer] [employer] Information is for End User's use only and may not be sold, redistributed or otherwise used for commercial purposes  All illustrations and images included in CareNotes® are the copyrighted property of [employer]  or Ady Johnson  The above information is an  only  It is not intended as medical advice for individual conditions or treatments  Talk to your doctor, nurse or pharmacist before following any medical regimen to see if it is safe and effective for you

## 2019-08-19 NOTE — H&P (VIEW-ONLY)
Assessment/Plan:  No problem-specific Assessment & Plan notes found for this encounter  Diagnoses and all orders for this visit:    Primary osteoarthritis of left knee  Comments:  He is getting his left knee arthroplasty on 09/09/2019    Pre-operative clearance  Comments:  EKG did not show any acute changes  Preop testing acceptable  He is an acceptable risk for the proposed procedure  Orders:  -     POCT ECG    Morbid obesity with BMI of 40 0-44 9, adult [facility])          Patient Instructions     Obesity   AMBULATORY CARE:   Obesity  is when your body mass index (BMI) is greater than 30  Your healthcare provider will use your height and weight to measure your BMI  The risks of obesity include  many health problems, such as injuries or physical disability  You may need tests to check for the following:  · Diabetes     · High blood pressure or high cholesterol     · Heart disease     · Gallbladder or liver disease     · Cancer of the colon, breast, prostate, liver, or kidney     · Sleep apnea     · Arthritis or gout  Seek care immediately if:   · You have a severe headache, confusion, or difficulty speaking  · You have weakness on one side of your body  · You have chest pain, sweating, or shortness of breath  Contact your healthcare provider if:   · You have symptoms of gallbladder or liver disease, such as pain in your upper abdomen  · You have knee or hip pain and discomfort while walking  · You have symptoms of diabetes, such as intense hunger and thirst, and frequent urination  · You have symptoms of sleep apnea, such as snoring or daytime sleepiness  · You have questions or concerns about your condition or care  Treatment for obesity  focuses on helping you lose weight to improve your health  Even a small decrease in BMI can reduce the risk for many health problems   Your healthcare provider will help you set a weight-loss goal   · Lifestyle changes  are the first step in treating obesity  These include making healthy food choices and getting regular physical activity  Your healthcare provider may suggest a weight-loss program that involves coaching, education, and therapy  · Medicine  may help you lose weight when it is used with a healthy diet and physical activity  · Surgery  can help you lose weight if you are very obese and have other health problems  There are several types of weight-loss surgery  Ask your healthcare provider for more information  Be successful losing weight:   · Set small, realistic goals  An example of a small goal is to walk for 20 minutes 5 days a week  Anther goal is to lose 5% of your body weight  · Tell friends, family members, and coworkers about your goals  and ask for their support  Ask a friend to lose weight with you, or join a weight-loss support group  · Identify foods or triggers that may cause you to overeat , and find ways to avoid them  Remove tempting high-calorie foods from your home and workplace  Place a bowl of fresh fruit on your kitchen counter  If stress causes you to eat, then find other ways to cope with stress  · Keep a diary to track what you eat and drink  Also write down how many minutes of physical activity you do each day  Weigh yourself once a week and record it in your diary  Eating changes: You will need to eat 500 to 1,000 fewer calories each day than you currently eat to lose 1 to 2 pounds a week  The following changes will help you cut calories:  · Eat smaller portions  Use small plates, no larger than 9 inches in diameter  Fill your plate half full of fruits and vegetables  Measure your food using measuring cups until you know what a serving size looks like  · Eat 3 meals and 1 or 2 snacks each day  Plan your meals in advance  Marcie Sharma and eat at home most of the time  Eat slowly       · Eat fruits and vegetables at every meal   They are low in calories and high in fiber, which makes you feel full  Do not add butter, margarine, or cream sauce to vegetables  Use herbs to season steamed vegetables  · Eat less fat and fewer fried foods  Eat more baked or grilled chicken and fish  These protein sources are lower in calories and fat than red meat  Limit fast food  Dress your salads with olive oil and vinegar instead of bottled dressing  · Limit the amount of sugar you eat  Do not drink sugary beverages  Limit alcohol  Activity changes:  Physical activity is good for your body in many ways  It helps you burn calories and build strong muscles  It decreases stress and depression, and improves your mood  It can also help you sleep better  Talk to your healthcare provider before you begin an exercise program   · Exercise for at least 30 minutes 5 days a week  Start slowly  Set aside time each day for physical activity that you enjoy and that is convenient for you  It is best to do both weight training and an activity that increases your heart rate, such as walking, bicycling, or swimming  · Find ways to be more active  Do yard work and housecleaning  Walk up the stairs instead of using elevators  Spend your leisure time going to events that require walking, such as outdoor festivals or fairs  This extra physical activity can help you lose weight and keep it off  Follow up with your healthcare provider as directed: You may need to meet with a dietitian  Write down your questions so you remember to ask them during your visits  © 2017 [employer] Information is for End User's use only and may not be sold, redistributed or otherwise used for commercial purposes  All illustrations and images included in CareNotes® are the copyrighted property of [employer]  or Ady Johnson  The above information is an  only  It is not intended as medical advice for individual conditions or treatments   Talk to your doctor, nurse or pharmacist before following any medical regimen to see if it is safe and effective for you  Return in about 2 months (around 10/19/2019)  Subjective:      Patient ID: Uday Coyne is a 60 y o  male  Chief Complaint   Patient presents with    Pre-op Exam     Arthroplasty of left knee 9/9/19 [facility]       He is here today for preop clearance for left knee replacement  He had his right knee in March he has been doing better since then  He denies any complaint today  His EKG in the office did not show any acute changes compared to his last EKG from January 19, 2019  The following portions of the patient's history were reviewed and updated as appropriate: allergies, current medications, past family history, past medical history, past social history, past surgical history and problem list     Review of Systems   Constitutional: Negative for chills and fever  HENT: Negative for trouble swallowing  Eyes: Negative for visual disturbance  Respiratory: Negative for cough and shortness of breath  Cardiovascular: Negative for chest pain and palpitations  Gastrointestinal: Negative for abdominal pain, blood in stool and vomiting  Endocrine: Negative for cold intolerance and heat intolerance  Genitourinary: Negative for difficulty urinating and dysuria  Musculoskeletal: Positive for arthralgias  Negative for gait problem  Skin: Negative for rash  Neurological: Negative for dizziness, syncope and headaches  Hematological: Negative for adenopathy  Psychiatric/Behavioral: Negative for behavioral problems           Current Outpatient Medications   Medication Sig Dispense Refill    ascorbic acid (VITAMIN C) 500 MG tablet Take 1 tablet (500 mg total) by mouth daily 60 tablet 0    cetirizine (ZyrTEC) 10 mg tablet Take 10 mg by mouth daily      Cholecalciferol 2000 units CAPS Take by mouth      diphenhydrAMINE (BENADRYL) 25 mg tablet Take 1 tablet (25 mg total) by mouth daily at bedtime as needed for itching 30 tablet 0    enoxaparin (LOVENOX) 40 mg/0 4 mL 1 subcutaneous injection daily x 28 days AFTER surgery 28 Syringe 0    ferrous sulfate 324 (65 Fe) mg Take 1 tablet (324 mg total) by mouth 2 (two) times a day before meals 60 tablet 0    fluticasone (FLONASE) 50 mcg/act nasal spray Blow nose; shake bottle; place tip in each nostril and tilt towards eye; hold breath and press plunger; do this 2 times daily prn      folic acid (FOLVITE) 1 mg tablet Take 1 tablet (1 mg total) by mouth daily 30 tablet 0    Multiple Vitamin (MULTIVITAMIN) tablet Take 2 tablets by mouth daily      Omega-3 Fatty Acids (FISH OIL PO) Take 1 capsule by mouth daily      simvastatin (ZOCOR) 40 mg tablet TAKE 1 TABLET (40 MG TOTAL) BY MOUTH DAILY AT BEDTIME 90 tablet 0     No current facility-administered medications for this visit  Objective:    /70 (BP Location: Left arm, Patient Position: Sitting, Cuff Size: Large)   Pulse 96   Temp 98 2 °F (36 8 °C) (Tympanic)   Resp 16   Ht 5' 7" (1 702 m)   Wt 123 kg (272 lb)   SpO2 96%   BMI 42 60 kg/m²        Physical Exam   Constitutional: He is oriented to person, place, and time  He appears well-developed and well-nourished  HENT:   Head: Normocephalic and atraumatic  Eyes: Pupils are equal, round, and reactive to light  EOM are normal    Neck: Normal range of motion  Neck supple  Cardiovascular: Normal rate, regular rhythm and normal heart sounds  Pulmonary/Chest: Effort normal and breath sounds normal    Abdominal: Soft  Bowel sounds are normal    Musculoskeletal: He exhibits no edema  Lymphadenopathy:     He has no cervical adenopathy  Neurological: He is alert and oriented to person, place, and time  No cranial nerve deficit  Skin: Skin is warm  No rash noted  Psychiatric: He has a normal mood and affect  Nursing note and vitals reviewed  Andrea Hyde MD BMI Counseling: Body mass index is 42 6 kg/m²  Discussed the patient's BMI with him   The BMI is above average  BMI counseling and education was provided to the patient  Nutrition recommendations include reducing portion sizes, decreasing overall calorie intake and 3-5 servings of fruits/vegetables daily  Exercise recommendations include moderate aerobic physical activity for 150 minutes/week

## 2019-08-19 NOTE — PROGRESS NOTES
Assessment/Plan:  No problem-specific Assessment & Plan notes found for this encounter  Diagnoses and all orders for this visit:    Primary osteoarthritis of left knee  Comments:  He is getting his left knee arthroplasty on 09/09/2019    Pre-operative clearance  Comments:  EKG did not show any acute changes  Preop testing acceptable  He is an acceptable risk for the proposed procedure  Orders:  -     POCT ECG    Morbid obesity with BMI of 40 0-44 9, adult [facility])          Patient Instructions     Obesity   AMBULATORY CARE:   Obesity  is when your body mass index (BMI) is greater than 30  Your healthcare provider will use your height and weight to measure your BMI  The risks of obesity include  many health problems, such as injuries or physical disability  You may need tests to check for the following:  · Diabetes     · High blood pressure or high cholesterol     · Heart disease     · Gallbladder or liver disease     · Cancer of the colon, breast, prostate, liver, or kidney     · Sleep apnea     · Arthritis or gout  Seek care immediately if:   · You have a severe headache, confusion, or difficulty speaking  · You have weakness on one side of your body  · You have chest pain, sweating, or shortness of breath  Contact your healthcare provider if:   · You have symptoms of gallbladder or liver disease, such as pain in your upper abdomen  · You have knee or hip pain and discomfort while walking  · You have symptoms of diabetes, such as intense hunger and thirst, and frequent urination  · You have symptoms of sleep apnea, such as snoring or daytime sleepiness  · You have questions or concerns about your condition or care  Treatment for obesity  focuses on helping you lose weight to improve your health  Even a small decrease in BMI can reduce the risk for many health problems   Your healthcare provider will help you set a weight-loss goal   · Lifestyle changes  are the first step in treating obesity  These include making healthy food choices and getting regular physical activity  Your healthcare provider may suggest a weight-loss program that involves coaching, education, and therapy  · Medicine  may help you lose weight when it is used with a healthy diet and physical activity  · Surgery  can help you lose weight if you are very obese and have other health problems  There are several types of weight-loss surgery  Ask your healthcare provider for more information  Be successful losing weight:   · Set small, realistic goals  An example of a small goal is to walk for 20 minutes 5 days a week  Anther goal is to lose 5% of your body weight  · Tell friends, family members, and coworkers about your goals  and ask for their support  Ask a friend to lose weight with you, or join a weight-loss support group  · Identify foods or triggers that may cause you to overeat , and find ways to avoid them  Remove tempting high-calorie foods from your home and workplace  Place a bowl of fresh fruit on your kitchen counter  If stress causes you to eat, then find other ways to cope with stress  · Keep a diary to track what you eat and drink  Also write down how many minutes of physical activity you do each day  Weigh yourself once a week and record it in your diary  Eating changes: You will need to eat 500 to 1,000 fewer calories each day than you currently eat to lose 1 to 2 pounds a week  The following changes will help you cut calories:  · Eat smaller portions  Use small plates, no larger than 9 inches in diameter  Fill your plate half full of fruits and vegetables  Measure your food using measuring cups until you know what a serving size looks like  · Eat 3 meals and 1 or 2 snacks each day  Plan your meals in advance  [place] and eat at home most of the time  Eat slowly       · Eat fruits and vegetables at every meal   They are low in calories and high in fiber, which makes you feel full  Do not add butter, margarine, or cream sauce to vegetables  Use herbs to season steamed vegetables  · Eat less fat and fewer fried foods  Eat more baked or grilled chicken and fish  These protein sources are lower in calories and fat than red meat  Limit fast food  Dress your salads with olive oil and vinegar instead of bottled dressing  · Limit the amount of sugar you eat  Do not drink sugary beverages  Limit alcohol  Activity changes:  Physical activity is good for your body in many ways  It helps you burn calories and build strong muscles  It decreases stress and depression, and improves your mood  It can also help you sleep better  Talk to your healthcare provider before you begin an exercise program   · Exercise for at least 30 minutes 5 days a week  Start slowly  Set aside time each day for physical activity that you enjoy and that is convenient for you  It is best to do both weight training and an activity that increases your heart rate, such as walking, bicycling, or swimming  · Find ways to be more active  Do yard work and housecleaning  Walk up the stairs instead of using elevators  Spend your leisure time going to events that require walking, such as outdoor festivals or fairs  This extra physical activity can help you lose weight and keep it off  Follow up with your healthcare provider as directed: You may need to meet with a dietitian  Write down your questions so you remember to ask them during your visits  © 2017 [employer] Information is for End User's use only and may not be sold, redistributed or otherwise used for commercial purposes  All illustrations and images included in CareNotes® are the copyrighted property of A D A M [employer] Inc  or [employer]y Johnson  The above information is an  only  It is not intended as medical advice for individual conditions or treatments   Talk to your doctor, nurse or pharmacist before following any medical regimen to see if it is safe and effective for you  Return in about 2 months (around 10/19/2019)  Subjective:      Patient ID: Dariel Garcia is a 60 y o  male  Chief Complaint   Patient presents with    Pre-op Exam     Arthroplasty of left knee 9/9/19 [facility] [place]       He is here today for preop clearance for left knee replacement  He had his right knee in March he has been doing better since then  He denies any complaint today  His EKG in the office did not show any acute changes compared to his last EKG from January 19, 2019  The following portions of the patient's history were reviewed and updated as appropriate: allergies, current medications, past family history, past medical history, past social history, past surgical history and problem list     Review of Systems   Constitutional: Negative for chills and fever  HENT: Negative for trouble swallowing  Eyes: Negative for visual disturbance  Respiratory: Negative for cough and shortness of breath  Cardiovascular: Negative for chest pain and palpitations  Gastrointestinal: Negative for abdominal pain, blood in stool and vomiting  Endocrine: Negative for cold intolerance and heat intolerance  Genitourinary: Negative for difficulty urinating and dysuria  Musculoskeletal: Positive for arthralgias  Negative for gait problem  Skin: Negative for rash  Neurological: Negative for dizziness, syncope and headaches  Hematological: Negative for adenopathy  Psychiatric/Behavioral: Negative for behavioral problems           Current Outpatient Medications   Medication Sig Dispense Refill    ascorbic acid (VITAMIN C) 500 MG tablet Take 1 tablet (500 mg total) by mouth daily 60 tablet 0    cetirizine (ZyrTEC) 10 mg tablet Take 10 mg by mouth daily      Cholecalciferol 2000 units CAPS Take by mouth      diphenhydrAMINE (BENADRYL) 25 mg tablet Take 1 tablet (25 mg total) by mouth daily at bedtime as needed for itching 30 tablet 0    enoxaparin (LOVENOX) 40 mg/0 4 mL 1 subcutaneous injection daily x 28 days AFTER surgery 28 Syringe 0    ferrous sulfate 324 (65 Fe) mg Take 1 tablet (324 mg total) by mouth 2 (two) times a day before meals 60 tablet 0    fluticasone (FLONASE) 50 mcg/act nasal spray Blow nose; shake bottle; place tip in each nostril and tilt towards eye; hold breath and press plunger; do this 2 times daily prn      folic acid (FOLVITE) 1 mg tablet Take 1 tablet (1 mg total) by mouth daily 30 tablet 0    Multiple Vitamin (MULTIVITAMIN) tablet Take 2 tablets by mouth daily      Omega-3 Fatty Acids (FISH OIL PO) Take 1 capsule by mouth daily      simvastatin (ZOCOR) 40 mg tablet TAKE 1 TABLET (40 MG TOTAL) BY MOUTH DAILY AT BEDTIME 90 tablet 0     No current facility-administered medications for this visit  Objective:    /70 (BP Location: Left arm, Patient Position: Sitting, Cuff Size: Large)   Pulse 96   Temp 98 2 °F (36 8 °C) (Tympanic)   Resp 16   Ht 5' 7" (1 702 m)   Wt 123 kg (272 lb)   SpO2 96%   BMI 42 60 kg/m²        Physical Exam   Constitutional: He is oriented to person, place, and time  He appears well-developed and well-nourished  HENT:   Head: Normocephalic and atraumatic  Eyes: Pupils are equal, round, and reactive to light  EOM are normal    Neck: Normal range of motion  Neck supple  Cardiovascular: Normal rate, regular rhythm and normal heart sounds  Pulmonary/Chest: Effort normal and breath sounds normal    Abdominal: Soft  Bowel sounds are normal    Musculoskeletal: He exhibits no edema  Lymphadenopathy:     He has no cervical adenopathy  Neurological: He is alert and oriented to person, place, and time  No cranial nerve deficit  Skin: Skin is warm  No rash noted  Psychiatric: He has a normal mood and affect  Nursing note and vitals reviewed  Lukasz Braxton MD BMI Counseling: Body mass index is 42 6 kg/m²  Discussed the patient's BMI with him   The BMI is above average  BMI counseling and education was provided to the patient  Nutrition recommendations include reducing portion sizes, decreasing overall calorie intake and 3-5 servings of fruits/vegetables daily  Exercise recommendations include moderate aerobic physical activity for 150 minutes/week

## 2019-08-20 ENCOUNTER — TELEPHONE (OUTPATIENT)
Dept: OBGYN CLINIC | Facility: HOSPITAL | Age: 59
End: 2019-08-20

## 2019-08-20 ENCOUNTER — EVALUATION (OUTPATIENT)
Dept: PHYSICAL THERAPY | Age: 59
End: 2019-08-20
Payer: COMMERCIAL

## 2019-08-20 DIAGNOSIS — Z47.1 AFTERCARE FOLLOWING RIGHT KNEE JOINT REPLACEMENT SURGERY: ICD-10-CM

## 2019-08-20 DIAGNOSIS — M25.561 ACUTE PAIN OF RIGHT KNEE: Primary | ICD-10-CM

## 2019-08-20 DIAGNOSIS — Z96.651 AFTERCARE FOLLOWING RIGHT KNEE JOINT REPLACEMENT SURGERY: ICD-10-CM

## 2019-08-20 PROCEDURE — 97112 NEUROMUSCULAR REEDUCATION: CPT | Performed by: PHYSICAL THERAPIST

## 2019-08-20 PROCEDURE — 97110 THERAPEUTIC EXERCISES: CPT | Performed by: PHYSICAL THERAPIST

## 2019-08-20 NOTE — PROGRESS NOTES
PT D/C NOTE    Today's date: 2019  Patient name: Cristela Tom  : 1960  MRN: [id number]  Referring provider: Anatoly Oakley MD  Dx:   Encounter Diagnosis     ICD-10-CM    1  Acute pain of right knee M25 561 Ambulatory referral to Physical Therapy   2  Aftercare following right knee joint replacement surgery Z47 [id number] Ambulatory referral to Physical Therapy    [id number]        Start Time: 0845  Stop Time: 930  Total time in clinic (min): 45 minutes    Assessment  Assessment details: Cristela Tom is a 60 y o  male who presents with signs and symptoms consistent of  Right TKA  Patient presents with improvements in pain,  strength and  ROM  Due to these improvments, Patient has less difficulty performing a/iadls and work-related activities  Patient will be discharged from PT to formal exercise program at clinic to strengthen for upcoming surgery  Understanding of Dx/Px/POC: good   Prognosis: good    Goals  Short Term Goals: to be achieved by 4 weeks MET  1) Patient to be independent with basic HEP  2) Decrease pain to 1/10 at its worst   3) Increase LE strength by 1/2 MMT grade in all deficient planes  Long Term Goals: to be achieved by discharge Met   1) FOTO equal to or greater than 58   2) Ambulation to improve to maximal level of function  3) Stair negotiation will improve to reciprocal   4) Sit to stand transfers will improve to maximal level of function     Plan    Frequency:D/C PT  Treatment plan discussed with: patient        Subjective Evaluation    History of Present Illness  Mechanism of injury: Pt is 10 weeks post R TKA  Pt is doing well functionally but still has quad weakness and would like to get L TKA surgery performed as soon as surgeon would be willing to perform surgery  Pt's main deficits are with community ambulation and high level activities        Pain  Current pain ratin  At best pain ratin  At worst pain ratin    Patient Goals  Patient goals for therapy: decreased pain, independence with ADLs/IADLs, improved balance and increased strength          Objective     Active Range of Motion     Right Knee   Flexion: 125 degrees   Extension: 0 degrees     Strength/Myotome Testing     Right Hip   Planes of Motion   Flexion: 5  Extension: 5  Abduction: 5  Adduction: 5  External rotation: 5  Internal rotation: 5    Right Knee   Flexion: 5  Extension: 5  Quadriceps contraction: good    Functional Assessment        Comments      Functional assessment:  SLS: 19 secs mod sway  Tandem stance: 30 secs min sway      Precautions: Right knee TKA    Daily Treatment Diary     Precautions: Right knee TKA    Daily Treatment Diary       Manual  6/27 7/22 8/12 8/20         PROM JF                                                                /  Exercise Diary  8/5/19 8/8 8/20       SLR 8# 3x10 8# 3x10        Sidelying hip abd 3#  3x10 3#  3x10        Concetric/eccentric LAQ PT resist 3x10 b/l 3x10 b/l 3x10  b/l       Foot taps  3*10 3x10       Leg press  125#/155#/200# 10x ea; SLS 85# 3x10 135#/165#/200# 10x ea; SLS 95# 3x10 135#  65#/20#  10 ea  SLS  95#  3x10       Monster walks + lateral  BTB  4 laps ea BTB  4 laps ea BTB  4 laps   ea       TKE 40#  3x10x5" 40#  3x10x5" 40#  3x10  5"       Bosu Squats 3x10 4*10 4x10       LAQ          STS SLS 3x10 ea 3*10 ea 3x10  ea       Upright bike 7' 7' 7'       Biodex  4x LOS dynamic  3x  LOS  dynamic                                                                                         Modalities

## 2019-08-21 ENCOUNTER — TELEPHONE (OUTPATIENT)
Dept: OBGYN CLINIC | Facility: HOSPITAL | Age: 59
End: 2019-08-21

## 2019-08-22 ENCOUNTER — OFFICE VISIT (OUTPATIENT)
Dept: PHYSICAL THERAPY | Age: 59
End: 2019-08-22
Payer: COMMERCIAL

## 2019-08-26 ENCOUNTER — APPOINTMENT (OUTPATIENT)
Dept: PHYSICAL THERAPY | Age: 59
End: 2019-08-26
Payer: COMMERCIAL

## 2019-08-27 NOTE — TELEPHONE ENCOUNTER
Preoperative Elective Admission Assessment       Living Situation: Pt reports living at home with his wife, Ivy Stoll  Home Layout: Ranch style home with walk in shower, with grab bar                    Steps: Handicap door and ramp to enter    First Floor Setup: Yes    Post-op Caregiver: Wife, Ki Martin Transport: WifeIvy    Outpatient Physical Therapy Site: [facility]     DME: Pt reports having the cane, RW and BSC     Patient's Current Level of Function: Independent with ADLS and Ambulation    Medication Management: Pt reports self managing medications removing them daily from the bottle                      Preferred Pharmacy: [facility]                     Blood Management Vitamins: Pt reports taking the folic acid, vitamin C and the Iron daily                  Post-op anticoagulant: Pt has paper script- reports he was taking it today  DC Plan: Home with outpatient PT                     Barriers to DC identified preoperatively:  *RTKA in March, home with outpatient PT      BMI: 42 5    Caresense: Enrolled on 8/21                    RAPT: 12                    ACE/ARB Form:GFR>60                    HOOS/KOOS: 57 14    Patient Education:   Pt educated on post op pain, early mobilization (POD0), indication/use of incentive spirometer (10x/hr while awake), and indication for/use of foot/leg pumps  pt encouraged to call me with questions, concerns or issues

## 2019-08-29 ENCOUNTER — APPOINTMENT (OUTPATIENT)
Dept: PHYSICAL THERAPY | Age: 59
End: 2019-08-29
Payer: COMMERCIAL

## 2019-09-03 ENCOUNTER — APPOINTMENT (OUTPATIENT)
Dept: PHYSICAL THERAPY | Age: 59
End: 2019-09-03
Payer: COMMERCIAL

## 2019-09-04 ENCOUNTER — PREP FOR PROCEDURE (OUTPATIENT)
Dept: OBGYN CLINIC | Facility: HOSPITAL | Age: 59
End: 2019-09-04

## 2019-09-05 DIAGNOSIS — M17.12 PRIMARY OSTEOARTHRITIS OF LEFT KNEE: ICD-10-CM

## 2019-09-05 RX ORDER — FOLIC ACID 1 MG/1
1 TABLET ORAL DAILY
Qty: 30 TABLET | Refills: 0 | Status: SHIPPED | OUTPATIENT
Start: 2019-09-05 | End: 2019-11-25

## 2019-09-05 RX ORDER — ASCORBIC ACID 500 MG
500 TABLET ORAL DAILY
Qty: 60 TABLET | Refills: 0 | Status: SHIPPED | OUTPATIENT
Start: 2019-09-05

## 2019-09-05 RX ORDER — FERROUS SULFATE TAB EC 324 MG (65 MG FE EQUIVALENT) 324 (65 FE) MG
324 TABLET DELAYED RESPONSE ORAL
Qty: 60 TABLET | Refills: 0 | Status: SHIPPED | OUTPATIENT
Start: 2019-09-05 | End: 2019-11-25

## 2019-09-06 ENCOUNTER — APPOINTMENT (OUTPATIENT)
Dept: PHYSICAL THERAPY | Age: 59
End: 2019-09-06
Payer: COMMERCIAL

## 2019-09-11 ENCOUNTER — TELEPHONE (OUTPATIENT)
Dept: FAMILY MEDICINE CLINIC | Facility: CLINIC | Age: 59
End: 2019-09-11

## 2019-09-11 NOTE — TELEPHONE ENCOUNTER
Spoke with pt  He is having surgery on 9/13/19  Pt will call closer to the end of October for OV  Last Annual Visit 10/16/18

## 2019-09-12 ENCOUNTER — ANESTHESIA EVENT (OUTPATIENT)
Dept: PERIOP | Facility: HOSPITAL | Age: 59
DRG: 470 | End: 2019-09-12
Payer: COMMERCIAL

## 2019-09-12 ENCOUNTER — APPOINTMENT (OUTPATIENT)
Dept: PHYSICAL THERAPY | Age: 59
End: 2019-09-12
Payer: COMMERCIAL

## 2019-09-13 ENCOUNTER — ANESTHESIA (OUTPATIENT)
Dept: PERIOP | Facility: HOSPITAL | Age: 59
DRG: 470 | End: 2019-09-13
Payer: COMMERCIAL

## 2019-09-13 ENCOUNTER — HOSPITAL ENCOUNTER (INPATIENT)
Facility: HOSPITAL | Age: 59
LOS: 1 days | Discharge: HOME | DRG: 470 | End: 2019-09-15
Attending: ORTHOPAEDIC SURGERY | Admitting: ORTHOPAEDIC SURGERY
Payer: COMMERCIAL

## 2019-09-13 DIAGNOSIS — Z96.652 STATUS POST TOTAL LEFT KNEE REPLACEMENT: Primary | ICD-10-CM

## 2019-09-13 DIAGNOSIS — M17.12 PRIMARY OSTEOARTHRITIS OF LEFT KNEE: ICD-10-CM

## 2019-09-13 LAB
ABO GROUP BLD: NORMAL
BLD GP AB SCN SERPL QL: NEGATIVE
GLUCOSE SERPL-MCNC: 120 MG/DL (ref 65–140)
RH BLD: POSITIVE
SPECIMEN EXPIRATION DATE: NORMAL

## 2019-09-13 PROCEDURE — C1776 JOINT DEVICE (IMPLANTABLE): HCPCS | Performed by: ORTHOPAEDIC SURGERY

## 2019-09-13 PROCEDURE — G8978 MOBILITY CURRENT STATUS: HCPCS

## 2019-09-13 PROCEDURE — 86900 BLOOD TYPING SEROLOGIC ABO: CPT | Performed by: ORTHOPAEDIC SURGERY

## 2019-09-13 PROCEDURE — 27447 TOTAL KNEE ARTHROPLASTY: CPT | Performed by: ORTHOPAEDIC SURGERY

## 2019-09-13 PROCEDURE — 86901 BLOOD TYPING SEROLOGIC RH(D): CPT | Performed by: ORTHOPAEDIC SURGERY

## 2019-09-13 PROCEDURE — C9290 INJ, BUPIVACAINE LIPOSOME: HCPCS | Performed by: ANESTHESIOLOGY

## 2019-09-13 PROCEDURE — G8979 MOBILITY GOAL STATUS: HCPCS

## 2019-09-13 PROCEDURE — 82948 REAGENT STRIP/BLOOD GLUCOSE: CPT

## 2019-09-13 PROCEDURE — C1713 ANCHOR/SCREW BN/BN,TIS/BN: HCPCS | Performed by: ORTHOPAEDIC SURGERY

## 2019-09-13 PROCEDURE — 86850 RBC ANTIBODY SCREEN: CPT | Performed by: ORTHOPAEDIC SURGERY

## 2019-09-13 PROCEDURE — 97163 PT EVAL HIGH COMPLEX 45 MIN: CPT

## 2019-09-13 DEVICE — SMARTSET HV HIGH VISCOSITY BONE CEMENT 40G
Type: IMPLANTABLE DEVICE | Site: KNEE | Status: FUNCTIONAL
Brand: SMARTSET

## 2019-09-13 DEVICE — ATTUNE PATELLA MEDIALIZED DOME 41MM CEMENTED AOX
Type: IMPLANTABLE DEVICE | Site: KNEE | Status: FUNCTIONAL
Brand: ATTUNE

## 2019-09-13 DEVICE — ATTUNE KNEE SYSTEM TIBIAL BASE ROTATING PLATFORM SIZE 8 CEMENTED
Type: IMPLANTABLE DEVICE | Site: KNEE | Status: FUNCTIONAL
Brand: ATTUNE

## 2019-09-13 DEVICE — ATTUNE KNEE SYSTEM TIBIAL INSERT ROTATING PLATFORM POSTERIOR STABILIZED 8 5MM AOX
Type: IMPLANTABLE DEVICE | Site: KNEE | Status: FUNCTIONAL
Brand: ATTUNE

## 2019-09-13 DEVICE — ATTUNE KNEE SYSTEM FEMORAL POSTERIOR STABILIZED SIZE 8 LEFT CEMENTED
Type: IMPLANTABLE DEVICE | Site: KNEE | Status: FUNCTIONAL
Brand: ATTUNE

## 2019-09-13 RX ORDER — SODIUM CHLORIDE 9 MG/ML
100 INJECTION, SOLUTION INTRAVENOUS CONTINUOUS
Status: DISPENSED | OUTPATIENT
Start: 2019-09-13 | End: 2019-09-14

## 2019-09-13 RX ORDER — METOCLOPRAMIDE HYDROCHLORIDE 5 MG/ML
10 INJECTION INTRAMUSCULAR; INTRAVENOUS ONCE AS NEEDED
Status: DISCONTINUED | OUTPATIENT
Start: 2019-09-13 | End: 2019-09-13 | Stop reason: HOSPADM

## 2019-09-13 RX ORDER — OXYCODONE HYDROCHLORIDE 5 MG/1
TABLET ORAL
Qty: 30 TABLET | Refills: 0 | Status: SHIPPED | OUTPATIENT
Start: 2019-09-13 | End: 2019-11-25

## 2019-09-13 RX ORDER — HYDROMORPHONE HCL/PF 1 MG/ML
0.5 SYRINGE (ML) INJECTION EVERY 2 HOUR PRN
Status: DISPENSED | OUTPATIENT
Start: 2019-09-13 | End: 2019-09-15

## 2019-09-13 RX ORDER — HYDROMORPHONE HCL/PF 1 MG/ML
0.5 SYRINGE (ML) INJECTION
Status: DISCONTINUED | OUTPATIENT
Start: 2019-09-13 | End: 2019-09-13 | Stop reason: HOSPADM

## 2019-09-13 RX ORDER — CEFAZOLIN SODIUM 1 G/50ML
1000 SOLUTION INTRAVENOUS EVERY 8 HOURS
Status: COMPLETED | OUTPATIENT
Start: 2019-09-13 | End: 2019-09-14

## 2019-09-13 RX ORDER — MELATONIN
1000 DAILY
Status: DISCONTINUED | OUTPATIENT
Start: 2019-09-13 | End: 2019-09-15 | Stop reason: HOSPADM

## 2019-09-13 RX ORDER — FOLIC ACID 1 MG/1
1 TABLET ORAL DAILY
Status: DISCONTINUED | OUTPATIENT
Start: 2019-09-13 | End: 2019-09-15 | Stop reason: HOSPADM

## 2019-09-13 RX ORDER — SODIUM CHLORIDE 9 MG/ML
125 INJECTION, SOLUTION INTRAVENOUS CONTINUOUS
Status: DISCONTINUED | OUTPATIENT
Start: 2019-09-13 | End: 2019-09-13

## 2019-09-13 RX ORDER — ACETAMINOPHEN 325 MG/1
975 TABLET ORAL ONCE
Status: COMPLETED | OUTPATIENT
Start: 2019-09-13 | End: 2019-09-13

## 2019-09-13 RX ORDER — PROPOFOL 10 MG/ML
INJECTION, EMULSION INTRAVENOUS CONTINUOUS PRN
Status: DISCONTINUED | OUTPATIENT
Start: 2019-09-13 | End: 2019-09-13

## 2019-09-13 RX ORDER — ASCORBIC ACID 500 MG
500 TABLET ORAL DAILY
Status: DISCONTINUED | OUTPATIENT
Start: 2019-09-13 | End: 2019-09-15 | Stop reason: HOSPADM

## 2019-09-13 RX ORDER — LORATADINE 10 MG/1
10 TABLET ORAL DAILY
Status: DISCONTINUED | OUTPATIENT
Start: 2019-09-13 | End: 2019-09-15 | Stop reason: HOSPADM

## 2019-09-13 RX ORDER — METHOCARBAMOL 500 MG/1
500 TABLET, FILM COATED ORAL EVERY 6 HOURS SCHEDULED
Status: DISCONTINUED | OUTPATIENT
Start: 2019-09-13 | End: 2019-09-15 | Stop reason: HOSPADM

## 2019-09-13 RX ORDER — ALBUTEROL SULFATE 2.5 MG/3ML
2.5 SOLUTION RESPIRATORY (INHALATION) ONCE AS NEEDED
Status: DISCONTINUED | OUTPATIENT
Start: 2019-09-13 | End: 2019-09-13 | Stop reason: HOSPADM

## 2019-09-13 RX ORDER — CHLORHEXIDINE GLUCONATE 0.12 MG/ML
15 RINSE ORAL ONCE
Status: COMPLETED | OUTPATIENT
Start: 2019-09-13 | End: 2019-09-13

## 2019-09-13 RX ORDER — ALBUMIN, HUMAN INJ 5% 5 %
SOLUTION INTRAVENOUS CONTINUOUS PRN
Status: DISCONTINUED | OUTPATIENT
Start: 2019-09-13 | End: 2019-09-13

## 2019-09-13 RX ORDER — HYDROMORPHONE HCL/PF 1 MG/ML
0.2 SYRINGE (ML) INJECTION
Status: DISCONTINUED | OUTPATIENT
Start: 2019-09-13 | End: 2019-09-13 | Stop reason: HOSPADM

## 2019-09-13 RX ORDER — FENTANYL CITRATE/PF 50 MCG/ML
25 SYRINGE (ML) INJECTION
Status: DISCONTINUED | OUTPATIENT
Start: 2019-09-13 | End: 2019-09-13 | Stop reason: HOSPADM

## 2019-09-13 RX ORDER — LABETALOL 20 MG/4 ML (5 MG/ML) INTRAVENOUS SYRINGE
10
Status: DISCONTINUED | OUTPATIENT
Start: 2019-09-13 | End: 2019-09-13 | Stop reason: HOSPADM

## 2019-09-13 RX ORDER — SENNOSIDES 8.6 MG
1 TABLET ORAL DAILY
Status: DISCONTINUED | OUTPATIENT
Start: 2019-09-13 | End: 2019-09-15 | Stop reason: HOSPADM

## 2019-09-13 RX ORDER — ONDANSETRON 2 MG/ML
4 INJECTION INTRAMUSCULAR; INTRAVENOUS ONCE AS NEEDED
Status: DISCONTINUED | OUTPATIENT
Start: 2019-09-13 | End: 2019-09-13 | Stop reason: HOSPADM

## 2019-09-13 RX ORDER — PROMETHAZINE HYDROCHLORIDE 25 MG/ML
12.5 INJECTION, SOLUTION INTRAMUSCULAR; INTRAVENOUS ONCE AS NEEDED
Status: DISCONTINUED | OUTPATIENT
Start: 2019-09-13 | End: 2019-09-13 | Stop reason: HOSPADM

## 2019-09-13 RX ORDER — LIDOCAINE HYDROCHLORIDE 10 MG/ML
0.5 INJECTION, SOLUTION EPIDURAL; INFILTRATION; INTRACAUDAL; PERINEURAL ONCE AS NEEDED
Status: DISCONTINUED | OUTPATIENT
Start: 2019-09-13 | End: 2019-09-13 | Stop reason: HOSPADM

## 2019-09-13 RX ORDER — CEFAZOLIN SODIUM 2 G/50ML
2000 SOLUTION INTRAVENOUS ONCE
Status: COMPLETED | OUTPATIENT
Start: 2019-09-13 | End: 2019-09-13

## 2019-09-13 RX ORDER — TRANEXAMIC ACID 100 MG/ML
INJECTION, SOLUTION INTRAVENOUS AS NEEDED
Status: DISCONTINUED | OUTPATIENT
Start: 2019-09-13 | End: 2019-09-13 | Stop reason: HOSPADM

## 2019-09-13 RX ORDER — TRAMADOL HYDROCHLORIDE 50 MG/1
50 TABLET ORAL EVERY 6 HOURS PRN
Qty: 30 TABLET | Refills: 0 | Status: SHIPPED | OUTPATIENT
Start: 2019-09-13 | End: 2019-09-23

## 2019-09-13 RX ORDER — SENNOSIDES 8.6 MG
650 CAPSULE ORAL EVERY 8 HOURS PRN
Qty: 30 TABLET | Refills: 0 | Status: SHIPPED | OUTPATIENT
Start: 2019-09-13 | End: 2019-10-13

## 2019-09-13 RX ORDER — FLUTICASONE PROPIONATE 50 MCG
1 SPRAY, SUSPENSION (ML) NASAL DAILY
Status: DISCONTINUED | OUTPATIENT
Start: 2019-09-13 | End: 2019-09-15 | Stop reason: HOSPADM

## 2019-09-13 RX ORDER — BUPIVACAINE HYDROCHLORIDE 7.5 MG/ML
INJECTION, SOLUTION INTRASPINAL AS NEEDED
Status: DISCONTINUED | OUTPATIENT
Start: 2019-09-13 | End: 2019-09-13 | Stop reason: SURG

## 2019-09-13 RX ORDER — MIDAZOLAM HYDROCHLORIDE 1 MG/ML
INJECTION INTRAMUSCULAR; INTRAVENOUS AS NEEDED
Status: DISCONTINUED | OUTPATIENT
Start: 2019-09-13 | End: 2019-09-13 | Stop reason: SURG

## 2019-09-13 RX ORDER — FERROUS SULFATE 325(65) MG
325 TABLET ORAL 2 TIMES DAILY WITH MEALS
Status: DISCONTINUED | OUTPATIENT
Start: 2019-09-13 | End: 2019-09-15 | Stop reason: HOSPADM

## 2019-09-13 RX ORDER — OXYCODONE HYDROCHLORIDE 5 MG/1
5 TABLET ORAL EVERY 4 HOURS PRN
Status: DISCONTINUED | OUTPATIENT
Start: 2019-09-13 | End: 2019-09-15 | Stop reason: HOSPADM

## 2019-09-13 RX ORDER — BUPIVACAINE HYDROCHLORIDE 5 MG/ML
INJECTION, SOLUTION EPIDURAL; INTRACAUDAL AS NEEDED
Status: DISCONTINUED | OUTPATIENT
Start: 2019-09-13 | End: 2019-09-13 | Stop reason: SURG

## 2019-09-13 RX ORDER — CHLORHEXIDINE GLUCONATE 4 G/100ML
SOLUTION TOPICAL DAILY PRN
Status: DISCONTINUED | OUTPATIENT
Start: 2019-09-13 | End: 2019-09-13 | Stop reason: HOSPADM

## 2019-09-13 RX ORDER — OXYCODONE HYDROCHLORIDE 10 MG/1
10 TABLET ORAL EVERY 4 HOURS PRN
Status: DISCONTINUED | OUTPATIENT
Start: 2019-09-13 | End: 2019-09-15 | Stop reason: HOSPADM

## 2019-09-13 RX ORDER — DOCUSATE SODIUM 100 MG/1
100 CAPSULE, LIQUID FILLED ORAL 2 TIMES DAILY
Qty: 10 CAPSULE | Refills: 0 | Status: SHIPPED | OUTPATIENT
Start: 2019-09-13 | End: 2019-11-25

## 2019-09-13 RX ORDER — HYDRALAZINE HYDROCHLORIDE 20 MG/ML
5 INJECTION INTRAMUSCULAR; INTRAVENOUS
Status: DISCONTINUED | OUTPATIENT
Start: 2019-09-13 | End: 2019-09-13 | Stop reason: HOSPADM

## 2019-09-13 RX ORDER — DOCUSATE SODIUM 100 MG/1
100 CAPSULE, LIQUID FILLED ORAL 2 TIMES DAILY
Status: DISCONTINUED | OUTPATIENT
Start: 2019-09-13 | End: 2019-09-15 | Stop reason: HOSPADM

## 2019-09-13 RX ORDER — CALCIUM CARBONATE 200(500)MG
1000 TABLET,CHEWABLE ORAL DAILY PRN
Status: DISCONTINUED | OUTPATIENT
Start: 2019-09-13 | End: 2019-09-15 | Stop reason: HOSPADM

## 2019-09-13 RX ORDER — ACETAMINOPHEN 325 MG/1
650 TABLET ORAL EVERY 6 HOURS PRN
Status: DISCONTINUED | OUTPATIENT
Start: 2019-09-13 | End: 2019-09-15 | Stop reason: HOSPADM

## 2019-09-13 RX ORDER — MAGNESIUM HYDROXIDE 1200 MG/15ML
LIQUID ORAL AS NEEDED
Status: DISCONTINUED | OUTPATIENT
Start: 2019-09-13 | End: 2019-09-13 | Stop reason: HOSPADM

## 2019-09-13 RX ORDER — ROPIVACAINE HYDROCHLORIDE 5 MG/ML
INJECTION, SOLUTION EPIDURAL; INFILTRATION; PERINEURAL AS NEEDED
Status: DISCONTINUED | OUTPATIENT
Start: 2019-09-13 | End: 2019-09-13 | Stop reason: SURG

## 2019-09-13 RX ORDER — DIPHENHYDRAMINE HCL 25 MG
25 TABLET ORAL
Status: DISCONTINUED | OUTPATIENT
Start: 2019-09-13 | End: 2019-09-15 | Stop reason: HOSPADM

## 2019-09-13 RX ADMIN — PHENYLEPHRINE HYDROCHLORIDE 100 MCG: 10 INJECTION INTRAVENOUS at 09:29

## 2019-09-13 RX ADMIN — SENNOSIDES 8.6 MG: 8.6 TABLET, FILM COATED ORAL at 11:57

## 2019-09-13 RX ADMIN — PHENYLEPHRINE HYDROCHLORIDE 200 MCG: 10 INJECTION INTRAVENOUS at 08:14

## 2019-09-13 RX ADMIN — FOLIC ACID 1 MG: 1 TABLET ORAL at 11:57

## 2019-09-13 RX ADMIN — ACETAMINOPHEN 975 MG: 325 TABLET ORAL at 06:08

## 2019-09-13 RX ADMIN — SODIUM CHLORIDE 100 ML/HR: 0.9 INJECTION, SOLUTION INTRAVENOUS at 11:50

## 2019-09-13 RX ADMIN — MIDAZOLAM 2 MG: 1 INJECTION INTRAMUSCULAR; INTRAVENOUS at 07:32

## 2019-09-13 RX ADMIN — DOCUSATE SODIUM 100 MG: 100 CAPSULE, LIQUID FILLED ORAL at 11:57

## 2019-09-13 RX ADMIN — CEFAZOLIN SODIUM 1000 MG: 1 SOLUTION INTRAVENOUS at 16:10

## 2019-09-13 RX ADMIN — PHENYLEPHRINE HYDROCHLORIDE 100 MCG: 10 INJECTION INTRAVENOUS at 08:02

## 2019-09-13 RX ADMIN — OXYCODONE HYDROCHLORIDE 10 MG: 10 TABLET ORAL at 11:56

## 2019-09-13 RX ADMIN — OXYCODONE HYDROCHLORIDE 10 MG: 10 TABLET ORAL at 16:09

## 2019-09-13 RX ADMIN — HYDROMORPHONE HYDROCHLORIDE 0.5 MG: 1 INJECTION, SOLUTION INTRAMUSCULAR; INTRAVENOUS; SUBCUTANEOUS at 13:22

## 2019-09-13 RX ADMIN — METHOCARBAMOL TABLETS 500 MG: 500 TABLET, COATED ORAL at 17:47

## 2019-09-13 RX ADMIN — VITAMIN D, TAB 1000IU (100/BT) 1000 UNITS: 25 TAB at 11:56

## 2019-09-13 RX ADMIN — BUPIVACAINE HYDROCHLORIDE 5 ML: 5 INJECTION, SOLUTION EPIDURAL; INTRACAUDAL at 07:10

## 2019-09-13 RX ADMIN — DOCUSATE SODIUM 100 MG: 100 CAPSULE, LIQUID FILLED ORAL at 17:47

## 2019-09-13 RX ADMIN — SODIUM CHLORIDE: 0.9 INJECTION, SOLUTION INTRAVENOUS at 06:57

## 2019-09-13 RX ADMIN — LORATADINE 10 MG: 10 TABLET ORAL at 11:57

## 2019-09-13 RX ADMIN — ENOXAPARIN SODIUM 40 MG: 40 INJECTION SUBCUTANEOUS at 22:13

## 2019-09-13 RX ADMIN — PROPOFOL 120 MCG/KG/MIN: 10 INJECTION, EMULSION INTRAVENOUS at 07:48

## 2019-09-13 RX ADMIN — BUPIVACAINE 20 ML: 13.3 INJECTION, SUSPENSION, LIPOSOMAL INFILTRATION at 07:10

## 2019-09-13 RX ADMIN — FERROUS SULFATE TAB 325 MG (65 MG ELEMENTAL FE) 325 MG: 325 (65 FE) TAB at 16:10

## 2019-09-13 RX ADMIN — SODIUM CHLORIDE 125 ML/HR: 0.9 INJECTION, SOLUTION INTRAVENOUS at 11:16

## 2019-09-13 RX ADMIN — PHENYLEPHRINE HYDROCHLORIDE 50 MCG/MIN: 10 INJECTION INTRAVENOUS at 08:52

## 2019-09-13 RX ADMIN — BUPIVACAINE HYDROCHLORIDE IN DEXTROSE 2 ML: 7.5 INJECTION, SOLUTION SUBARACHNOID at 07:47

## 2019-09-13 RX ADMIN — ALBUMIN (HUMAN): 12.5 SOLUTION INTRAVENOUS at 08:36

## 2019-09-13 RX ADMIN — TRANEXAMIC ACID 1000 MG: 1 INJECTION, SOLUTION INTRAVENOUS at 07:59

## 2019-09-13 RX ADMIN — SODIUM CHLORIDE: 0.9 INJECTION, SOLUTION INTRAVENOUS at 08:38

## 2019-09-13 RX ADMIN — PHENYLEPHRINE HYDROCHLORIDE 200 MCG: 10 INJECTION INTRAVENOUS at 08:29

## 2019-09-13 RX ADMIN — ROPIVACAINE HYDROCHLORIDE 20 ML: 5 INJECTION, SOLUTION EPIDURAL; INFILTRATION; PERINEURAL at 07:10

## 2019-09-13 RX ADMIN — OXYCODONE HYDROCHLORIDE 10 MG: 10 TABLET ORAL at 20:09

## 2019-09-13 RX ADMIN — SODIUM CHLORIDE 100 ML/HR: 0.9 INJECTION, SOLUTION INTRAVENOUS at 17:47

## 2019-09-13 RX ADMIN — PHENYLEPHRINE HYDROCHLORIDE 300 MCG: 10 INJECTION INTRAVENOUS at 08:35

## 2019-09-13 RX ADMIN — CHLORHEXIDINE GLUCONATE 0.12% ORAL RINSE 15 ML: 1.2 LIQUID ORAL at 06:08

## 2019-09-13 RX ADMIN — METHOCARBAMOL TABLETS 500 MG: 500 TABLET, COATED ORAL at 11:56

## 2019-09-13 RX ADMIN — OXYCODONE HYDROCHLORIDE AND ACETAMINOPHEN 500 MG: 500 TABLET ORAL at 11:56

## 2019-09-13 RX ADMIN — CEFAZOLIN SODIUM 2000 MG: 2 SOLUTION INTRAVENOUS at 07:51

## 2019-09-13 NOTE — ANESTHESIA PREPROCEDURE EVALUATION
Review of Systems/Medical History  Patient summary reviewed  Chart reviewed  History of anesthetic complications ("slow wake up" after lithotripsy)     Cardiovascular  EKG reviewed, Exercise tolerance (METS): <4, Exercise comment: Exercise limited by arthritis Hyperlipidemia, No orthopnea, No PND, No SESAY,    Pulmonary  Negative pulmonary ROS No sleep apnea (denies) ,        GI/Hepatic  Negative GI/hepatic ROS          Kidney stones,        Endo/Other  Diabetes well controlled type 2 Diet controlled,   Comment: Hx cleft palate repair Obesity (BMI 41)  morbid obesity   GYN       Hematology  Negative hematology ROS      Musculoskeletal    Arthritis     Neurology  Negative neurology ROS      Psychology   Anxiety,              Physical Exam    Airway    Mallampati score: I  TM Distance: >3 FB  Neck ROM: full     Dental   Comment: Extremely loose upper incisors; partial removed  Residual palate defects,     Cardiovascular  Rhythm: regular, Rate: normal, Cardiovascular exam normal    Pulmonary  Pulmonary exam normal Breath sounds clear to auscultation,     Other Findings      Lab Results   Component Value Date    WBC 6 30 08/12/2019    HGB 14 5 08/12/2019     08/12/2019     Lab Results   Component Value Date    K 3 8 08/12/2019    BUN 11 08/12/2019    CREATININE 0 98 08/12/2019     Lab Results   Component Value Date    PTT 28 08/12/2019      Lab Results   Component Value Date    INR 1 06 08/12/2019     Blood type A+/antibody neg  Lab Results   Component Value Date    HGBA1C 6 7 (H) 08/12/2019       Anesthesia Plan  ASA Score- 3     Anesthesia Type- spinal and regional with ASA Monitors  Additional Monitors:   Airway Plan:     Comment: Backup GA/airway instrumentation discussed  Plan Factors-    Induction- intravenous  Postoperative Plan-     Informed Consent- Anesthetic plan and risks discussed with patient and spouse  I personally reviewed this patient with the CRNA   Discussed and agreed on the Anesthesia Plan with the CRNA  Nicolle Foster

## 2019-09-13 NOTE — ANESTHESIA PROCEDURE NOTES
Spinal Block    Patient location during procedure: OR  Start time: 9/13/2019 7:47 AM  Reason for block: procedure for pain and at surgeon's request  Staffing  Anesthesiologist: Giovana Ferris MD  Performed: anesthesiologist   Preanesthetic Checklist  Completed: patient identified, site marked, surgical consent, pre-op evaluation, timeout performed, IV checked, risks and benefits discussed and monitors and equipment checked  Spinal Block  Patient position: sitting  Prep: ChloraPrep  Patient monitoring: cardiac monitor and frequent blood pressure checks  Approach: midline  Location: L3-4  Injection technique: single-shot  Needle  Needle type: pencil-tip   Needle gauge: 25 G  Needle length: 10 cm  Assessment  Sensory level: T4  Events: cerebrospinal fluid  Injection Assessment:  negative aspiration for heme, no paresthesia on injection and positive aspiration for clear CSF    Post-procedure:  adhesive bandage applied, pressure dressing applied, secured with tape, site cleaned and sterile dressing applied

## 2019-09-13 NOTE — OP NOTE
Brief Op Note  Kristen Bowling  MRN: [id number]      Unit/Bed#: -01    PreOp Dx: left knee DJD      Postop Dx: left knee DJD      Procedure: left total knee arthroplasty      Surgeon: MD Charles Ulloa MD ,Jorge Alberto Lee PA-C ,        Fluids:       EBL:       Drains: Hemovac x 1      Specimens: No       Complications: No       Condition: stable transferred to postanesthesia care unit      Implants: Depuy Attune RP  Femoral, size: 8  Tibial tray: 8  Polyethylene: 5  Patellar button: 87      52 y o male, presents at this time, secondary to treatment of left knee DJD with varus deformity which has failed conservative treatment  The patient was told of all the pros and cons of operative and nonoperative intervention  Some of the complications of operative intervention include infection, bleeding, scarring, nerve injury, vascular injury, fracture, continued pain, decreased range of motion, DVT, PE death, loss of limb, need for further surgery, not obtain an results  The patient wished  Patient did consent for operative intervention for this pathology  Patient was brought to the operating room  Patient was anesthetized as anesthesia team  Patient was prepped and draped in normal sterile fashion  After this was done, we did conduct a time out to make sure correct  Patient was in the room, prepped marked and draped  Implants were in the room, Rep  For the implants were present, DVT prophylaxis and antibiotics were addressed  Midline incision was made over the anterior knee after going through skin, fatty tissue, fascia was identified  Flaps were created both medially and laterally  Medial parapatellar incision was made  Excision of Hoffa fat pad was conducted  At this time because this was a varus knee, we did release over the medial aspect including medial osteophytes and deep MCL  We're able to excise the fatty tissue from the anterior aspect of the distal femur   We were able to at this time, flex the knee with the patella everted  Intramedullary reamer was used  Intramedullary guide for the femur was then placed to determine how much of the distal femur to cut and also, valgus cut angle  Pins were then placed  Intramedullary guide was removed  Distal femoral cut was made  Attention was now to the proximal tibia  Extramedullary guide was used  After making sure that we took the appropriate amount from the medial and lateral side with the aid of a measuring device, the jig was held in place with pins  Protection of the medial and lateral ligaments, as well as the popliteal region, was done  Proximal tibial cut was made  Extension gaps were evaluated  Size of the femur was then determined with the aid of a guide  After this was done, we placed the appropriate sized block  These were held down with pins  Anterior and posterior cuts were made  Anterior, posterior, chamfer cuts were made  We did check our flexion gap and was noted to be appropriate  This was a PCL sacrificing device being used Therefore a box cut was made  Tibial tray size was determined  This was held down with 2 small screws  The reamer and the punch was then used with the appropriate guide to make sure that these were all done, the appropriate manner  Guide was removed  Trial femoral component was placed  Trial tibial polyethylene was placed  Patient was noted to be stable  On coronal and sagittal planes  Patellar button size was determined after the articular surface of the patella was excised  The patella button was placed on the medial aspect of the patella  Holes were drilled for our true patella button to be cemented on  We tracked the knee, and we noted that the patella was tracking appropriately over the trochlear of the trial implants  After this was done we did drill holes in our trial femoral component  We then removed  All trial components  Copious irrigation was used at the operative site   We did place some bone within the intramedullary canal of the femur  High viscocity cement was used and all components were placed, including the femur, tibia, and patella button  A trial tibial Polyethylene was placed  After cement had dried, removed the trial polyethylene and removed any excess cement that was in the popliteal region  Copious irrigation was used  The tibial polyethylene was then placed  Patient was placed in the appropriate ranges of motion and patient's Knee was noted to be stable  Tourniquet was discontinued  Hemostasis was obtained  Hemovac was placed, exiting the lateral distal thigh region  #1 Vicryl sutures were used to reapproximate the parapatellar incision  2-0 Vicryl sutures for the subcutaneous closure  This was reinforced with staples  Wounds were cleaned and dried  Hemovac was placed to suction  Acticoat, 4 x 4, ABDs and web roll was placed prior to Ace bandage be placed from the foot  All the way to the mid thigh region    Patient was awakened as anesthesia team noted to be a stable condition and transferred to postanesthesia care unit

## 2019-09-13 NOTE — PLAN OF CARE
Problem: PHYSICAL THERAPY ADULT  Goal: Performs mobility at highest level of function for planned discharge setting  See evaluation for individualized goals  Description  Treatment/Interventions: Functional transfer training, LE strengthening/ROM, Therapeutic exercise, Endurance training, Cognitive reorientation, Patient/family training, Bed mobility, Gait training, Equipment eval/education, Spoke to nursing, Family  Equipment Recommended: Walker(RW)       See flowsheet documentation for full assessment, interventions and recommendations  Note:   Prognosis: Good  Problem List: Decreased strength, Decreased range of motion, Decreased endurance, Impaired balance, Decreased mobility, Decreased coordination, Pain, Orthopedic restrictions  Assessment: Pt  is a 59 yo M who presents s/p L TKA  POD 0  WBAT BLEs  Dx: Primary OA of L Knee, order placed for PT eval and tx, w/ activity order of up w/ A  pt presents w/ comorbidities of Candidiasis, Glaucoma, abdominal hernia, HLD, obesity, OA, hyperglycemia, S/P R knee replacement, Morbid obesity and personal factors of mobilizing w/ assistive device, stair(s) to enter home, inability to ambulate household distances, inability to navigate community distances, inability to navigate level surfaces w/o external assistance, unable to perform dynamic tasks in community, inability to perform current job functions, unable to perform physical activity, inability to perform IADLs, inability to perform ADLs and inability to live alone  pt presents w/ pain, weakness, decreased endurance, impaired balance, gait deviations, impaired coordination, decreased safety awareness, orthopedic restrictions, fall risk and LE edema   these impairments are evident in findings from physical examination (weakness, decreased ROM, impaired skin integrity and edema of extremities), mobility assessment (need for Julia assist w/ all phases of mobility when usually mobilizing independently, tolerance to only 50 feet of ambulation and need for cueing for mobility technique), and Barthel Index: 45/100  pt needed input for task focus and mobility technique  pt is at risk for falls due to physical and safety awareness deficits  pt's clinical presentation is unstable/unpredictable (evident in need for assist w/ all phases of mobility when usually mobilizing independently, tolerance to only 50 feet of ambulation, need for input for mobility technique and need for input for task focus and mobility technique)  pt needs inpatient PT tx to improve mobility deficits  discharge recommendation is for outpatient PT and home w/ family support to reduce fall risk and maximize level of functional independence  Recommendation: Short-term skilled PT     PT - OK to Discharge: Yes    See flowsheet documentation for full assessment

## 2019-09-13 NOTE — PHYSICAL THERAPY NOTE
PHYSICAL THERAPY Evaluation NOTE    Patient Name: Cristela Tom  [id number] Date: 9/13/2019     AGE:   60 y o  Mrn:   [id number]  ADMIT DX:  Primary osteoarthritis of left knee [M17 12]    Past Medical History:   Diagnosis Date    Anesthesia complication     difficulty awakening- dyspnea    Anxiety     Arthritis     Cleft hard palate     Glaucoma suspect, both eyes     Hyperlipidemia     Kidney stone     left    Right inguinal hernia     Right ureteral stone     Seasonal allergies     Wears dentures     partial upper    Wears glasses      Length Of Stay: 0  PHYSICAL THERAPY EVALUATION :    09/13/19 8302   Note Type   Note type Eval only   Pain Assessment   Pain Assessment 0-10   Pain Score 5   Pain Location Knee   Pain Orientation Left   Home Living   Type of Home House  (1 SH, ramped enterance)   Home Layout One level;Ramped entrance   Bathroom Shower/Tub Walk-in shower   Bathroom Toilet Standard   Bathroom Equipment Grab bars in shower;Commode;Grab bars around toilet; Shower chair   [address] Walker   Additional Comments Pt  lives with spouse in 1 , ramped enterance  Prior Function   Level of Tri[place] Independent with ADLs and functional mobility   Lives With Spouse   Receives Help From Family   ADL Assistance Independent   IADLs Independent   Falls in the last 6 months 0   Vocational Full time employment   Comments PTA, Pt  reports INDEP with ADLs, IADLs and functional mobility without an AD at baseline   Restrictions/Precautions   Weight Bearing Precautions Per Order Yes   RLE Weight Bearing Per Order WBAT   LLE Weight Bearing Per Order WBAT   Other Precautions WBS; Multiple lines; Fall Risk;Pain   General   Additional Pertinent History Pt  is a 59 yo M who presents s/p L TKA  POD 0  WBAT BLEs   Dx: Primary OA of L Knee, comorbidities include Candidiasis, Glaucoma, abdominal hernia, HLD, obesity, OA, hyperglycemia, S/P R knee replacement, Morbid obesity  Family/Caregiver Present Yes   Cognition   Overall Cognitive Status WFL   Arousal/Participation Persistent stimuli required   Orientation Level Oriented X4   Memory Within functional limits   Following Commands Follows multistep commands with increased time or repetition   Comments Pt  was identified with full name and birthdate   RLE Assessment   RLE Assessment   (functionally > 3+/5)   LLE Assessment   LLE Assessment   (limited MMT POD 0 )   Coordination   Movements are Fluid and Coordinated 0   Coordination and Movement Description antalgic   Sensation WFL   Light Touch   RLE Light Touch Grossly intact   LLE Light Touch Grossly intact   Bed Mobility   Supine to Sit 4  Minimal assistance   Additional items HOB elevated; Bedrails; Increased time required;LE management;Verbal cues   Sit to Supine Unable to assess   Additional Comments Pt  seated OOB in chair following PT session   Transfers   Sit to Stand 4  Minimal assistance   Additional items Assist x 1; Increased time required;Verbal cues  (for hand placement and sequencing)   Stand to Sit 4  Minimal assistance   Additional items Assist x 1; Impulsive;Verbal cues  (to reach back to control descent)   Ambulation/Elevation   Gait pattern Improper Weight shift;Decreased foot clearance;Narrow KORINA; Forward Flexion; Shuffling; Inconsistent erna; Redundant gait at times; Short stride; Step to;Excessively slow   Gait Assistance 4  Minimal assist   Additional items Assist x 1;Verbal cues  (for sequencing and AD managment)   Assistive Device Rolling walker   Distance 50'x1   Balance   Static Sitting Good   Dynamic Sitting Fair +   Static Standing Fair   Dynamic Standing Fair -   Ambulatory Fair -   Endurance Deficit   Endurance Deficit Yes   Endurance Deficit Description Moderate SOB following ambulaiton   Postural and gait degradation noted   Activity Tolerance   Activity Tolerance Patient limited by fatigue   Nurse Made Aware Spoke to RN   Assessment   Prognosis Good   Problem List Decreased strength;Decreased range of motion;Decreased endurance; Impaired balance;Decreased mobility; Decreased coordination;Pain;Orthopedic restrictions   Assessment Pt  is a 61 yo M who presents s/p L TKA  POD 0  WBAT BLEs  Dx: Primary OA of L Knee, order placed for PT eval and tx, w/ activity order of up w/ A  pt presents w/ comorbidities of Candidiasis, Glaucoma, abdominal hernia, HLD, obesity, OA, hyperglycemia, S/P R knee replacement, Morbid obesity and personal factors of mobilizing w/ assistive device, stair(s) to enter home, inability to ambulate household distances, inability to navigate community distances, inability to navigate level surfaces w/o external assistance, unable to perform dynamic tasks in community, inability to perform current job functions, unable to perform physical activity, inability to perform IADLs, inability to perform ADLs and inability to live alone  pt presents w/ pain, weakness, decreased endurance, impaired balance, gait deviations, impaired coordination, decreased safety awareness, orthopedic restrictions, fall risk and LE edema  these impairments are evident in findings from physical examination (weakness, decreased ROM, impaired skin integrity and edema of extremities), mobility assessment (need for Julia assist w/ all phases of mobility when usually mobilizing independently, tolerance to only 50 feet of ambulation and need for cueing for mobility technique), and Barthel Index: 45/100  pt needed input for task focus and mobility technique  pt is at risk for falls due to physical and safety awareness deficits   pt's clinical presentation is unstable/unpredictable (evident in need for assist w/ all phases of mobility when usually mobilizing independently, tolerance to only 50 feet of ambulation, need for input for mobility technique and need for input for task focus and mobility technique)  pt needs inpatient PT tx to improve mobility deficits  discharge recommendation is for outpatient PT and home w/ family support to reduce fall risk and maximize level of functional independence  Goals   Patient Goals to get home   STG Expiration Date 09/23/19   Short Term Goal #1 pt will: Increase bilateral LE strength 1/2 grade to facilitate independent mobility, Perform all bed mobility tasks w/ supervision to decrease fall risk factors, Perform all transfers w/ supervision to improve independence, Ambulate 150 ft  with roller walker w/ supervision w/o LOB, Increase all balance 1/2 grade to decrease risk for falls and Improve Barthel Index score to 70 or greater to facilitate independence   Treatment Day 0   Plan   Treatment/Interventions Functional transfer training;LE strengthening/ROM; Therapeutic exercise; Endurance training;Cognitive reorientation;Patient/family training;Bed mobility;Gait training;Equipment eval/education;Spoke to nursing;Family   PT Frequency   (BID)   Recommendation   Recommendation Short-term skilled PT   Equipment Recommended Walker  (RW)   PT - OK to Discharge Yes   Additional Comments when medically appropriate   Barthel Index   Feeding 10   Bathing 0   Grooming Score 5   Dressing Score 5   Bladder Score 0   Bowels Score 10   Toilet Use Score 5   Transfers (Bed/Chair) Score 10   Mobility (Level Surface) Score 0   Stairs Score 0   Barthel Index Score 45     Skilled PT recommended while in hospital and upon DC to progress pt toward treatment goals       Partha Mccarthy, PT, DPT 9/13/2019

## 2019-09-13 NOTE — DISCHARGE INSTRUCTIONS
Discharge Instructions - Orthopedics  Juliann Bianchi 60 y o  male MRN: [id number]  Unit/Bed#: PACU 04    Weight Bearing Status:                                           Weight Bearing as tolerated to the left lower extremity  DVT prophylaxis:  Complete course of Lovenox as directed    Pain:  Continue analgesics as directed    Showering Instructions:   Do not shower until followup     Dressing Instructions:   Keep dressing clean, dry and intact until follow up appointment  Driving Instructions:  No driving until cleared by Orthopaedic Surgery  PT/OT:  Continue PT/OT on outpatient basis as directed    Appt Instructions: If you do not have your appointment, please call the clinic at [phone]  Otherwise followup as scheduled    Contact the office sooner if you experience any increased numbness/tingling in the extremities        Miscellaneous:  None

## 2019-09-13 NOTE — ANESTHESIA PROCEDURE NOTES
Peripheral Block    Patient location during procedure: holding area  Start time: 9/13/2019 7:10 AM  Reason for block: at surgeon's request and post-op pain management  Staffing  Anesthesiologist: Grzegorz Ye MD  Performed: anesthesiologist   Preanesthetic Checklist  Completed: patient identified, site marked, surgical consent, pre-op evaluation, timeout performed, IV checked, risks and benefits discussed and monitors and equipment checked  Peripheral Block  Patient position: supine  Prep: ChloraPrep  Patient monitoring: continuous pulse ox and frequent blood pressure checks  Block type: adductor canal block and ipack block  Laterality: left  Injection technique: single-shot  Procedures: ultrasound guided, Ultrasound guidance required for the procedure to increase accuracy and safety of medication placement and decrease risk of complications    Ultrasound permanent image saved  Needle  Needle type: short-bevel   Needle gauge: 22 G  Needle length: 10 cm  Needle localization: ultrasound guidance  Test dose: negative  Assessment  Injection assessment: incremental injection and local visualized surrounding nerve on ultrasound  Paresthesia pain: none  Heart rate change: no  Slow fractionated injection: yes  Post-procedure:  site cleaned  patient tolerated the procedure well with no immediate complications

## 2019-09-13 NOTE — ANESTHESIA POSTPROCEDURE EVALUATION
Post-Op Assessment Note    CV Status:  Stable  Pain Score: 0    Pain management: adequate     Mental Status:  Alert and awake   Hydration Status:  Euvolemic   PONV Controlled:  Controlled   Airway Patency:  Patent   Post Op Vitals Reviewed: Yes      Staff: CRNA           BP   100/66   Temp      Pulse  98   Resp   17   SpO2   96%

## 2019-09-13 NOTE — PLAN OF CARE
Problem: Potential for Falls  Goal: Patient will remain free of falls  Description  INTERVENTIONS:  - Assess patient frequently for physical needs  -  Identify cognitive and physical deficits and behaviors that affect risk of falls    -  [place] fall precautions as indicated by assessment   - Educate patient/family on patient safety including physical limitations  - Instruct patient to call for assistance with activity based on assessment  - Modify environment to reduce risk of injury  - Consider OT/PT consult to assist with strengthening/mobility  Outcome: Progressing     Problem: PAIN - ADULT  Goal: Verbalizes/displays adequate comfort level or baseline comfort level  Description  Interventions:  - Encourage patient to monitor pain and request assistance  - Assess pain using appropriate pain scale  - Administer analgesics based on type and severity of pain and evaluate response  - Implement non-pharmacological measures as appropriate and evaluate response  - Consider cultural and social influences on pain and pain management  - Notify physician/advanced practitioner if interventions unsuccessful or patient reports new pain  Outcome: Progressing     Problem: MUSCULOSKELETAL - ADULT  Goal: Maintain or return mobility to safest level of function  Description  INTERVENTIONS:  - Assess patient's ability to carry out ADLs; assess patient's baseline for ADL function and identify physical deficits which impact ability to perform ADLs (bathing, care of mouth/teeth, toileting, grooming, dressing, etc )  - Assess/evaluate cause of self-care deficits   - Assess range of motion  - Assess patient's mobility  - Assess patient's need for assistive devices and provide as appropriate  - Encourage maximum independence but intervene and supervise when necessary  - Involve family in performance of ADLs  - Assess for home care needs following discharge   - Consider OT consult to assist with ADL evaluation and planning for discharge  - Provide patient education as appropriate  Outcome: Progressing  Goal: Maintain proper alignment of affected body part  Description  INTERVENTIONS:  - Support, maintain and protect limb and body alignment  - Provide patient/ family with appropriate education  Outcome: Progressing

## 2019-09-13 NOTE — INTERVAL H&P NOTE
H&P reviewed  After examining the patient I find no changes in the patients condition since the H&P had been written      Vitals:    09/13/19 0556   BP: 150/83   Pulse: 80   Resp: 18   Temp: 98 1 °F (36 7 °C)   SpO2: 98%    Patient seen and examined  To the operating room for left total knee arthroplasty  Discussed plan with patient  We will follow up postoperatively

## 2019-09-14 LAB
ANION GAP SERPL CALCULATED.3IONS-SCNC: 9 MMOL/L (ref 4–13)
BUN SERPL-MCNC: 8 MG/DL (ref 5–25)
CALCIUM SERPL-MCNC: 7.9 MG/DL (ref 8.3–10.1)
CHLORIDE SERPL-SCNC: 109 MMOL/L (ref 100–108)
CO2 SERPL-SCNC: 23 MMOL/L (ref 21–32)
CREAT SERPL-MCNC: 0.89 MG/DL (ref 0.6–1.3)
ERYTHROCYTE [DISTWIDTH] IN BLOOD BY AUTOMATED COUNT: 13.2 % (ref 11.6–15.1)
GFR SERPL CREATININE-BSD FRML MDRD: 93 ML/MIN/1.73SQ M
GLUCOSE SERPL-MCNC: 144 MG/DL (ref 65–140)
HCT VFR BLD AUTO: 38.5 % (ref 36.5–49.3)
HGB BLD-MCNC: 12.4 G/DL (ref 12–17)
MCH RBC QN AUTO: 28.8 PG (ref 26.8–34.3)
MCHC RBC AUTO-ENTMCNC: 32.2 G/DL (ref 31.4–37.4)
MCV RBC AUTO: 90 FL (ref 82–98)
PLATELET # BLD AUTO: 186 THOUSANDS/UL (ref 149–390)
PMV BLD AUTO: 11.1 FL (ref 8.9–12.7)
POTASSIUM SERPL-SCNC: 3.8 MMOL/L (ref 3.5–5.3)
RBC # BLD AUTO: 4.3 MILLION/UL (ref 3.88–5.62)
SODIUM SERPL-SCNC: 141 MMOL/L (ref 136–145)
WBC # BLD AUTO: 10.67 THOUSAND/UL (ref 4.31–10.16)

## 2019-09-14 PROCEDURE — 0SRD069 REPLACEMENT OF LEFT KNEE JOINT WITH OXIDIZED ZIRCONIUM ON POLYETHYLENE SYNTHETIC SUBSTITUTE, CEMENTED, OPEN APPROACH: ICD-10-PCS | Performed by: ORTHOPAEDIC SURGERY

## 2019-09-14 PROCEDURE — 99024 POSTOP FOLLOW-UP VISIT: CPT | Performed by: PHYSICIAN ASSISTANT

## 2019-09-14 PROCEDURE — 97110 THERAPEUTIC EXERCISES: CPT

## 2019-09-14 PROCEDURE — 97166 OT EVAL MOD COMPLEX 45 MIN: CPT

## 2019-09-14 PROCEDURE — G8989 SELF CARE D/C STATUS: HCPCS

## 2019-09-14 PROCEDURE — 97530 THERAPEUTIC ACTIVITIES: CPT

## 2019-09-14 PROCEDURE — G8987 SELF CARE CURRENT STATUS: HCPCS

## 2019-09-14 PROCEDURE — 85027 COMPLETE CBC AUTOMATED: CPT | Performed by: PHYSICIAN ASSISTANT

## 2019-09-14 PROCEDURE — 80048 BASIC METABOLIC PNL TOTAL CA: CPT | Performed by: PHYSICIAN ASSISTANT

## 2019-09-14 PROCEDURE — G8988 SELF CARE GOAL STATUS: HCPCS

## 2019-09-14 PROCEDURE — 97116 GAIT TRAINING THERAPY: CPT

## 2019-09-14 RX ORDER — PRAVASTATIN SODIUM 80 MG/1
80 TABLET ORAL
Status: DISCONTINUED | OUTPATIENT
Start: 2019-09-14 | End: 2019-09-15 | Stop reason: HOSPADM

## 2019-09-14 RX ORDER — SIMVASTATIN 40 MG
40 TABLET ORAL
Status: DISCONTINUED | OUTPATIENT
Start: 2019-09-14 | End: 2019-09-14

## 2019-09-14 RX ADMIN — FOLIC ACID 1 MG: 1 TABLET ORAL at 09:33

## 2019-09-14 RX ADMIN — METHOCARBAMOL TABLETS 500 MG: 500 TABLET, COATED ORAL at 11:57

## 2019-09-14 RX ADMIN — METHOCARBAMOL TABLETS 500 MG: 500 TABLET, COATED ORAL at 01:25

## 2019-09-14 RX ADMIN — ENOXAPARIN SODIUM 40 MG: 40 INJECTION SUBCUTANEOUS at 23:41

## 2019-09-14 RX ADMIN — DOCUSATE SODIUM 100 MG: 100 CAPSULE, LIQUID FILLED ORAL at 17:27

## 2019-09-14 RX ADMIN — DOCUSATE SODIUM 100 MG: 100 CAPSULE, LIQUID FILLED ORAL at 09:33

## 2019-09-14 RX ADMIN — CEFAZOLIN SODIUM 1000 MG: 1 SOLUTION INTRAVENOUS at 01:25

## 2019-09-14 RX ADMIN — OXYCODONE HYDROCHLORIDE 10 MG: 10 TABLET ORAL at 05:43

## 2019-09-14 RX ADMIN — METHOCARBAMOL TABLETS 500 MG: 500 TABLET, COATED ORAL at 05:43

## 2019-09-14 RX ADMIN — PRAVASTATIN SODIUM 80 MG: 80 TABLET ORAL at 17:27

## 2019-09-14 RX ADMIN — OXYCODONE HYDROCHLORIDE 10 MG: 10 TABLET ORAL at 23:41

## 2019-09-14 RX ADMIN — OXYCODONE HYDROCHLORIDE 10 MG: 10 TABLET ORAL at 14:38

## 2019-09-14 RX ADMIN — OXYCODONE HYDROCHLORIDE 10 MG: 10 TABLET ORAL at 19:58

## 2019-09-14 RX ADMIN — FERROUS SULFATE TAB 325 MG (65 MG ELEMENTAL FE) 325 MG: 325 (65 FE) TAB at 09:33

## 2019-09-14 RX ADMIN — HYDROMORPHONE HYDROCHLORIDE 0.5 MG: 1 INJECTION, SOLUTION INTRAMUSCULAR; INTRAVENOUS; SUBCUTANEOUS at 04:01

## 2019-09-14 RX ADMIN — OXYCODONE HYDROCHLORIDE 10 MG: 10 TABLET ORAL at 09:33

## 2019-09-14 RX ADMIN — FERROUS SULFATE TAB 325 MG (65 MG ELEMENTAL FE) 325 MG: 325 (65 FE) TAB at 17:27

## 2019-09-14 RX ADMIN — METHOCARBAMOL TABLETS 500 MG: 500 TABLET, COATED ORAL at 23:41

## 2019-09-14 RX ADMIN — OXYCODONE HYDROCHLORIDE AND ACETAMINOPHEN 500 MG: 500 TABLET ORAL at 09:33

## 2019-09-14 RX ADMIN — LORATADINE 10 MG: 10 TABLET ORAL at 09:33

## 2019-09-14 RX ADMIN — SODIUM CHLORIDE 100 ML/HR: 0.9 INJECTION, SOLUTION INTRAVENOUS at 01:28

## 2019-09-14 RX ADMIN — OXYCODONE HYDROCHLORIDE 10 MG: 10 TABLET ORAL at 01:31

## 2019-09-14 RX ADMIN — METHOCARBAMOL TABLETS 500 MG: 500 TABLET, COATED ORAL at 17:27

## 2019-09-14 RX ADMIN — ACETAMINOPHEN 650 MG: 325 TABLET ORAL at 19:58

## 2019-09-14 RX ADMIN — SENNOSIDES 8.6 MG: 8.6 TABLET, FILM COATED ORAL at 09:33

## 2019-09-14 RX ADMIN — VITAMIN D, TAB 1000IU (100/BT) 1000 UNITS: 25 TAB at 09:33

## 2019-09-14 NOTE — PLAN OF CARE
Problem: PHYSICAL THERAPY ADULT  Goal: Performs mobility at highest level of function for planned discharge setting  See evaluation for individualized goals  Description  Treatment/Interventions: Functional transfer training, LE strengthening/ROM, Therapeutic exercise, Endurance training, Cognitive reorientation, Patient/family training, Bed mobility, Gait training, Equipment eval/education, Spoke to nursing, Family  Equipment Recommended: Walker(RW)       See flowsheet documentation for full assessment, interventions and recommendations  Outcome: Progressing  Note:   Prognosis: Good  Problem List: Decreased strength, Decreased range of motion, Decreased endurance, Impaired balance, Decreased mobility, Decreased coordination, Pain, Orthopedic restrictions  Assessment: The pt  had notably increased pain today, but he was able to complete therapeutic exercise with instruction and assistance  His lunch then arrived, and he requested therapy to return later as his lunch was delayed by 90 minutes  Will return later for ambulation  Recommendation: Outpatient PT, Home with family support     PT - OK to Discharge: No    See flowsheet documentation for full assessment

## 2019-09-14 NOTE — PLAN OF CARE
Problem: PHYSICAL THERAPY ADULT  Goal: Performs mobility at highest level of function for planned discharge setting  See evaluation for individualized goals  Description  Treatment/Interventions: Functional transfer training, LE strengthening/ROM, Therapeutic exercise, Endurance training, Cognitive reorientation, Patient/family training, Bed mobility, Gait training, Equipment eval/education, Spoke to nursing, Family  Equipment Recommended: Walker(PARVEEN)       See flowsheet documentation for full assessment, interventions and recommendations  9/14/2019 1544 by DAVINA Murillo  Outcome: Progressing  Note:   Prognosis: Good  Problem List: Decreased strength, Decreased range of motion, Decreased endurance, Impaired balance, Decreased mobility, Decreased coordination, Pain, Orthopedic restrictions  Assessment: The pt  was able to ambulate beyond household distance today, but he continues to remain limited due to pain  He does have improved balance and activity tolerance  He has a very slow erna coupled with a short stride which increases his ambulatory time  Anticipate that as his pain improves that he will rapidly progress  Recommendation: Home with family support, Outpatient PT     PT - OK to Discharge: No    See flowsheet documentation for full assessment  9/14/2019 1539 by DAVINA Murillo  Outcome: Progressing  Note:   Prognosis: Good  Problem List: Decreased strength, Decreased range of motion, Decreased endurance, Impaired balance, Decreased mobility, Decreased coordination, Pain, Orthopedic restrictions  Assessment: The pt  had notably increased pain today, but he was able to complete therapeutic exercise with instruction and assistance  His lunch then arrived, and he requested therapy to return later as his lunch was delayed by 90 minutes  Will return later for ambulation          Recommendation: Outpatient PT, Home with family support     PT - OK to Discharge: No    See flowsheet documentation for full assessment

## 2019-09-14 NOTE — SOCIAL WORK
Initial interview:     CM met with the patient to review the CM role and discuss possible dc needs  Pt lives with his macy Duarte in a single story home in [place], [place]  Ramp entry  ASYA  Pt is independent, not working d/t disability and drives  Pt has a RW, shower chair and a bedside commode  No hx of VNA or IP rehab  Pt denied drug, etoh or mental illness history  No POA or LW  Prescriptions are filled at [address]  Main contact:   Nyla Schaumann [phone]  3 Daughters  Step-Grandson    DC Plan - pt denied any dc needs  Home via [facility]  CM reviewed d/c planning process including the following: identifying help at home, patient preference for d/c planning needs, Discharge Lounge, [facility] to Bed program, availability of treatment team to discuss questions or concerns patient and/or family may have regarding understanding medications and recognizing signs and symptoms once discharged  CM also encouraged patient to follow up with all recommended appointments after discharge  Patient advised of importance for patient and family to participate in managing patients medical well being

## 2019-09-14 NOTE — UTILIZATION REVIEW
Initial Clinical Review    Admission: Date/Time/Statement: Inpatient Admission Orders (From admission, onward)     Ordered        09/14/19 1558  Inpatient Admission  Once                   Orders Placed This Encounter   Procedures    Inpatient Admission     Standing Status:   Standing     Number of Occurrences:   1     Order Specific Question:   Admitting Physician     Answer:   Abdias Little [[id number]]     Order Specific Question:   Level of Care     Answer:   Med Surg [16]     Order Specific Question:   Estimated length of stay     Answer:   More than 2 Midnights     Order Specific Question:   Certification     Answer:   I certify that inpatient services are medically necessary for this patient for a duration of greater than two midnights  See H&P and MD Progress Notes for additional information about the patient's course of treatment  OR 9/13 --    Postop Dx: left knee DJD   Procedure: left total knee arthroplasty  Drains: Hemovac x 1  Implants: Depuy Attune RP  Femoral, size: 8  Tibial tray: 8  Polyethylene: 5  Patellar button: 41    Assessment/Plan: 59 y o male with left knee DJD with varus deformity which has failed conservative treatment  Presented to hospital 9/13 for elective left knee arthroplasty  9/14 -- POD#1 dressings C/D/I, pain controlled on current pain regimen   O2 2 lpm nc, WBAT, up and oob, b/l foot pumps, PT/OT evals, regular diet, maintain hemovac empty & drain q shift      Vital Signs:   Date/Time  Temp  Pulse  Resp  BP  MAP (mmHg)  SpO2  O2 Device   09/14/19 15:12:19  98 6 °F (37 °C)  106Abnormal   18  143/83  103  94 %     09/14/19 10:57:38  99 °F (37 2 °C)  105  16  141/82  102  90 %     09/14/19 07:14:41  98 4 °F (36 9 °C)  104  18  125/71  89  91 %     09/14/19 03:32:16  98 8 °F (37 1 °C)  101  19  125/72  90  90 %     09/13/19 23:06:36  98 6 °F (37 °C)  96  18  128/71  90  89 %Abnormal      09/13/19 12:39:56  97 7 °F (36 5 °C)  71  18  130/79  96  99 %     09/13/19 11:36:55 97 5 °F (36 4 °C)  76  18  118/75  89  98 %     09/13/19 1115  97 °F (36 1 °C)Abnormal   72  19  118/77    97 %  Nasal cannula   09/13/19 1100  97 °F (36 1 °C)Abnormal   76  20  116/73    98 %  Nasal cannula   09/13/19 1030  97 °F (36 1 °C)Abnormal   78  20  111/73    96 %  Nasal cannula   09/13/19 1015  97 °F (36 1 °C)Abnormal   84  18  104/69    97 %  Nasal cannula   09/13/19 1010  97 °F (36 1 °C)Abnormal                09/13/19 0556  98 1 °F (36 7 °C)  80  18  150/83    98 %  None (Room air)       Pertinent Labs/Diagnostic Test Results:   Results from last 7 days   Lab Units 09/14/19  0546   WBC Thousand/uL 10 67*   HEMOGLOBIN g/dL 12 4   HEMATOCRIT % 38 5   PLATELETS Thousands/uL 186     Results from last 7 days   Lab Units 09/14/19  0546   SODIUM mmol/L 141   POTASSIUM mmol/L 3 8   CHLORIDE mmol/L 109*   CO2 mmol/L 23   ANION GAP mmol/L 9   BUN mg/dL 8   CREATININE mg/dL 0 89   EGFR ml/min/1 73sq m 93   CALCIUM mg/dL 7 9*     Results from last 7 days   Lab Units 09/13/19  1042   POC GLUCOSE mg/dl 120     Results from last 7 days   Lab Units 09/14/19  0546   GLUCOSE RANDOM mg/dL 144*       Past Medical History:   Diagnosis Date    Anesthesia complication     difficulty awakening- dyspnea    Anxiety     Arthritis     Cleft hard palate     Glaucoma suspect, both eyes     Hyperlipidemia     Kidney stone     left    Right inguinal hernia     Right ureteral stone     Seasonal allergies     Wears dentures     partial upper    Wears glasses      Admitting Diagnosis: Primary osteoarthritis of left knee [M17 12]  Age/Sex: 60 y o  male  Admission Orders:  Current Facility-Administered Medications:  acetaminophen 650 mg Oral Q6H PRN   ascorbic acid 500 mg Oral Daily   calcium carbonate 1,000 mg Oral Daily PRN   cholecalciferol 1,000 Units Oral Daily   diphenhydrAMINE 25 mg Oral HS PRN   docusate sodium 100 mg Oral BID   enoxaparin 40 mg Subcutaneous Daily   ferrous sulfate 325 mg Oral BID With Meals   fluticasone 1 spray Each Nare Daily   folic acid 1 mg Oral Daily   HYDROmorphone 0 5 mg Intravenous Q2H PRN   loratadine 10 mg Oral Daily   methocarbamol 500 mg Oral Q6H TIMMY   oxyCODONE 10 mg Oral Q4H PRN   oxyCODONE 5 mg Oral Q4H PRN   pravastatin 80 mg Oral Daily With Dinner   senna 1 tablet Oral Daily   sodium chloride 1,000 mL Intravenous Once     IS 1 qh while awake      IP CONSULT TO CASE MANAGEMENT    Network Utilization Review Department  Phone: [phone]; Fax [phone]  [email or web address]  ATTENTION: Please call with any questions or concerns to [phone]  and carefully listen to the prompts so that you are directed to the right person  Send all requests for admission clinical reviews, approved or denied determinations and any other requests to fax [phone]   All voicemails are confidential

## 2019-09-14 NOTE — OCCUPATIONAL THERAPY NOTE
[address]  Evaluation     Patient Name: Socorro Ford  Today's Date: 9/14/2019  Problem List  Principal Problem:    Primary osteoarthritis of left knee    Past Medical History  Past Medical History:   Diagnosis Date    Anesthesia complication     difficulty awakening- dyspnea    Anxiety     Arthritis     Cleft hard palate     Glaucoma suspect, both eyes     Hyperlipidemia     Kidney stone     left    Right inguinal hernia     Right ureteral stone     Seasonal allergies     Wears dentures     partial upper    Wears glasses      Past Surgical History  Past Surgical History:   Procedure Laterality Date    CLEFT PALATE REPAIR      INGUINAL HERNIA REPAIR Right     KNEE CARTILAGE SURGERY Left     DE CYSTO/URETERO W/LITHOTRIPSY &INDWELL STENT INSRT Right 8/4/2017    Procedure: CYSTOSCOPY URETEROSCOPY WITH LITHOTRIPSY HOLMIUM LASER, RETROGRADE PYELOGRAM AND INSERTION STENT URETERAL;  Surgeon: Maico Travis MD;  Location: [facility];  Service: Urology    DE TOTAL KNEE ARTHROPLASTY Right 3/4/2019    Procedure: TOTAL KNEE ARTHROPLASTY;  Surgeon: Roni Mina MD;  Location: [facility];  Service: Orthopedics         09/14/19 0921   Note Type   Note type Eval only   Restrictions/Precautions   Weight Bearing Precautions Per Order Yes   LLE Weight Bearing Per Order WBAT   Other Precautions WBS; Multiple lines; Fall Risk;Pain   Pain Assessment   Pain Assessment 0-10   Pain Score 6   Pain Type Surgical pain   Pain Location Knee   Pain Orientation Left   Hospital Pain Intervention(s) Repositioned; Ambulation/increased activity; Emotional support   Response to Interventions worse w/ movement   Home Living   Type of [address] One level;Ramped entrance   Bathroom Shower/Tub Walk-in shower   Bathroom Toilet Standard   Bathroom Equipment Grab bars in shower;Grab bars around toilet; Shower chair;Commode   Bathroom Accessibility Accessible   Home Equipment Walker   Prior Function   Level of [place] Independent with ADLs and functional mobility   Lives With Significant other   Receives Help From Family   ADL Assistance Independent   IADLs Independent   Falls in the last 6 months 0   Vocational On disability   Lifestyle   Autonomy pta pt reports I in ADLs/IADLs/functional mobility   Reciprocal Relationships supportive SO   Service to Others currently on disability, normally drives heavy machinery   Intrinsic Gratification enjoys working on cars   Psychosocial   Psychosocial (WDL) WDL   Subjective   Subjective "It was not painful yesterday, today    much more"   ADL   Where Assessed Chair   Eating Assistance 5  Supervision/Setup   Grooming Assistance 5  Supervision/Setup   UB Bathing Assistance 4  Minimal Assistance   LB Bathing Assistance 4  Minimal Assistance   [address] 4  Minimal Assistance   Transfers   Sit to Stand 4  Minimal assistance   Additional items Assist x 1   Stand to Sit 4  Minimal assistance   Additional items Assist x 1   Functional Mobility   Functional Mobility 4  Minimal assistance   Additional items Rolling walker   Balance   Static Sitting Good   Dynamic Sitting Fair -   Static Standing Fair -   Dynamic Standing Poor +   Ambulatory Poor +   Activity Tolerance   Activity Tolerance Patient limited by pain   Nurse Made Aware okay to see per RN   RUE Assessment   RUE Assessment WFL   LUE Assessment   LUE Assessment WFL   Hand Function   Gross Motor Coordination Functional   Fine Motor Coordination Functional   Vision-Basic Assessment   Current Vision Wears glasses all the time   Cognition   Overall Cognitive Status WFL   Arousal/Participation Cooperative   Attention Within functional limits   Orientation Level Oriented X4   Memory Within functional limits   Following Commands Follows all commands and directions without difficulty   Comments pt pleasant and cooperative, eager to participate   Assessment   Limitation Decreased ADL status; Decreased endurance;Decreased high-level ADLs   Prognosis Good   Assessment Pt is a 60 y o  YO  male admitted to [facility] on 9/13/2019 for elective L TKA  Comorbidities include a h/o "anxiety, arthritis, R TKA, cleft hard palate, glaucoma suspect, R inguinal hernia, R ureteral stone, and kidney stone"   Pt with active OT orders and up with assistance  orders  Pt resides in a ranch style home w/ ramped entrance with SO  Pt was I w/  ADLS and IADLS, (+) drove, & required no use of DME PTA  Currently pt is Min A for ADLs and functional mobility  Pt is limited at this time 2*: pain, endurance, activity tolerance, functional mobility and functional standing tolerance  The following Occupational Performance Areas to address include: bathing/shower, toilet hygiene, dressing, functional mobility, household maintenance and job performance/volunteering  Pt scored overall  55/100 on the Barthel Index  Based on the aforementioned OT evaluation, functional performance deficits, and assessments, pt has been identified as a moderate complexity evaluation  From OT standpoint, anticipate d/c home with family support  Recommend continued participation in ADLs and functional mobility w/ staff  No further acute OT needs, d/c OT      Goals   Patient Goals have less pain   Recommendation   OT Discharge Recommendation Home with family support   OT - OK to Discharge Yes   Barthel Index   Feeding 10   Bathing 0   Grooming Score 5   Dressing Score 5   Bladder Score 10   Bowels Score 10   Toilet Use Score 5   Transfers (Bed/Chair) Score 10   Mobility (Level Surface) Score 0   Stairs Score 0   Barthel Index Score 55   Modified Kern Scale   Modified Kern Scale 3     Dinah Kendrick MS, OTR/L

## 2019-09-14 NOTE — PHYSICAL THERAPY NOTE
Physical Therapy Progress Note     09/14/19 1320   Pain Assessment   Pain Assessment 0-10   Pain Score 7   Pain Type Surgical pain   Pain Location Knee   Pain Orientation Left   Hospital Pain Intervention(s) Repositioned; Emotional support   Response to Interventions Tolerated  Restrictions/Precautions   LLE Weight Bearing Per Order WBAT   Other Precautions Pain   Subjective   Subjective The pt  states that he was only able to take two steps with occupational therapy this morning due to his pain  He notes that his pain is much worse than it was yesterday  Activity Tolerance   Activity Tolerance Patient tolerated treatment well;Patient limited by pain   Nurse Ines Noguera, RN  Exercises   TKR Sitting;Left;AAROM;15 reps  (x2 sets )   Assessment   Prognosis Good   Problem List Decreased strength;Decreased range of motion;Decreased endurance; Impaired balance;Decreased mobility; Decreased coordination;Pain;Orthopedic restrictions   Assessment The pt  had notably increased pain today, but he was able to complete therapeutic exercise with instruction and assistance  His lunch then arrived, and he requested therapy to return later as his lunch was delayed by 90 minutes  Will return later for ambulation  Goals   Patient Goals To have less pain  STG Expiration Date 09/23/19   Treatment Day 1   Plan   Treatment/Interventions Functional transfer training;LE strengthening/ROM; Therapeutic exercise; Endurance training;Patient/family training;Bed mobility;Gait training   Progress Progressing toward goals   PT Frequency 7x/wk; Twice a day   Recommendation   Recommendation Outpatient PT; Home with family support   Equipment Recommended Shila Cody   PT - OK to Discharge No     Desean Osorio PTA

## 2019-09-14 NOTE — PROGRESS NOTES
Orthopedics   Hillary Res 60 y o  male MRN: [id number]  Unit/Bed#: -01      Subjective:  60 y o male post operative day 1 left total knee arthroplasty  Pt doing well  Pain controlled      Labs:  0   Lab Value Date/Time    HCT 38 5 09/14/2019 0546    HCT 44 3 08/12/2019 1609    HCT 43 8 03/11/2019 1146    HGB 12 4 09/14/2019 0546    HGB 14 5 08/12/2019 1609    HGB 14 2 03/11/2019 1146    INR 1 06 08/12/2019 1146    WBC 10 67 (H) 09/14/2019 0546    WBC 6 30 08/12/2019 1609    WBC 10 22 (H) 03/11/2019 1146    CRP 3 2 (H) 08/12/2019 1517       Meds:    Current Facility-Administered Medications:     acetaminophen (TYLENOL) tablet 650 mg, 650 mg, Oral, Q6H PRN, Tabitha Key PA-C    ascorbic acid (VITAMIN C) tablet 500 mg, 500 mg, Oral, Daily, TWILA Castellon-C, 500 mg at 09/13/19 1156    calcium carbonate (TUMS) chewable tablet 1,000 mg, 1,000 mg, Oral, Daily PRN, Tabitha Key PA-C    cholecalciferol (VITAMIN D3) tablet 1,000 Units, 1,000 Units, Oral, Daily, TWILA Castellon-C, 1,000 Units at 09/13/19 1156    diphenhydrAMINE (BENADRYL) tablet 25 mg, 25 mg, Oral, HS PRN, QUETA CastellonC    docusate sodium (COLACE) capsule 100 mg, 100 mg, Oral, BID, TWILA Castellon-C, 100 mg at 09/13/19 1747    enoxaparin (LOVENOX) subcutaneous injection 40 mg, 40 mg, Subcutaneous, Daily, TWILA Castellon-C, 40 mg at 09/13/19 2213    ferrous sulfate tablet 325 mg, 325 mg, Oral, BID With Meals, Tabitha Key PA-C, 325 mg at 09/13/19 1610    fluticasone (FLONASE) 50 mcg/act nasal spray 1 spray, 1 spray, Each Nare, Daily, Tabitha Key PA-C    folic acid (FOLVITE) tablet 1 mg, 1 mg, Oral, Daily, Tabitha Ip, PA-C, 1 mg at 09/13/19 1157    HYDROmorphone (DILAUDID) injection 0 5 mg, 0 5 mg, Intravenous, Q2H PRN, Tabitha Ip, PA-C, 0 5 mg at 09/14/19 0401    lactated ringers bolus 1,000 mL, 1,000 mL, Intravenous, Once PRN **AND** lactated ringers bolus 1,000 mL, 1,000 mL, Intravenous, Once PRN, Alem Bain PA-C    loratadine (CLARITIN) tablet 10 mg, 10 mg, Oral, Daily, Alem Bain PA-C, 10 mg at 09/13/19 1157    methocarbamol (ROBAXIN) tablet 500 mg, 500 mg, Oral, Q6H [address], Alem Bain PA-C, 500 mg at 09/14/19 0543    oxyCODONE (ROXICODONE) immediate release tablet 10 mg, 10 mg, Oral, Q4H PRN, Alem Bain PA-C, 10 mg at 09/14/19 0543    oxyCODONE (ROXICODONE) IR tablet 5 mg, 5 mg, Oral, Q4H PRN, Alem Bain PA-C    pravastatin (PRAVACHOL) tablet 80 mg, 80 mg, Oral, Daily With Dinner, Rise Tammy, PA-C    senna (SENOKOT) tablet 8 6 mg, 1 tablet, Oral, Daily, TWILA Gandara-C, 8 6 mg at 09/13/19 1157    sodium chloride 0 9 % bolus 1,000 mL, 1,000 mL, Intravenous, Once PRN **AND** sodium chloride 0 9 % bolus 1,000 mL, 1,000 mL, Intravenous, Once PRN, Alem Bain PA-C    sodium chloride 0 9 % bolus 1,000 mL, 1,000 mL, Intravenous, Once, Alem Bain PA-C, Stopped at 09/13/19 1145    sodium chloride 0 9 % infusion, 100 mL/hr, Intravenous, Continuous, Alem Bain PA-C, Last Rate: 100 mL/hr at 09/14/19 0128, 100 mL/hr at 09/14/19 0128    Blood Culture:   No results found for: BLOODCX    Wound Culture:   No results found for: WOUNDCULT    Ins and Outs:  I/O last 24 hours: In: 3301 7 [P O :360; I V :2691 7; IV Piggyback:250]  Out: 1841 [Urine:2600; Drains:375; Blood:150]          Physical:  Vitals:    09/14/19 0332   BP: 125/72   Pulse: 101   Resp: 19   Temp: 98 8 °F (37 1 °C)   SpO2: 90%     left lower extremity:  · Dressings C/D/I  · Toes warm and well perfused  · HVx1, 75cc overnight for 375 total since surgery  · Calves nontender  · Motor and sensation grossly intact distally    _*_*_*_*_*_*_*_*_*_*_*_*_*_*_*_*_*_*_*_*_*_*_*_*_*_*_*_*_*_*_*_*_*_*_*_*_*_*_*_*_*    Assessment: 59 y o male post operative day 1 left total knee arthroplasty   Doing well    Plan:  · Weight Bearing as tolerated  · Up and out of bed  · DVT prophylaxis  · Drain: possible dc today  · Analgesics  · PT/OT  · Will continue to assess for acute blood loss anemia   · Wishes to go home when cleared for discharge    Patsy Velez PA-C

## 2019-09-14 NOTE — PHYSICAL THERAPY NOTE
Physical Therapy Progress Note     09/14/19 1890   Pain Assessment   Pain Assessment 0-10   Pain Score 7   Pain Type Surgical pain   Pain Location Knee   Pain Orientation Left   Hospital Pain Intervention(s) Cold applied;Repositioned; Ambulation/increased activity; Emotional support   Response to Interventions Tolerated  Restrictions/Precautions   LLE Weight Bearing Per Order WBAT   Other Precautions Pain   Subjective   Subjective The pt  notes that he is feeling better than this morning, but that he continues to remain limited due to pain  Transfers   Sit to Stand 5  Supervision   Additional items Increased time required   Stand to Sit 5  Supervision   Ambulation/Elevation   Gait pattern Excessively slow; Short stride; Step to; Inconsistent erna;Decreased foot clearance;Decreased L stance; Antalgic   Gait Assistance 5  Supervision   Additional items Verbal cues   Assistive Device Bariatric Rolling walker   Distance 80 feet  Balance   Static Sitting Good   Dynamic Sitting Good   Static Standing Fair +   Ambulatory Fair   Activity Tolerance   Activity Tolerance Patient tolerated treatment well;Patient limited by pain   Nurse Ines Noguera RN  Exercises   Hip Flexion Sitting;Left;AAROM;10 reps   Knee AROM Long Arc Quad Sitting;Left;AAROM;25 reps   Ankle Pumps Sitting;Bilateral;AROM;20 reps   Assessment   Prognosis Good   Problem List Decreased strength;Decreased range of motion;Decreased endurance; Impaired balance;Decreased mobility; Decreased coordination;Pain;Orthopedic restrictions   Assessment The pt  was able to ambulate beyond household distance today, but he continues to remain limited due to pain  He does have improved balance and activity tolerance  He has a very slow erna coupled with a short stride which increases his ambulatory time  Anticipate that as his pain improves that he will rapidly progress  Goals   Patient Goals To feel better     STG Expiration Date 09/23/19   Treatment Day 2   Plan Treatment/Interventions Functional transfer training;LE strengthening/ROM; Therapeutic exercise; Endurance training;Patient/family training;Bed mobility;Gait training   Progress Progressing toward goals   PT Frequency 7x/wk; Twice a day   Recommendation   Recommendation Home with family support; Outpatient PT   Equipment Recommended Samantha Estrada   PT - OK to Discharge Elsa Kent PTA

## 2019-09-15 VITALS
HEIGHT: 67 IN | DIASTOLIC BLOOD PRESSURE: 76 MMHG | WEIGHT: 272 LBS | OXYGEN SATURATION: 94 % | TEMPERATURE: 99.1 F | BODY MASS INDEX: 42.69 KG/M2 | RESPIRATION RATE: 18 BRPM | SYSTOLIC BLOOD PRESSURE: 138 MMHG | HEART RATE: 116 BPM

## 2019-09-15 PROBLEM — M17.12 PRIMARY OSTEOARTHRITIS OF LEFT KNEE: Status: RESOLVED | Noted: 2019-07-23 | Resolved: 2019-09-15

## 2019-09-15 LAB
ANION GAP SERPL CALCULATED.3IONS-SCNC: 3 MMOL/L (ref 4–13)
BUN SERPL-MCNC: 10 MG/DL (ref 5–25)
CALCIUM SERPL-MCNC: 8.6 MG/DL (ref 8.3–10.1)
CHLORIDE SERPL-SCNC: 101 MMOL/L (ref 100–108)
CO2 SERPL-SCNC: 28 MMOL/L (ref 21–32)
CREAT SERPL-MCNC: 1.08 MG/DL (ref 0.6–1.3)
ERYTHROCYTE [DISTWIDTH] IN BLOOD BY AUTOMATED COUNT: 13.3 % (ref 11.6–15.1)
GFR SERPL CREATININE-BSD FRML MDRD: 74 ML/MIN/1.73SQ M
GLUCOSE SERPL-MCNC: 152 MG/DL (ref 65–140)
HCT VFR BLD AUTO: 40.9 % (ref 36.5–49.3)
HGB BLD-MCNC: 13.6 G/DL (ref 12–17)
MCH RBC QN AUTO: 29.5 PG (ref 26.8–34.3)
MCHC RBC AUTO-ENTMCNC: 33.3 G/DL (ref 31.4–37.4)
MCV RBC AUTO: 89 FL (ref 82–98)
PLATELET # BLD AUTO: 191 THOUSANDS/UL (ref 149–390)
PMV BLD AUTO: 11 FL (ref 8.9–12.7)
POTASSIUM SERPL-SCNC: 4.4 MMOL/L (ref 3.5–5.3)
RBC # BLD AUTO: 4.61 MILLION/UL (ref 3.88–5.62)
SODIUM SERPL-SCNC: 132 MMOL/L (ref 136–145)
WBC # BLD AUTO: 12.39 THOUSAND/UL (ref 4.31–10.16)

## 2019-09-15 PROCEDURE — 85027 COMPLETE CBC AUTOMATED: CPT | Performed by: PHYSICIAN ASSISTANT

## 2019-09-15 PROCEDURE — 97116 GAIT TRAINING THERAPY: CPT

## 2019-09-15 PROCEDURE — 80048 BASIC METABOLIC PNL TOTAL CA: CPT | Performed by: PHYSICIAN ASSISTANT

## 2019-09-15 PROCEDURE — 97110 THERAPEUTIC EXERCISES: CPT

## 2019-09-15 PROCEDURE — 99024 POSTOP FOLLOW-UP VISIT: CPT | Performed by: PHYSICIAN ASSISTANT

## 2019-09-15 RX ADMIN — FOLIC ACID 1 MG: 1 TABLET ORAL at 08:13

## 2019-09-15 RX ADMIN — SENNOSIDES 8.6 MG: 8.6 TABLET, FILM COATED ORAL at 08:13

## 2019-09-15 RX ADMIN — OXYCODONE HYDROCHLORIDE 10 MG: 10 TABLET ORAL at 05:21

## 2019-09-15 RX ADMIN — METHOCARBAMOL TABLETS 500 MG: 500 TABLET, COATED ORAL at 05:20

## 2019-09-15 RX ADMIN — LORATADINE 10 MG: 10 TABLET ORAL at 08:13

## 2019-09-15 RX ADMIN — VITAMIN D, TAB 1000IU (100/BT) 1000 UNITS: 25 TAB at 08:13

## 2019-09-15 RX ADMIN — FERROUS SULFATE TAB 325 MG (65 MG ELEMENTAL FE) 325 MG: 325 (65 FE) TAB at 08:13

## 2019-09-15 RX ADMIN — DOCUSATE SODIUM 100 MG: 100 CAPSULE, LIQUID FILLED ORAL at 08:13

## 2019-09-15 RX ADMIN — OXYCODONE HYDROCHLORIDE 10 MG: 10 TABLET ORAL at 16:10

## 2019-09-15 RX ADMIN — METHOCARBAMOL TABLETS 500 MG: 500 TABLET, COATED ORAL at 11:07

## 2019-09-15 RX ADMIN — OXYCODONE HYDROCHLORIDE AND ACETAMINOPHEN 500 MG: 500 TABLET ORAL at 08:13

## 2019-09-15 RX ADMIN — OXYCODONE HYDROCHLORIDE 10 MG: 10 TABLET ORAL at 09:54

## 2019-09-15 NOTE — PHYSICAL THERAPY NOTE
Physical Therapy Progress Note     09/15/19 1200   Pain Assessment   Pain Assessment 0-10   Pain Score 7   Pain Type Surgical pain   Pain Location Knee   Pain Orientation Left   Hospital Pain Intervention(s) Cold applied;Repositioned; Ambulation/increased activity; Emotional support   Response to Interventions Tolerated  Restrictions/Precautions   LLE Weight Bearing Per Order WBAT   Other Precautions Pain   Subjective   Subjective The pt  states that he is about the same, but a little better  He is agreeable to ambulate with therapy  Transfers   Sit to Stand 5  Supervision   Stand to Sit 5  Supervision   Ambulation/Elevation   Gait pattern Excessively slow; Inconsistent erna   Gait Assistance 5  Supervision   Assistive Device Bariatric Rolling walker   Distance 120 feet  Balance   Static Sitting Good   Dynamic Sitting Good   Static Standing Good   Ambulatory Fair +   Activity Tolerance   Activity Tolerance Patient tolerated treatment well;Patient limited by pain   Nurse [address], RN  Exercises   TKR Sitting;Left;AROM;AAROM;15 reps   Assessment   Prognosis Good   Problem List Decreased strength;Decreased range of motion;Decreased endurance; Impaired balance;Decreased mobility; Decreased coordination;Pain;Orthopedic restrictions   Assessment The pt  was able to progress his ambulatory distance today  He does continue to remain limited due to pain, but he has demonstrated the necessary mobility in order to safely return home at discharge  The pt  had no difficulty other than requiring increased time to manuever around obstacles  Barriers to Discharge None   Goals   Patient Goals To go home  STG Expiration Date 09/23/19   Treatment Day 3   Plan   Treatment/Interventions Functional transfer training;LE strengthening/ROM; Therapeutic exercise; Endurance training;Patient/family training;Bed mobility;Gait training   Progress Progressing toward goals   PT Frequency 7x/wk; Twice a day   Recommendation Recommendation Home with family support; Outpatient PT   Equipment Recommended Hassan Shone   PT - OK to Discharge Yes     Flaco Melgoza, PTA

## 2019-09-15 NOTE — PLAN OF CARE
Problem: PHYSICAL THERAPY ADULT  Goal: Performs mobility at highest level of function for planned discharge setting  See evaluation for individualized goals  Description  Treatment/Interventions: Functional transfer training, LE strengthening/ROM, Therapeutic exercise, Endurance training, Cognitive reorientation, Patient/family training, Bed mobility, Gait training, Equipment eval/education, Spoke to nursing, Family  Equipment Recommended: Walker(RW)       See flowsheet documentation for full assessment, interventions and recommendations  9/15/2019 1623 by Uma Bobby PTA  Outcome: Adequate for Discharge  Note:   Prognosis: Good  Problem List: Decreased strength, Decreased range of motion, Decreased endurance, Impaired balance, Decreased mobility, Decreased coordination, Pain, Orthopedic restrictions  Assessment: The pt  was able to progress his ambulatory distance today  He does continue to remain limited due to pain, but he has demonstrated the necessary mobility in order to safely return home at discharge  The pt  had no difficulty other than requiring increased time to manuever around obstacles  Barriers to Discharge: None     Recommendation: Home with family support, Outpatient PT     PT - OK to Discharge: Yes    See flowsheet documentation for full assessment       9/15/2019 1623 by Uma Bobby PTA  Reactivated

## 2019-09-15 NOTE — PROGRESS NOTES
Orthopedics   [facility] 60 y o  male MRN: [id number]  Unit/Bed#: -01      Subjective:  60 y o male post operative day 2 left total knee arthroplasty  Pt doing well  c/o Pain suprapetellar area      Labs:  0   Lab Value Date/Time    HCT 40 9 09/15/2019 0512    HCT 38 5 09/14/2019 0546    HCT 44 3 08/12/2019 1609    HGB 13 6 09/15/2019 0512    HGB 12 4 09/14/2019 0546    HGB 14 5 08/12/2019 1609    INR 1 06 08/12/2019 1146    WBC 12 39 (H) 09/15/2019 0512    WBC 10 67 (H) 09/14/2019 0546    WBC 6 30 08/12/2019 1609    CRP 3 2 (H) 08/12/2019 1517       Meds:    Current Facility-Administered Medications:     acetaminophen (TYLENOL) tablet 650 mg, 650 mg, Oral, Q6H PRN, Kris Cola, PA-C, 650 mg at 09/14/19 1958    ascorbic acid (VITAMIN C) tablet 500 mg, 500 mg, Oral, Daily, Kris Cola, PA-C, 500 mg at 09/14/19 3553    calcium carbonate (TUMS) chewable tablet 1,000 mg, 1,000 mg, Oral, Daily PRN, Kris Cola, PA-C    cholecalciferol (VITAMIN D3) tablet 1,000 Units, 1,000 Units, Oral, Daily, Kris Cola, PA-C, 1,000 Units at 09/14/19 0933    diphenhydrAMINE (BENADRYL) tablet 25 mg, 25 mg, Oral, HS PRN, Kris Cola, PA-C    docusate sodium (COLACE) capsule 100 mg, 100 mg, Oral, BID, Kris Cola, PA-C, 100 mg at 09/14/19 1727    enoxaparin (LOVENOX) subcutaneous injection 40 mg, 40 mg, Subcutaneous, Daily, Kris Cola, PA-C, 40 mg at 09/14/19 2341    ferrous sulfate tablet 325 mg, 325 mg, Oral, BID With Meals, Kris Cola, PA-C, 325 mg at 09/14/19 1727    fluticasone (FLONASE) 50 mcg/act nasal spray 1 spray, 1 spray, Each Nare, Daily, Kris Cola, PA-C    folic acid (FOLVITE) tablet 1 mg, 1 mg, Oral, Daily, Kris Cola, PA-C, 1 mg at 09/14/19 0933    HYDROmorphone (DILAUDID) injection 0 5 mg, 0 5 mg, Intravenous, Q2H PRN, Kris Cola, PA-C, 0 5 mg at 09/14/19 0401    loratadine (CLARITIN) tablet 10 mg, 10 mg, Oral, Daily, Kris Cola, PA-C, 10 mg at 09/14/19 0933    methocarbamol (ROBAXIN) tablet 500 mg, 500 mg, Oral, Q6H [address], Aki New PA-C, 500 mg at 09/15/19 0520    oxyCODONE (ROXICODONE) immediate release tablet 10 mg, 10 mg, Oral, Q4H PRN, Aki New PA-C, 10 mg at 09/15/19 0521    oxyCODONE (ROXICODONE) IR tablet 5 mg, 5 mg, Oral, Q4H PRN, Aki New PA-C    pravastatin (PRAVACHOL) tablet 80 mg, 80 mg, Oral, Daily With Soni Benson PA-C, 80 mg at 09/14/19 1727    senna (SENOKOT) tablet 8 6 mg, 1 tablet, Oral, Daily, Aki New PA-C, 8 6 mg at 09/14/19 0933    sodium chloride 0 9 % bolus 1,000 mL, 1,000 mL, Intravenous, Once, Aki New PA-C, Stopped at 09/13/19 1145    Blood Culture:   No results found for: BLOODCX    Wound Culture:   No results found for: WOUNDCULT    Ins and Outs:  I/O last 24 hours: In: 685 [P O :685]  Out: 2385 [Urine:1575]          Physical:  Vitals:    09/14/19 2317   BP: 139/83   Pulse: (!) 116   Resp: 20   Temp: 98 2 °F (36 8 °C)   SpO2: 91%     left lower extremity:  · Dressings C/D/I  · Toes warm and well perfused  · 2+ distal pulses  · Calves nontender    _*_*_*_*_*_*_*_*_*_*_*_*_*_*_*_*_*_*_*_*_*_*_*_*_*_*_*_*_*_*_*_*_*_*_*_*_*_*_*_*_*    Assessment: 59 y o male post operative day 2 left total knee arthroplasty   Doing well    Plan:  · Weight Bearing as tolerated  · Up and out of bed  · DVT prophylaxis  · Analgesics  · PT/OT  · Will continue to assess for acute blood loss anemia   · Discharge when cleared by PT     Jose Velazquez PA-C

## 2019-09-15 NOTE — SOCIAL WORK
Informed by PT & ortho pt is cleared for dc today with OP therapy & has rw at home  OP script given to pt & has ride home  Updated RN

## 2019-09-15 NOTE — DISCHARGE SUMMARY
ORTHOPEDICS DISCHARGE SUMMARY  Tim Batista 60 y o  male MRN: [id number]  Unit/Bed#: -18    Attending Physician: Johanna Francisco    Admitting diagnosis: Primary osteoarthritis of left knee [M17 12]    Discharge diagnosis: Primary osteoarthritis of left knee [M17 12]    Date of admission: 9/13/2019    Date of discharge: 09/15/19         Procedure: Left total knee arthroplasty    HPI:  This is a 60y o  year old male that presented to the office with signs and symptoms of left knee osteoarthritis  They tried and failed conservative treatment measures and wished to proceed with surgical intervention  The risks, benefits, and complications of the procedure were discussed with the patient and informed consent was obtained  Hospital Course: The patient was admitted to the hospital on 9/13/2019 and underwent an uncomplicated left total knee arthroplasty  They were transferred to the floor after a brief stay in the post-anesthesia care unit  Their pain was well managed with IV and oral pain medications  They began therapy on post operative day #1  Lovenox 40 mg was also started for DVT prophylaxis 12 hours post operatively  Hemovac drain was removed on POD1  On discharge date pt was cleared by PT and the medicine team and determined to be safe for discharge  Daily discussion was had with the patient, nursing staff, orthopaedic team, and family members if present  All questions were answered to the patients satisifaction  0   Lab Value Date/Time    HGB 13 6 09/15/2019 0512    HGB 12 4 09/14/2019 0546    HGB 14 5 08/12/2019 1609    HGB 14 2 03/11/2019 1146    HGB 12 4 03/06/2019 0542    HGB 12 6 03/05/2019 0524    HGB 15 3 02/11/2019 1052    HGB 13 8 07/26/2017 1553       Vital signs remained stable and pt was resuscitated with IVF as needed   Body mass index is 42 6 kg/m²  moderately obese  Recommend nutrition  Discharge Instructions:   The patient was discharged weight bearing as tolerated to the left lower extremity  Lovenox 40 mg will be continued for 28 days  Continue PT/OT  Take pain medications as instructed  Discharge Medications: For the complete list of discharge medications, please refer to the patient's medication reconciliation

## 2019-09-16 ENCOUNTER — TELEPHONE (OUTPATIENT)
Dept: OBGYN CLINIC | Facility: HOSPITAL | Age: 59
End: 2019-09-16

## 2019-09-16 ENCOUNTER — OFFICE VISIT (OUTPATIENT)
Dept: PHYSICAL THERAPY | Age: 59
End: 2019-09-16
Payer: COMMERCIAL

## 2019-09-16 DIAGNOSIS — Z47.1 AFTERCARE FOLLOWING LEFT KNEE JOINT REPLACEMENT SURGERY: ICD-10-CM

## 2019-09-16 DIAGNOSIS — M17.12 PRIMARY OSTEOARTHRITIS OF LEFT KNEE: ICD-10-CM

## 2019-09-16 DIAGNOSIS — Z96.652 AFTERCARE FOLLOWING LEFT KNEE JOINT REPLACEMENT SURGERY: ICD-10-CM

## 2019-09-16 DIAGNOSIS — M25.562 ACUTE PAIN OF LEFT KNEE: Primary | ICD-10-CM

## 2019-09-16 PROCEDURE — 97140 MANUAL THERAPY 1/> REGIONS: CPT | Performed by: PHYSICAL THERAPIST

## 2019-09-16 PROCEDURE — 97110 THERAPEUTIC EXERCISES: CPT | Performed by: PHYSICAL THERAPIST

## 2019-09-16 PROCEDURE — 97164 PT RE-EVAL EST PLAN CARE: CPT | Performed by: PHYSICAL THERAPIST

## 2019-09-16 NOTE — TELEPHONE ENCOUNTER
Pt contacted today to complete a postoperative follow up call assessment  VM left for patient to return my call at earliest convenience

## 2019-09-16 NOTE — UTILIZATION REVIEW
Notification of Discharge  This is a Notification of Discharge from our facility [address]  Please be advised that this patient has been discharge from our facility  Below you will find the admission and discharge date and time including the patients disposition  PRESENTATION DATE: 9/13/2019  5:22 AM  OBS ADMISSION DATE: n/a  IP ADMISSION DATE: 9/14/19 1558   DISCHARGE DATE: 9/15/2019  4:21 PM  DISPOSITION: Home/Self Care Home/Self Care   Admission Orders listed below:  Admission Orders (From admission, onward)     Ordered        09/14/19 1558  Inpatient Admission  Once                 Please contact the UR Department if additional information is required to close this patient's authorization/case  [address]  Utilization Review Department  Phone: [phone]; Fax [phone]  [email or web address]  ATTENTION: Please call with any questions or concerns to [phone]  and carefully listen to the prompts so that you are directed to the right person  Send all requests for admission clinical reviews, approved or denied determinations and any other requests to fax [phone]   All voicemails are confidential

## 2019-09-16 NOTE — PROGRESS NOTES
PT Evaluation     Today's date: 2019  Patient name: Ruben Almonte  : 1960  MRN: [id number]  Referring provider: Roderick Gr PA-C  Dx:   Encounter Diagnosis     ICD-10-CM    1  Acute pain of left knee M25 562    2  Primary osteoarthritis of left knee M17 12 Ambulatory referral to Physical Therapy   3  Aftercare following left knee joint replacement surgery Z47 1     [id number]        Start Time: 1530  Stop Time: 1630  Total time in clinic (min): 60 minutes    Assessment  Assessment details: Ruben Almonte is a 60 y o  male who presents with signs and symptoms consistent of L TKA  Incision shows no signs of infection  Pt is compliant with HEP and functional limitations are demonstrated by 5 STS amd TUG  Pt educated on sings and symptoms of DVT's  Patient presents with pain, decreased strength and decreased ROM  Due to these impairments, Patient has difficulty performing a/iadls and work-related activities  Patient would benefit from skilled physical therapy to address the impairments, improve their level of function, and to improve their overall quality of life  Impairments: abnormal muscle tone, abnormal or restricted ROM, impaired balance, impaired physical strength, lacks appropriate home exercise program, pain with function and weight-bearing intolerance  Understanding of Dx/Px/POC: good   Prognosis: good    Goals  Short Term Goals: to be achieved by 4 weeks  1) Patient to be independent with basic HEP  2) Decrease pain to 2/10 at its worst   3) Increase Knee  ROM by 5-10 degrees   4) Increase LE strength by 1/2 MMT grade in all deficient planes      Long Term Goals: to be achieved by discharge  1) FOTO equal to or greater than 55   2) Ambulation to improve to maximal level of function  3) Stair negotiation will improve to reciprocal   4) Sit to stand transfers will improve to maximal level of function     Plan  Patient would benefit from: skilled physical therapy  Planned modality interventions: cryotherapy and thermotherapy: hydrocollator packs  Planned therapy interventions: ADL training, manual therapy, neuromuscular re-education, patient education, strengthening, stretching, therapeutic activities, therapeutic exercise, transfer training, home exercise program, graded exercise and functional ROM exercises  Frequency: Tiwce a week for 12 weeks  Subjective Evaluation    History of Present Illness  Mechanism of injury: Pt underwent L TKA on 19  No medical complication were reported during recovery or hospital stay  Pt has most difficulty with stairs and functional trasnfers     Patient Goals  Patient goals for therapy: decreased pain, independence with ADLs/IADLs, increased strength and return to sport/leisure activities          Objective     Active Range of Motion   Left Knee   Flexion: 80 degrees   Extension: -15 degrees     Passive Range of Motion   Left Knee   Flexion: 85 degrees   Extension: -10 degrees     Strength/Myotome Testing     Left Hip   Planes of Motion   Flexion: 2+  Extension: 2+  Abduction: 2+  Adduction: 2+    Right Hip   Planes of Motion   Flexion: 5  Extension: 5  Abduction: 5  Adduction: 5    Left Knee   Flexion: 3-  Extension: 3-  Quadriceps contraction: poor    Swelling     Left Knee Girth Measurement (cm)   Joint line: 52 cm  10 cm above joint line: 62 cm  10 cm below joint line: 50 cm    Functional Assessment        Comments  TU secs with RW  5 STS: limited 2/2 to pain    General Comments:      Knee Comments  Incision shows no signs of infection       Flowsheet Rows      Most Recent Value   PT/OT G-Codes   Current Score  22   Projected Score  53             Precautions: Current L TKA, previous R TKA      Manual              PROM JF            Fib head mobs JF                                                       Exercise Diary              Quad sets HEP            Heel slides  HEP            LAQ HEP            Mini squats HEP            Standing hip abd HEP            Heel prop stretch HEP                                                                                                                                                                                                      Modalities

## 2019-09-17 ENCOUNTER — TRANSITIONAL CARE MANAGEMENT (OUTPATIENT)
Dept: FAMILY MEDICINE CLINIC | Facility: CLINIC | Age: 59
End: 2019-09-17

## 2019-09-17 ENCOUNTER — TELEPHONE (OUTPATIENT)
Dept: OBGYN CLINIC | Facility: HOSPITAL | Age: 59
End: 2019-09-17

## 2019-09-17 NOTE — TELEPHONE ENCOUNTER
Pt contacted to complete a postoperative follow up call assessment  Pt reports being "sore " Reports current 4 out of 10 pain at this time, and a "8-9 when moving " Pt rpeorts taking oxycodone 5mg every 4 hours for pain at this time  Pt reports occasionally taking tylenol for breakthrough pain between oxycodone  Pt's AVS and AVS medication list were reviewed  Pt reports taking lovenox daily, denies any bleeding  Pt reports taking Colace BID for stool softening, LBM was this morning  Pt denies nausea, vomiting or abdominal pain  Pt denies taking iron and folic acid and benadryl  Pt reports swelling is the "same" as time of DC, pt reports icing and elevating  Pt reports incision is dry and denies any drainage  Pt reports PT therapist yesterday removed dressing and reports it "looks good " Pt states "no blood at all " Pt ambulating with the walker and going "SLOW " Pt denies any falls  Pt had outpatient PT yesterday and reports it went "good "    Pt denies chest pain, SOB, dizziness, fever, and/or calf pain  Pt denies questions or concerns  Pt aware of apt with surgeon on 10/1, and PCP apt on 9/26  pt encouraged to call me with questions, concerns or issues

## 2019-09-18 ENCOUNTER — OFFICE VISIT (OUTPATIENT)
Dept: PHYSICAL THERAPY | Age: 59
End: 2019-09-18
Payer: COMMERCIAL

## 2019-09-18 DIAGNOSIS — M25.562 ACUTE PAIN OF LEFT KNEE: Primary | ICD-10-CM

## 2019-09-18 DIAGNOSIS — M17.12 PRIMARY OSTEOARTHRITIS OF LEFT KNEE: ICD-10-CM

## 2019-09-18 DIAGNOSIS — Z47.1 AFTERCARE FOLLOWING RIGHT KNEE JOINT REPLACEMENT SURGERY: ICD-10-CM

## 2019-09-18 DIAGNOSIS — Z96.652 AFTERCARE FOLLOWING LEFT KNEE JOINT REPLACEMENT SURGERY: ICD-10-CM

## 2019-09-18 DIAGNOSIS — M25.561 ACUTE PAIN OF RIGHT KNEE: ICD-10-CM

## 2019-09-18 DIAGNOSIS — Z47.1 AFTERCARE FOLLOWING LEFT KNEE JOINT REPLACEMENT SURGERY: ICD-10-CM

## 2019-09-18 DIAGNOSIS — Z96.651 AFTERCARE FOLLOWING RIGHT KNEE JOINT REPLACEMENT SURGERY: ICD-10-CM

## 2019-09-18 PROCEDURE — 97110 THERAPEUTIC EXERCISES: CPT

## 2019-09-18 PROCEDURE — 97140 MANUAL THERAPY 1/> REGIONS: CPT

## 2019-09-18 NOTE — PROGRESS NOTES
Daily Note     Today's date: 2019  Patient name: Santos Marie  : 1960  MRN: [id number]  Referring provider: Maria Luisa Morales PA-C  Dx:   Encounter Diagnosis     ICD-10-CM    1  Acute pain of left knee M25 562    2  Primary osteoarthritis of left knee M17 12    3  Aftercare following left knee joint replacement surgery Z47 1     Z96 [id number]    4  Acute pain of right knee M25 561    5  Aftercare following right knee joint replacement surgery Z47 1     Z96 [id number]        Start Time: 1005          Subjective:       Objective: See treatment diary below      Assessment: Tolerated treatment fair  Patient demonstrated fatigue post treatment and would benefit from continued PT, mod effusion cont with staples intact, re wrapped L knee with ACE bandage, pt cont to amb with roller walker      Plan: Continue per plan of care        Precautions: Current L TKA, previous R TKA      Manual  19           PROM JF VK           Fib head mobs JF JF                                                      Exercise Diary  19           Quad sets HEP 3x10x5"           Heel slides  HEP 2x10x5"           LAQ HEP 2x10x5"           Mini squats HEP np           Standing hip abd HEP 10xL           Heel prop stretch HEP 5 min                                                                                                                                                                                                     Modalities  19            Ice with elevation/ankle pumps to finish 10 min

## 2019-09-20 ENCOUNTER — OFFICE VISIT (OUTPATIENT)
Dept: PHYSICAL THERAPY | Age: 59
End: 2019-09-20
Payer: COMMERCIAL

## 2019-09-20 DIAGNOSIS — Z96.651 AFTERCARE FOLLOWING RIGHT KNEE JOINT REPLACEMENT SURGERY: ICD-10-CM

## 2019-09-20 DIAGNOSIS — Z47.1 AFTERCARE FOLLOWING RIGHT KNEE JOINT REPLACEMENT SURGERY: ICD-10-CM

## 2019-09-20 DIAGNOSIS — M25.561 ACUTE PAIN OF RIGHT KNEE: ICD-10-CM

## 2019-09-20 DIAGNOSIS — Z96.652 AFTERCARE FOLLOWING LEFT KNEE JOINT REPLACEMENT SURGERY: ICD-10-CM

## 2019-09-20 DIAGNOSIS — M25.562 ACUTE PAIN OF LEFT KNEE: Primary | ICD-10-CM

## 2019-09-20 DIAGNOSIS — M17.12 PRIMARY OSTEOARTHRITIS OF LEFT KNEE: ICD-10-CM

## 2019-09-20 DIAGNOSIS — Z47.1 AFTERCARE FOLLOWING LEFT KNEE JOINT REPLACEMENT SURGERY: ICD-10-CM

## 2019-09-20 PROCEDURE — 97140 MANUAL THERAPY 1/> REGIONS: CPT

## 2019-09-20 PROCEDURE — 97110 THERAPEUTIC EXERCISES: CPT

## 2019-09-20 PROCEDURE — 97112 NEUROMUSCULAR REEDUCATION: CPT

## 2019-09-20 NOTE — PROGRESS NOTES
Daily Note     Today's date: 2019  Patient name: Cristela Tom  : 1960  MRN: [id number]  Referring provider: Mat Ramon PA-C  Dx:   Encounter Diagnosis     ICD-10-CM    1  Acute pain of left knee M25 562    2  Primary osteoarthritis of left knee M17 12    3  Aftercare following left knee joint replacement surgery Z47 1     Z96 [id number]    4  Acute pain of right knee M25 561    5  Aftercare following right knee joint replacement surgery Z47 1     Z96 651                   Subjective: pt reports he still has difficulty sleeping, c/o slight dizziness during standing ex, pt reports he doesn't drink much water throughout the day    Objective: See treatment diary below  /86    SaO2 94%  Vitals post treatment    Assessment: Tolerated treatment fair  Patient demonstrated fatigue post treatment and would benefit from continued PT, pt's HR slightly elevated despite recovery period at end of session  but no SOB/dizziness when pt left clinic, otherwise vitals stable, staples intact with mod swelling cont to limit pt's barrera for ex and ROM L knee      Plan: Continue per plan of care  Progress treatment as tolerated         Precautions: Current L TKA, previous R TKA      Manual  19          PROM JF VK VK          Fib head mobs JF JF                                                      Exercise Diary  19          Quad sets HEP 3x10x5" 3x10x5"          Heel slides  HEP 2x10x5" 3x10x5"          LAQ HEP 2x10x5" 3x10x5"          Mini squats HEP np 2x10          Standing hip abd HEP 10xL 2x10          Heel prop stretch HEP 5 min 5 min          HR   30x                                                                                                                                                                                       Modalities  19            Ice with elevation/ankle pumps to finish 10 min

## 2019-09-22 DIAGNOSIS — E78.5 HYPERLIPIDEMIA, UNSPECIFIED HYPERLIPIDEMIA TYPE: ICD-10-CM

## 2019-09-23 ENCOUNTER — OFFICE VISIT (OUTPATIENT)
Dept: PHYSICAL THERAPY | Age: 59
End: 2019-09-23
Payer: COMMERCIAL

## 2019-09-23 DIAGNOSIS — Z47.1 AFTERCARE FOLLOWING LEFT KNEE JOINT REPLACEMENT SURGERY: Primary | ICD-10-CM

## 2019-09-23 DIAGNOSIS — Z96.652 AFTERCARE FOLLOWING LEFT KNEE JOINT REPLACEMENT SURGERY: Primary | ICD-10-CM

## 2019-09-23 PROCEDURE — 97140 MANUAL THERAPY 1/> REGIONS: CPT | Performed by: PHYSICAL THERAPIST

## 2019-09-23 PROCEDURE — 97112 NEUROMUSCULAR REEDUCATION: CPT | Performed by: PHYSICAL THERAPIST

## 2019-09-23 PROCEDURE — 97110 THERAPEUTIC EXERCISES: CPT | Performed by: PHYSICAL THERAPIST

## 2019-09-23 RX ORDER — SIMVASTATIN 40 MG
TABLET ORAL
Qty: 90 TABLET | Refills: 0 | Status: SHIPPED | OUTPATIENT
Start: 2019-09-23 | End: 2019-12-17 | Stop reason: SDUPTHER

## 2019-09-26 ENCOUNTER — OFFICE VISIT (OUTPATIENT)
Dept: PHYSICAL THERAPY | Age: 59
End: 2019-09-26
Payer: COMMERCIAL

## 2019-09-26 ENCOUNTER — OFFICE VISIT (OUTPATIENT)
Dept: FAMILY MEDICINE CLINIC | Facility: CLINIC | Age: 59
End: 2019-09-26
Payer: COMMERCIAL

## 2019-09-26 VITALS
TEMPERATURE: 97.7 F | SYSTOLIC BLOOD PRESSURE: 130 MMHG | BODY MASS INDEX: 40.18 KG/M2 | HEART RATE: 101 BPM | OXYGEN SATURATION: 96 % | RESPIRATION RATE: 16 BRPM | DIASTOLIC BLOOD PRESSURE: 68 MMHG | WEIGHT: 256 LBS | HEIGHT: 67 IN

## 2019-09-26 DIAGNOSIS — E78.5 HYPERLIPIDEMIA, UNSPECIFIED HYPERLIPIDEMIA TYPE: ICD-10-CM

## 2019-09-26 DIAGNOSIS — Z76.89 ENCOUNTER FOR SUPPORT AND COORDINATION OF TRANSITION OF CARE: ICD-10-CM

## 2019-09-26 DIAGNOSIS — Z96.651 STATUS POST RIGHT KNEE REPLACEMENT: Primary | ICD-10-CM

## 2019-09-26 DIAGNOSIS — E87.1 HYPONATREMIA: ICD-10-CM

## 2019-09-26 DIAGNOSIS — Z96.651 AFTERCARE FOLLOWING RIGHT KNEE JOINT REPLACEMENT SURGERY: ICD-10-CM

## 2019-09-26 DIAGNOSIS — E66.01 MORBID OBESITY WITH BMI OF 40.0-44.9, ADULT (HCC): ICD-10-CM

## 2019-09-26 DIAGNOSIS — M25.562 ACUTE PAIN OF LEFT KNEE: ICD-10-CM

## 2019-09-26 DIAGNOSIS — R03.0 ELEVATED BLOOD-PRESSURE READING WITHOUT DIAGNOSIS OF HYPERTENSION: ICD-10-CM

## 2019-09-26 DIAGNOSIS — Z96.652 AFTERCARE FOLLOWING LEFT KNEE JOINT REPLACEMENT SURGERY: Primary | ICD-10-CM

## 2019-09-26 DIAGNOSIS — M17.11 PRIMARY OSTEOARTHRITIS OF RIGHT KNEE: ICD-10-CM

## 2019-09-26 DIAGNOSIS — Z47.1 AFTERCARE FOLLOWING RIGHT KNEE JOINT REPLACEMENT SURGERY: ICD-10-CM

## 2019-09-26 DIAGNOSIS — Z47.1 AFTERCARE FOLLOWING LEFT KNEE JOINT REPLACEMENT SURGERY: Primary | ICD-10-CM

## 2019-09-26 DIAGNOSIS — M17.12 PRIMARY OSTEOARTHRITIS OF LEFT KNEE: ICD-10-CM

## 2019-09-26 DIAGNOSIS — E11.9 TYPE 2 DIABETES MELLITUS WITHOUT COMPLICATION, WITHOUT LONG-TERM CURRENT USE OF INSULIN (HCC): ICD-10-CM

## 2019-09-26 DIAGNOSIS — M25.561 ACUTE PAIN OF RIGHT KNEE: ICD-10-CM

## 2019-09-26 PROCEDURE — 99495 TRANSJ CARE MGMT MOD F2F 14D: CPT | Performed by: FAMILY MEDICINE

## 2019-09-26 PROCEDURE — 97140 MANUAL THERAPY 1/> REGIONS: CPT

## 2019-09-26 PROCEDURE — 97112 NEUROMUSCULAR REEDUCATION: CPT

## 2019-09-26 PROCEDURE — 97110 THERAPEUTIC EXERCISES: CPT

## 2019-09-26 RX ORDER — TRAMADOL HYDROCHLORIDE 50 MG/1
50 TABLET ORAL EVERY 4 HOURS
COMMUNITY
End: 2019-11-07

## 2019-09-26 NOTE — PROGRESS NOTES
Daily Note     Today's date: 2019  Patient name: Colt Wing  : 1960  MRN: [id number]  Referring provider: Juan Manuel Renteria PA-C  Dx:   Encounter Diagnosis     ICD-10-CM    1  Aftercare following left knee joint replacement surgery Z47 1     Z96 [id number]    2  Acute pain of left knee M25 562    3  Primary osteoarthritis of left knee M17 12    4  Acute pain of right knee M25 561    5  Aftercare following right knee joint replacement surgery Z47 1     Z96 [id number]                   Subjective: ***      Objective: See treatment diary below      Assessment: Tolerated treatment {Tolerated treatment :[id number]}   Patient {assessment:[id number]}      Plan: {PLAN:0221879596}     Precautions: Current L TKA, previous R TKA      Manual  19         PROM JF VK VK          Fib head mobs JF JF                                                      Exercise Diary  19         Quad sets HEP 3x10x5" 3x10x5" 3x10, 5'         Heel slides  HEP 2x10x5" 3x10x5" 3x10, 5"         LAQ HEP 2x10x5" 3x10x5" 3x10, 5"         Mini squats HEP np 2x10 2x10         Standing hip abd HEP 10xL 2x10 2x10         Heel prop stretch HEP 5 min 5 min          HR   30x 30x         SLR    3x10                                                                                                                                                                         Modalities  19           Ice with elevation/ankle pumps to finish 10 min 8 min

## 2019-09-26 NOTE — PROGRESS NOTES
Assessment/Plan:  Status post right knee replacement  Continue physical therapy and follow up with ortho  Continue on current pain regimen  Primary osteoarthritis of right knee  Replacement went well  Incision healing well  Continue follow up with PT and ortho  Morbid obesity with BMI of 40 0-44 9, adult ([facility])  Weight down from knee replacement  Discussed low carb, low fat diet and increasing physical activity as knee heals  Hyperlipidemia  Need updated labs  Hyperglycemia  Fasting glucose elevated upon discharge from hospital   Will recheck metabolic profile and R7F  Encounter for support and coordination of transition of care  Patient recovering well from R knee arthroplasty  Incision site healing without complications  Continue with PT and follow up with ortho  Elevated WBC count and hyponatremia upon discharge  Will recheck CBC and CMP  Elevated blood-pressure reading without diagnosis of hypertension  Elevated in office today  Discussed getting home monitor and checking BP 4-5x per week  Call or come back if readings consistently elevated  Will continue to monitor  Type 2 diabetes mellitus without complication, without long-term current use of insulin ([facility])    Lab Results   Component Value Date    HGBA1C 6 7 (H) 08/12/2019    Well controlled  Continue same  Will continue to monitor  He is not medications  Follow low carb diet  He stated he will start exercising after his knee feels better  Hyponatremia  It multiple salty snacks  I will repeat his BMP  Diagnoses and all orders for this visit:    Status post right knee replacement    Primary osteoarthritis of right knee    Type 2 diabetes mellitus without complication, without long-term current use of insulin ([facility])  -     Comprehensive metabolic panel;  Future  -     CBC and differential; Future  -     Hemoglobin A1C; Future  -     Microalbumin / creatinine urine ratio    Hyperlipidemia, unspecified hyperlipidemia type  -     Lipid Panel with Direct LDL reflex; Future    Elevated blood-pressure reading without diagnosis of hypertension    Morbid obesity with BMI of 40 0-44 9, adult ([facility] [address] )    Encounter for support and coordination of transition of care  -     Vitamin D 25 hydroxy; Future    Hyponatremia    Other orders  -     traMADol (ULTRAM) 50 mg tablet; Take 50 mg by mouth every 4 (four) hours          There are no Patient Instructions on file for this visit  Return in about 3 months (around 12/26/2019)  Subjective:      Patient ID: Uriel Valera is a 60 y o  male  Chief Complaint   Patient presents with    Transition of Care Management       Vita Lutz is a 60 y o  M with multiple medical problems presenting to the office today for transition of care management following a R knee arthroplasty  He has no complaints today  His pain is well controlled since the operation, and he denies any constipation  He is taking all of his medications as prescribed and without any side effects  He states he is continuing with physical therapy and with his orthopedic surgeon  He is tolerating his PT well, but his staples are restricting his movement and causing itchiness  They are scheduled to be removed next week  The incision appears to be healing well  He has an incentive spirometer and is using it at home  Upon discharge he did have an elevated WBC count and some hyponatremia  The following portions of the patient's history were reviewed and updated as appropriate: allergies, current medications, past family history, past medical history, past social history, past surgical history and problem list     Review of Systems   Constitutional: Negative for chills and fever  HENT: Negative for trouble swallowing  Eyes: Negative for visual disturbance  Respiratory: Negative for cough and shortness of breath  Cardiovascular: Negative for chest pain and palpitations     Gastrointestinal: Negative for abdominal pain, blood in stool, constipation and vomiting  Endocrine: Negative for cold intolerance and heat intolerance  Genitourinary: Negative for difficulty urinating and dysuria  Musculoskeletal: Negative for gait problem  Skin: Negative for rash  Allergic/Immunologic: Negative for environmental allergies and food allergies  Neurological: Negative for dizziness, syncope and headaches  Hematological: Negative for adenopathy  Psychiatric/Behavioral: Negative for behavioral problems  The patient is not nervous/anxious            Current Outpatient Medications   Medication Sig Dispense Refill    acetaminophen (TYLENOL) 650 mg CR tablet Take 1 tablet (650 mg total) by mouth every 8 (eight) hours as needed for mild pain 30 tablet 0    ascorbic acid (VITAMIN C) 500 MG tablet Take 1 tablet (500 mg total) by mouth daily 60 tablet 0    cetirizine (ZyrTEC) 10 mg tablet Take 10 mg by mouth daily      Cholecalciferol 2000 units CAPS Take by mouth      enoxaparin (LOVENOX) 40 mg/0 4 mL 1 subcutaneous injection daily x 28 days AFTER surgery 28 Syringe 0    fluticasone (FLONASE) 50 mcg/act nasal spray Blow nose; shake bottle; place tip in each nostril and tilt towards eye; hold breath and press plunger; do this 2 times daily prn      Multiple Vitamin (MULTIVITAMIN) tablet Take 2 tablets by mouth daily      Omega-3 Fatty Acids (FISH OIL PO) Take 1 capsule by mouth daily      simvastatin (ZOCOR) 40 mg tablet TAKE 1 TABLET DAILY AT BEDTIME 90 tablet 0    traMADol (ULTRAM) 50 mg tablet Take 50 mg by mouth every 4 (four) hours      diphenhydrAMINE (BENADRYL) 25 mg tablet Take 1 tablet (25 mg total) by mouth daily at bedtime as needed for itching (Patient not taking: Reported on 9/26/2019) 30 tablet 0    docusate sodium (COLACE) 100 mg capsule Take 1 capsule (100 mg total) by mouth 2 (two) times a day (Patient not taking: Reported on 9/26/2019) 10 capsule 0    ferrous sulfate 324 (65 Fe) mg Take 1 tablet (324 mg total) by mouth 2 (two) times a day before meals (Patient not taking: Reported on 9/26/2019) 60 tablet 0    folic acid (FOLVITE) 1 mg tablet Take 1 tablet (1 mg total) by mouth daily (Patient not taking: Reported on 9/26/2019) 30 tablet 0    oxyCODONE (ROXICODONE) 5 mg immediate release tablet Take 1 tablets every 6 hrs as needed for pain control (Patient not taking: Reported on 9/26/2019) 30 tablet 0     No current facility-administered medications for this visit  Objective:    /68 (BP Location: Left arm, Patient Position: Sitting, Cuff Size: Large)   Pulse 101   Temp 97 7 °F (36 5 °C) (Tympanic)   Resp 16   Ht 5' 7" (1 702 m)   Wt 116 kg (256 lb)   SpO2 96%   BMI 40 10 kg/m²        Physical Exam   Constitutional: He is oriented to person, place, and time  He appears well-developed and well-nourished  HENT:   Head: Normocephalic and atraumatic  Eyes: Pupils are equal, round, and reactive to light  EOM are normal    Neck: Normal range of motion  Neck supple  Cardiovascular: Normal rate, regular rhythm and normal heart sounds  Exam reveals no gallop and no friction rub  Pulses are no weak pulses  No murmur heard  Pulses:       Dorsalis pedis pulses are 2+ on the left side  Pulmonary/Chest: Effort normal and breath sounds normal  No stridor  He has no wheezes  He has no rales  Abdominal: Soft  Bowel sounds are normal  He exhibits no distension  There is no tenderness  Musculoskeletal: Normal range of motion  He exhibits no edema  Feet:   Right Foot:   Skin Integrity: Negative for ulcer, skin breakdown, erythema, warmth, callus or dry skin  Left Foot:   Skin Integrity: Negative for ulcer, skin breakdown, erythema, warmth, callus or dry skin  Lymphadenopathy:     He has no cervical adenopathy  Neurological: He is alert and oriented to person, place, and time  No cranial nerve deficit  Skin: Skin is warm and dry  Capillary refill takes less than 2 seconds   No rash noted    Psychiatric: He has a normal mood and affect  His behavior is normal    Nursing note and vitals reviewed  Patient's shoes and socks removed  Right Foot/Ankle   Right Foot Inspection  Skin Exam: skin normal and skin intact no dry skin, no warmth, no callus, no erythema, no maceration, no abnormal color, no pre-ulcer, no ulcer and no callus                          Toe Exam:  no right toe deformity  Sensory       Monofilament testing: intact  Vascular  Capillary refills: < 3 seconds      Left Foot/Ankle  Left Foot Inspection  Skin Exam: skin normal and skin intactno dry skin, no warmth, no erythema, no maceration, normal color, no pre-ulcer, no ulcer and no callus                         Toe Exam: swellingno left toe deformity                   Sensory       Monofilament: intact  Vascular  Capillary refills: < 3 seconds  The left DP pulse is 2+  Assign Risk Category:  No deformity present; No loss of protective sensation;  No weak pulses       Risk: Sari Marinelli MD

## 2019-09-26 NOTE — ASSESSMENT & PLAN NOTE
Elevated in office today  Discussed getting home monitor and checking BP 4-5x per week  Call or come back if readings consistently elevated  Will continue to monitor

## 2019-09-26 NOTE — PROGRESS NOTES
Assessment/Plan:     No problem-specific Assessment & Plan notes found for this encounter  {Assess/PlanSmartLinks:32219}     Subjective:     Patient ID: Juliann Bianchi is a 60 y o  male  HPI    Review of Systems      Objective:     Physical Exam      Vitals:    09/26/19 1041   BP: 130/68   BP Location: Left arm   Patient Position: Sitting   Cuff Size: Large   Pulse: 101   Resp: 16   Temp: 97 7 °F (36 5 °C)   TempSrc: Tympanic   SpO2: 96%   Weight: 116 kg (256 lb)   Height: 5' 7" (1 702 m)       Transitional Care Management Review:  Juliann Bianchi is a 60 y o  male here for TCM follow up  During the TCM phone call patient stated:    TCM Call (since 8/26/2019)     Date and time call was made  9/17/2019  1:14 PM    Hospital care reviewed  Records reviewed    Patient was hospitial[facility] at  [facility]    Date of Admission  09/13/19    Date of discharge  09/15/19    Diagnosis   Left total knee arthroplasty    Disposition  Home    Current Symptoms  -- <img src='C:FILES (X86)      TCM Call (since 8/26/2019)     Post hospital issues  None    Should patient be enrolled in anticoag monitoring? No    Scheduled for follow up?   Yes    Referrals needed  PT    Did you obtain your prescribed medications  Yes    Do you need help managing your prescriptions or medications  No    Is transportation to your appointment needed  No    I have advised the patient to call PCP with any new or worsening symptoms  ARINA Madden    Are you recieving any outpatient services  Yes    What type of services  PT    Are you recieving home care services  No    Are you using any community resources  No    Current waiver services  No    Have you fallen in the last 12 months  No    Interperter language line needed  No          Erin Fortune

## 2019-09-26 NOTE — ASSESSMENT & PLAN NOTE
Lab Results   Component Value Date    HGBA1C 6 7 (H) 08/12/2019    Well controlled  Continue same  Will continue to monitor  He is not medications  Follow low carb diet  He stated he will start exercising after his knee feels better

## 2019-09-26 NOTE — ASSESSMENT & PLAN NOTE
Weight down from knee replacement  Discussed low carb, low fat diet and increasing physical activity as knee heals

## 2019-09-26 NOTE — PROGRESS NOTES
Daily Note     Today's date: 2019  Patient name: Ramsey Ang  : 1960  MRN: [id number]  Referring provider: Adolfo Briceño PA-C  Dx:   Encounter Diagnosis     ICD-10-CM    1  Aftercare following left knee joint replacement surgery Z47 1     Z96 652    2  Acute pain of left knee M25 562    3  Primary osteoarthritis of left knee M17 12    4  Acute pain of right knee M25 561    5  Aftercare following right knee joint replacement surgery Z47 1     Z96 651                   Subjective: pt reports occasional pain on the inside of his knee while doing his exercises  He has also been walking with his cane instead of his walker and feels comfortable with it  Objective: See treatment diary below  AROM ext -10,  flex 90  PROM ext -6, flex 92      Assessment: Tolerated treatment well  Patient has increased strength and motion in his knee since last session and is able to perform a straight leg raise with more ease  Patient should continue therapy and progress strength and motion exercises  Plan: Continue per plan of care  Progress treatment as tolerated         Precautions: Current L TKA, previous R TKA      Manual  19        PROM JF VK VK  TE        Fib head mobs JF JF                                                      Exercise Diary  19        Quad sets HEP 3x10x5" 3x10x5" 3x10, 5' 3x10, 5"        Heel slides  HEP 2x10x5" 3x10x5" 3x10, 5" 3x10, 5"        LAQ HEP 2x10x5" 3x10x5" 3x10, 5" 3x10, 5"        Mini squats HEP np 2x10 2x10 2x10        Standing hip abd HEP 10xL 2x10 2x10 2x10         Heel prop stretch HEP 5 min 5 min  5'        HR   30x 30x 30x        SLR    3x10 3x10                                                                                                                                                                        Modalities  19           Ice with elevation/ankle pumps to finish 10 min 8 min 1:1 treatment [id number]

## 2019-09-26 NOTE — ASSESSMENT & PLAN NOTE
Patient recovering well from R knee arthroplasty  Incision site healing without complications  Continue with PT and follow up with ortho  Elevated WBC count and hyponatremia upon discharge  Will recheck CBC and CMP

## 2019-09-30 ENCOUNTER — OFFICE VISIT (OUTPATIENT)
Dept: PHYSICAL THERAPY | Age: 59
End: 2019-09-30
Payer: COMMERCIAL

## 2019-09-30 DIAGNOSIS — Z96.651 AFTERCARE FOLLOWING RIGHT KNEE JOINT REPLACEMENT SURGERY: ICD-10-CM

## 2019-09-30 DIAGNOSIS — Z47.1 AFTERCARE FOLLOWING RIGHT KNEE JOINT REPLACEMENT SURGERY: ICD-10-CM

## 2019-09-30 DIAGNOSIS — Z47.1 AFTERCARE FOLLOWING LEFT KNEE JOINT REPLACEMENT SURGERY: Primary | ICD-10-CM

## 2019-09-30 DIAGNOSIS — Z96.652 AFTERCARE FOLLOWING LEFT KNEE JOINT REPLACEMENT SURGERY: Primary | ICD-10-CM

## 2019-09-30 DIAGNOSIS — M17.12 PRIMARY OSTEOARTHRITIS OF LEFT KNEE: ICD-10-CM

## 2019-09-30 DIAGNOSIS — M25.562 ACUTE PAIN OF LEFT KNEE: ICD-10-CM

## 2019-09-30 DIAGNOSIS — M25.561 ACUTE PAIN OF RIGHT KNEE: ICD-10-CM

## 2019-09-30 PROCEDURE — 97140 MANUAL THERAPY 1/> REGIONS: CPT | Performed by: PHYSICAL THERAPIST

## 2019-09-30 PROCEDURE — 97112 NEUROMUSCULAR REEDUCATION: CPT | Performed by: PHYSICAL THERAPIST

## 2019-09-30 PROCEDURE — 97110 THERAPEUTIC EXERCISES: CPT | Performed by: PHYSICAL THERAPIST

## 2019-09-30 NOTE — PROGRESS NOTES
Daily Note     Today's date: 2019  Patient name: Carvel Riding  : 1960  MRN: [id number]  Referring provider: Hilary Walters PA-C  Dx:   Encounter Diagnosis     ICD-10-CM    1  Aftercare following left knee joint replacement surgery Z47 1     Z96 652    2  Acute pain of left knee M25 562    3  Primary osteoarthritis of left knee M17 12    4  Acute pain of right knee M25 561    5  Aftercare following right knee joint replacement surgery Z47 1     Z96 651        Start Time: 0800  Stop Time: 0845  Total time in clinic (min): 45 minutes    Subjective: Pt reports improvements in symptoms  Objective: See treatment diary below    PROM: -6 - 100 degrees      Assessment: Tolerated treatment well  Pt's POC was progressed to include more closed chain strengthening to increase tolerance to functional transfers  Patient demonstrated fatigue post treatment and would benefit from continued PT      Plan: Continue per plan of care        Precautions: Current L TKA, previous R TKA      Manual  19       PROM JF VK VK  TE JF       Fib head mobs JF JF                                                      Exercise Diary  19       Quad sets HEP 3x10x5" 3x10x5" 3x10, 5' 3x10, 5" 3*10*5"       Heel slides  HEP 2x10x5" 3x10x5" 3x10, 5" 3x10, 5" 3*10*5"       LAQ HEP 2x10x5" 3x10x5" 3x10, 5" 3x10, 5" 3*10*5"       Mini squats HEP np 2x10 2x10 2x10 3*10       Standing hip abd HEP 10xL 2x10 2x10 2x10  3*10       Heel prop stretch HEP 5 min 5 min  5' 5'       HR   30x 30x 30x 30*       SLR    3x10 3x10 3*10       Step-ups 4"      3*10                                                                                                                                                          Modalities  19           Ice with elevation/ankle pumps to finish 10 min 8 min

## 2019-10-01 ENCOUNTER — HOSPITAL ENCOUNTER (OUTPATIENT)
Dept: RADIOLOGY | Facility: HOSPITAL | Age: 59
Discharge: HOME | End: 2019-10-01
Attending: ORTHOPAEDIC SURGERY
Payer: COMMERCIAL

## 2019-10-01 ENCOUNTER — OFFICE VISIT (OUTPATIENT)
Dept: OBGYN CLINIC | Facility: HOSPITAL | Age: 59
End: 2019-10-01

## 2019-10-01 VITALS — BODY MASS INDEX: 41.25 KG/M2 | WEIGHT: 262.79 LBS | HEIGHT: 67 IN

## 2019-10-01 DIAGNOSIS — Z96.652 S/P TOTAL KNEE REPLACEMENT, LEFT: ICD-10-CM

## 2019-10-01 DIAGNOSIS — Z48.89 AFTERCARE FOLLOWING SURGERY: Primary | ICD-10-CM

## 2019-10-01 DIAGNOSIS — Z48.89 AFTERCARE FOLLOWING SURGERY: ICD-10-CM

## 2019-10-01 PROCEDURE — 73560 X-RAY EXAM OF KNEE 1 OR 2: CPT

## 2019-10-01 PROCEDURE — 99024 POSTOP FOLLOW-UP VISIT: CPT | Performed by: ORTHOPAEDIC SURGERY

## 2019-10-01 RX ORDER — CELECOXIB 100 MG/1
100 CAPSULE ORAL 2 TIMES DAILY
Qty: 20 CAPSULE | Refills: 0 | Status: SHIPPED | OUTPATIENT
Start: 2019-10-01 | End: 2019-11-25

## 2019-10-01 NOTE — PROGRESS NOTES
Assessment:    Status post left total knee arthroplasty    Plan:     Weight Bearing  as Tolerated   Physical therapy range of motion strengthening   Patient takes Celebrex at this time ambulatory   Continue Lovenox for DVT prophylaxis   Follow-up in 2 months time repeat x-rays two views left knee        The above stated was discussed in layman's terms and the patient expressed understanding  All questions were answered to the patient's satisfaction  Subjective:   Raelyn Ormond is a 60 y o  male who presents  status post 2 weeks left total knee arthroplasty  Patient states he is doing well pain is controlled with tramadol  States he has been taking his Lovenox for DVT prophylaxis as directed  He is ambulate with assist of a cane participate in physical therapy increasing motion strengthening  Denies any shortness of breath chest pain calf pain numbness tingling left lower extremity        Review of systems negative unless otherwise specified in HPI    Past Medical History:   Diagnosis Date    Anesthesia complication     difficulty awakening- dyspnea    Anxiety     Arthritis     Cleft hard palate     Glaucoma suspect, both eyes     Hyperlipidemia     Kidney stone     left    Right inguinal hernia     Right ureteral stone     Seasonal allergies     Wears dentures     partial upper    Wears glasses        Past Surgical History:   Procedure Laterality Date    CLEFT PALATE REPAIR      INGUINAL HERNIA REPAIR Right     KNEE CARTILAGE SURGERY Left     WA CYSTO/URETERO W/LITHOTRIPSY &INDWELL STENT INSRT Right 8/4/2017    Procedure: CYSTOSCOPY URETEROSCOPY WITH LITHOTRIPSY HOLMIUM LASER, RETROGRADE PYELOGRAM AND INSERTION STENT URETERAL;  Surgeon: Gilbert Ramirez MD;  Location: [facility];  Service: Urology    WA TOTAL KNEE ARTHROPLASTY Right 3/4/2019    Procedure: TOTAL KNEE ARTHROPLASTY;  Surgeon: Gila Cruz MD;  Location:  MAIN OR;  Service: Orthopedics    WA TOTAL KNEE ARTHROPLASTY Left 9/13/2019    Procedure: ARTHROPLASTY KNEE TOTAL;  Surgeon: Adilia Dukes MD;  Location: [facility];  Service: Orthopedics    TOTAL KNEE ARTHROPLASTY Left        Family History   Problem Relation Age of Onset    Diabetes Mother     Heart disease Mother     Hypertension Mother     Esophageal cancer Father     Diabetes Sister     Other Sister        Social History     Occupational History    Not on file   Tobacco Use    Smoking status: Never Smoker    Smokeless tobacco: Never Used    Tobacco comment: no passive smoke exposure   Substance and Sexual Activity    Alcohol use: Yes     Frequency: 2-4 times a month    Drug use: No    Sexual activity: Yes     Partners: Female         Current Outpatient Medications:     acetaminophen (TYLENOL) 650 mg CR tablet, Take 1 tablet (650 mg total) by mouth every 8 (eight) hours as needed for mild pain, Disp: 30 tablet, Rfl: 0    ascorbic acid (VITAMIN C) 500 MG tablet, Take 1 tablet (500 mg total) by mouth daily, Disp: 60 tablet, Rfl: 0    cetirizine (ZyrTEC) 10 mg tablet, Take 10 mg by mouth daily, Disp: , Rfl:     Cholecalciferol 2000 units CAPS, Take by mouth, Disp: , Rfl:     diphenhydrAMINE (BENADRYL) 25 mg tablet, Take 1 tablet (25 mg total) by mouth daily at bedtime as needed for itching (Patient not taking: Reported on 9/26/2019), Disp: 30 tablet, Rfl: 0    docusate sodium (COLACE) 100 mg capsule, Take 1 capsule (100 mg total) by mouth 2 (two) times a day (Patient not taking: Reported on 9/26/2019), Disp: 10 capsule, Rfl: 0    enoxaparin (LOVENOX) 40 mg/0 4 mL, 1 subcutaneous injection daily x 28 days AFTER surgery, Disp: 28 Syringe, Rfl: 0    ferrous sulfate 324 (65 Fe) mg, Take 1 tablet (324 mg total) by mouth 2 (two) times a day before meals (Patient not taking: Reported on 9/26/2019), Disp: 60 tablet, Rfl: 0    fluticasone (FLONASE) 50 mcg/act nasal spray, Blow nose; shake bottle; place tip in each nostril and tilt towards eye; hold breath and press plunger; do this 2 times daily prn, Disp: , Rfl:     folic acid (FOLVITE) 1 mg tablet, Take 1 tablet (1 mg total) by mouth daily (Patient not taking: Reported on 9/26/2019), Disp: 30 tablet, Rfl: 0    Multiple Vitamin (MULTIVITAMIN) tablet, Take 2 tablets by mouth daily, Disp: , Rfl:     Omega-3 Fatty Acids (FISH OIL PO), Take 1 capsule by mouth daily, Disp: , Rfl:     oxyCODONE (ROXICODONE) 5 mg immediate release tablet, Take 1 tablets every 6 hrs as needed for pain control (Patient not taking: Reported on 9/26/2019), Disp: 30 tablet, Rfl: 0    simvastatin (ZOCOR) 40 mg tablet, TAKE 1 TABLET DAILY AT BEDTIME, Disp: 90 tablet, Rfl: 0    traMADol (ULTRAM) 50 mg tablet, Take 50 mg by mouth every 4 (four) hours, Disp: , Rfl:     Allergies   Allergen Reactions    No Active Allergies           There were no vitals filed for this visit  Objective:            Physical Exam  · General: Awake, Alert, Oriented  · Eyes: Pupils equal, round and reactive to light  · Heart: regular rate and rhythm  · Lungs: No audible wheezing  · Examination of the left knee well-healing anterior incision staples removed no erythema full passive extension and flexion to 95° of the left knee calf nontender palpation active ankle dorsi plantar flexion without pain sensation intact distal pulses present                    Ortho Exam      Neurovascularly Intact Distally     Diagnostics, reviewed and taken today if performed as documented:    X-rays reviewed personally office today x-rays reveal well placed left total knee arthroplasty without evidence of loosening      The attending physician has personally reviewed the pertinent films in PACS and interpretation is as follows:      Procedures, if performed today:    Procedures    None performed      Scribe Attestation    I,:    am acting as a scribe while in the presence of the attending physician :        I,:    personally performed the services described in this documentation    as scribed in my presence :              Portions of the record may have been created with voice recognition software  Occasional wrong word or "sound a like" substitutions may have occurred due to the inherent limitations of voice recognition software  Read the chart carefully and recognize, using context, where substitutions have occurred

## 2019-10-02 ENCOUNTER — APPOINTMENT (OUTPATIENT)
Dept: LAB | Facility: IMAGING CENTER | Age: 59
End: 2019-10-02
Payer: COMMERCIAL

## 2019-10-02 DIAGNOSIS — Z76.89 ENCOUNTER FOR SUPPORT AND COORDINATION OF TRANSITION OF CARE: ICD-10-CM

## 2019-10-02 DIAGNOSIS — E11.9 TYPE 2 DIABETES MELLITUS WITHOUT COMPLICATION, WITHOUT LONG-TERM CURRENT USE OF INSULIN (HCC): ICD-10-CM

## 2019-10-02 DIAGNOSIS — E78.5 HYPERLIPIDEMIA, UNSPECIFIED HYPERLIPIDEMIA TYPE: ICD-10-CM

## 2019-10-03 ENCOUNTER — OFFICE VISIT (OUTPATIENT)
Dept: PHYSICAL THERAPY | Age: 59
End: 2019-10-03
Payer: COMMERCIAL

## 2019-10-03 ENCOUNTER — OFFICE VISIT (OUTPATIENT)
Dept: FAMILY MEDICINE CLINIC | Facility: CLINIC | Age: 59
End: 2019-10-03
Payer: COMMERCIAL

## 2019-10-03 ENCOUNTER — APPOINTMENT (OUTPATIENT)
Dept: LAB | Facility: IMAGING CENTER | Age: 59
End: 2019-10-03
Payer: COMMERCIAL

## 2019-10-03 VITALS
DIASTOLIC BLOOD PRESSURE: 78 MMHG | HEART RATE: 100 BPM | RESPIRATION RATE: 16 BRPM | TEMPERATURE: 98.3 F | SYSTOLIC BLOOD PRESSURE: 122 MMHG | WEIGHT: 261 LBS | HEIGHT: 67 IN | OXYGEN SATURATION: 98 % | BODY MASS INDEX: 40.97 KG/M2

## 2019-10-03 DIAGNOSIS — Z96.651 AFTERCARE FOLLOWING RIGHT KNEE JOINT REPLACEMENT SURGERY: ICD-10-CM

## 2019-10-03 DIAGNOSIS — Z47.1 AFTERCARE FOLLOWING LEFT KNEE JOINT REPLACEMENT SURGERY: Primary | ICD-10-CM

## 2019-10-03 DIAGNOSIS — E78.49 OTHER HYPERLIPIDEMIA: ICD-10-CM

## 2019-10-03 DIAGNOSIS — M25.562 ACUTE PAIN OF LEFT KNEE: ICD-10-CM

## 2019-10-03 DIAGNOSIS — Z96.652 AFTERCARE FOLLOWING LEFT KNEE JOINT REPLACEMENT SURGERY: Primary | ICD-10-CM

## 2019-10-03 DIAGNOSIS — M17.12 PRIMARY OSTEOARTHRITIS OF LEFT KNEE: ICD-10-CM

## 2019-10-03 DIAGNOSIS — Z47.1 AFTERCARE FOLLOWING RIGHT KNEE JOINT REPLACEMENT SURGERY: ICD-10-CM

## 2019-10-03 DIAGNOSIS — Z23 ENCOUNTER FOR IMMUNIZATION: ICD-10-CM

## 2019-10-03 DIAGNOSIS — E11.9 TYPE 2 DIABETES MELLITUS WITHOUT COMPLICATION, WITHOUT LONG-TERM CURRENT USE OF INSULIN (HCC): Primary | ICD-10-CM

## 2019-10-03 DIAGNOSIS — M25.561 ACUTE PAIN OF RIGHT KNEE: ICD-10-CM

## 2019-10-03 DIAGNOSIS — E66.01 MORBID OBESITY WITH BMI OF 40.0-44.9, ADULT (HCC): ICD-10-CM

## 2019-10-03 LAB
25(OH)D3 SERPL-MCNC: 36.6 NG/ML (ref 30–100)
ALBUMIN SERPL BCP-MCNC: 3.6 G/DL (ref 3.5–5)
ALP SERPL-CCNC: 60 U/L (ref 46–116)
ALT SERPL W P-5'-P-CCNC: 44 U/L (ref 12–78)
ANION GAP SERPL CALCULATED.3IONS-SCNC: 4 MMOL/L (ref 4–13)
AST SERPL W P-5'-P-CCNC: 27 U/L (ref 5–45)
BASOPHILS # BLD AUTO: 0.1 THOUSANDS/ΜL (ref 0–0.1)
BASOPHILS NFR BLD AUTO: 1 % (ref 0–1)
BILIRUB SERPL-MCNC: 0.56 MG/DL (ref 0.2–1)
BUN SERPL-MCNC: 19 MG/DL (ref 5–25)
CALCIUM SERPL-MCNC: 9.6 MG/DL (ref 8.3–10.1)
CHLORIDE SERPL-SCNC: 106 MMOL/L (ref 100–108)
CHOLEST SERPL-MCNC: 154 MG/DL (ref 50–200)
CO2 SERPL-SCNC: 28 MMOL/L (ref 21–32)
CREAT SERPL-MCNC: 1 MG/DL (ref 0.6–1.3)
CREAT UR-MCNC: 317 MG/DL
EOSINOPHIL # BLD AUTO: 0.43 THOUSAND/ΜL (ref 0–0.61)
EOSINOPHIL NFR BLD AUTO: 6 % (ref 0–6)
ERYTHROCYTE [DISTWIDTH] IN BLOOD BY AUTOMATED COUNT: 12.9 % (ref 11.6–15.1)
EST. AVERAGE GLUCOSE BLD GHB EST-MCNC: 157 MG/DL
GFR SERPL CREATININE-BSD FRML MDRD: 81 ML/MIN/1.73SQ M
GLUCOSE P FAST SERPL-MCNC: 119 MG/DL (ref 65–99)
HBA1C MFR BLD: 7.1 % (ref 4.2–6.3)
HCT VFR BLD AUTO: 41.9 % (ref 36.5–49.3)
HDLC SERPL-MCNC: 32 MG/DL (ref 40–60)
HGB BLD-MCNC: 13.2 G/DL (ref 12–17)
IMM GRANULOCYTES # BLD AUTO: 0.02 THOUSAND/UL (ref 0–0.2)
IMM GRANULOCYTES NFR BLD AUTO: 0 % (ref 0–2)
LDLC SERPL CALC-MCNC: 67 MG/DL (ref 0–100)
LYMPHOCYTES # BLD AUTO: 2.77 THOUSANDS/ΜL (ref 0.6–4.47)
LYMPHOCYTES NFR BLD AUTO: 36 % (ref 14–44)
MCH RBC QN AUTO: 28.2 PG (ref 26.8–34.3)
MCHC RBC AUTO-ENTMCNC: 31.5 G/DL (ref 31.4–37.4)
MCV RBC AUTO: 90 FL (ref 82–98)
MICROALBUMIN UR-MCNC: 58.5 MG/L (ref 0–20)
MICROALBUMIN/CREAT 24H UR: 18 MG/G CREATININE (ref 0–30)
MONOCYTES # BLD AUTO: 0.65 THOUSAND/ΜL (ref 0.17–1.22)
MONOCYTES NFR BLD AUTO: 8 % (ref 4–12)
NEUTROPHILS # BLD AUTO: 3.76 THOUSANDS/ΜL (ref 1.85–7.62)
NEUTS SEG NFR BLD AUTO: 49 % (ref 43–75)
NRBC BLD AUTO-RTO: 0 /100 WBCS
PLATELET # BLD AUTO: 445 THOUSANDS/UL (ref 149–390)
PMV BLD AUTO: 10.7 FL (ref 8.9–12.7)
POTASSIUM SERPL-SCNC: 4.3 MMOL/L (ref 3.5–5.3)
PROT SERPL-MCNC: 7.9 G/DL (ref 6.4–8.2)
RBC # BLD AUTO: 4.68 MILLION/UL (ref 3.88–5.62)
SODIUM SERPL-SCNC: 138 MMOL/L (ref 136–145)
TRIGL SERPL-MCNC: 274 MG/DL
WBC # BLD AUTO: 7.73 THOUSAND/UL (ref 4.31–10.16)

## 2019-10-03 PROCEDURE — 97110 THERAPEUTIC EXERCISES: CPT

## 2019-10-03 PROCEDURE — 80053 COMPREHEN METABOLIC PANEL: CPT

## 2019-10-03 PROCEDURE — 90471 IMMUNIZATION ADMIN: CPT

## 2019-10-03 PROCEDURE — 90682 RIV4 VACC RECOMBINANT DNA IM: CPT

## 2019-10-03 PROCEDURE — 99213 OFFICE O/P EST LOW 20 MIN: CPT | Performed by: FAMILY MEDICINE

## 2019-10-03 PROCEDURE — 85025 COMPLETE CBC W/AUTO DIFF WBC: CPT

## 2019-10-03 PROCEDURE — 36415 COLL VENOUS BLD VENIPUNCTURE: CPT

## 2019-10-03 PROCEDURE — 82306 VITAMIN D 25 HYDROXY: CPT

## 2019-10-03 PROCEDURE — 82570 ASSAY OF URINE CREATININE: CPT | Performed by: FAMILY MEDICINE

## 2019-10-03 PROCEDURE — 82043 UR ALBUMIN QUANTITATIVE: CPT | Performed by: FAMILY MEDICINE

## 2019-10-03 PROCEDURE — 83036 HEMOGLOBIN GLYCOSYLATED A1C: CPT

## 2019-10-03 PROCEDURE — 97112 NEUROMUSCULAR REEDUCATION: CPT

## 2019-10-03 PROCEDURE — 80061 LIPID PANEL: CPT

## 2019-10-03 PROCEDURE — 97140 MANUAL THERAPY 1/> REGIONS: CPT

## 2019-10-03 NOTE — ASSESSMENT & PLAN NOTE
Well controlled  Continue same  Will continue to monitor  It was discussed about low carb diet and regular exercise  I will check his urine microalbumin    Lab Results   Component Value Date    HGBA1C 6 7 (H) 08/12/2019

## 2019-10-03 NOTE — PROGRESS NOTES
Assessment/Plan:  Type 2 diabetes mellitus without complication, without long-term current use of insulin ([address] )  Well controlled  Continue same  Will continue to monitor  It was discussed about low carb diet and regular exercise  I will check his urine microalbumin  Lab Results   Component Value Date    HGBA1C 6 7 (H) 08/12/2019       Morbid obesity with BMI of 40 0-44 9, adult ([facility] [address] )  It was discussed about low carb diet and regular exercise  Hyperlipidemia  Continue on simvastatin  It was discussed about low-fat diet and regular exercise  We will continue to monitor  Diagnoses and all orders for this visit:    Type 2 diabetes mellitus without complication, without long-term current use of insulin ([facility] [address] )    Encounter for immunization  -     influenza vaccine, [id number], quadrivalent, recombinant, PF, 0 5 mL, for patients 18 yr+ (FLUBLOK)    Other hyperlipidemia    Morbid obesity with BMI of 40 0-44 9, adult ([facility] [address] )          There are no Patient Instructions on file for this visit  Return in about 6 months (around 4/3/2020)  Subjective:      Patient ID: Shen Greenfield is a 60 y o  male  Chief Complaint   Patient presents with    Physical Exam       He is here today for follow-up multiple medical problems  He has been taking all his medications  He denies any side effects from his medications  He had recent left knee total arthroplasty  He has been doing well since then  He has been following with physical therapy  He did not get his blood work done yet his going to do it today after he leaves the office  The following portions of the patient's history were reviewed and updated as appropriate: allergies, current medications, past family history, past medical history, past social history, past surgical history and problem list     Review of Systems   Constitutional: Negative for chills and fever  HENT: Negative for trouble swallowing  Eyes: Negative for visual disturbance  Respiratory: Negative for cough and shortness of breath  Cardiovascular: Negative for chest pain and palpitations  Gastrointestinal: Negative for abdominal pain, blood in stool and vomiting  Endocrine: Negative for cold intolerance and heat intolerance  Genitourinary: Negative for difficulty urinating and dysuria  Musculoskeletal: Negative for gait problem  Skin: Negative for rash  Neurological: Negative for dizziness, syncope and headaches  Hematological: Negative for adenopathy  Psychiatric/Behavioral: Negative for behavioral problems           Current Outpatient Medications   Medication Sig Dispense Refill    acetaminophen (TYLENOL) 650 mg CR tablet Take 1 tablet (650 mg total) by mouth every 8 (eight) hours as needed for mild pain 30 tablet 0    ascorbic acid (VITAMIN C) 500 MG tablet Take 1 tablet (500 mg total) by mouth daily 60 tablet 0    celecoxib (CeleBREX) 100 mg capsule Take 1 capsule (100 mg total) by mouth 2 (two) times a day 20 capsule 0    cetirizine (ZyrTEC) 10 mg tablet Take 10 mg by mouth daily      Cholecalciferol 2000 units CAPS Take by mouth      enoxaparin (LOVENOX) 40 mg/0 4 mL 1 subcutaneous injection daily x 28 days AFTER surgery 28 Syringe 0    fluticasone (FLONASE) 50 mcg/act nasal spray Blow nose; shake bottle; place tip in each nostril and tilt towards eye; hold breath and press plunger; do this 2 times daily prn      Multiple Vitamin (MULTIVITAMIN) tablet Take 2 tablets by mouth daily      Omega-3 Fatty Acids (FISH OIL PO) Take 1 capsule by mouth daily      simvastatin (ZOCOR) 40 mg tablet TAKE 1 TABLET DAILY AT BEDTIME 90 tablet 0    traMADol (ULTRAM) 50 mg tablet Take 50 mg by mouth every 4 (four) hours      diphenhydrAMINE (BENADRYL) 25 mg tablet Take 1 tablet (25 mg total) by mouth daily at bedtime as needed for itching (Patient not taking: Reported on 9/26/2019) 30 tablet 0    docusate sodium (COLACE) 100 mg capsule Take 1 capsule (100 mg total) by mouth 2 (two) times a day (Patient not taking: Reported on 9/26/2019) 10 capsule 0    ferrous sulfate 324 (65 Fe) mg Take 1 tablet (324 mg total) by mouth 2 (two) times a day before meals (Patient not taking: Reported on 9/26/2019) 60 tablet 0    folic acid (FOLVITE) 1 mg tablet Take 1 tablet (1 mg total) by mouth daily (Patient not taking: Reported on 9/26/2019) 30 tablet 0    oxyCODONE (ROXICODONE) 5 mg immediate release tablet Take 1 tablets every 6 hrs as needed for pain control (Patient not taking: Reported on 9/26/2019) 30 tablet 0     No current facility-administered medications for this visit  Objective:    /78 (BP Location: Left arm, Patient Position: Sitting, Cuff Size: Large)   Pulse 100   Temp 98 3 °F (36 8 °C) (Tympanic)   Resp 16   Ht 5' 7" (1 702 m)   Wt 118 kg (261 lb)   SpO2 98%   BMI 40 88 kg/m²        Physical Exam   Constitutional: He is oriented to person, place, and time  He appears well-developed and well-nourished  HENT:   Head: Normocephalic and atraumatic  Eyes: Pupils are equal, round, and reactive to light  EOM are normal    Neck: Normal range of motion  Neck supple  Cardiovascular: Normal rate, regular rhythm and normal heart sounds  Pulmonary/Chest: Effort normal and breath sounds normal    Abdominal: Soft  Bowel sounds are normal    Musculoskeletal: Normal range of motion  He exhibits no edema  Lymphadenopathy:     He has no cervical adenopathy  Neurological: He is alert and oriented to person, place, and time  No cranial nerve deficit  Skin: Skin is warm  No rash noted  Psychiatric: He has a normal mood and affect  Nursing note and vitals reviewed               Berta Luis MD

## 2019-10-03 NOTE — ASSESSMENT & PLAN NOTE
Continue on simvastatin  It was discussed about low-fat diet and regular exercise  We will continue to monitor

## 2019-10-03 NOTE — PROGRESS NOTES
Daily Note     Today's date: 10/3/2019  Patient name: Citlaly Ohcoa  : 1960  MRN: [id number]  Referring provider: Kelsey Choi PA-C  Dx:   Encounter Diagnosis     ICD-10-CM    1  Aftercare following left knee joint replacement surgery Z47 1     Z96 652    2  Acute pain of left knee M25 562    3  Primary osteoarthritis of left knee M17 12    4  Acute pain of right knee M25 561    5  Aftercare following right knee joint replacement surgery Z47 1     Z96 [id number]                   Subjective: pt reports gradual improvement with mobility and pain levels, pt reports he's driving now and starting to sleep better      Objective: See treatment diary below  A/PROM 8-108 degrees/ 5-112 degrees      Assessment:  A/PROM L knee gradually improving, pt starting to amb without SPC with increased Trendelenberg, overall decreased swelling L knee/lower leg      Plan: Continue per plan of care  Progress treatment as tolerated         Precautions: Current L TKA, previous R TKA      Manual  9/16 9/18/19 9/20/19 9/23 9/26 9/30 10/3/19      PROM JF VK VK  TE JF VK      Fib head mobs JF JF                                                      Exercise Diary  9/16 9/18/19 9/20/19 9/23/19 9/26/19 9/30 10/3/19      Quad sets HEP 3x10x5" 3x10x5" 3x10, 5' 3x10, 5" 3*10*5" 3x10x5"      Heel slides  HEP 2x10x5" 3x10x5" 3x10, 5" 3x10, 5" 3*10*5" 3x10x5"      LAQ HEP 2x10x5" 3x10x5" 3x10, 5" 3x10, 5" 3*10*5" 3x10x5"      Mini squats HEP np 2x10 2x10 2x10 3*10 3x10      Standing hip abd HEP 10xL 2x10 2x10 2x10  3*10 3x10      Heel prop stretch HEP 5 min 5 min  5' 5' 5'      HR   30x 30x 30x 30* 30x      SLR    3x10 3x10 3*10 3x10      Step-ups 4"      3*10 3x10      Wall ball       2x10x5"                                                                                                                                            Modalities  19           Ice with elevation/ankle pumps to finish 10 min 8 min

## 2019-10-07 ENCOUNTER — OFFICE VISIT (OUTPATIENT)
Dept: PHYSICAL THERAPY | Age: 59
End: 2019-10-07
Payer: COMMERCIAL

## 2019-10-07 DIAGNOSIS — Z47.1 AFTERCARE FOLLOWING LEFT KNEE JOINT REPLACEMENT SURGERY: Primary | ICD-10-CM

## 2019-10-07 DIAGNOSIS — Z96.652 AFTERCARE FOLLOWING LEFT KNEE JOINT REPLACEMENT SURGERY: Primary | ICD-10-CM

## 2019-10-07 DIAGNOSIS — M25.562 ACUTE PAIN OF LEFT KNEE: ICD-10-CM

## 2019-10-07 DIAGNOSIS — M17.12 PRIMARY OSTEOARTHRITIS OF LEFT KNEE: ICD-10-CM

## 2019-10-07 PROCEDURE — 97110 THERAPEUTIC EXERCISES: CPT | Performed by: PHYSICAL THERAPIST

## 2019-10-07 PROCEDURE — 97140 MANUAL THERAPY 1/> REGIONS: CPT | Performed by: PHYSICAL THERAPIST

## 2019-10-07 PROCEDURE — 97112 NEUROMUSCULAR REEDUCATION: CPT | Performed by: PHYSICAL THERAPIST

## 2019-10-07 NOTE — PROGRESS NOTES
Daily Note     Today's date: 10/7/2019  Patient name: Aki Ashley  : 1960  MRN: [id number]  Referring provider: Liliya Lemus PA-C  Dx:   Encounter Diagnosis     ICD-10-CM    1  Aftercare following left knee joint replacement surgery Z47 1     Z96 652    2  Acute pain of left knee M25 562    3  Primary osteoarthritis of left knee M17 12        Start Time: 1145  Stop Time: 1230  Total time in clinic (min): 45 minutes    Subjective: Pt reports improvements in tolerance to community ambulation  Objective: See treatment diary below      Assessment: Tolerated treatment well  Pt's POC was progressed to include more closed chain interventions to increase tolerance to functional transfers  Patient demonstrated fatigue post treatment and would benefit from continued PT      Plan: Continue per plan of care        Precautions: Current L TKA, previous R TKA      Manual  9/16 9/18/19 9/20/19 9/23 9/26 9/30 10/3/19      PROM JF VK VK  TE JF VK      Fib head mobs JF JF                                                      Exercise Diary  9/16 9/18/19 9/20/19 9/23/19 9/26/19 9/30 10/3/19 10/7     Quad sets HEP 3x10x5" 3x10x5" 3x10, 5' 3x10, 5" 3*10*5" 3x10x5" 2*10*5"     Heel slides  HEP 2x10x5" 3x10x5" 3x10, 5" 3x10, 5" 3*10*5" 3x10x5"      LAQ HEP 2x10x5" 3x10x5" 3x10, 5" 3x10, 5" 3*10*5" 3x10x5" 3*10*5"     Mini squats HEP np 2x10 2x10 2x10 3*10 3x10 3*10     Standing hip abd HEP 10xL 2x10 2x10 2x10  3*10 3x10 2*10     Heel prop stretch HEP 5 min 5 min  5' 5' 5' 5'     HR   30x 30x 30x 30* 30x      SLR    3x10 3x10 3*10 3x10 3*10     Step-ups 4"      3*10 3x10 3*10 6"     Wall ball       2x10x5" 3*10*5" TKE CC     STS        2*10 foam                                                                                                                              Modalities  19           Ice with elevation/ankle pumps to finish 10 min 8 min

## 2019-10-10 ENCOUNTER — OFFICE VISIT (OUTPATIENT)
Dept: PHYSICAL THERAPY | Age: 59
End: 2019-10-10
Payer: COMMERCIAL

## 2019-10-10 ENCOUNTER — TELEPHONE (OUTPATIENT)
Dept: FAMILY MEDICINE CLINIC | Facility: CLINIC | Age: 59
End: 2019-10-10

## 2019-10-10 DIAGNOSIS — Z47.1 AFTERCARE FOLLOWING LEFT KNEE JOINT REPLACEMENT SURGERY: Primary | ICD-10-CM

## 2019-10-10 DIAGNOSIS — M25.562 ACUTE PAIN OF LEFT KNEE: ICD-10-CM

## 2019-10-10 DIAGNOSIS — Z96.652 AFTERCARE FOLLOWING LEFT KNEE JOINT REPLACEMENT SURGERY: Primary | ICD-10-CM

## 2019-10-10 PROCEDURE — 97112 NEUROMUSCULAR REEDUCATION: CPT | Performed by: PHYSICAL THERAPIST

## 2019-10-10 PROCEDURE — 97140 MANUAL THERAPY 1/> REGIONS: CPT | Performed by: PHYSICAL THERAPIST

## 2019-10-10 PROCEDURE — 97110 THERAPEUTIC EXERCISES: CPT | Performed by: PHYSICAL THERAPIST

## 2019-10-10 NOTE — PROGRESS NOTES
Daily Note     Today's date: 10/10/2019  Patient name: Pascal Gowers  : 1960  MRN: [id number]  Referring provider: Danae Arguello PA-C  Dx:   Encounter Diagnosis     ICD-10-CM    1  Aftercare following left knee joint replacement surgery Z47 1     Z96 652    2  Acute pain of left knee M25 562        Start Time: 1015  Stop Time: 1100  Total time in clinic (min): 45 minutes    Subjective: Pt reports improvements in symptoms  Objective: See treatment diary below      Assessment: Tolerated treatment well  Pt's POC was progressed to include more closed chain strenghtening to increase tolerance to community ambulation  Patient demonstrated fatigue post treatment and would benefit from continued PT      Plan: Continue per plan of care        Precautions: Current L TKA, previous R TKA      Manual  9/16 9/18/19 9/20/19 9/23 9/26 9/30 10/3/19      PROM JF VK VK  TE JF VK      Fib head mobs JF JF                                                      Exercise Diary  9/16 9/18/19 9/20/19 9/23/19 9/26/19 9/30 10/3/19 10/10     Quad sets HEP 3x10x5" 3x10x5" 3x10, 5' 3x10, 5" 3*10*5" 3x10x5" 2*10*5"     Heel slides  HEP 2x10x5" 3x10x5" 3x10, 5" 3x10, 5" 3*10*5" 3x10x5"      LAQ HEP 2x10x5" 3x10x5" 3x10, 5" 3x10, 5" 3*10*5" 3x10x5" 3*10*5"     Mini squats HEP np 2x10 2x10 2x10 3*10 3x10 3*10     Standing hip abd HEP 10xL 2x10 2x10 2x10  3*10 3x10 2*10     Heel prop stretch HEP 5 min 5 min  5' 5' 5' 5'     HR   30x 30x 30x 30* 30x      SLR    3x10 3x10 3*10 3x10 3*10     Step-ups 4"      3*10 3x10 3*10 6"     Wall ball       2x10x5" 3*10*5" TKE CC     STS        3*10 no foam                                                                                                                              Modalities  19           Ice with elevation/ankle pumps to finish 10 min 8 min

## 2019-10-10 NOTE — TELEPHONE ENCOUNTER
----- Message from Divya Jameson MD sent at 10/6/2019  7:12 AM EDT -----  BW showed elevated A1C at 7 1 and elevated TG  I recommend low carb ad low fat diet   All the rest of BW came back normal

## 2019-10-12 ENCOUNTER — APPOINTMENT (EMERGENCY)
Dept: RADIOLOGY | Facility: HOSPITAL | Age: 59
End: 2019-10-12
Payer: COMMERCIAL

## 2019-10-12 ENCOUNTER — HOSPITAL ENCOUNTER (EMERGENCY)
Facility: HOSPITAL | Age: 59
Discharge: HOME | End: 2019-10-12
Attending: EMERGENCY MEDICINE | Admitting: EMERGENCY MEDICINE
Payer: COMMERCIAL

## 2019-10-12 VITALS
HEART RATE: 70 BPM | RESPIRATION RATE: 19 BRPM | SYSTOLIC BLOOD PRESSURE: 163 MMHG | TEMPERATURE: 97.8 F | OXYGEN SATURATION: 97 % | DIASTOLIC BLOOD PRESSURE: 83 MMHG

## 2019-10-12 DIAGNOSIS — N20.1 URETEROLITHIASIS: Primary | ICD-10-CM

## 2019-10-12 LAB
BACTERIA UR QL AUTO: ABNORMAL /HPF
BILIRUB UR QL STRIP: NEGATIVE
CLARITY UR: ABNORMAL
COLOR UR: ABNORMAL
EXT BLOOD URINE: NORMAL
EXT PROTEIN, UA: NORMAL
GLUCOSE UR STRIP-MCNC: NEGATIVE MG/DL
HGB UR QL STRIP.AUTO: ABNORMAL
HYALINE CASTS #/AREA URNS LPF: ABNORMAL /LPF
KETONES UR STRIP-MCNC: NEGATIVE MG/DL
LEUKOCYTE ESTERASE UR QL STRIP: NEGATIVE
NITRITE UR QL STRIP: NEGATIVE
NON-SQ EPI CELLS URNS QL MICRO: ABNORMAL /HPF
PH UR STRIP.AUTO: 5.5 [PH] (ref 4.5–8)
PROT UR STRIP-MCNC: ABNORMAL MG/DL
RBC #/AREA URNS AUTO: ABNORMAL /HPF
SP GR UR STRIP.AUTO: 1.02 (ref 1–1.03)
UROBILINOGEN UR QL STRIP.AUTO: 1 E.U./DL
WBC #/AREA URNS AUTO: ABNORMAL /HPF

## 2019-10-12 PROCEDURE — 96375 TX/PRO/DX INJ NEW DRUG ADDON: CPT

## 2019-10-12 PROCEDURE — 96374 THER/PROPH/DIAG INJ IV PUSH: CPT

## 2019-10-12 PROCEDURE — 81001 URINALYSIS AUTO W/SCOPE: CPT

## 2019-10-12 PROCEDURE — 99284 EMERGENCY DEPT VISIT MOD MDM: CPT

## 2019-10-12 PROCEDURE — 99284 EMERGENCY DEPT VISIT MOD MDM: CPT | Performed by: EMERGENCY MEDICINE

## 2019-10-12 PROCEDURE — 74176 CT ABD & PELVIS W/O CONTRAST: CPT

## 2019-10-12 RX ORDER — NAPROXEN 500 MG/1
500 TABLET ORAL 2 TIMES DAILY WITH MEALS
COMMUNITY
End: 2019-11-07

## 2019-10-12 RX ORDER — KETOROLAC TROMETHAMINE 30 MG/ML
15 INJECTION, SOLUTION INTRAMUSCULAR; INTRAVENOUS ONCE
Status: COMPLETED | OUTPATIENT
Start: 2019-10-12 | End: 2019-10-12

## 2019-10-12 RX ORDER — ONDANSETRON 2 MG/ML
4 INJECTION INTRAMUSCULAR; INTRAVENOUS ONCE
Status: COMPLETED | OUTPATIENT
Start: 2019-10-12 | End: 2019-10-12

## 2019-10-12 RX ADMIN — KETOROLAC TROMETHAMINE 15 MG: 30 INJECTION, SOLUTION INTRAMUSCULAR at 14:36

## 2019-10-12 RX ADMIN — ONDANSETRON 4 MG: 2 INJECTION INTRAMUSCULAR; INTRAVENOUS at 14:34

## 2019-10-12 NOTE — ED ATTENDING ATTESTATION
10/12/2019  IMau MD, saw and evaluated the patient  I have discussed the patient with the resident/non-physician practitioner and agree with the resident's/non-physician practitioner's findings, Plan of Care, and MDM as documented in the resident's/non-physician practitioner's note, except where noted  All available labs and Radiology studies were reviewed  I was present for key portions of any procedure(s) performed by the resident/non-physician practitioner and I was immediately available to provide assistance  At this point I agree with the current assessment done in the Emergency Department    I have conducted an independent evaluation of this patient a history and physical is as follows:   the patient presents for evaluation of flank pain the patient states started about 11 o'clock right-sided patient has had kidney stones in the past patient did vomit twice nonbilious nonbloody he has no fever no dysuria no testicular pain or swelling  Exam: Patient is in no acute distress he appears comfortable vital signs stable he is afebrile lungs clear heart regular abdomen soft positive bowel sounds mild right CVA tenderness   impression kidney stone   plan urine analgesics antiemetics noncontrast CT sca  n   patient had recent outpatient blood work which revealed normal creatinine less than 2 weeks ago    ED Course         Critical Care Time  Procedures

## 2019-10-12 NOTE — ED PROVIDER NOTES
History  Chief Complaint   Patient presents with    Flank Pain     Pt reports R sided flank pain, vomited x2, nauseous  Pt has hx kidney stones  No urinary symptoms at this time     21-year-old man with a past medical history of negative renal stones the presents for evaluation of right flank pain  The symptoms started at noon  The pain is located in his right flank  It radiates to his right lower quadrant  He takes naproxen and tramadol for pain related to a recent knee replacement  He took the medication at 9:00 a m  His pain is currently well controlled but does feel similar to his prior kidney stone pain  He denies hematuria, difficulty urinating  Denies fever, chills, nausea vomiting, diarrhea  Prior to Admission Medications   Prescriptions Last Dose Informant Patient Reported? Taking?    Cholecalciferol 2000 units CAPS 10/12/2019 at Unknown time Self Yes Yes   Sig: Take 1 capsule by mouth daily    Multiple Vitamin (MULTIVITAMIN) tablet 10/12/2019 at Unknown time Self Yes Yes   Sig: Take 2 tablets by mouth daily   Omega-3 Fatty Acids (FISH OIL PO) 10/12/2019 at Unknown time Self Yes Yes   Sig: Take 1 capsule by mouth daily   acetaminophen (TYLENOL) 650 mg CR tablet Not Taking at Unknown time  No No   Sig: Take 1 tablet (650 mg total) by mouth every 8 (eight) hours as needed for mild pain   Patient not taking: Reported on 10/12/2019   ascorbic acid (VITAMIN C) 500 MG tablet 10/12/2019 at Unknown time  No Yes   Sig: Take 1 tablet (500 mg total) by mouth daily   celecoxib (CeleBREX) 100 mg capsule Not Taking at Unknown time  No No   Sig: Take 1 capsule (100 mg total) by mouth 2 (two) times a day   Patient not taking: Reported on 10/12/2019   cetirizine (ZyrTEC) 10 mg tablet 10/12/2019 at Unknown time Self Yes Yes   Sig: Take 10 mg by mouth daily   diphenhydrAMINE (BENADRYL) 25 mg tablet Not Taking at Unknown time Self No No   Sig: Take 1 tablet (25 mg total) by mouth daily at bedtime as needed for itching   Patient not taking: Reported on 2019   docusate sodium (COLACE) 100 mg capsule Not Taking at Unknown time  No No   Sig: Take 1 capsule (100 mg total) by mouth 2 (two) times a day   Patient not taking: Reported on 2019   enoxaparin (LOVENOX) 40 mg/0 4 mL 10/11/2019 at Unknown time  No Yes   Si subcutaneous injection daily x 28 days AFTER surgery   ferrous sulfate 324 (65 Fe) mg Not Taking at Unknown time  No No   Sig: Take 1 tablet (324 mg total) by mouth 2 (two) times a day before meals   Patient not taking: Reported on 2019   fluticasone (FLONASE) 50 mcg/act nasal spray Not Taking at Unknown time Self Yes No   Sig: Blow nose; shake bottle; place tip in each nostril and tilt towards eye; hold breath and press plunger; do this 2 times daily prn   folic acid (FOLVITE) 1 mg tablet Not Taking at Unknown time  No No   Sig: Take 1 tablet (1 mg total) by mouth daily   Patient not taking: Reported on 2019   naproxen (NAPROSYN) 500 mg tablet 10/12/2019 at Unknown time  Yes Yes   Sig: Take 500 mg by mouth 2 (two) times a day with meals   oxyCODONE (ROXICODONE) 5 mg immediate release tablet Not Taking at Unknown time  No No   Sig: Take 1 tablets every 6 hrs as needed for pain control   Patient not taking: Reported on 2019   simvastatin (ZOCOR) 40 mg tablet 10/11/2019 at Unknown time  No Yes   Sig: TAKE 1 TABLET DAILY AT BEDTIME   traMADol (ULTRAM) 50 mg tablet 10/12/2019 at Unknown time  Yes Yes   Sig: Take 50 mg by mouth every 4 (four) hours      Facility-Administered Medications: None       Past Medical History:   Diagnosis Date    Anesthesia complication     difficulty awakening- dyspnea    Anxiety     Arthritis     Cleft hard palate     Glaucoma suspect, both eyes     Hyperlipidemia     Kidney stone     left    Right inguinal hernia     Right ureteral stone     Seasonal allergies     Wears dentures     partial upper    Wears glasses        Past Surgical History: Procedure Laterality Date    CLEFT PALATE REPAIR      INGUINAL HERNIA REPAIR Right     KNEE CARTILAGE SURGERY Left     TN CYSTO/URETERO W/LITHOTRIPSY &INDWELL STENT INSRT Right 8/4/2017    Procedure: CYSTOSCOPY URETEROSCOPY WITH LITHOTRIPSY HOLMIUM LASER, RETROGRADE PYELOGRAM AND INSERTION STENT URETERAL;  Surgeon: Jayla Harkins MD;  Location: [facility];  Service: Urology    TN TOTAL KNEE ARTHROPLASTY Right 3/4/2019    Procedure: TOTAL KNEE ARTHROPLASTY;  Surgeon: Fany Chester MD;  Location: [facility];  Service: Orthopedics    TN TOTAL KNEE ARTHROPLASTY Left 9/13/2019    Procedure: ARTHROPLASTY KNEE TOTAL;  Surgeon: Fany Chester MD;  [facility];  Service: Orthopedics    TOTAL KNEE ARTHROPLASTY Left        Family History   Problem Relation Age of Onset    Diabetes Mother     Heart disease Mother     Hypertension Mother     Esophageal cancer Father     Diabetes Sister     Other Sister      I have reviewed and agree with the history as documented  Social History     Tobacco Use    Smoking status: Never Smoker    Smokeless tobacco: Never Used    Tobacco comment: no passive smoke exposure   Substance Use Topics    Alcohol use: Yes     Frequency: 2-4 times a month    Drug use: No        Review of Systems   Constitutional: Negative for appetite change, chills, diaphoresis, fatigue and fever  HENT: Negative for congestion, rhinorrhea and sore throat  Respiratory: Negative for apnea, cough, choking, chest tightness, shortness of breath, wheezing and stridor  Cardiovascular: Negative for chest pain, palpitations and leg swelling  Gastrointestinal: Positive for nausea and vomiting  Negative for abdominal distention, abdominal pain, constipation and diarrhea  Genitourinary: Positive for flank pain  Negative for dysuria and hematuria  Musculoskeletal: Negative for back pain, neck pain and neck stiffness  Skin: Negative for pallor, rash and wound     Neurological: Negative for dizziness, light-headedness and headaches  Psychiatric/Behavioral: Negative for behavioral problems and confusion  All other systems reviewed and are negative  Physical Exam  ED Triage Vitals [10/12/19 1324]   Temperature Pulse Respirations Blood Pressure SpO2   97 8 °F (36 6 °C) 70 19 163/83 97 %      Temp Source Heart Rate Source Patient Position - Orthostatic VS BP Location FiO2 (%)   Oral Monitor Sitting Right arm --      Pain Score       8             Orthostatic Vital Signs  Vitals:    10/12/19 1324   BP: 163/83   Pulse: 70   Patient Position - Orthostatic VS: Sitting       Physical Exam   Constitutional: He is oriented to person, place, and time  He appears well-developed and well-nourished  No distress  HENT:   Head: Normocephalic and atraumatic  Eyes: Pupils are equal, round, and reactive to light  Conjunctivae and EOM are normal  No scleral icterus  Neck: Normal range of motion  Neck supple  Cardiovascular: Normal rate, regular rhythm and normal heart sounds  No murmur heard  Pulmonary/Chest: Effort normal and breath sounds normal  No respiratory distress  He has no wheezes  Abdominal: Soft  Bowel sounds are normal  He exhibits no distension and no mass  There is no tenderness  There is CVA tenderness (Mild right CVA tenderness to percussion)  There is no rebound and no guarding  Musculoskeletal: Normal range of motion  He exhibits no edema  Neurological: He is alert and oriented to person, place, and time  He displays normal reflexes  No cranial nerve deficit or sensory deficit  He exhibits normal muscle tone  Coordination normal    Skin: Skin is warm and dry  No rash noted  He is not diaphoretic  Psychiatric: He has a normal mood and affect  His behavior is normal    Nursing note and vitals reviewed        ED Medications  Medications   ketorolac (TORADOL) injection 15 mg (15 mg Intravenous Given 10/12/19 1436)   ondansetron (ZOFRAN) injection 4 mg (4 mg Intravenous Given 10/12/19 1434)       Diagnostic Studies  Results Reviewed     Procedure Component Value Units Date/Time    Urine Microscopic [[id number]]  (Abnormal) Collected:  10/12/19 1516    Lab Status:  Final result Specimen:  Urine, Clean Catch Updated:  10/12/19 1550     RBC, UA Innumerable /hpf      WBC, UA 4-10 /hpf      Epithelial Cells None Seen /hpf      Bacteria, UA None Seen /hpf      Hyaline Casts, UA 3-5 /lpf     POCT urinalysis dipstick [[id number]]  (Normal) Resulted:  10/12/19 1514    Lab Status:  Final result Specimen:  Urine Updated:  10/12/19 1515     Blood, UA (Ref: Negative) large     Protein, UA (Ref: Negative) 100mg/dl    ED Urine Macroscopic [[id number]]  (Abnormal) Collected:  10/12/19 1516    Lab Status:  Final result Specimen:  Urine Updated:  10/12/19 1514     Color, UA Brown     Clarity, UA Cloudy     pH, UA 5 5     Leukocytes, UA Negative     Nitrite, UA Negative     Protein,  (2+) mg/dl      Glucose, UA Negative mg/dl      Ketones, UA Negative mg/dl      Urobilinogen, UA 1 0 E U /dl      Bilirubin, UA Negative     Blood, UA Large     Specific Gravity, UA 1 025    Narrative:       CLINITEK RESULT                 CT renal stone study abdomen pelvis without contrast   Final Result by Galileo Quiroz MD (10/12 1510)      Mild right asymmetric renal enlargement, perinephric stranding and hydronephrosis and hydroureter with multiple nonobstructing and partially obstructing right lower pole and proximal ureteral calculi ranging from 1 mm to 4 mm  There is suggestion of    recent passage of a 2 mm renal calculus which is now residing in the dependent left bladder  Nonobstructing left lower pole staghorn calculus  No left-sided hydronephrosis or hydroureter           Workstation performed: [id number]               Procedures  Procedures        ED Course                               MDM  Number of Diagnoses or Management Options  Ureterolithiasis: new and requires workup  Diagnosis management comments: 9year-old man with a known history of kidney stones presents with right flank pain started 3 hours prior to arrival   Patient is well-appearing normal vital signs  He has mild right CVA tenderness  Concern for stone disease  Will check urinalysis for infection, CT renal stone study  Patient given Toradol and Zofran for symptoms  Reassess  CT scan shows multiple small stones ranging from 1-4 mm right ureter  There is mild hydronephrosis with stranding around the right kidney  Patient's urinalysis is not concerning for infection  He has no fevers and is not toxic-appearing  Pain is well controlled re-evaluation  Patient passed 2 small stones while being observed here  Will discharge patient home  Continue NSAIDs for pain  Follow up Urology  Return precautions discussed         Amount and/or Complexity of Data Reviewed  Clinical lab tests: ordered and reviewed  Tests in the radiology section of CPT®: ordered and reviewed  Decide to obtain previous medical records or to obtain history from someone other than the patient: yes  Obtain history from someone other than the patient: yes  Review and summarize past medical records: yes  Discuss the patient with other providers: yes  Independent visualization of images, tracings, or specimens: yes    Risk of Complications, Morbidity, and/or Mortality  Presenting problems: minimal  Diagnostic procedures: minimal  Management options: minimal    Patient Progress  Patient progress: stable      Disposition  Final diagnoses:   Ureterolithiasis - Right     Time reflects when diagnosis was documented in both MDM as applicable and the Disposition within this note     Time User Action Codes Description Comment    10/12/2019  4:21 PM Dk Rodriguez Add [N20 1] Ureterolithiasis     10/12/2019  4:21 PM Dk Rodriguez Modify [N20 1] Ureterolithiasis Right      ED Disposition     ED Disposition Condition Date/Time Comment    Discharge Stable Sat Oct 12, 2019 4:21 PM Katharine Fuller discharge to home/self care              Follow-up Information     Follow up With Specialties Details Why Contact Info Additional [address] [facility]   [address] [facility] [id number]  [facility], [address] [place], [place], [place], [address]          Discharge Medication List as of 10/12/2019  4:22 PM      CONTINUE these medications which have NOT CHANGED    Details   ascorbic acid (VITAMIN C) 500 MG tablet Take 1 tablet (500 mg total) by mouth daily, Starting Thu 9/5/2019, Normal      cetirizine (ZyrTEC) 10 mg tablet Take 10 mg by mouth daily, Historical Med      Cholecalciferol 2000 units CAPS Take 1 capsule by mouth daily , Historical Med      enoxaparin (LOVENOX) 40 mg/0 4 mL 1 subcutaneous injection daily x 28 days AFTER surgery, Print      Multiple Vitamin (MULTIVITAMIN) tablet Take 2 tablets by mouth daily, Historical Med      naproxen (NAPROSYN) 500 mg tablet Take 500 mg by mouth 2 (two) times a day with meals, Historical Med      Omega-3 Fatty Acids (FISH OIL PO) Take 1 capsule by mouth daily, Historical Med      simvastatin (ZOCOR) 40 mg tablet TAKE 1 TABLET DAILY AT BEDTIME, Normal      traMADol (ULTRAM) 50 mg tablet Take 50 mg by mouth every 4 (four) hours, Historical Med      acetaminophen (TYLENOL) 650 mg CR tablet Take 1 tablet (650 mg total) by mouth every 8 (eight) hours as needed for mild pain, Starting Fri 9/13/2019, Until Sun 10/13/2019, Print      celecoxib (CeleBREX) 100 mg capsule Take 1 capsule (100 mg total) by mouth 2 (two) times a day, Starting Tue 10/1/2019, Normal      diphenhydrAMINE (BENADRYL) 25 mg tablet Take 1 tablet (25 mg total) by mouth daily at bedtime as needed for itching, Starting Thu 3/21/2019, Normal      docusate sodium (COLACE) 100 mg capsule Take 1 capsule (100 mg total) by mouth 2 (two) times a day, Starting Fri 9/13/2019, Print      ferrous sulfate 324 (65 Fe) mg Take 1 tablet (324 mg total) by mouth 2 (two) times a day before meals, Starting Thu 9/5/2019, Normal      fluticasone (FLONASE) 50 mcg/act nasal spray Blow nose; shake bottle; place tip in each nostril and tilt towards eye; hold breath and press plunger; do this 2 times daily prn, Historical Med      folic acid (FOLVITE) 1 mg tablet Take 1 tablet (1 mg total) by mouth daily, Starting u 9/5/2019, Normal      oxyCODONE (ROXICODONE) 5 mg immediate release tablet Take 1 tablets every 6 hrs as needed for pain control, Print           No discharge procedures on file  ED Provider  Attending physically available and evaluated Emily Gonzalez I managed the patient along with the ED Attending      Electronically Signed by         Lyssa Crocker MD  10/13/19 9503

## 2019-10-14 ENCOUNTER — OFFICE VISIT (OUTPATIENT)
Dept: PHYSICAL THERAPY | Age: 59
End: 2019-10-14
Payer: COMMERCIAL

## 2019-10-14 DIAGNOSIS — M25.562 ACUTE PAIN OF LEFT KNEE: ICD-10-CM

## 2019-10-14 DIAGNOSIS — Z47.1 AFTERCARE FOLLOWING LEFT KNEE JOINT REPLACEMENT SURGERY: Primary | ICD-10-CM

## 2019-10-14 DIAGNOSIS — Z96.652 AFTERCARE FOLLOWING LEFT KNEE JOINT REPLACEMENT SURGERY: Primary | ICD-10-CM

## 2019-10-14 PROCEDURE — 97140 MANUAL THERAPY 1/> REGIONS: CPT | Performed by: PHYSICAL THERAPIST

## 2019-10-14 PROCEDURE — 97112 NEUROMUSCULAR REEDUCATION: CPT | Performed by: PHYSICAL THERAPIST

## 2019-10-14 PROCEDURE — 97110 THERAPEUTIC EXERCISES: CPT | Performed by: PHYSICAL THERAPIST

## 2019-10-14 NOTE — PROGRESS NOTES
Daily Note     Today's date: 10/14/2019  Patient name: Yamile Euceda  : 1960  MRN: [id number]  Referring provider: Nicola Card PA-C  Dx:   Encounter Diagnosis     ICD-10-CM    1  Aftercare following left knee joint replacement surgery Z47 1     Z96 652    2  Acute pain of left knee M25 562        Start Time: 0845  Stop Time: 0930  Total time in clinic (min): 45 minutes    Subjective: Pt reports no new symptoms  Pt visited ED for renal stones but is now cleared for PT  Objective: See treatment diary below      Assessment: Tolerated treatment well  Pt's POC was adjusted to improve patients tolerance to neuromuscular re-education  Patient demonstrated fatigue post treatment and would benefit from continued PT      Plan: Continue per plan of care        Precautions: Current L TKA, previous R TKA      Manual  9/16 9/18/19 9/20/19 9/23 9/26 9/30 10/3/19 10/14     PROM JF VK VK  TE JF VK JF     Fib head mobs JF JF                                                      Exercise Diary  9/16 9/18/19 9/20/19 9/23/19 9/26/19 9/30 10/3/19 10/10 10/14    Quad sets HEP 3x10x5" 3x10x5" 3x10, 5' 3x10, 5" 3*10*5" 3x10x5" 2*10*5" 2*10*5"    Heel slides  HEP 2x10x5" 3x10x5" 3x10, 5" 3x10, 5" 3*10*5" 3x10x5"  3*10*5"    LAQ HEP 2x10x5" 3x10x5" 3x10, 5" 3x10, 5" 3*10*5" 3x10x5" 3*10*5" 3*10*5"    Mini squats HEP np 2x10 2x10 2x10 3*10 3x10 3*10 3*10    Standing hip abd HEP 10xL 2x10 2x10 2x10  3*10 3x10 2*10 2*10    Heel prop stretch HEP 5 min 5 min  5' 5' 5' 5' 5'    HR   30x 30x 30x 30* 30x      SLR    3x10 3x10 3*10 3x10 3*10 3*10    Step-ups 4"      3*10 3x10 3*10 6"     Wall ball       2x10x5" 3*10*5" TKE CC 3*10*5" TKE CC    STS        3*10 no foam 3*10 no foam                                                                                                                             Modalities  19           Ice with elevation/ankle pumps to finish 10 min 8 min

## 2019-10-17 ENCOUNTER — OFFICE VISIT (OUTPATIENT)
Dept: PHYSICAL THERAPY | Age: 59
End: 2019-10-17
Payer: COMMERCIAL

## 2019-10-17 DIAGNOSIS — Z47.1 AFTERCARE FOLLOWING RIGHT KNEE JOINT REPLACEMENT SURGERY: ICD-10-CM

## 2019-10-17 DIAGNOSIS — M25.562 ACUTE PAIN OF LEFT KNEE: ICD-10-CM

## 2019-10-17 DIAGNOSIS — Z47.1 AFTERCARE FOLLOWING LEFT KNEE JOINT REPLACEMENT SURGERY: Primary | ICD-10-CM

## 2019-10-17 DIAGNOSIS — Z96.651 AFTERCARE FOLLOWING RIGHT KNEE JOINT REPLACEMENT SURGERY: ICD-10-CM

## 2019-10-17 DIAGNOSIS — M17.12 PRIMARY OSTEOARTHRITIS OF LEFT KNEE: ICD-10-CM

## 2019-10-17 DIAGNOSIS — M25.561 ACUTE PAIN OF RIGHT KNEE: ICD-10-CM

## 2019-10-17 DIAGNOSIS — Z96.652 AFTERCARE FOLLOWING LEFT KNEE JOINT REPLACEMENT SURGERY: Primary | ICD-10-CM

## 2019-10-17 PROCEDURE — 97110 THERAPEUTIC EXERCISES: CPT

## 2019-10-17 PROCEDURE — 97112 NEUROMUSCULAR REEDUCATION: CPT

## 2019-10-17 PROCEDURE — 97140 MANUAL THERAPY 1/> REGIONS: CPT

## 2019-10-17 NOTE — PROGRESS NOTES
Daily Note     Today's date: 10/17/2019  Patient name: Jessica Schuster  : 1960  MRN: [id number]  Referring provider: Taylor Ritchie PA-C  Dx:   Encounter Diagnosis     ICD-10-CM    1  Aftercare following left knee joint replacement surgery Z47 1     Z96 652    2  Acute pain of left knee M25 562    3  Primary osteoarthritis of left knee M17 12    4  Acute pain of right knee M25 561    5  Aftercare following right knee joint replacement surgery Z47 1     Z96 651                   Subjective:  "Knee is slowly getting better, but still feels like swelling/pressure sometimes"      Objective: See treatment diary below      Assessment: Tolerated treatment well  Patient demonstrated fatigue post treatment and would benefit from continued PT, open end feel noted during PROM L knee, pt amb without assistive device with antalgia       Plan: Continue per plan of care  Progress treatment as tolerated         Precautions: Current L TKA, previous R TKA       Manual  9/16 9/18/19 9/20/19 9/23 9/26 9/30 10/3/19 10/14 10/17/19   PROM JF VK VK  TE JF VK JF VK   Fib head mobs JF JF                                                  Exercise Diary  9/16 9/18/19 9/20/19 9/23/19 9/26/19 9/30 10/3/19 10/10 10/14 10/17/19   Quad sets HEP 3x10x5" 3x10x5" 3x10, 5' 3x10, 5" 3*10*5" 3x10x5" 2*10*5" 2*10*5" 15x10"   Heel slides  HEP 2x10x5" 3x10x5" 3x10, 5" 3x10, 5" 3*10*5" 3x10x5"  3*10*5" 3x10x5"   LAQ HEP 2x10x5" 3x10x5" 3x10, 5" 3x10, 5" 3*10*5" 3x10x5" 3*10*5" 3*10*5" 3x10x5"   Mini squats HEP np 2x10 2x10 2x10 3*10 3x10 3*10 3*10 3x10   Standing hip abd HEP 10xL 2x10 2x10 2x10  3*10 3x10 2*10 2*10 3x10   Heel prop stretch HEP 5 min 5 min  5' 5' 5' 5' 5' 5'   HR   30x 30x 30x 30* 30x      SLR    3x10 3x10 3*10 3x10 3*10 3*10 2x15   Step-ups 4"      3*10 3x10 3*10 6"  6" 3x10   Wall ball       2x10x5" 3*10*5" TKE CC 3*10*5" TKE CC 3x10x5"   STS        3*10 no foam 3*10 no foam 3x10 Modalities  9/18/19 9/23/19           Ice with elevation/ankle pumps to finish 10 min 8 min

## 2019-10-21 ENCOUNTER — EVALUATION (OUTPATIENT)
Dept: PHYSICAL THERAPY | Age: 59
End: 2019-10-21
Payer: COMMERCIAL

## 2019-10-21 DIAGNOSIS — M17.12 PRIMARY OSTEOARTHRITIS OF LEFT KNEE: ICD-10-CM

## 2019-10-21 DIAGNOSIS — Z96.652 AFTERCARE FOLLOWING LEFT KNEE JOINT REPLACEMENT SURGERY: Primary | ICD-10-CM

## 2019-10-21 DIAGNOSIS — Z47.1 AFTERCARE FOLLOWING LEFT KNEE JOINT REPLACEMENT SURGERY: Primary | ICD-10-CM

## 2019-10-21 DIAGNOSIS — M25.562 ACUTE PAIN OF LEFT KNEE: ICD-10-CM

## 2019-10-21 PROCEDURE — 97112 NEUROMUSCULAR REEDUCATION: CPT | Performed by: PHYSICAL THERAPIST

## 2019-10-21 PROCEDURE — 97110 THERAPEUTIC EXERCISES: CPT | Performed by: PHYSICAL THERAPIST

## 2019-10-24 ENCOUNTER — OFFICE VISIT (OUTPATIENT)
Dept: PHYSICAL THERAPY | Age: 59
End: 2019-10-24
Payer: COMMERCIAL

## 2019-10-24 DIAGNOSIS — M17.12 PRIMARY OSTEOARTHRITIS OF LEFT KNEE: ICD-10-CM

## 2019-10-24 DIAGNOSIS — Z47.1 AFTERCARE FOLLOWING LEFT KNEE JOINT REPLACEMENT SURGERY: Primary | ICD-10-CM

## 2019-10-24 DIAGNOSIS — Z96.652 AFTERCARE FOLLOWING LEFT KNEE JOINT REPLACEMENT SURGERY: Primary | ICD-10-CM

## 2019-10-24 DIAGNOSIS — M25.562 ACUTE PAIN OF LEFT KNEE: ICD-10-CM

## 2019-10-24 PROCEDURE — 97110 THERAPEUTIC EXERCISES: CPT | Performed by: PHYSICAL THERAPIST

## 2019-10-24 PROCEDURE — 97112 NEUROMUSCULAR REEDUCATION: CPT | Performed by: PHYSICAL THERAPIST

## 2019-10-24 NOTE — PROGRESS NOTES
Daily Note     Today's date: 10/24/2019  Patient name: Desean Tineo  : 1960  MRN: [id number]  Referring provider: Lety Wagner PA-C  Dx:   Encounter Diagnosis     ICD-10-CM    1  Aftercare following left knee joint replacement surgery Z47 1     Z96 652    2  Acute pain of left knee M25 562    3  Primary osteoarthritis of left knee M17 12        Start Time: 845  Stop Time:   Total time in clinic (min): 46 minutes    Subjective: Pt reports improvements in symptoms       Objective: See treatment diary below      Assessment: Tolerated treatment well  Pt's POC was progressed to include more resistance during strengthening exercises to increase tolerance to functional transfers  Patient demonstrated fatigue post treatment and would benefit from continued PT      Plan: Continue per plan of care        Precautions: Current L TKA, previous R TKA       Manual  10/24        10/17/19   PROM JF        VK   Fib head mobs JF                                                   Exercise Diary  10/24         10/17/19   Quad sets          15x10"   Heel slides           3x10x5"   LAQ 3*10 2#         3x10x5"   Mini squats 3*10         3x10   Standing hip abd 3*10 2#         3x10   Heel prop stretch          5'   HR             SLR 3*10 2#         2x15   Step-ups 6" 6" 3x10         6" 3x10   Wall ball 3x10x5"         3x10x5"   STS 4*10         3x10   Hamstring curls  3*10 2#                                                                                                                        Modalities  19           Ice with elevation/ankle pumps to finish 10 min 8 min

## 2019-10-28 ENCOUNTER — OFFICE VISIT (OUTPATIENT)
Dept: PHYSICAL THERAPY | Age: 59
End: 2019-10-28
Payer: COMMERCIAL

## 2019-10-28 DIAGNOSIS — Z47.1 AFTERCARE FOLLOWING LEFT KNEE JOINT REPLACEMENT SURGERY: Primary | ICD-10-CM

## 2019-10-28 DIAGNOSIS — Z96.652 AFTERCARE FOLLOWING LEFT KNEE JOINT REPLACEMENT SURGERY: Primary | ICD-10-CM

## 2019-10-28 DIAGNOSIS — M25.562 ACUTE PAIN OF LEFT KNEE: ICD-10-CM

## 2019-10-28 PROCEDURE — 97110 THERAPEUTIC EXERCISES: CPT | Performed by: PHYSICAL THERAPIST

## 2019-10-28 PROCEDURE — 97112 NEUROMUSCULAR REEDUCATION: CPT | Performed by: PHYSICAL THERAPIST

## 2019-10-28 NOTE — PROGRESS NOTES
Daily Note     Today's date: 10/28/2019  Patient name: Olayinka Chamberlain  : 1960  MRN: [id number]  Referring provider: Josefina Garcia PA-C  Dx:   Encounter Diagnosis     ICD-10-CM    1  Aftercare following left knee joint replacement surgery Z47 1     Z96 652    2  Acute pain of left knee M25 562        Start Time: 0845  Stop Time: 923  Total time in clinic (min): 46 minutes    Subjective: Pt reports improvements with symptoms  Objective: See treatment diary below      Assessment: Tolerated treatment well  Pt's POC was progressed to include more advanced closed chain interventions to improve muscle recruitment patterns  Patient demonstrated fatigue post treatment and would benefit from continued PT      Plan: Continue per plan of care        Precautions: Current L TKA, previous R TKA       Manual  10/24 10/28       10/17/19   PROM JF JF       VK   Fib head mobs JF JF                                                  Exercise Diary  10/24 10/28        10/17/19   Quad sets          15x10"   Heel slides           3x10x5"   LAQ 3*10 2# 3*10 2#        3x10x5"   Mini squats 3*10 3*10        3x10   Standing hip abd 3*10 2# 3*10 2#        3x10   Heel prop stretch          5'   HR             SLR 3*10 2# 3*10 2#        2x15   Step-ups 6" 6" 3x10 3*10 8"        6" 3x10   Wall ball 3x10x5" 3*10*5"        3x10x5"   STS 4*10 4*10        3x10   Hamstring curls  3*10 2# 3*10 2#                                                                                                                       Modalities  19           Ice with elevation/ankle pumps to finish 10 min 8 min

## 2019-10-31 ENCOUNTER — OFFICE VISIT (OUTPATIENT)
Dept: PHYSICAL THERAPY | Age: 59
End: 2019-10-31
Payer: COMMERCIAL

## 2019-10-31 DIAGNOSIS — M25.561 ACUTE PAIN OF RIGHT KNEE: ICD-10-CM

## 2019-10-31 DIAGNOSIS — M25.562 ACUTE PAIN OF LEFT KNEE: ICD-10-CM

## 2019-10-31 DIAGNOSIS — Z47.1 AFTERCARE FOLLOWING LEFT KNEE JOINT REPLACEMENT SURGERY: Primary | ICD-10-CM

## 2019-10-31 DIAGNOSIS — M17.12 PRIMARY OSTEOARTHRITIS OF LEFT KNEE: ICD-10-CM

## 2019-10-31 DIAGNOSIS — Z96.651 AFTERCARE FOLLOWING RIGHT KNEE JOINT REPLACEMENT SURGERY: ICD-10-CM

## 2019-10-31 DIAGNOSIS — Z96.652 AFTERCARE FOLLOWING LEFT KNEE JOINT REPLACEMENT SURGERY: Primary | ICD-10-CM

## 2019-10-31 DIAGNOSIS — Z47.1 AFTERCARE FOLLOWING RIGHT KNEE JOINT REPLACEMENT SURGERY: ICD-10-CM

## 2019-10-31 PROCEDURE — 97112 NEUROMUSCULAR REEDUCATION: CPT

## 2019-10-31 PROCEDURE — 97110 THERAPEUTIC EXERCISES: CPT

## 2019-10-31 NOTE — PROGRESS NOTES
Daily Note     Today's date: 10/31/2019  Patient name: Vinny Armendariz  : 1960  MRN: [id number]  Referring provider: Rose Chanel PA-C  Dx:   Encounter Diagnosis     ICD-10-CM    1  Aftercare following left knee joint replacement surgery Z47 1     Z96 652    2  Acute pain of left knee M25 562    3  Primary osteoarthritis of left knee M17 12    4  Acute pain of right knee M25 561    5  Aftercare following right knee joint replacement surgery Z47 1     Z96 651                   Subjective: pt reports he's gradually improving with motion and strength  But still stiff and sore at times     Objective: See treatment diary below      Assessment: Tolerated treatment well  Patient demonstrated fatigue post treatment and would benefit from continued PT, pt amb without assistive device and min antalgia at this time      Plan: Continue per plan of care  Progress treatment as tolerated         Precautions: Current L TKA, previous R TKA       Manual  10/24 10/28 10/31/19      10/17/19   PROM JF JF VK      VK   Fib head mobs JF JF                                                  Exercise Diary  10/24 10/28 10/31/19       10/17/19   Quad sets          15x10"   Heel slides           3x10x5"   LAQ 3*10 2# 3*10 2# 2# 3x10       3x10x5"   Mini squats 3*10 3*10 3x10       3x10   Standing hip abd 3*10 2# 3*10 2# 2# 3x10       3x10   Heel prop stretch          5'   HR             SLR 3*10 2# 3*10 2# 2# 3x10       2x15   Step-ups 6" 6" 3x10 3*10 8" 3x108"         6" 3x10   Wall ball 3x10x5" 3*10*5" 3x10x5"       3x10x5"   STS 4*10 4*10 4x10       3x10   Hamstring curls  3*10 2# 3*10 2# 2# 3x10                                                                                                                      Modalities  19           Ice with elevation/ankle pumps to finish 10 min 8 min

## 2019-11-04 ENCOUNTER — OFFICE VISIT (OUTPATIENT)
Dept: PHYSICAL THERAPY | Age: 59
End: 2019-11-04
Payer: COMMERCIAL

## 2019-11-04 DIAGNOSIS — Z47.1 AFTERCARE FOLLOWING LEFT KNEE JOINT REPLACEMENT SURGERY: Primary | ICD-10-CM

## 2019-11-04 DIAGNOSIS — Z47.1 AFTERCARE FOLLOWING RIGHT KNEE JOINT REPLACEMENT SURGERY: ICD-10-CM

## 2019-11-04 DIAGNOSIS — M17.12 PRIMARY OSTEOARTHRITIS OF LEFT KNEE: ICD-10-CM

## 2019-11-04 DIAGNOSIS — Z96.652 AFTERCARE FOLLOWING LEFT KNEE JOINT REPLACEMENT SURGERY: Primary | ICD-10-CM

## 2019-11-04 DIAGNOSIS — Z96.651 AFTERCARE FOLLOWING RIGHT KNEE JOINT REPLACEMENT SURGERY: ICD-10-CM

## 2019-11-04 DIAGNOSIS — M25.562 ACUTE PAIN OF LEFT KNEE: ICD-10-CM

## 2019-11-04 DIAGNOSIS — M25.561 ACUTE PAIN OF RIGHT KNEE: ICD-10-CM

## 2019-11-04 PROCEDURE — 97110 THERAPEUTIC EXERCISES: CPT | Performed by: PHYSICAL MEDICINE & REHABILITATION

## 2019-11-04 PROCEDURE — 97112 NEUROMUSCULAR REEDUCATION: CPT | Performed by: PHYSICAL MEDICINE & REHABILITATION

## 2019-11-04 NOTE — PROGRESS NOTES
Daily Note     Today's date: 2019  Patient name: Harry Simons  : 1960  MRN: [id number]  Referring provider: Yamile Dumont PA-C  Dx:   Encounter Diagnosis     ICD-10-CM    1  Aftercare following left knee joint replacement surgery Z47 1     Z96 [id number]    2  Acute pain of left knee M25 562    3  Primary osteoarthritis of left knee M17 12    4  Acute pain of right knee M25 561    5  Aftercare following right knee joint replacement surgery Z47 1     Z96 651                   Subjective: Patient reports " I just need more strength in it" regarding LLE, offers no new complaints  Some stiffness remains  Objective: See treatment diary below      Assessment: Tolerated treatment well  Patient demonstrated fatigue post treatment and would benefit from continued PT   Patient may be appropriate for d/c to HEP/fitness by next RE  Plan: Continue per plan of care  Progress treatment as tolerated         Precautions: Current L TKA, previous R TKA       Manual  10/24 10/28 10/31/19 11/4     10/17/19   PROM L knee JF JF VK np     VK   Fib head mobs JF JF                                                  Exercise Diary  10/24 10/28 10/31/19 11/4      10/17/19   Quad sets          15x10"   Heel slides           3x10x5"   LAQ 3*10 2# 3*10 2# 2# 3x10 2#, 3x10      3x10x5"   Mini squats 3*10 3*10 3x10 3x10      3x10   Standing hip abd 3*10 2# 3*10 2# 2# 3x10 2#, 3x10      3x10   Heel prop stretch          5'   HR             SLR 3*10 2# 3*10 2# 2# 3x10 2#, 3x10      2x15   Step-ups 6" 6" 3x10 3*10 8" 3x10 8"   3x10, 8"      6" 3x10   Wall ball 3x10x5" 3*10*5" 3x10x5" Cable column, 25#, 3x10      3x10x5"   STS 4*10 4*10 4x10 4x10      3x10   Hamstring curls  3*10 2# 3*10 2# 2# 3x10 2#, 3x10             Bike 8'             Leg press 110#, 3x10             Clamshells, 2x10                                                                              Modalities  19          Ice with elevation/ankle pumps to finish 10 min 8 min  seated, 10' post

## 2019-11-07 ENCOUNTER — HOSPITAL ENCOUNTER (OUTPATIENT)
Dept: RADIOLOGY | Facility: HOSPITAL | Age: 59
Discharge: HOME | End: 2019-11-07
Payer: COMMERCIAL

## 2019-11-07 ENCOUNTER — OFFICE VISIT (OUTPATIENT)
Dept: PHYSICAL THERAPY | Age: 59
End: 2019-11-07
Payer: COMMERCIAL

## 2019-11-07 ENCOUNTER — OFFICE VISIT (OUTPATIENT)
Dept: OBGYN CLINIC | Facility: HOSPITAL | Age: 59
End: 2019-11-07

## 2019-11-07 VITALS
BODY MASS INDEX: 41.28 KG/M2 | WEIGHT: 263 LBS | HEIGHT: 67 IN | HEART RATE: 93 BPM | DIASTOLIC BLOOD PRESSURE: 83 MMHG | SYSTOLIC BLOOD PRESSURE: 132 MMHG

## 2019-11-07 DIAGNOSIS — Z96.652 AFTERCARE FOLLOWING LEFT KNEE JOINT REPLACEMENT SURGERY: Primary | ICD-10-CM

## 2019-11-07 DIAGNOSIS — G56.21 GUYON SYNDROME, RIGHT: ICD-10-CM

## 2019-11-07 DIAGNOSIS — Z47.1 AFTERCARE FOLLOWING LEFT KNEE JOINT REPLACEMENT SURGERY: Primary | ICD-10-CM

## 2019-11-07 DIAGNOSIS — R20.0 HAND NUMBNESS: ICD-10-CM

## 2019-11-07 DIAGNOSIS — M17.12 PRIMARY OSTEOARTHRITIS OF LEFT KNEE: ICD-10-CM

## 2019-11-07 DIAGNOSIS — M25.562 ACUTE PAIN OF LEFT KNEE: ICD-10-CM

## 2019-11-07 DIAGNOSIS — M79.641 RIGHT HAND PAIN: ICD-10-CM

## 2019-11-07 DIAGNOSIS — R20.0 HAND NUMBNESS: Primary | ICD-10-CM

## 2019-11-07 PROCEDURE — 97110 THERAPEUTIC EXERCISES: CPT | Performed by: PHYSICAL THERAPIST

## 2019-11-07 PROCEDURE — 99024 POSTOP FOLLOW-UP VISIT: CPT | Performed by: PHYSICIAN ASSISTANT

## 2019-11-07 PROCEDURE — 73130 X-RAY EXAM OF HAND: CPT

## 2019-11-07 PROCEDURE — 97112 NEUROMUSCULAR REEDUCATION: CPT | Performed by: PHYSICAL THERAPIST

## 2019-11-07 RX ORDER — DICLOFENAC SODIUM 75 MG/1
75 TABLET, DELAYED RELEASE ORAL 2 TIMES DAILY
Qty: 28 TABLET | Refills: 0 | Status: SHIPPED | OUTPATIENT
Start: 2019-11-07 | End: 2019-11-25

## 2019-11-07 NOTE — PROGRESS NOTES
Assessment/Plan   Diagnoses and all orders for this visit:        Guyon syndrome, right    - Since this started with compression from using a walker and he is no longer using it, the plan will be to give this some time and anti-inflammatories to see if it resolves  It will therefore hold off on EMG or hand consult at this time  - Follow up with Dr Linette Guajardo as already scheduled  Subjective   Patient ID: Shravan Hamilton is a 60 y o  male  Vitals:    11/07/19 1132   BP: 132/83   Pulse: 80     58yo male comes in for an evaluation of his right hand  He has been having numbness in the entire small finger and the ulnar side of the ring finger since September  He is a patient of Dr Linette Guajardo and this numbness started when he was using a walker due to a knee surgery  The symptoms do not wake him up at night and there is no specific aggravating factors  He does have some pain at baseline across the MCP joints, but this is unchanged  The numbness is only in the fingers, not in the hand or arm  No neck symptoms  No left hand symptoms  He is right handed  He is no longer using the walker        The following portions of the patient's history were reviewed and updated as appropriate: allergies, current medications, past family history, past medical history, past social history, past surgical history and problem list     Review of Systems  Ortho Exam  Past Medical History:   Diagnosis Date    Anesthesia complication     difficulty awakening- dyspnea    Anxiety     Arthritis     Cleft hard palate     Glaucoma suspect, both eyes     Hyperlipidemia     Kidney stone     left    Right inguinal hernia     Right ureteral stone     Seasonal allergies     Wears dentures     partial upper    Wears glasses      Past Surgical History:   Procedure Laterality Date    CLEFT PALATE REPAIR      INGUINAL HERNIA REPAIR Right     KNEE CARTILAGE SURGERY Left     [place] CYSTO/URETERO W/LITHOTRIPSY &INDWELL STENT INSRT Right 8/4/2017    Procedure: CYSTOSCOPY URETEROSCOPY WITH LITHOTRIPSY HOLMIUM LASER, RETROGRADE PYELOGRAM AND INSERTION STENT URETERAL;  Surgeon: Wili Tinoco MD;  Location: [facility];  Service: Urology    TX TOTAL KNEE ARTHROPLASTY Right 3/4/2019    Procedure: TOTAL KNEE ARTHROPLASTY;  Surgeon: Carey Valdez MD;  Location: [facility];  Service: Orthopedics    TX TOTAL KNEE ARTHROPLASTY Left 9/13/2019    Procedure: ARTHROPLASTY KNEE TOTAL;  Surgeon: Carey Valdez MD;  Location: [facility];  Service: Orthopedics    TOTAL KNEE ARTHROPLASTY Left      Family History   Problem Relation Age of Onset    Diabetes Mother     Heart disease Mother     Hypertension Mother     Esophageal cancer Father     Diabetes Sister     Other Sister      Social History     Occupational History    Not on file   Tobacco Use    Smoking status: Never Smoker    Smokeless tobacco: Never Used    Tobacco comment: no passive smoke exposure   Substance and Sexual Activity    Alcohol use: Yes     Frequency: 2-4 times a month    Drug use: No    Sexual activity: Yes     Partners: Female       Review of Systems   Constitutional: Negative  HENT: Negative  Eyes: Negative  Respiratory: Negative  Cardiovascular: Negative  Gastrointestinal: Negative  Endocrine: Negative  Genitourinary: Negative  Musculoskeletal: As below      Allergic/Immunologic: Negative  Neurological: Negative  Hematological: Negative  Psychiatric/Behavioral: Negative          Objective   Physical Exam    · Constitutional: Awake, Alert, Oriented  · Eyes: EOMI  · Psych: Mood and affect appropriate  · Heart: regular rate and rhythm  · Lungs: No audible wheezing  · Abdomen: soft  · Lymph: no lymphedema   right ring finger, small finger:  - Appearance   No swelling, discoloration, deformity, or ecchymosis  - Palpation  o No tenderness to palpation of distal radius, distal ulna, scaphoid, lunate, hamate, ulnar wrist, dorsal wrist, palmar wrist, thenar eminence, hypothenar eminence, fingers, or hand   - ROM  o full active flexion and extension of each joint independently  + stiffness when trying to make a full fist  - Motor  o  5/5, wrist extension 5/5, and wrist flexion 5/5, interossi 5/5  - Special Tests  o negative Phalen's maneuver, negative Tinel's sign and no hypothenar atrophy  - NVI distally    I have personally reviewed pertinent films in PACS and my interpretation is no acute displaced fracture or obvious compressive lesion

## 2019-11-07 NOTE — PROGRESS NOTES
Daily Note     Today's date: 2019  Patient name: Ryan Goldman  : 1960  MRN: [id number]  Referring provider: Justus Moreno PA-C  Dx:   Encounter Diagnosis     ICD-10-CM    1  Aftercare following left knee joint replacement surgery Z47 1     Z96 652    2  Acute pain of left knee M25 562    3  Primary osteoarthritis of left knee M17 12        Start Time: 0845  Stop Time: 0945  Total time in clinic (min): 60 minutes    Subjective: Pt reports no new symptoms  Objective: See treatment diary below      Assessment: Tolerated treatment well  Pt's POC was progressed to include more closed chain strengthening to increase tolerance to community ambulation  Patient demonstrated fatigue post treatment and would benefit from continued PT      Plan: Continue per plan of care        Precautions: Current L TKA, previous R TKA       Manual  10/24 10/28 10/31/19 11/4 11/7    10/17/19   PROM L knee JF JF VK np JF    VK   Fib head mobs JF JF   JF                                               Exercise Diary  10/24 10/28 10/31/19 11/4 11/7     10/17/19   Quad sets          15x10"   Heel slides           3x10x5"   LAQ 3*10 2# 3*10 2# 2# 3x10 2#, 3x10 3# 3*10     3x10x5"   Mini squats 3*10 3*10 3x10 3x10 3*10     3x10   Standing hip abd 3*10 2# 3*10 2# 2# 3x10 2#, 3x10 3# 3*10     3x10   Heel prop stretch          5'   HR             SLR 3*10 2# 3*10 2# 2# 3x10 2#, 3x10 3# 3*10     2x15   Step-ups 6" 6" 3x10 3*10 8" 3x10 8"   3x10, 8" 3*10     6" 3x10   Wall ball 3x10x5" 3*10*5" 3x10x5" Cable column, 25#, 3x10 CC, 30# 3*10     3x10x5"   STS 4*10 4*10 4x10 4x10 4*10     3x10   Hamstring curls  3*10 2# 3*10 2# 2# 3x10 2#, 3x10 3# 3*10        RB    Bike 8' 5'            Leg press 110#, 3x10 Leg press 110#, 3x10            Clamshells, 2x10 Clamshells, 2x10                                                                             Modalities  9/18/19 9/23/19 11/4          Ice with elevation/ankle pumps to finish 10 min 8 min  seated, 10' post

## 2019-11-11 ENCOUNTER — OFFICE VISIT (OUTPATIENT)
Dept: PHYSICAL THERAPY | Age: 59
End: 2019-11-11
Payer: COMMERCIAL

## 2019-11-11 DIAGNOSIS — M25.562 ACUTE PAIN OF LEFT KNEE: ICD-10-CM

## 2019-11-11 DIAGNOSIS — Z47.1 AFTERCARE FOLLOWING LEFT KNEE JOINT REPLACEMENT SURGERY: Primary | ICD-10-CM

## 2019-11-11 DIAGNOSIS — Z96.652 AFTERCARE FOLLOWING LEFT KNEE JOINT REPLACEMENT SURGERY: Primary | ICD-10-CM

## 2019-11-11 DIAGNOSIS — M17.12 PRIMARY OSTEOARTHRITIS OF LEFT KNEE: ICD-10-CM

## 2019-11-11 PROCEDURE — 97112 NEUROMUSCULAR REEDUCATION: CPT | Performed by: PHYSICAL THERAPIST

## 2019-11-11 PROCEDURE — 97140 MANUAL THERAPY 1/> REGIONS: CPT | Performed by: PHYSICAL THERAPIST

## 2019-11-11 PROCEDURE — 97110 THERAPEUTIC EXERCISES: CPT | Performed by: PHYSICAL THERAPIST

## 2019-11-11 NOTE — PROGRESS NOTES
Daily Note     Today's date: 2019  Patient name: Tana Tejeda  : 1960  MRN: [id number]  Referring provider: Carlota Naik PA-C  Dx:   Encounter Diagnosis     ICD-10-CM    1  Aftercare following left knee joint replacement surgery Z47 1     Z96 652    2  Acute pain of left knee M25 562    3  Primary osteoarthritis of left knee M17 12        Start Time: 1015  Stop Time: 1115  Total time in clinic (min): 60 minutes    Subjective: Pt reports no new symptoms  Objective: See treatment diary below      Assessment: Tolerated treatment well  Patient demonstrated fatigue post treatment and would benefit from continued PT      Plan: Continue per plan of care        Precautions: Current L TKA, previous R TKA       Manual  10/24 10/28 10/31/19 11/4 11/7 11/11   10/17/19   PROM L knee JF JF VK np JF JF   VK   Fib head mobs JF JF   JF JF                                              Exercise Diary  10/24 10/28 10/31/19 11/4 11/7 11/11    10/17/19   Quad sets          15x10"   Heel slides           3x10x5"   LAQ 3*10 2# 3*10 2# 2# 3x10 2#, 3x10 3# 3*10 3# 3*10    3x10x5"   Mini squats 3*10 3*10 3x10 3x10 3*10 3*10    3x10   Standing hip abd 3*10 2# 3*10 2# 2# 3x10 2#, 3x10 3# 3*10 3# 3*10    3x10   Heel prop stretch      5'    5'   HR             SLR 3*10 2# 3*10 2# 2# 3x10 2#, 3x10 3# 3*10 3# 3*10    2x15   Step-ups 6" 6" 3x10 3*10 8" 3x10 8"   3x10, 8" 3*10 3*10    6" 3x10   Wall ball 3x10x5" 3*10*5" 3x10x5" Cable column, 25#, 3x10 CC, 30# 3*10 CC, 30# 3*10    3x10x5"   STS 4*10 4*10 4x10 4x10 4*10 3*10    3x10   Hamstring curls  3*10 2# 3*10 2# 2# 3x10 2#, 3x10 3# 3*10 CC, 30# 3*10       RB    Bike 8' 5' 5'           Leg press 110#, 3x10 Leg press 110#, 3x10 Leg press 110#, 3x10           Clamshells, 2x10 Clamshells, 2x10 clahmshell 3x10       Monster walks      3 laps + lateral walks                                                               Modalities  19/          Ice with elevation/ankle pumps to finish 10 min 8 min  seated, 10' post

## 2019-11-14 ENCOUNTER — OFFICE VISIT (OUTPATIENT)
Dept: PHYSICAL THERAPY | Age: 59
End: 2019-11-14
Payer: COMMERCIAL

## 2019-11-14 DIAGNOSIS — Z96.651 AFTERCARE FOLLOWING RIGHT KNEE JOINT REPLACEMENT SURGERY: ICD-10-CM

## 2019-11-14 DIAGNOSIS — M25.561 ACUTE PAIN OF RIGHT KNEE: ICD-10-CM

## 2019-11-14 DIAGNOSIS — Z47.1 AFTERCARE FOLLOWING LEFT KNEE JOINT REPLACEMENT SURGERY: Primary | ICD-10-CM

## 2019-11-14 DIAGNOSIS — M25.562 ACUTE PAIN OF LEFT KNEE: ICD-10-CM

## 2019-11-14 DIAGNOSIS — Z47.1 AFTERCARE FOLLOWING RIGHT KNEE JOINT REPLACEMENT SURGERY: ICD-10-CM

## 2019-11-14 DIAGNOSIS — M17.12 PRIMARY OSTEOARTHRITIS OF LEFT KNEE: ICD-10-CM

## 2019-11-14 DIAGNOSIS — Z96.652 AFTERCARE FOLLOWING LEFT KNEE JOINT REPLACEMENT SURGERY: Primary | ICD-10-CM

## 2019-11-14 PROCEDURE — 97112 NEUROMUSCULAR REEDUCATION: CPT

## 2019-11-14 PROCEDURE — 97110 THERAPEUTIC EXERCISES: CPT

## 2019-11-14 NOTE — PROGRESS NOTES
Daily Note     Today's date: 2019  Patient name: Destiny Diaz  : 1960  MRN: [id number]  Referring provider: Rasheed Reid PA-C  Dx:   Encounter Diagnosis     ICD-10-CM    1  Aftercare following left knee joint replacement surgery Z47 1     Z96 652    2  Acute pain of left knee M25 562    3  Primary osteoarthritis of left knee M17 12    4  Acute pain of right knee M25 561    5  Aftercare following right knee joint replacement surgery Z47 1     Z96 651                   Subjective:  Pt reports he is improving but wants to be stronger      Objective: See treatment diary below      Assessment: Tolerated treatment well  Discussed with pt post op healing process and importance of cont of HEP       Plan: Continue per plan of care        Precautions: Current L TKA, previous R TKA       Manual  10/24 10/28 10/31/19 11/4 11/7 11/11   10/17/19   PROM L knee JF JF VK np JF JF   VK   Fib head mobs JF JF   JF JF                                              Exercise Diary  10/24 10/28 10/31/19 11/4 11/7 11/11 11/14/19   10/17/19   Quad sets          15x10"   Heel slides           3x10x5"   LAQ 3*10 2# 3*10 2# 2# 3x10 2#, 3x10 3# 3*10 3# 3*10 3# 3x10   3x10x5"   Mini squats 3*10 3*10 3x10 3x10 3*10 3*10 3x10   3x10   Standing hip abd 3*10 2# 3*10 2# 2# 3x10 2#, 3x10 3# 3*10 3# 3*10 3# 3x10   3x10   Heel prop stretch      5' 5'   5'   HR             SLR 3*10 2# 3*10 2# 2# 3x10 2#, 3x10 3# 3*10 3# 3*10 3# 3x10   2x15   Step-ups 6" 6" 3x10 3*10 8" 3x10 8"   3x10, 8" 3*10 3*10 3x10   6" 3x10   Wall ball 3x10x5" 3*10*5" 3x10x5" Cable column, 25#, 3x10 CC, 30# 3*10 CC, 30# 3*10 30# 3x10   3x10x5"   STS 4*10 4*10 4x10 4x10 4*10 3*10 3x10   3x10   Hamstring curls  3*10 2# 3*10 2# 2# 3x10 2#, 3x10 3# 3*10 CC, 30# 3*10 3# 3x10      RB    Bike 8' 5' 5' 7'          Leg press 110#, 3x10 Leg press 110#, 3x10 Leg press 110#, 3x10 # 3x10          Clamshells, 2x10 Clamshells, 2x10 clahmshell 3x10 3x10      Monster walks 3 laps + lateral walks 3 laps ea btb                                                              Modalities  9/18/19 9/23/19 11/4          Ice with elevation/ankle pumps to finish 10 min 8 min  seated, 10' post

## 2019-11-18 ENCOUNTER — OFFICE VISIT (OUTPATIENT)
Dept: PHYSICAL THERAPY | Age: 59
End: 2019-11-18
Payer: COMMERCIAL

## 2019-11-18 DIAGNOSIS — Z47.1 AFTERCARE FOLLOWING LEFT KNEE JOINT REPLACEMENT SURGERY: Primary | ICD-10-CM

## 2019-11-18 DIAGNOSIS — Z96.652 AFTERCARE FOLLOWING LEFT KNEE JOINT REPLACEMENT SURGERY: Primary | ICD-10-CM

## 2019-11-18 DIAGNOSIS — M25.562 ACUTE PAIN OF LEFT KNEE: ICD-10-CM

## 2019-11-18 PROCEDURE — 97112 NEUROMUSCULAR REEDUCATION: CPT | Performed by: PHYSICAL THERAPIST

## 2019-11-18 PROCEDURE — 97110 THERAPEUTIC EXERCISES: CPT | Performed by: PHYSICAL THERAPIST

## 2019-11-18 NOTE — PROGRESS NOTES
Daily Note     Today's date: 2019  Patient name: Aki Ashley  : 1960  MRN: [id number]  Referring provider: Liliya Lemus PA-C  Dx:   Encounter Diagnosis     ICD-10-CM    1  Aftercare following left knee joint replacement surgery Z47 1     Z96 652    2  Acute pain of left knee M25 562        Start Time: 0845  Stop Time: 0945  Total time in clinic (min): 60 minutes    Subjective: Pt reports improvements in symptoms  Objective: See treatment diary below      Assessment: Tolerated treatment well  Pt will be re-evaluated next session to determine plan of care moving forward  Patient demonstrated fatigue post treatment and would benefit from continued PT      Plan: Continue per plan of care        Precautions: Current L TKA, previous R TKA       Manual  10/24 10/28 10/31/19 11/4 11/7 11/11 11/18     PROM L knee JF JF VK np JF JF JF     Fib head mobs JF JF   JF JF JF                                             Exercise Diary  10/24 10/28 10/31/19 11/4 11/7 11/11 11/14/19 11/18     Quad sets             Heel slides              LAQ 3*10 2# 3*10 2# 2# 3x10 2#, 3x10 3# 3*10 3# 3*10 3# 3x10 3# 3*10     Mini squats 3*10 3*10 3x10 3x10 3*10 3*10 3x10 3*10     Standing hip abd 3*10 2# 3*10 2# 2# 3x10 2#, 3x10 3# 3*10 3# 3*10 3# 3x10 3# 3x10     Heel prop stretch      5' 5' 5'     HR             SLR 3*10 2# 3*10 2# 2# 3x10 2#, 3x10 3# 3*10 3# 3*10 3# 3x10 3# 3x10     Step-ups 6" 6" 3x10 3*10 8" 3x10 8"   3x10, 8" 3*10 3*10 3x10 3*10     Wall ball 3x10x5" 3*10*5" 3x10x5" Cable column, 25#, 3x10 CC, 30# 3*10 CC, 30# 3*10 30# 3x10 30# 3x10     STS 4*10 4*10 4x10 4x10 4*10 3*10 3x10 3x10     Hamstring curls  3*10 2# 3*10 2# 2# 3x10 2#, 3x10 3# 3*10 CC, 30# 3*10 3# 3x10 3# 3x10     RB    Bike 8' 5' 5' 7' 7'         Leg press 110#, 3x10 Leg press 110#, 3x10 Leg press 110#, 3x10 # 3x10 # 3x10         Clamshells, 2x10 Clamshells, 2x10 clahmshell 3x10 3x10 3*10     Monster walks      3 laps + lateral walks 3 laps ea btb 3 laps ea btb                                                             Modalities  9/18/19 9/23/19 11/4          Ice with elevation/ankle pumps to finish 10 min 8 min  seated, 10' post

## 2019-11-19 ENCOUNTER — TELEPHONE (OUTPATIENT)
Dept: OBGYN CLINIC | Facility: OTHER | Age: 59
End: 2019-11-19

## 2019-11-21 ENCOUNTER — EVALUATION (OUTPATIENT)
Dept: PHYSICAL THERAPY | Age: 59
End: 2019-11-21
Payer: COMMERCIAL

## 2019-11-21 DIAGNOSIS — M25.562 ACUTE PAIN OF LEFT KNEE: ICD-10-CM

## 2019-11-21 DIAGNOSIS — M17.12 PRIMARY OSTEOARTHRITIS OF LEFT KNEE: ICD-10-CM

## 2019-11-21 DIAGNOSIS — Z96.652 AFTERCARE FOLLOWING LEFT KNEE JOINT REPLACEMENT SURGERY: Primary | ICD-10-CM

## 2019-11-21 DIAGNOSIS — Z47.1 AFTERCARE FOLLOWING LEFT KNEE JOINT REPLACEMENT SURGERY: Primary | ICD-10-CM

## 2019-11-21 PROCEDURE — 97110 THERAPEUTIC EXERCISES: CPT | Performed by: PHYSICAL THERAPIST

## 2019-11-21 PROCEDURE — 97112 NEUROMUSCULAR REEDUCATION: CPT | Performed by: PHYSICAL THERAPIST

## 2019-11-21 NOTE — PROGRESS NOTES
PT Re-Evaluation     Today's date: 2019  Patient name: Desean Tineo  : 1960  MRN: [id number]  Referring provider: Lety Wagner PA-C  Dx:   Encounter Diagnosis     ICD-10-CM    1  Acute pain of left knee M25 562    2  Primary osteoarthritis of left knee M17 12 Ambulatory referral to Physical Therapy   3  Aftercare following left knee joint replacement surgery Z47 1     Z96 652        Start Time: 1100  Stop Time: 1145  Total time in clinic (min): 45 minutes    Assessment  Assessment details: Desean Tineo is a 60 y o  male who presents with signs and symptoms consistent of L TKA  Incision shows no signs of infection  Although patient still states he has pain and some difficulty with higher level functional task, patient is nearly independent with exercise program and fully independent with ADL's at this point  Pt would benefit from 1 more week of PT to establish complete HEP  At that point, patient will be discharged to self guided patient exercise program at the clinical facility  I would be more than happy to see patient for more visits if patient and doctor determine more PT would benefit patient after next follow-up visit  Patient would continue benefit from skilled physical therapy to address the impairments, improve their level of function, and to improve their overall quality of life  Impairments: abnormal muscle tone, abnormal or restricted ROM, impaired balance, impaired physical strength, lacks appropriate home exercise program, pain with function and weight-bearing intolerance  Understanding of Dx/Px/POC: good   Prognosis: good    Goals  Short Term Goals: to be achieved by 4 weeks MET  1) Patient to be independent with basic HEP  2) Decrease pain to 2/10 at its worst   3) Increase Knee  ROM by 5-10 degrees   4) Increase LE strength by 1/2 MMT grade in all deficient planes  Long Term Goals: to be achieved by discharge Partially met  1) FOTO equal to or greater than 55   Partially met 2) Ambulation to improve to maximal level of function  3) Stair negotiation will improve to reciprocal   4) Sit to stand transfers will improve to maximal level of function     Plan  Patient would benefit from: skilled physical therapy  Planned modality interventions: cryotherapy and thermotherapy: hydrocollator packs  Planned therapy interventions: ADL training, manual therapy, neuromuscular re-education, patient education, strengthening, stretching, therapeutic activities, therapeutic exercise, transfer training, home exercise program, graded exercise and functional ROM exercises  Frequency: Twice a week for one week  Subjective Evaluation    History of Present Illness  Mechanism of injury: Pt underwent L TKA on 9/13/19  No medical complication were reported during recovery or hospital stay  Pt still has difficulty at this point with ALD's but nothing out of the oridnary for this time in recovery     Patient Goals  Patient goals for therapy: decreased pain, independence with ADLs/IADLs, increased strength and return to sport/leisure activities          Objective     Active Range of Motion   Left Knee   Flexion: 120 degrees   Extension: -10 degrees     Passive Range of Motion   Left Knee   Flexion: 125 degrees   Extension: -5 degrees     Strength/Myotome Testing     Left Hip   Planes of Motion   Flexion: 4+/5  Extension: 4+/5  Abduction: 4+/5  Adduction: 4+/5    Right Hip   Planes of Motion   Flexion: 5  Extension: 5  Abduction: 5  Adduction: 5    Left Knee   Flexion: 4+  Extension: 4+  Quadriceps contraction: poor    Swelling     Left Knee Girth Measurement (cm)   Joint line: 52 cm  10 cm above joint line: 47 cm  10 cm below joint line: 46 cm    Functional Assessment        Comments  TUG: 10 secs   5 STS: 12 secs     SLS: 7 secs  Tandem: 30 secs    General Comments:      Knee Comments  Incision shows no signs of infection       Flowsheet Rows      Most Recent Value   PT/OT G-Codes   Current Score  50 Projected Score  53             Precautions: Current L TKA, previous R TKA        Manual  10/24 10/28 10/31/19 11/4 11/7 11/11 11/21     PROM L knee JF JF VK np JF JF JF     Fib head mobs JF JF   JF JF JF                                             Exercise Diary  10/24 10/28 10/31/19 11/4 11/7 11/11 11/14/19 11/21     Quad sets             Heel slides              LAQ 3*10 2# 3*10 2# 2# 3x10 2#, 3x10 3# 3*10 3# 3*10 3# 3x10 3# 3*10     Mini squats 3*10 3*10 3x10 3x10 3*10 3*10 3x10 3*10     Standing hip abd 3*10 2# 3*10 2# 2# 3x10 2#, 3x10 3# 3*10 3# 3*10 3# 3x10 3# 3x10     Heel prop stretch      5' 5' 5'     HR             SLR 3*10 2# 3*10 2# 2# 3x10 2#, 3x10 3# 3*10 3# 3*10 3# 3x10 3# 3x10     Step-ups 6" 6" 3x10 3*10 8" 3x10 8"   3x10, 8" 3*10 3*10 3x10 3*10     Wall ball 3x10x5" 3*10*5" 3x10x5" Cable column, 25#, 3x10 CC, 30# 3*10 CC, 30# 3*10 30# 3x10 30# 3x10     STS 4*10 4*10 4x10 4x10 4*10 3*10 3x10 3x10     Hamstring curls  3*10 2# 3*10 2# 2# 3x10 2#, 3x10 3# 3*10 CC, 30# 3*10 3# 3x10 3# 3x10     RB    Bike 8' 5' 5' 7' 7'         Leg press 110#, 3x10 Leg press 110#, 3x10 Leg press 110#, 3x10 # 3x10 # 3x10         Clamshells, 2x10 Clamshells, 2x10 clahmshell 3x10 3x10 3*10     Monster walks      3 laps + lateral walks 3 laps ea btb 3 laps ea btb                                                             Modalities  9/18/19 9/23/19 11/4          Ice with elevation/ankle pumps to finish 10 min 8 min  seated, 10' post

## 2019-11-21 NOTE — TELEPHONE ENCOUNTER
Currently working on getting an EMG for patient - Central Scheduling told me Feb 2020 was earliest appt dates   email sent asking for earlier appt

## 2019-11-25 ENCOUNTER — OFFICE VISIT (OUTPATIENT)
Dept: PHYSICAL THERAPY | Age: 59
End: 2019-11-25
Payer: COMMERCIAL

## 2019-11-25 ENCOUNTER — OFFICE VISIT (OUTPATIENT)
Dept: OBGYN CLINIC | Facility: MEDICAL CENTER | Age: 59
End: 2019-11-25
Payer: COMMERCIAL

## 2019-11-25 VITALS
BODY MASS INDEX: 41.97 KG/M2 | HEIGHT: 67 IN | WEIGHT: 267.4 LBS | HEART RATE: 96 BPM | SYSTOLIC BLOOD PRESSURE: 143 MMHG | DIASTOLIC BLOOD PRESSURE: 84 MMHG

## 2019-11-25 DIAGNOSIS — G56.21 GUYON SYNDROME, RIGHT: Primary | ICD-10-CM

## 2019-11-25 DIAGNOSIS — Z47.1 AFTERCARE FOLLOWING LEFT KNEE JOINT REPLACEMENT SURGERY: Primary | ICD-10-CM

## 2019-11-25 DIAGNOSIS — M25.562 ACUTE PAIN OF LEFT KNEE: ICD-10-CM

## 2019-11-25 DIAGNOSIS — Z96.652 AFTERCARE FOLLOWING LEFT KNEE JOINT REPLACEMENT SURGERY: Primary | ICD-10-CM

## 2019-11-25 DIAGNOSIS — M17.12 PRIMARY OSTEOARTHRITIS OF LEFT KNEE: ICD-10-CM

## 2019-11-25 PROCEDURE — 97112 NEUROMUSCULAR REEDUCATION: CPT | Performed by: PHYSICAL THERAPIST

## 2019-11-25 PROCEDURE — 99213 OFFICE O/P EST LOW 20 MIN: CPT | Performed by: ORTHOPAEDIC SURGERY

## 2019-11-25 PROCEDURE — 97110 THERAPEUTIC EXERCISES: CPT | Performed by: PHYSICAL THERAPIST

## 2019-11-25 NOTE — PROGRESS NOTES
CHIEF COMPLAINT:  Chief Complaint   Patient presents with    Right Hand - Follow-up       SUBJECTIVE:  Merry Grigsby is a 60y o  year old  male who presents to the office for evaluation of his right upper extremity  Patient has been having numbness and tingling in his small and ring fingers since September  Patient states that the numbness and tingling began due to using his walker status post knee replacement  Pt states that his symptoms did not improve after DC of walker        PAST MEDICAL HISTORY:  Past Medical History:   Diagnosis Date    Anesthesia complication     difficulty awakening- dyspnea    Anxiety     Arthritis     Cleft hard palate     Glaucoma suspect, both eyes     Hyperlipidemia     Kidney stone     left    Right inguinal hernia     Right ureteral stone     Seasonal allergies     Wears dentures     partial upper    Wears glasses        PAST SURGICAL HISTORY:  Past Surgical History:   Procedure Laterality Date    CLEFT PALATE REPAIR      INGUINAL HERNIA REPAIR Right     KNEE CARTILAGE SURGERY Left     [place] CYSTO/URETERO W/LITHOTRIPSY &INDWELL STENT INSRT Right 8/4/2017    Procedure: CYSTOSCOPY URETEROSCOPY WITH LITHOTRIPSY HOLMIUM LASER, RETROGRADE PYELOGRAM AND INSERTION STENT URETERAL;  Surgeon: Robina Tenorio MD;  [facility];  Service: Urology    [place] TOTAL KNEE ARTHROPLASTY Right 3/4/2019    Procedure: TOTAL KNEE ARTHROPLASTY;  Surgeon: Eusebia Hatfield MD;  [facility];  Service: Orthopedics    [place] TOTAL KNEE ARTHROPLASTY Left 9/13/2019    Procedure: ARTHROPLASTY KNEE TOTAL;  Surgeon: Eusebia Hatfield MD;  [facility];  Service: Orthopedics    TOTAL KNEE ARTHROPLASTY Left        FAMILY HISTORY:  Family History   Problem Relation Age of Onset    Diabetes Mother     Heart disease Mother     Hypertension Mother     Esophageal cancer Father     Diabetes Sister     Other Sister        SOCIAL HISTORY:  Social History     Tobacco Use    Smoking status: Never Smoker    Smokeless tobacco: Never Used    Tobacco comment: no passive smoke exposure   Substance Use Topics    Alcohol use: Yes     Frequency: 2-4 times a month    Drug use: No       MEDICATIONS:    Current Outpatient Medications:     ascorbic acid (VITAMIN C) 500 MG tablet, Take 1 tablet (500 mg total) by mouth daily, Disp: 60 tablet, Rfl: 0    cetirizine (ZyrTEC) 10 mg tablet, Take 10 mg by mouth daily, Disp: , Rfl:     Cholecalciferol 2000 units CAPS, Take 1 capsule by mouth daily , Disp: , Rfl:     Multiple Vitamin (MULTIVITAMIN) tablet, Take 2 tablets by mouth daily, Disp: , Rfl:     Omega-3 Fatty Acids (FISH OIL PO), Take 1 capsule by mouth daily, Disp: , Rfl:     simvastatin (ZOCOR) 40 mg tablet, TAKE 1 TABLET DAILY AT BEDTIME, Disp: 90 tablet, Rfl: 0    ALLERGIES:  Allergies   Allergen Reactions    No Active Allergies        REVIEW OF SYSTEMS:  Review of Systems   Constitutional: Negative for chills, fever and unexpected weight change  HENT: Negative for hearing loss, nosebleeds and sore throat  Eyes: Negative for pain, redness and visual disturbance  Respiratory: Negative for cough, shortness of breath and wheezing  Cardiovascular: Negative for chest pain, palpitations and leg swelling  Gastrointestinal: Negative for abdominal pain, nausea and vomiting  Endocrine: Negative for polydipsia and polyuria  Genitourinary: Negative for dysuria and hematuria  Skin: Negative for rash and wound  Neurological: Negative for dizziness, light-headedness and headaches  Psychiatric/Behavioral: Negative for decreased concentration, dysphoric mood and suicidal ideas  The patient is not nervous/anxious          VITALS:  Vitals:    11/25/19 1421   BP: 143/84   Pulse: 96       LABS:  HgA1c:   Lab Results   Component Value Date    HGBA1C 7 1 (H) 10/03/2019     BMP:   Lab Results   Component Value Date    CALCIUM 9 6 10/03/2019    K 4 3 10/03/2019    CO2 28 10/03/2019    CL 106 10/03/2019    BUN 19 10/03/2019    CREATININE 1 00 10/03/2019       _____________________________________________________  PHYSICAL EXAMINATION:  General: well developed and well nourished, alert, oriented times 3 and appears comfortable  Psychiatric: Normal  HEENT: Trachea Midline, No torticollis  Pulmonary: No audible wheezing or respiratory distress   Skin: No masses, erythema, lacerations, fluctation, ulcerations  Neurovascular: Sensation Intact to the Median, Ulnar, Radial Nerve, Motor Intact to the Median, Ulnar, Radial Nerve and Pulses Intact    MUSCULOSKELETAL EXAMINATION:    Right wrist   No swelling erythema or ecchymosis  No tenderness to palpation of the wrist  Negative Tinel's over the Guyon canal          ___________________________________________________  STUDIES REVIEWED:  No studies reviewed  PROCEDURES PERFORMED:  Procedures  No Procedures performed today    _____________________________________________________  ASSESSMENT/PLAN:    Right ulnar neuropathy, 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

## 2019-11-25 NOTE — PROGRESS NOTES
Daily Note     Today's date: 2019  Patient name: Vasquez Hicks  : 1960  MRN: [id number]  Referring provider: Rowan Lugo PA-C  Dx:   Encounter Diagnosis     ICD-10-CM    1  Aftercare following left knee joint replacement surgery Z47 1     Z96 652    2  Acute pain of left knee M25 562    3  Primary osteoarthritis of left knee M17 12        Start Time: 0845  Stop Time: 9849   Total time in clinic (min): 46 minutes    Subjective: Pt reports improvements in symptoms  Objective: See treatment diary below      Assessment: Tolerated treatment well  Patient demonstrated fatigue post treatment and would benefit from continued PT      Plan: Continue per plan of care  Potential discharge next visit       Precautions: Current L TKA, previous R TKA       Manual  10/24 10/28 10/31/19 11/4 11/7 11/11 11/18     PROM L knee JF JF VK np JF JF JF     Fib head mobs JF JF   JF JF JF                                             Exercise Diary  10/24 10/28 10/31/19 11/4 11/7 11/11 11/14/19 11/18 11/25    Quad sets             Heel slides              LAQ 3*10 2# 3*10 2# 2# 3x10 2#, 3x10 3# 3*10 3# 3*10 3# 3x10 3# 3*10 3# 3*10    Mini squats 3*10 3*10 3x10 3x10 3*10 3*10 3x10 3*10 3*10    Standing hip abd 3*10 2# 3*10 2# 2# 3x10 2#, 3x10 3# 3*10 3# 3*10 3# 3x10 3# 3x10 3# 3*10    Heel prop stretch      5' 5' 5' 5'    HR             SLR 3*10 2# 3*10 2# 2# 3x10 2#, 3x10 3# 3*10 3# 3*10 3# 3x10 3# 3x10 3#    Step-ups 6" 6" 3x10 3*10 8" 3x10 8"   3x10, 8" 3*10 3*10 3x10 3*10 3*10    Wall ball 3x10x5" 3*10*5" 3x10x5" Cable column, 25#, 3x10 CC, 30# 3*10 CC, 30# 3*10 30# 3x10 30# 3x10 30# 3x10    STS 4*10 4*10 4x10 4x10 4*10 3*10 3x10 3x10 3*10    Hamstring curls  3*10 2# 3*10 2# 2# 3x10 2#, 3x10 3# 3*10 CC, 30# 3*10 3# 3x10 3# 3x10 3# 3*10    RB    Bike 8' 5' 5' 7' 7'         Leg press 110#, 3x10 Leg press 110#, 3x10 Leg press 110#, 3x10 # 3x10 # 3x10         Clamshells, 2x10 Clamshells, 2x10 clahmshell 3x10 3x10 3*10     Monster walks      3 laps + lateral walks 3 laps ea btb 3 laps ea btb 3 laps ea btb                                                            Modalities  9/18/19 9/23/19 11/4          Ice with elevation/ankle pumps to finish 10 min 8 min  seated, 10' post

## 2019-11-27 ENCOUNTER — OFFICE VISIT (OUTPATIENT)
Dept: PHYSICAL THERAPY | Age: 59
End: 2019-11-27
Payer: COMMERCIAL

## 2019-11-27 DIAGNOSIS — Z47.1 AFTERCARE FOLLOWING LEFT KNEE JOINT REPLACEMENT SURGERY: Primary | ICD-10-CM

## 2019-11-27 DIAGNOSIS — M25.562 ACUTE PAIN OF LEFT KNEE: ICD-10-CM

## 2019-11-27 DIAGNOSIS — M17.12 PRIMARY OSTEOARTHRITIS OF LEFT KNEE: ICD-10-CM

## 2019-11-27 DIAGNOSIS — Z96.652 AFTERCARE FOLLOWING LEFT KNEE JOINT REPLACEMENT SURGERY: Primary | ICD-10-CM

## 2019-11-27 PROCEDURE — 97112 NEUROMUSCULAR REEDUCATION: CPT | Performed by: PHYSICAL THERAPIST

## 2019-11-27 PROCEDURE — 97110 THERAPEUTIC EXERCISES: CPT | Performed by: PHYSICAL THERAPIST

## 2019-11-27 NOTE — PROGRESS NOTES
D/C Note     Today's date: 2019  Patient name: Ezequiel Nolen  : 1960  MRN: [id number]  Referring provider: Dominic Epperson PA-C  Dx:   Encounter Diagnosis     ICD-10-CM    1  Aftercare following left knee joint replacement surgery Z47 1     Z96 652    2  Acute pain of left knee M25 562    3  Primary osteoarthritis of left knee M17 12        Start Time: 0845  Stop Time: 1754  Total time in clinic (min): 46 minutes    Subjective: Pt reports improvements in symptoms  Objective: See treatment diary below      Assessment: Tolerated treatment well  Patient exhibited good technique with therapeutic exercises and will be discharged per POC  Re-evaluation assessment from :   Ezequiel Nolen is a 60 y o  male who presents with signs and symptoms consistent of L TKA  Incision shows no signs of infection  Although patient still states he has pain and some difficulty with higher level functional task, patient is nearly independent with exercise program and fully independent with ADL's at this point  Pt would benefit from 1 more week of PT to establish complete HEP  At that point, patient will be discharged to self guided patient exercise program at the clinical facility  I would be more than happy to see patient for more visits if patient and doctor determine more PT would benefit patient after next follow-up visit   Patient would continue benefit from skilled physical therapy to address the impairments, improve their level of function, and to improve their overall quality of life      Plan: D/C per POC     Precautions: Current L TKA, previous R TKA       Manual  10/24 10/28 10/31/19 11/4 11/7 11/11 11/18 11/27    PROM L knee JF JF VK np JF GARDENIA VARNER JF    Fib head mobs GARDENIA VARNER                                            Exercise Diary  10/24 10/28 10/31/19 11/4 11/7 11/11 11/14/19 11/18 11/27    Quad sets             Heel slides              LAQ 3*10 2# 3*10 2# 2# 3x10 2#, 3x10 3# 3*10 3# 3*10 3# 3x10 3# 3*10 4# 3*10     Mini squats 3*10 3*10 3x10 3x10 3*10 3*10 3x10 3*10 3*10    Standing hip abd 3*10 2# 3*10 2# 2# 3x10 2#, 3x10 3# 3*10 3# 3*10 3# 3x10 3# 3x10 3# 3*10    Heel prop stretch      5' 5' 5' 5'    HR             SLR 3*10 2# 3*10 2# 2# 3x10 2#, 3x10 3# 3*10 3# 3*10 3# 3x10 3# 3x10 3#    Step-ups 6" 6" 3x10 3*10 8" 3x10 8"   3x10, 8" 3*10 3*10 3x10 3*10 3*10    Wall ball 3x10x5" 3*10*5" 3x10x5" Cable column, 25#, 3x10 CC, 30# 3*10 CC, 30# 3*10 30# 3x10 30# 3x10 30# 3x10    STS 4*10 4*10 4x10 4x10 4*10 3*10 3x10 3x10 3*10    Hamstring curls  3*10 2# 3*10 2# 2# 3x10 2#, 3x10 3# 3*10 CC, 30# 3*10 3# 3x10 3# 3x10 3# 3*10    RB    Bike 8' 5' 5' 7' 7'         Leg press 110#, 3x10 Leg press 110#, 3x10 Leg press 110#, 3x10 # 3x10 # 3x10         Clamshells, 2x10 Clamshells, 2x10 clahmshell 3x10 3x10 3*10     Monster walks      3 laps + lateral walks 3 laps ea btb 3 laps ea btb 3 laps ea btb                                                            Modalities  9/18/19 9/23/19 11/4          Ice with elevation/ankle pumps to finish 10 min 8 min  seated, 10' post

## 2019-12-02 ENCOUNTER — TELEPHONE (OUTPATIENT)
Dept: OBGYN CLINIC | Facility: HOSPITAL | Age: 59
End: 2019-12-02

## 2019-12-02 NOTE — TELEPHONE ENCOUNTER
Pt requested call via [employer] alert system reporting "complication since surgery " As [facility] states, pt's wife contacted and was unable to hear me  I disconnected and called her back, that time with no answer  No option to leave VM

## 2019-12-05 ENCOUNTER — HOSPITAL ENCOUNTER (OUTPATIENT)
Dept: RADIOLOGY | Facility: HOSPITAL | Age: 59
Discharge: HOME | End: 2019-12-05
Attending: ORTHOPAEDIC SURGERY
Payer: COMMERCIAL

## 2019-12-05 ENCOUNTER — OFFICE VISIT (OUTPATIENT)
Dept: OBGYN CLINIC | Facility: HOSPITAL | Age: 59
End: 2019-12-05

## 2019-12-05 VITALS
WEIGHT: 264.4 LBS | SYSTOLIC BLOOD PRESSURE: 137 MMHG | BODY MASS INDEX: 41.41 KG/M2 | DIASTOLIC BLOOD PRESSURE: 94 MMHG | HEART RATE: 93 BPM

## 2019-12-05 DIAGNOSIS — Z96.652 S/P TOTAL KNEE REPLACEMENT, LEFT: ICD-10-CM

## 2019-12-05 DIAGNOSIS — M25.562 ACUTE PAIN OF LEFT KNEE: ICD-10-CM

## 2019-12-05 DIAGNOSIS — M25.562 ACUTE PAIN OF LEFT KNEE: Primary | ICD-10-CM

## 2019-12-05 PROCEDURE — 99024 POSTOP FOLLOW-UP VISIT: CPT | Performed by: ORTHOPAEDIC SURGERY

## 2019-12-05 PROCEDURE — 73560 X-RAY EXAM OF KNEE 1 OR 2: CPT

## 2019-12-05 RX ORDER — METHOCARBAMOL 750 MG/1
750 TABLET, FILM COATED ORAL
Qty: 30 TABLET | Refills: 0 | Status: SHIPPED | OUTPATIENT
Start: 2019-12-05 | End: 2020-02-18

## 2019-12-05 NOTE — LETTER
December 5, 2019     Patient: Pascal Gowers   YOB: 1960   Date of Visit: 12/5/2019       To Whom it May Concern:    Pascal Gowers is under my professional care  He was seen in my office on 12/5/2019  Please excuse the patient from work for 4 weeks from date listed above  If you have any questions or concerns, please don't hesitate to call           Sincerely,          Javier Luna MD        CC: Brayden Baptistes

## 2019-12-05 NOTE — PROGRESS NOTES
60 y o male presents for Two and half months postoperative visit status post left TKA  Patient states he is doing very well regarding his left knee states he is doing very well he has been discharged from physical therapy continues to do home range of motion stretching strengthening exercises  Patient states having occasional pain is left knee had 90 due to spasms  Denies any changes numbness tingling left lower extremity      Review of Systems  Review of systems negative unless otherwise specified in HPI    Past Medical History  Past Medical History:   Diagnosis Date    Anesthesia complication     difficulty awakening- dyspnea    Anxiety     Arthritis     Cleft hard palate     Glaucoma suspect, both eyes     Hyperlipidemia     Kidney stone     left    Right inguinal hernia     Right ureteral stone     Seasonal allergies     Wears dentures     partial upper    Wears glasses        Past Surgical History  Past Surgical History:   Procedure Laterality Date    CLEFT PALATE REPAIR      INGUINAL HERNIA REPAIR Right     KNEE CARTILAGE SURGERY Left     [place] CYSTO/URETERO W/LITHOTRIPSY &INDWELL STENT INSRT Right 8/4/2017    Procedure: CYSTOSCOPY URETEROSCOPY WITH LITHOTRIPSY HOLMIUM LASER, RETROGRADE PYELOGRAM AND INSERTION STENT URETERAL;  Surgeon: Candice Palmer MD;  [facility];  Service: Urology    [place] TOTAL KNEE ARTHROPLASTY Right 3/4/2019    Procedure: TOTAL KNEE ARTHROPLASTY;  Surgeon: Asmita Willis MD;  [facility];  Service: Orthopedics    [place] TOTAL KNEE ARTHROPLASTY Left 9/13/2019    Procedure: ARTHROPLASTY KNEE TOTAL;  Surgeon: Asmita Willis MD;  [facility];  Service: Orthopedics    TOTAL KNEE ARTHROPLASTY Left        Current Medications  Current Outpatient Medications on File Prior to Visit   Medication Sig Dispense Refill    ascorbic acid (VITAMIN C) 500 MG tablet Take 1 tablet (500 mg total) by mouth daily 60 tablet 0    cetirizine (ZyrTEC) 10 mg tablet Take 10 mg by mouth daily      Cholecalciferol 2000 units CAPS Take 1 capsule by mouth daily       Multiple Vitamin (MULTIVITAMIN) tablet Take 2 tablets by mouth daily      Omega-3 Fatty Acids (FISH OIL PO) Take 1 capsule by mouth daily      simvastatin (ZOCOR) 40 mg tablet TAKE 1 TABLET DAILY AT BEDTIME 90 tablet 0     No current facility-administered medications on file prior to visit          Recent Labs [facility])  0   Lab Value Date/Time    HCT 41 9 10/03/2019 0923    HGB 13 2 10/03/2019 0923    WBC 7 73 10/03/2019 0923    INR 1 06 08/12/2019 1146    CRP 3 2 (H) 08/12/2019 1517    HGBA1C 7 1 (H) 10/03/2019 0923         Physical exam  · General: Awake, Alert, Oriented  · Eyes: Pupils equal, round and reactive to light  · Heart: regular rate and rhythm  · Lungs: No audible wheezing    left Knee exam  · Well-healed anterior incision full active extension flexion greater than 120° quadriceps strength 5/5 sensation intact distal pulses present left lower extremity    Imaging  X-rays left knee reviewed personally in the office by myself x-rays revealed well in aligned left total knee arthroplasty no evidence of loosening    Assessment:  Status post 2 and half months left total knee arthroplasty  Plan:  Weightbearing as tolerated left lower extremity  Continue home range of motion stretching strengthening exercise  Lifetime dental antibiotic prophylaxis recommended  Muscle relaxants to taking at night as needed  Follow-up in 9 months time or sooner if needed repeat x-rays two views left knee

## 2019-12-09 ENCOUNTER — TELEPHONE (OUTPATIENT)
Dept: OBGYN CLINIC | Facility: HOSPITAL | Age: 59
End: 2019-12-09

## 2019-12-09 NOTE — TELEPHONE ENCOUNTER
Left message to confirm disability paperwork was faxed over to [facility] with medical records

## 2019-12-10 ENCOUNTER — HOSPITAL ENCOUNTER (OUTPATIENT)
Dept: NEUROLOGY | Facility: CLINIC | Age: 59
Discharge: HOME | End: 2019-12-10
Payer: COMMERCIAL

## 2019-12-10 DIAGNOSIS — M79.641 RIGHT HAND PAIN: ICD-10-CM

## 2019-12-10 DIAGNOSIS — R20.0 HAND NUMBNESS: ICD-10-CM

## 2019-12-10 DIAGNOSIS — G56.21 GUYON SYNDROME, RIGHT: ICD-10-CM

## 2019-12-10 PROCEDURE — 95909 NRV CNDJ TST 5-6 STUDIES: CPT | Performed by: PSYCHIATRY & NEUROLOGY

## 2019-12-10 PROCEDURE — 95886 MUSC TEST DONE W/N TEST COMP: CPT | Performed by: PSYCHIATRY & NEUROLOGY

## 2019-12-16 ENCOUNTER — TELEPHONE (OUTPATIENT)
Dept: OBGYN CLINIC | Facility: HOSPITAL | Age: 59
End: 2019-12-16

## 2019-12-16 ENCOUNTER — OFFICE VISIT (OUTPATIENT)
Dept: OBGYN CLINIC | Facility: MEDICAL CENTER | Age: 59
End: 2019-12-16
Payer: COMMERCIAL

## 2019-12-16 VITALS
HEART RATE: 92 BPM | WEIGHT: 264.4 LBS | HEIGHT: 67 IN | BODY MASS INDEX: 41.5 KG/M2 | DIASTOLIC BLOOD PRESSURE: 83 MMHG | SYSTOLIC BLOOD PRESSURE: 135 MMHG

## 2019-12-16 DIAGNOSIS — R20.0 HAND NUMBNESS: ICD-10-CM

## 2019-12-16 DIAGNOSIS — G56.21 GUYON SYNDROME, RIGHT: Primary | ICD-10-CM

## 2019-12-16 PROCEDURE — 99213 OFFICE O/P EST LOW 20 MIN: CPT | Performed by: ORTHOPAEDIC SURGERY

## 2019-12-16 NOTE — PROGRESS NOTES
CHIEF COMPLAINT:  Chief Complaint   Patient presents with    Right Hand - Follow-up       SUBJECTIVE:  Alison Galarza is a 60y o  year old RHD male who presents today to discuss an EDx for his right upper extremity to evaluate the Guyon canal syndrome and right ulnar neuropathy at elbow  He continues to have throbbing and numbness in the small and ring fingers  Patient notes that this started having the symptoms while using a walker post-op of a knee replacement  The symptoms did not resolve once d/c the walker  The EDx will be compared to the 11/15/19 that was ordered by Julia Conroy         PAST MEDICAL HISTORY:  Past Medical History:   Diagnosis Date    Anesthesia complication     difficulty awakening- dyspnea    Anxiety     Arthritis     Cleft hard palate     Glaucoma suspect, both eyes     Hyperlipidemia     Kidney stone     left    Right inguinal hernia     Right ureteral stone     Seasonal allergies     Wears dentures     partial upper    Wears glasses        PAST SURGICAL HISTORY:  Past Surgical History:   Procedure Laterality Date    CLEFT PALATE REPAIR      INGUINAL HERNIA REPAIR Right     KNEE CARTILAGE SURGERY Left     [place] CYSTO/URETERO W/LITHOTRIPSY &INDWELL STENT INSRT Right 8/4/2017    Procedure: CYSTOSCOPY URETEROSCOPY WITH LITHOTRIPSY HOLMIUM LASER, RETROGRADE PYELOGRAM AND INSERTION STENT URETERAL;  Surgeon: Miguel Andrews MD;  [facility];  Service: Urology    [place] TOTAL KNEE ARTHROPLASTY Right 3/4/2019    Procedure: TOTAL KNEE ARTHROPLASTY;  Surgeon: Román Jauregui MD;  [facility];  Service: Orthopedics    [place] TOTAL KNEE ARTHROPLASTY Left 9/13/2019    Procedure: ARTHROPLASTY KNEE TOTAL;  Surgeon: Román Jauregui MD;  [facility];  Service: Orthopedics    TOTAL KNEE ARTHROPLASTY Left        FAMILY HISTORY:  Family History   Problem Relation Age of Onset    Diabetes Mother     Heart disease Mother     Hypertension Mother     Esophageal cancer Father     Diabetes Sister     Other Sister        SOCIAL HISTORY:  Social History     Tobacco Use    Smoking status: Never Smoker    Smokeless tobacco: Never Used    Tobacco comment: no passive smoke exposure   Substance Use Topics    Alcohol use: Yes     Frequency: 2-4 times a month    Drug use: No       MEDICATIONS:    Current Outpatient Medications:     ascorbic acid (VITAMIN C) 500 MG tablet, Take 1 tablet (500 mg total) by mouth daily, Disp: 60 tablet, Rfl: 0    cetirizine (ZyrTEC) 10 mg tablet, Take 10 mg by mouth daily, Disp: , Rfl:     Cholecalciferol 2000 units CAPS, Take 1 capsule by mouth daily , Disp: , Rfl:     methocarbamol (ROBAXIN) 750 mg tablet, Take 1 tablet (750 mg total) by mouth daily at bedtime as needed for muscle spasms, Disp: 30 tablet, Rfl: 0    Multiple Vitamin (MULTIVITAMIN) tablet, Take 2 tablets by mouth daily, Disp: , Rfl:     Omega-3 Fatty Acids (FISH OIL PO), Take 1 capsule by mouth daily, Disp: , Rfl:     simvastatin (ZOCOR) 40 mg tablet, TAKE 1 TABLET DAILY AT BEDTIME, Disp: 90 tablet, Rfl: 0    ALLERGIES:  Allergies   Allergen Reactions    No Active Allergies        REVIEW OF SYSTEMS:  Review of Systems    VITALS:  Vitals:    12/16/19 1012   BP: 135/83   Pulse: 92       LABS:  HgA1c:   Lab Results   Component Value Date    HGBA1C 7 1 (H) 10/03/2019     BMP:   Lab Results   Component Value Date    CALCIUM 9 6 10/03/2019    K 4 3 10/03/2019    CO2 28 10/03/2019     10/03/2019    BUN 19 10/03/2019    CREATININE 1 00 10/03/2019       _____________________________________________________  PHYSICAL EXAMINATION:  General: well developed and well nourished, alert, oriented times 3 and appears comfortable  Psychiatric: Normal  HEENT: Trachea Midline, No torticollis  Pulmonary: No audible wheezing or strider  Cardiovascular: No discernable arrhythmia   Skin: No masses, erythema, lacerations, fluctation, ulcerations  Neurovascular: Decreased Sensation to  the Ulnar Nerve, Motor Intact to the Median, Ulnar, Radial Nerve and Pulses Intact    MUSCULOSKELETAL EXAMINATION:  RIGHT wrist:   There is full ROM of the wrist and hand  Abduction of the finger is 5/5  Cross finger exam is negative  Mildly + tinel's over the ulnar nerve at elbow  Small finger 2 point was 4mm throughout but pt had difficulty in the ulnar distribution    ___________________________________________________  STUDIES REVIEWED:  The EDx of the right upper extremity was performed and reviewed from 12/10/19 and compared to the 11/15/19 EDx:  abnormal mild right ulnar neuropathy  PROCEDURES PERFORMED:  Procedures  No Procedures performed today    _____________________________________________________  ASSESSMENT/PLAN:    Right ulnar neuritis at elbow (more likely) vs Guyons canal    - The updated EDx was reviewed with the patient that showed minimal change at this time  It was reiterated with the patient that nerve injuries are slow and will take some time to resolve  - Recommended to use a neoprene elbow sleeve to be worn at night to block elbow flexion  This will decrease the the compression of the ulnar nerve  - At this time, a surgery is not recommended  Follow Up:  Return if symptoms worsen or fail to improve  To Do Next Visit:  If the symptoms persist or increase with intensity of pain to come back in for re-evaluation         Scribe Attestation    I,:   Sapna Del Toro am acting as a scribe while in the presence of the attending physician :        I,:   Sondra Hall MD personally performed the services described in this documentation    as scribed in my presence :

## 2019-12-16 NOTE — TELEPHONE ENCOUNTER
Dr Luis Otero patient - L knee Xray results are available in epic with significant findings  Patient was seen on 12/5 and he was to return in 9 months for 2 view xray of L knee  Please advise

## 2019-12-16 NOTE — PATIENT INSTRUCTIONS
Recommended to purchase a Neoprene elbow sleeve to help block flexing the elbow at night that compresses the ulnar nerve

## 2019-12-17 DIAGNOSIS — E78.5 HYPERLIPIDEMIA, UNSPECIFIED HYPERLIPIDEMIA TYPE: ICD-10-CM

## 2019-12-17 RX ORDER — SIMVASTATIN 40 MG
TABLET ORAL
Qty: 90 TABLET | Refills: 0 | Status: SHIPPED | OUTPATIENT
Start: 2019-12-17 | End: 2020-03-18

## 2020-02-17 ENCOUNTER — TELEPHONE (OUTPATIENT)
Dept: FAMILY MEDICINE CLINIC | Facility: CLINIC | Age: 60
End: 2020-02-17

## 2020-02-17 NOTE — TELEPHONE ENCOUNTER
Pt had eye exam 12/2019 and was very upset because he claims he is not diabetic  he only had a general eye exam  Can you please review his chart?  He wants DM removed

## 2020-02-18 ENCOUNTER — HOSPITAL ENCOUNTER (EMERGENCY)
Facility: HOSPITAL | Age: 60
Discharge: HOME | End: 2020-02-18
Attending: EMERGENCY MEDICINE
Payer: COMMERCIAL

## 2020-02-18 ENCOUNTER — APPOINTMENT (EMERGENCY)
Dept: RADIOLOGY | Facility: HOSPITAL | Age: 60
End: 2020-02-18
Payer: COMMERCIAL

## 2020-02-18 VITALS
HEART RATE: 83 BPM | RESPIRATION RATE: 16 BRPM | WEIGHT: 267 LBS | TEMPERATURE: 99.4 F | HEIGHT: 67 IN | SYSTOLIC BLOOD PRESSURE: 161 MMHG | OXYGEN SATURATION: 94 % | DIASTOLIC BLOOD PRESSURE: 74 MMHG | BODY MASS INDEX: 41.91 KG/M2

## 2020-02-18 DIAGNOSIS — R11.0 NAUSEA: ICD-10-CM

## 2020-02-18 DIAGNOSIS — R10.9 FLANK PAIN: ICD-10-CM

## 2020-02-18 DIAGNOSIS — N20.0 NEPHROLITHIASIS: Primary | ICD-10-CM

## 2020-02-18 LAB
ANION GAP SERPL CALCULATED.3IONS-SCNC: 8 MMOL/L (ref 4–13)
BACTERIA UR QL AUTO: ABNORMAL /HPF
BILIRUB UR QL STRIP: NEGATIVE
BUN SERPL-MCNC: 12 MG/DL (ref 5–25)
CALCIUM SERPL-MCNC: 9.2 MG/DL (ref 8.3–10.1)
CHLORIDE SERPL-SCNC: 106 MMOL/L (ref 100–108)
CLARITY UR: ABNORMAL
CLARITY, POC: NORMAL
CO2 SERPL-SCNC: 26 MMOL/L (ref 21–32)
COLOR UR: ABNORMAL
COLOR, POC: NORMAL
CREAT SERPL-MCNC: 1.05 MG/DL (ref 0.6–1.3)
GFR SERPL CREATININE-BSD FRML MDRD: 77 ML/MIN/1.73SQ M
GLUCOSE SERPL-MCNC: 155 MG/DL (ref 65–140)
GLUCOSE UR STRIP-MCNC: NEGATIVE MG/DL
HGB UR QL STRIP.AUTO: ABNORMAL
HYALINE CASTS #/AREA URNS LPF: ABNORMAL /LPF
KETONES UR STRIP-MCNC: ABNORMAL MG/DL
LEUKOCYTE ESTERASE UR QL STRIP: NEGATIVE
NITRITE UR QL STRIP: NEGATIVE
NON-SQ EPI CELLS URNS QL MICRO: ABNORMAL /HPF
PH UR STRIP.AUTO: 6.5 [PH] (ref 4.5–8)
POTASSIUM SERPL-SCNC: 4.1 MMOL/L (ref 3.5–5.3)
PROT UR STRIP-MCNC: ABNORMAL MG/DL
RBC #/AREA URNS AUTO: ABNORMAL /HPF
SODIUM SERPL-SCNC: 140 MMOL/L (ref 136–145)
SP GR UR STRIP.AUTO: 1.02 (ref 1–1.03)
UROBILINOGEN UR QL STRIP.AUTO: 0.2 E.U./DL
WBC #/AREA URNS AUTO: ABNORMAL /HPF

## 2020-02-18 PROCEDURE — 80048 BASIC METABOLIC PNL TOTAL CA: CPT | Performed by: EMERGENCY MEDICINE

## 2020-02-18 PROCEDURE — 81001 URINALYSIS AUTO W/SCOPE: CPT

## 2020-02-18 PROCEDURE — 99284 EMERGENCY DEPT VISIT MOD MDM: CPT

## 2020-02-18 PROCEDURE — 74176 CT ABD & PELVIS W/O CONTRAST: CPT

## 2020-02-18 PROCEDURE — 96374 THER/PROPH/DIAG INJ IV PUSH: CPT

## 2020-02-18 PROCEDURE — 99284 EMERGENCY DEPT VISIT MOD MDM: CPT | Performed by: EMERGENCY MEDICINE

## 2020-02-18 PROCEDURE — 36415 COLL VENOUS BLD VENIPUNCTURE: CPT | Performed by: EMERGENCY MEDICINE

## 2020-02-18 RX ORDER — OMEGA-3S/DHA/EPA/FISH OIL/D3 300MG-1000
400 CAPSULE ORAL DAILY
COMMUNITY
End: 2020-10-23

## 2020-02-18 RX ORDER — KETOROLAC TROMETHAMINE 30 MG/ML
15 INJECTION, SOLUTION INTRAMUSCULAR; INTRAVENOUS ONCE
Status: COMPLETED | OUTPATIENT
Start: 2020-02-18 | End: 2020-02-18

## 2020-02-18 RX ORDER — ACETAMINOPHEN 325 MG/1
975 TABLET ORAL ONCE
Status: COMPLETED | OUTPATIENT
Start: 2020-02-18 | End: 2020-02-18

## 2020-02-18 RX ADMIN — KETOROLAC TROMETHAMINE 15 MG: 30 INJECTION, SOLUTION INTRAMUSCULAR at 20:51

## 2020-02-18 RX ADMIN — ACETAMINOPHEN 975 MG: 325 TABLET ORAL at 20:50

## 2020-02-18 NOTE — TELEPHONE ENCOUNTER
The patient is diabetic he was diagnosed in October of 2018  His A1c has been fairly controlled and that is why he is not on medication

## 2020-02-19 NOTE — ED ATTENDING ATTESTATION
2/18/2020  I, Cheri Ormond Pester, DO, saw and evaluated the patient  I have discussed the patient with the resident/non-physician practitioner and agree with the resident's/non-physician practitioner's findings, Plan of Care, and MDM as documented in the resident's/non-physician practitioner's note, except where noted  All available labs and Radiology studies were reviewed  I was present for key portions of any procedure(s) performed by the resident/non-physician practitioner and I was immediately available to provide assistance  At this point I agree with the current assessment done in the Emergency Department  I have conducted an independent evaluation of this patient a history and physical is as follows:    61 yo male c/o R sided flank pain last night, now migrated to RLQ  Pain constant, waxes and wanes  Was more intense last night  Associated with nausea, difficulty urinating  No a/e factors  Denies f/c     Bedside ultrasound shows mild R hydroureteronephrosis  Unable to identify distal stone  Imp: R ureteral colic plan: CT stone study, UA, BMP, tx sx, reassess        ED Course         Critical Care Time  Procedures

## 2020-02-19 NOTE — ED PROVIDER NOTES
History  Chief Complaint   Patient presents with    Flank Pain     Pt states, "I think I have a kidney stone" Pt had severe back pain that started last night and subsided  Pt states now the pain is in his groin and he has not been able to urinate  50-year-old male history of prior kidney stones coming in today with flank pain, groin pain, and nausea vomiting  Patient stated symptoms started last night with severe stabbing right-sided flank pain that self-resolved after few hours  Patient was asymptomatic by the time he went to sleep for the evening  Patient woke this morning is asymptomatic until approximately 3:30 p m  To 4:00 p m  When he had acute onset right groin sharp pain with associated nausea and 1 episode of vomiting around 4:00 p m  Ministerio Pearce Patient had persistent waxing and waning waning right groin pains so he decided come to the emergency department as all of his symptoms were similar to the prior time he had kidney stones  Patient complaining of some mild bilateral flank pain as well as mild right groin pain  Patient otherwise been urinating normally and without difficulty or burning  He denies any blood in his urine or stool  He denies any abdominal pain  No prior abdominal surgery  Patient stated he had a right inguinal hernia repaired approximately 30 years ago  Patient has no other complaints  Patient denies any headache, vision changes, chest pain, shortness of breath, abdominal pain, or any bladder or bowel changes  Prior to Admission Medications   Prescriptions Last Dose Informant Patient Reported? Taking?    Multiple Vitamin (MULTIVITAMIN) tablet 2/18/2020 at Unknown time Self Yes Yes   Sig: Take 2 tablets by mouth daily   Omega-3 Fatty Acids (FISH OIL PO) 2/18/2020 at Unknown time Self Yes Yes   Sig: Take 1 capsule by mouth daily   ascorbic acid (VITAMIN C) 500 MG tablet 2/18/2020 at Unknown time Self No Yes   Sig: Take 1 tablet (500 mg total) by mouth daily   cetirizine (ZyrTEC) 10 mg tablet 2/18/2020 at Unknown time Self Yes Yes   Sig: Take 10 mg by mouth daily   cholecalciferol (VITAMIN D3) 400 units tablet 2/18/2020 at Unknown time Self Yes Yes   Sig: Take 400 Units by mouth daily   simvastatin (ZOCOR) 40 mg tablet 2/17/2020 at Unknown time  No Yes   Sig: TAKE 1 TABLET BY MOUTH EVERYDAY AT BEDTIME      Facility-Administered Medications: None       Past Medical History:   Diagnosis Date    Anesthesia complication     difficulty awakening- dyspnea    Anxiety     Arthritis     Cleft hard palate     Glaucoma suspect, both eyes     Hyperlipidemia     Kidney stone     left    Right inguinal hernia     Right ureteral stone     Seasonal allergies     Wears dentures     partial upper    Wears glasses        Past Surgical History:   Procedure Laterality Date    CLEFT PALATE REPAIR      INGUINAL HERNIA REPAIR Right     KNEE CARTILAGE SURGERY Left     PA CYSTO/URETERO W/LITHOTRIPSY &INDWELL STENT INSRT Right 8/4/2017    Procedure: CYSTOSCOPY URETEROSCOPY WITH LITHOTRIPSY HOLMIUM LASER, RETROGRADE PYELOGRAM AND INSERTION STENT URETERAL;  Surgeon: Ada Schneider MD;  Location: [facility];  Service: Urology    PA TOTAL KNEE ARTHROPLASTY Right 3/4/2019    Procedure: TOTAL KNEE ARTHROPLASTY;  Surgeon: Abdiaziz Licona MD;  Location: [facility];  Service: Orthopedics    PA TOTAL KNEE ARTHROPLASTY Left 9/13/2019    Procedure: ARTHROPLASTY KNEE TOTAL;  Surgeon: Abdiaziz Licona MD;  [facility];  Service: Orthopedics    TOTAL KNEE ARTHROPLASTY Left        Family History   Problem Relation Age of Onset    Diabetes Mother     Heart disease Mother     Hypertension Mother     Esophageal cancer Father     Diabetes Sister     Other Sister      I have reviewed and agree with the history as documented      Social History     Tobacco Use    Smoking status: Never Smoker    Smokeless tobacco: Never Used    Tobacco comment: no passive smoke exposure   Substance Use Topics    Alcohol use: Yes     Frequency: 2-4 times a month    Drug use: No        Review of Systems   Constitutional: Negative for appetite change, chills, diaphoresis, fever and unexpected weight change  HENT: Negative for congestion and rhinorrhea  Eyes: Negative for photophobia and visual disturbance  Respiratory: Negative for cough, chest tightness and shortness of breath  Cardiovascular: Negative for chest pain, palpitations and leg swelling  Gastrointestinal: Positive for nausea and vomiting  Negative for abdominal distention, abdominal pain, blood in stool, constipation and diarrhea  Genitourinary: Positive for flank pain  Negative for decreased urine volume, discharge, dysuria, frequency, hematuria, penile pain, scrotal swelling, testicular pain and urgency  Musculoskeletal: Negative for back pain, joint swelling, neck pain and neck stiffness  Skin: Negative for color change, pallor, rash and wound  Neurological: Negative for dizziness, syncope, weakness, light-headedness and headaches  Psychiatric/Behavioral: Negative for agitation  All other systems reviewed and are negative  Physical Exam  ED Triage Vitals   Temperature Pulse Respirations Blood Pressure SpO2   02/18/20 1901 02/18/20 1901 02/18/20 1901 02/18/20 1901 02/18/20 1901   99 4 °F (37 4 °C) 84 16 165/94 96 %      Temp Source Heart Rate Source Patient Position - Orthostatic VS BP Location FiO2 (%)   02/18/20 1901 02/18/20 2050 02/18/20 2050 02/18/20 2050 --   Tympanic Monitor Lying Right arm       Pain Score       02/18/20 1901       Worst Possible Pain             Orthostatic Vital Signs  Vitals:    02/18/20 1901 02/18/20 2050   BP: 165/94 161/74   Pulse: 84 83   Patient Position - Orthostatic VS:  Lying       Physical Exam   Constitutional: He is oriented to person, place, and time  He appears well-developed and well-nourished  HENT:   Head: Normocephalic and atraumatic     Nose: Nose normal    Mouth/Throat: Oropharynx is clear and moist    Eyes: Pupils are equal, round, and reactive to light  EOM are normal  Right eye exhibits no discharge  Left eye exhibits no discharge  Neck: Normal range of motion  Neck supple  No JVD present  No tracheal deviation present  Cardiovascular: Normal rate, regular rhythm, normal heart sounds and intact distal pulses  Exam reveals no gallop and no friction rub  No murmur heard  Pulmonary/Chest: Effort normal and breath sounds normal  No stridor  No respiratory distress  He has no wheezes  He has no rales  He exhibits no tenderness  Abdominal: Soft  Bowel sounds are normal  He exhibits no distension  There is no tenderness  There is no rebound and no guarding  Genitourinary: Penis normal  No penile tenderness  Genitourinary Comments: No testicular pain or tenderness to palpate  No penile discharge  Skin assessment unremarkable  Cremaster intact bilaterally  Musculoskeletal: Normal range of motion  He exhibits no edema, tenderness or deformity  Lymphadenopathy:     He has no cervical adenopathy  Neurological: He is alert and oriented to person, place, and time  No cranial nerve deficit or sensory deficit  He exhibits normal muscle tone  Coordination normal    Skin: Skin is warm and dry  No rash noted  He is not diaphoretic  No erythema  No pallor  Psychiatric: He has a normal mood and affect  His behavior is normal  Thought content normal    Nursing note and vitals reviewed        ED Medications  Medications   acetaminophen (TYLENOL) tablet 975 mg (975 mg Oral Given 2/18/20 2050)   ketorolac (TORADOL) injection 15 mg (15 mg Intravenous Given 2/18/20 2051)       Diagnostic Studies  Results Reviewed     Procedure Component Value Units Date/Time    Basic metabolic panel [[id number]]  (Abnormal) Collected:  02/18/20 2046    Lab Status:  Final result Specimen:  Blood from Arm, Right Updated:  02/18/20 2114     Sodium 140 mmol/L      Potassium 4 1 mmol/L      Chloride 106 mmol/L      CO2 26 mmol/L      ANION GAP 8 mmol/L      BUN 12 mg/dL      Creatinine 1 05 mg/dL      Glucose 155 mg/dL      Calcium 9 2 mg/dL      eGFR 77 ml/min/1 73sq m     Narrative:       [place] guidelines for Chronic Kidney Disease (CKD):     Stage 1 with normal or high GFR (GFR > 90 mL/min/1 73 square meters)    Stage 2 Mild CKD (GFR = 60-89 mL/min/1 73 square meters)    Stage 3A Moderate CKD (GFR = 45-59 mL/min/1 73 square meters)    Stage 3B Moderate CKD (GFR = 30-44 mL/min/1 73 square meters)    Stage 4 Severe CKD (GFR = 15-29 mL/min/1 73 square meters)    Stage 5 End Stage CKD (GFR <15 mL/min/1 73 square meters)  Note: GFR calculation is accurate only with a steady state creatinine    Urine Microscopic [[id number]]  (Abnormal) Collected:  02/18/20 2037    Lab Status:  Final result Specimen:  Urine, Clean Catch Updated:  02/18/20 2052     RBC, UA Innumerable /hpf      WBC, UA 4-10 /hpf      Epithelial Cells None Seen /hpf      Bacteria, UA None Seen /hpf      Hyaline Casts, UA None Seen /lpf     POCT urinalysis dipstick [[id number]]  (Normal) Resulted:  02/18/20 2034    Lab Status:  Final result Specimen:  Urine Updated:  02/18/20 2045     Color, UA Isa     Clarity, UA Cloudy    Urine Macroscopic, POC [[id number]]  (Abnormal) Collected:  02/18/20 2037    Lab Status:  Final result Specimen:  Urine Updated:  02/18/20 2034     Color, UA Isa     Clarity, UA Cloudy     pH, UA 6 5     Leukocytes, UA Negative     Nitrite, UA Negative     Protein, UA 30 (1+) mg/dl      Glucose, UA Negative mg/dl      Ketones, UA Trace mg/dl      Urobilinogen, UA 0 2 E U /dl      Bilirubin, UA Negative     Blood, UA Large     Specific [place], UA 1 020    Narrative:       [facility] RESULT                 CT renal stone study abdomen pelvis wo contrast   Final Result by Clint Viveros MD (02/18 2222)      1   3 mm obstructing calculus in the distal right ureter at the level of the pelvic brim causing moderate hydroureteronephrosis with associated diffuse perinephric and periureteral fat stranding  2   Bilateral nonobstructing renal calculi including large staghorn calculus on the left  3   Stable hepatomegaly  4   Extensive sigmoid and descending colonic diverticulosis without diverticulitis  The study was marked in [facility] for immediate notification  Workstation performed: [id number]               Procedures  Procedures      ED Course                               MDM  Number of Diagnoses or Management Options  Flank pain:   Nausea:   Nephrolithiasis:   Diagnosis management comments: 79-year-old male history of prior kidney stones coming in today with flank pain, groin pain, and nausea vomiting  Symptoms similar to prior episode of kidney stone and consistent with kidney stones in general   No abdominal pain or bowel movement changes so other intra-abdominal pathology is much less likely  Nausea vomiting only response to right inguinal pain  Plan to workup for kidney stones including creatinine, CT kidney stone, urinalysis, and pain medication  Creatinine normal   Urinalysis notable for blood but otherwise unremarkable  Pain improved with Toradol and Tylenol  CT notable for small nonobstructing 3 mm stone in the right ureter just proximal to the bladder  No other concerning signs or symptoms such as urinary infection or intractable pain or profuse vomiting  Plan to have patient follow-up outpatient with Urology  Discharged with urinary strainer in specimen cup  Given return precautions and instructions  Advised follow-up primary care provider  Patient discharged         Amount and/or Complexity of Data Reviewed  Clinical lab tests: ordered and reviewed  Tests in the radiology section of CPT®: ordered and reviewed  Tests in the medicine section of CPT®: ordered and reviewed  Review and summarize past medical records: yes  Independent visualization of images, tracings, or specimens: yes    Risk of Complications, Morbidity, and/or Mortality  Presenting problems: moderate  Diagnostic procedures: low  Management options: low    Patient Progress  Patient progress: stable        Disposition  Final diagnoses:   Nephrolithiasis   Flank pain   Nausea     Time reflects when diagnosis was documented in both MDM as applicable and the Disposition within this note     Time User Action Codes Description Comment    2/18/2020  9:50 PM Geralynn Mais Add [N20 0] Nephrolithiasis     2/18/2020  9:50 PM Geralynn Mais Add [R10 9] Flank pain     2/18/2020  9:50 PM Geralynn Mais Add [R11 0] Nausea       ED Disposition     ED Disposition Condition Date/Time Comment    Discharge Stable Tue Feb 18, 2020  9:49 PM Keeley Carreon discharge to home/self care  Follow-up Information     Follow up With Specialties Details Why Contact Info Additional Information    Gurvinder Montez MD Family Medicine Schedule an appointment as soon as possible for a visit   [address] [facility] Emergency Department Emergency Medicine Go to  If symptoms worsen [address]  [phone]  ED, [address] [place] [address], [facility]    [facility] [place] Schedule an appointment as soon as possible for a visit   [address] [address]  [address] [place] [address] Urology [address], [address] [phone]          Patient's Medications   Discharge Prescriptions    No medications on file     No discharge procedures on file  ED Provider  Attending physically available and evaluated Keeley Carreon I managed the patient along with the ED Attending      Electronically Signed by         Sonja Younger MD  02/18/20 9915

## 2020-02-19 NOTE — DISCHARGE INSTRUCTIONS
You came to the emergency department today with symptoms concerning for kidney stone  We checked your urine, your creatinine which is a marker of kidney function, and did a CT scan of your abdomen to look for kidney stones  Your scan was notable for a small 3 mm kidney stone in the right ureter just before your bladder and an approximately 3 cm stone in your left kidney  Please call the below number to follow-up with urology and set up an appointment  Please use the urine strainer any time you urinate and if you do pass the stone please place it in the specimen cup and bring it with you to your urology appointment  Please return for any severe pain that does not pass, signs of infection such as fever or burning with urination, nausea vomiting, or any other concerning signs or symptoms  Please follow-up with urology and your primary care provider  Please refer to the following documents for additional instructions and return precautions

## 2020-03-18 DIAGNOSIS — E78.5 HYPERLIPIDEMIA, UNSPECIFIED HYPERLIPIDEMIA TYPE: ICD-10-CM

## 2020-03-18 RX ORDER — SIMVASTATIN 40 MG
TABLET ORAL
Qty: 90 TABLET | Refills: 0 | Status: SHIPPED | OUTPATIENT
Start: 2020-03-18 | End: 2020-06-13

## 2020-04-03 ENCOUNTER — TELEMEDICINE (OUTPATIENT)
Dept: FAMILY MEDICINE CLINIC | Facility: CLINIC | Age: 60
End: 2020-04-03
Payer: COMMERCIAL

## 2020-04-03 VITALS — TEMPERATURE: 98.7 F | HEIGHT: 67 IN | BODY MASS INDEX: 42.06 KG/M2 | WEIGHT: 268 LBS

## 2020-04-03 DIAGNOSIS — N20.1 CALCULI, URETER: ICD-10-CM

## 2020-04-03 DIAGNOSIS — E66.01 MORBID OBESITY WITH BMI OF 45.0-49.9, ADULT (HCC): ICD-10-CM

## 2020-04-03 DIAGNOSIS — Z12.5 PROSTATE CANCER SCREENING: ICD-10-CM

## 2020-04-03 DIAGNOSIS — E11.9 TYPE 2 DIABETES MELLITUS WITHOUT COMPLICATION, WITHOUT LONG-TERM CURRENT USE OF INSULIN (HCC): Primary | ICD-10-CM

## 2020-04-03 DIAGNOSIS — R05.9 COUGH: ICD-10-CM

## 2020-04-03 DIAGNOSIS — E78.49 OTHER HYPERLIPIDEMIA: ICD-10-CM

## 2020-04-03 PROCEDURE — 99214 OFFICE O/P EST MOD 30 MIN: CPT | Performed by: FAMILY MEDICINE

## 2020-04-03 RX ORDER — ALBUTEROL SULFATE 90 UG/1
2 AEROSOL, METERED RESPIRATORY (INHALATION) EVERY 6 HOURS PRN
Qty: 1 INHALER | Refills: 5 | Status: SHIPPED | OUTPATIENT
Start: 2020-04-03 | End: 2020-04-10 | Stop reason: SDUPTHER

## 2020-04-03 RX ORDER — FLUTICASONE PROPIONATE 50 MCG
2 SPRAY, SUSPENSION (ML) NASAL DAILY
Qty: 16 G | Refills: 0 | Status: SHIPPED | OUTPATIENT
Start: 2020-04-03

## 2020-04-03 RX ORDER — BENZONATATE 200 MG/1
200 CAPSULE ORAL 3 TIMES DAILY PRN
Qty: 20 CAPSULE | Refills: 0 | Status: SHIPPED | OUTPATIENT
Start: 2020-04-03 | End: 2020-04-14

## 2020-04-06 ENCOUNTER — APPOINTMENT (OUTPATIENT)
Dept: LAB | Age: 60
End: 2020-04-06
Payer: COMMERCIAL

## 2020-04-06 ENCOUNTER — APPOINTMENT (OUTPATIENT)
Dept: RADIOLOGY | Age: 60
End: 2020-04-06
Payer: COMMERCIAL

## 2020-04-06 DIAGNOSIS — R05.9 COUGH: ICD-10-CM

## 2020-04-06 DIAGNOSIS — E11.9 TYPE 2 DIABETES MELLITUS WITHOUT COMPLICATION, WITHOUT LONG-TERM CURRENT USE OF INSULIN (HCC): ICD-10-CM

## 2020-04-06 DIAGNOSIS — Z12.5 PROSTATE CANCER SCREENING: ICD-10-CM

## 2020-04-06 LAB
ALBUMIN SERPL BCP-MCNC: 3.7 G/DL (ref 3.5–5)
ALP SERPL-CCNC: 53 U/L (ref 46–116)
ALT SERPL W P-5'-P-CCNC: 50 U/L (ref 12–78)
ANION GAP SERPL CALCULATED.3IONS-SCNC: 5 MMOL/L (ref 4–13)
AST SERPL W P-5'-P-CCNC: 29 U/L (ref 5–45)
BASOPHILS # BLD AUTO: 0.09 THOUSANDS/ΜL (ref 0–0.1)
BASOPHILS NFR BLD AUTO: 1 % (ref 0–1)
BILIRUB SERPL-MCNC: 0.78 MG/DL (ref 0.2–1)
BUN SERPL-MCNC: 13 MG/DL (ref 5–25)
CALCIUM SERPL-MCNC: 9.1 MG/DL (ref 8.3–10.1)
CHLORIDE SERPL-SCNC: 109 MMOL/L (ref 100–108)
CHOLEST SERPL-MCNC: 134 MG/DL (ref 50–200)
CO2 SERPL-SCNC: 27 MMOL/L (ref 21–32)
CREAT SERPL-MCNC: 1.07 MG/DL (ref 0.6–1.3)
CREAT UR-MCNC: 200 MG/DL
EOSINOPHIL # BLD AUTO: 0.42 THOUSAND/ΜL (ref 0–0.61)
EOSINOPHIL NFR BLD AUTO: 5 % (ref 0–6)
ERYTHROCYTE [DISTWIDTH] IN BLOOD BY AUTOMATED COUNT: 13 % (ref 11.6–15.1)
EST. AVERAGE GLUCOSE BLD GHB EST-MCNC: 169 MG/DL
GFR SERPL CREATININE-BSD FRML MDRD: 75 ML/MIN/1.73SQ M
GLUCOSE P FAST SERPL-MCNC: 139 MG/DL (ref 65–99)
HBA1C MFR BLD: 7.5 %
HCT VFR BLD AUTO: 46.7 % (ref 36.5–49.3)
HDLC SERPL-MCNC: 32 MG/DL
HGB BLD-MCNC: 15.1 G/DL (ref 12–17)
IMM GRANULOCYTES # BLD AUTO: 0.04 THOUSAND/UL (ref 0–0.2)
IMM GRANULOCYTES NFR BLD AUTO: 0 % (ref 0–2)
LDLC SERPL CALC-MCNC: 66 MG/DL (ref 0–100)
LYMPHOCYTES # BLD AUTO: 3.26 THOUSANDS/ΜL (ref 0.6–4.47)
LYMPHOCYTES NFR BLD AUTO: 36 % (ref 14–44)
MCH RBC QN AUTO: 28.2 PG (ref 26.8–34.3)
MCHC RBC AUTO-ENTMCNC: 32.3 G/DL (ref 31.4–37.4)
MCV RBC AUTO: 87 FL (ref 82–98)
MICROALBUMIN UR-MCNC: 189 MG/L (ref 0–20)
MICROALBUMIN/CREAT 24H UR: 95 MG/G CREATININE (ref 0–30)
MONOCYTES # BLD AUTO: 0.77 THOUSAND/ΜL (ref 0.17–1.22)
MONOCYTES NFR BLD AUTO: 9 % (ref 4–12)
NEUTROPHILS # BLD AUTO: 4.46 THOUSANDS/ΜL (ref 1.85–7.62)
NEUTS SEG NFR BLD AUTO: 49 % (ref 43–75)
NRBC BLD AUTO-RTO: 0 /100 WBCS
PLATELET # BLD AUTO: 297 THOUSANDS/UL (ref 149–390)
PMV BLD AUTO: 11.2 FL (ref 8.9–12.7)
POTASSIUM SERPL-SCNC: 4.2 MMOL/L (ref 3.5–5.3)
PROT SERPL-MCNC: 7.8 G/DL (ref 6.4–8.2)
PSA SERPL-MCNC: 0.4 NG/ML (ref 0–4)
RBC # BLD AUTO: 5.35 MILLION/UL (ref 3.88–5.62)
SODIUM SERPL-SCNC: 141 MMOL/L (ref 136–145)
TRIGL SERPL-MCNC: 181 MG/DL
TSH SERPL DL<=0.05 MIU/L-ACNC: 2.79 UIU/ML (ref 0.36–3.74)
WBC # BLD AUTO: 9.04 THOUSAND/UL (ref 4.31–10.16)

## 2020-04-06 PROCEDURE — 36415 COLL VENOUS BLD VENIPUNCTURE: CPT

## 2020-04-06 PROCEDURE — G0103 PSA SCREENING: HCPCS

## 2020-04-06 PROCEDURE — 80053 COMPREHEN METABOLIC PANEL: CPT

## 2020-04-06 PROCEDURE — 84443 ASSAY THYROID STIM HORMONE: CPT

## 2020-04-06 PROCEDURE — 82570 ASSAY OF URINE CREATININE: CPT | Performed by: FAMILY MEDICINE

## 2020-04-06 PROCEDURE — 85025 COMPLETE CBC W/AUTO DIFF WBC: CPT

## 2020-04-06 PROCEDURE — 82043 UR ALBUMIN QUANTITATIVE: CPT | Performed by: FAMILY MEDICINE

## 2020-04-06 PROCEDURE — 80061 LIPID PANEL: CPT

## 2020-04-06 PROCEDURE — 83036 HEMOGLOBIN GLYCOSYLATED A1C: CPT

## 2020-04-06 PROCEDURE — 71046 X-RAY EXAM CHEST 2 VIEWS: CPT

## 2020-04-09 ENCOUNTER — TELEPHONE (OUTPATIENT)
Dept: FAMILY MEDICINE CLINIC | Facility: CLINIC | Age: 60
End: 2020-04-09

## 2020-04-10 ENCOUNTER — OFFICE VISIT (OUTPATIENT)
Dept: FAMILY MEDICINE CLINIC | Facility: CLINIC | Age: 60
End: 2020-04-10
Payer: COMMERCIAL

## 2020-04-10 VITALS
OXYGEN SATURATION: 97 % | HEART RATE: 89 BPM | HEIGHT: 67 IN | RESPIRATION RATE: 16 BRPM | DIASTOLIC BLOOD PRESSURE: 68 MMHG | BODY MASS INDEX: 41.75 KG/M2 | TEMPERATURE: 97.9 F | WEIGHT: 266 LBS | SYSTOLIC BLOOD PRESSURE: 130 MMHG

## 2020-04-10 DIAGNOSIS — R05.9 COUGH: ICD-10-CM

## 2020-04-10 DIAGNOSIS — E78.49 OTHER HYPERLIPIDEMIA: ICD-10-CM

## 2020-04-10 DIAGNOSIS — E11.9 TYPE 2 DIABETES MELLITUS WITHOUT COMPLICATION, WITHOUT LONG-TERM CURRENT USE OF INSULIN (HCC): ICD-10-CM

## 2020-04-10 DIAGNOSIS — Z00.00 HEALTHCARE MAINTENANCE: Primary | ICD-10-CM

## 2020-04-10 PROCEDURE — 99396 PREV VISIT EST AGE 40-64: CPT | Performed by: FAMILY MEDICINE

## 2020-04-10 PROCEDURE — 3051F HG A1C>EQUAL 7.0%<8.0%: CPT | Performed by: FAMILY MEDICINE

## 2020-04-10 RX ORDER — METFORMIN HYDROCHLORIDE 500 MG/1
500 TABLET, EXTENDED RELEASE ORAL
Qty: 90 TABLET | Refills: 1 | Status: SHIPPED | OUTPATIENT
Start: 2020-04-10 | End: 2020-10-02

## 2020-04-10 RX ORDER — ALBUTEROL SULFATE 90 UG/1
2 AEROSOL, METERED RESPIRATORY (INHALATION) EVERY 6 HOURS PRN
Qty: 1 INHALER | Refills: 5 | Status: SHIPPED | OUTPATIENT
Start: 2020-04-10 | End: 2020-07-24 | Stop reason: ALTCHOICE

## 2020-04-14 DIAGNOSIS — R05.9 COUGH: ICD-10-CM

## 2020-04-14 RX ORDER — BENZONATATE 200 MG/1
CAPSULE ORAL
Qty: 20 CAPSULE | Refills: 0 | Status: SHIPPED | OUTPATIENT
Start: 2020-04-14 | End: 2020-04-20 | Stop reason: SDUPTHER

## 2020-04-14 RX ORDER — BENZONATATE 200 MG/1
200 CAPSULE ORAL 3 TIMES DAILY PRN
Qty: 20 CAPSULE | Refills: 0 | Status: CANCELLED | OUTPATIENT
Start: 2020-04-14

## 2020-04-17 ENCOUNTER — TELEPHONE (OUTPATIENT)
Dept: FAMILY MEDICINE CLINIC | Facility: CLINIC | Age: 60
End: 2020-04-17

## 2020-04-20 ENCOUNTER — TELEMEDICINE (OUTPATIENT)
Dept: FAMILY MEDICINE CLINIC | Facility: CLINIC | Age: 60
End: 2020-04-20
Payer: COMMERCIAL

## 2020-04-20 VITALS — TEMPERATURE: 98 F

## 2020-04-20 DIAGNOSIS — R05.9 COUGH: Primary | ICD-10-CM

## 2020-04-20 DIAGNOSIS — R30.0 DYSURIA: ICD-10-CM

## 2020-04-20 PROCEDURE — 99214 OFFICE O/P EST MOD 30 MIN: CPT | Performed by: FAMILY MEDICINE

## 2020-04-20 RX ORDER — SULFAMETHOXAZOLE AND TRIMETHOPRIM 800; 160 MG/1; MG/1
1 TABLET ORAL EVERY 12 HOURS SCHEDULED
Qty: 14 TABLET | Refills: 0 | Status: SHIPPED | OUTPATIENT
Start: 2020-04-20 | End: 2020-04-27

## 2020-04-20 RX ORDER — PREDNISONE 50 MG/1
50 TABLET ORAL DAILY
Qty: 5 TABLET | Refills: 0 | Status: SHIPPED | OUTPATIENT
Start: 2020-04-20 | End: 2020-04-25

## 2020-04-20 RX ORDER — BENZONATATE 200 MG/1
200 CAPSULE ORAL 3 TIMES DAILY PRN
Qty: 30 CAPSULE | Refills: 0 | Status: SHIPPED | OUTPATIENT
Start: 2020-04-20 | End: 2020-05-06 | Stop reason: SDUPTHER

## 2020-05-05 ENCOUNTER — TELEPHONE (OUTPATIENT)
Dept: FAMILY MEDICINE CLINIC | Facility: CLINIC | Age: 60
End: 2020-05-05

## 2020-05-06 ENCOUNTER — OFFICE VISIT (OUTPATIENT)
Dept: FAMILY MEDICINE CLINIC | Facility: CLINIC | Age: 60
End: 2020-05-06
Payer: COMMERCIAL

## 2020-05-06 VITALS
HEART RATE: 94 BPM | SYSTOLIC BLOOD PRESSURE: 132 MMHG | RESPIRATION RATE: 16 BRPM | HEIGHT: 67 IN | DIASTOLIC BLOOD PRESSURE: 70 MMHG | BODY MASS INDEX: 41.34 KG/M2 | OXYGEN SATURATION: 97 % | TEMPERATURE: 98.3 F | WEIGHT: 263.4 LBS

## 2020-05-06 DIAGNOSIS — R05.9 COUGH: ICD-10-CM

## 2020-05-06 DIAGNOSIS — R30.9 PAINFUL URINATION: Primary | ICD-10-CM

## 2020-05-06 DIAGNOSIS — N20.1 CALCULI, URETER: ICD-10-CM

## 2020-05-06 DIAGNOSIS — N30.90 CYSTITIS: ICD-10-CM

## 2020-05-06 LAB
BACTERIA UR QL AUTO: ABNORMAL /HPF
BILIRUB UR QL STRIP: NEGATIVE
CLARITY UR: ABNORMAL
COLOR UR: YELLOW
GLUCOSE UR STRIP-MCNC: NEGATIVE MG/DL
HGB UR QL STRIP.AUTO: ABNORMAL
HYALINE CASTS #/AREA URNS LPF: ABNORMAL /LPF
KETONES UR STRIP-MCNC: NEGATIVE MG/DL
LEUKOCYTE ESTERASE UR QL STRIP: ABNORMAL
NITRITE UR QL STRIP: NEGATIVE
NON-SQ EPI CELLS URNS QL MICRO: ABNORMAL /HPF
PH UR STRIP.AUTO: 6 [PH]
PROT UR STRIP-MCNC: ABNORMAL MG/DL
RBC #/AREA URNS AUTO: ABNORMAL /HPF
SL AMB  POCT GLUCOSE, UA: ABNORMAL
SL AMB LEUKOCYTE ESTERASE,UA: ABNORMAL
SL AMB POCT BILIRUBIN,UA: ABNORMAL
SL AMB POCT BLOOD,UA: ABNORMAL
SL AMB POCT CLARITY,UA: ABNORMAL
SL AMB POCT COLOR,UA: YELLOW
SL AMB POCT KETONES,UA: ABNORMAL
SL AMB POCT NITRITE,UA: POSITIVE
SL AMB POCT PH,UA: 5
SL AMB POCT SPECIFIC GRAVITY,UA: 1.02
SL AMB POCT URINE PROTEIN: 30
SL AMB POCT UROBILINOGEN: 0.2
SP GR UR STRIP.AUTO: 1.02 (ref 1–1.03)
UROBILINOGEN UR QL STRIP.AUTO: 0.2 E.U./DL
WBC #/AREA URNS AUTO: ABNORMAL /HPF

## 2020-05-06 PROCEDURE — 87186 SC STD MICRODIL/AGAR DIL: CPT | Performed by: FAMILY MEDICINE

## 2020-05-06 PROCEDURE — 3008F BODY MASS INDEX DOCD: CPT | Performed by: FAMILY MEDICINE

## 2020-05-06 PROCEDURE — 99214 OFFICE O/P EST MOD 30 MIN: CPT | Performed by: FAMILY MEDICINE

## 2020-05-06 PROCEDURE — 87086 URINE CULTURE/COLONY COUNT: CPT | Performed by: FAMILY MEDICINE

## 2020-05-06 PROCEDURE — 3075F SYST BP GE 130 - 139MM HG: CPT | Performed by: FAMILY MEDICINE

## 2020-05-06 PROCEDURE — 81002 URINALYSIS NONAUTO W/O SCOPE: CPT | Performed by: FAMILY MEDICINE

## 2020-05-06 PROCEDURE — 81001 URINALYSIS AUTO W/SCOPE: CPT | Performed by: FAMILY MEDICINE

## 2020-05-06 PROCEDURE — 3051F HG A1C>EQUAL 7.0%<8.0%: CPT | Performed by: FAMILY MEDICINE

## 2020-05-06 PROCEDURE — 3078F DIAST BP <80 MM HG: CPT | Performed by: FAMILY MEDICINE

## 2020-05-06 PROCEDURE — 1036F TOBACCO NON-USER: CPT | Performed by: FAMILY MEDICINE

## 2020-05-06 PROCEDURE — 3060F POS MICROALBUMINURIA REV: CPT | Performed by: FAMILY MEDICINE

## 2020-05-06 PROCEDURE — 87147 CULTURE TYPE IMMUNOLOGIC: CPT | Performed by: FAMILY MEDICINE

## 2020-05-06 RX ORDER — CIPROFLOXACIN 500 MG/1
500 TABLET, FILM COATED ORAL EVERY 12 HOURS SCHEDULED
Qty: 14 TABLET | Refills: 0 | Status: SHIPPED | OUTPATIENT
Start: 2020-05-06 | End: 2020-05-13

## 2020-05-06 RX ORDER — BENZONATATE 200 MG/1
200 CAPSULE ORAL 3 TIMES DAILY PRN
Qty: 30 CAPSULE | Refills: 0 | Status: SHIPPED | OUTPATIENT
Start: 2020-05-06 | End: 2020-07-24 | Stop reason: ALTCHOICE

## 2020-05-07 DIAGNOSIS — R05.9 COUGH: ICD-10-CM

## 2020-05-07 RX ORDER — MOMETASONE FUROATE AND FORMOTEROL FUMARATE DIHYDRATE 200; 5 UG/1; UG/1
AEROSOL RESPIRATORY (INHALATION)
Qty: 13 INHALER | Refills: 1 | Status: SHIPPED | OUTPATIENT
Start: 2020-05-07 | End: 2020-05-07

## 2020-05-07 RX ORDER — MOMETASONE FUROATE AND FORMOTEROL FUMARATE DIHYDRATE 200; 5 UG/1; UG/1
AEROSOL RESPIRATORY (INHALATION)
Qty: 13 INHALER | Refills: 1 | Status: SHIPPED | OUTPATIENT
Start: 2020-05-07 | End: 2020-07-24 | Stop reason: ALTCHOICE

## 2020-05-08 LAB — BACTERIA UR CULT: ABNORMAL

## 2020-05-13 ENCOUNTER — TELEPHONE (OUTPATIENT)
Dept: FAMILY MEDICINE CLINIC | Facility: CLINIC | Age: 60
End: 2020-05-13

## 2020-06-01 ENCOUNTER — TELEPHONE (OUTPATIENT)
Dept: FAMILY MEDICINE CLINIC | Facility: CLINIC | Age: 60
End: 2020-06-01

## 2020-06-02 ENCOUNTER — OFFICE VISIT (OUTPATIENT)
Dept: FAMILY MEDICINE CLINIC | Facility: CLINIC | Age: 60
End: 2020-06-02
Payer: COMMERCIAL

## 2020-06-02 VITALS
BODY MASS INDEX: 40.87 KG/M2 | RESPIRATION RATE: 16 BRPM | HEIGHT: 67 IN | OXYGEN SATURATION: 97 % | TEMPERATURE: 97.8 F | HEART RATE: 98 BPM | SYSTOLIC BLOOD PRESSURE: 126 MMHG | DIASTOLIC BLOOD PRESSURE: 60 MMHG | WEIGHT: 260.38 LBS

## 2020-06-02 DIAGNOSIS — N39.0 FREQUENT UTI: Primary | ICD-10-CM

## 2020-06-02 DIAGNOSIS — R35.0 FREQUENCY OF URINATION: ICD-10-CM

## 2020-06-02 LAB
BACTERIA UR QL AUTO: ABNORMAL /HPF
BILIRUB UR QL STRIP: NEGATIVE
CLARITY UR: ABNORMAL
COLOR UR: ABNORMAL
GLUCOSE UR STRIP-MCNC: NEGATIVE MG/DL
HGB UR QL STRIP.AUTO: ABNORMAL
KETONES UR STRIP-MCNC: NEGATIVE MG/DL
LEUKOCYTE ESTERASE UR QL STRIP: ABNORMAL
NITRITE UR QL STRIP: POSITIVE
NON-SQ EPI CELLS URNS QL MICRO: ABNORMAL /HPF
PH UR STRIP.AUTO: 5.5 [PH]
PROT UR STRIP-MCNC: ABNORMAL MG/DL
RBC #/AREA URNS AUTO: ABNORMAL /HPF
SL AMB  POCT GLUCOSE, UA: ABNORMAL
SL AMB LEUKOCYTE ESTERASE,UA: POSITIVE
SL AMB POCT BILIRUBIN,UA: ABNORMAL
SL AMB POCT BLOOD,UA: POSITIVE
SL AMB POCT CLARITY,UA: ABNORMAL
SL AMB POCT COLOR,UA: YELLOW
SL AMB POCT KETONES,UA: ABNORMAL
SL AMB POCT NITRITE,UA: POSITIVE
SL AMB POCT PH,UA: 5
SL AMB POCT SPECIFIC GRAVITY,UA: 1.02
SL AMB POCT URINE PROTEIN: ABNORMAL
SL AMB POCT UROBILINOGEN: ABNORMAL
SP GR UR STRIP.AUTO: 1.02 (ref 1–1.03)
UROBILINOGEN UR QL STRIP.AUTO: 0.2 E.U./DL
WBC #/AREA URNS AUTO: ABNORMAL /HPF

## 2020-06-02 PROCEDURE — 81001 URINALYSIS AUTO W/SCOPE: CPT | Performed by: FAMILY MEDICINE

## 2020-06-02 PROCEDURE — 1036F TOBACCO NON-USER: CPT | Performed by: FAMILY MEDICINE

## 2020-06-02 PROCEDURE — 87147 CULTURE TYPE IMMUNOLOGIC: CPT | Performed by: FAMILY MEDICINE

## 2020-06-02 PROCEDURE — 3008F BODY MASS INDEX DOCD: CPT | Performed by: FAMILY MEDICINE

## 2020-06-02 PROCEDURE — 3074F SYST BP LT 130 MM HG: CPT | Performed by: FAMILY MEDICINE

## 2020-06-02 PROCEDURE — 99214 OFFICE O/P EST MOD 30 MIN: CPT | Performed by: FAMILY MEDICINE

## 2020-06-02 PROCEDURE — 87086 URINE CULTURE/COLONY COUNT: CPT | Performed by: FAMILY MEDICINE

## 2020-06-02 PROCEDURE — 3051F HG A1C>EQUAL 7.0%<8.0%: CPT | Performed by: FAMILY MEDICINE

## 2020-06-02 PROCEDURE — 87186 SC STD MICRODIL/AGAR DIL: CPT | Performed by: FAMILY MEDICINE

## 2020-06-02 PROCEDURE — 81002 URINALYSIS NONAUTO W/O SCOPE: CPT | Performed by: FAMILY MEDICINE

## 2020-06-02 PROCEDURE — 3078F DIAST BP <80 MM HG: CPT | Performed by: FAMILY MEDICINE

## 2020-06-02 PROCEDURE — 3060F POS MICROALBUMINURIA REV: CPT | Performed by: FAMILY MEDICINE

## 2020-06-02 RX ORDER — SULFAMETHOXAZOLE AND TRIMETHOPRIM 800; 160 MG/1; MG/1
1 TABLET ORAL EVERY 12 HOURS SCHEDULED
Qty: 14 TABLET | Refills: 0 | Status: SHIPPED | OUTPATIENT
Start: 2020-06-02 | End: 2020-06-09

## 2020-06-04 LAB — BACTERIA UR CULT: ABNORMAL

## 2020-06-05 ENCOUNTER — TELEPHONE (OUTPATIENT)
Dept: FAMILY MEDICINE CLINIC | Facility: CLINIC | Age: 60
End: 2020-06-05

## 2020-06-13 DIAGNOSIS — E78.5 HYPERLIPIDEMIA, UNSPECIFIED HYPERLIPIDEMIA TYPE: ICD-10-CM

## 2020-06-13 RX ORDER — SIMVASTATIN 40 MG
TABLET ORAL
Qty: 90 TABLET | Refills: 0 | Status: SHIPPED | OUTPATIENT
Start: 2020-06-13 | End: 2020-09-08

## 2020-06-15 ENCOUNTER — NURSE TRIAGE (OUTPATIENT)
Dept: OTHER | Facility: OTHER | Age: 60
End: 2020-06-15

## 2020-06-15 ENCOUNTER — TELEPHONE (OUTPATIENT)
Dept: FAMILY MEDICINE CLINIC | Facility: CLINIC | Age: 60
End: 2020-06-15

## 2020-06-15 DIAGNOSIS — N39.0 URINARY TRACT INFECTION WITHOUT HEMATURIA, SITE UNSPECIFIED: Primary | ICD-10-CM

## 2020-06-15 RX ORDER — CIPROFLOXACIN 500 MG/1
500 TABLET, FILM COATED ORAL EVERY 12 HOURS SCHEDULED
Qty: 14 TABLET | Refills: 0 | Status: SHIPPED | OUTPATIENT
Start: 2020-06-15 | End: 2020-06-22

## 2020-06-16 ENCOUNTER — TELEPHONE (OUTPATIENT)
Dept: UROLOGY | Facility: MEDICAL CENTER | Age: 60
End: 2020-06-16

## 2020-06-26 ENCOUNTER — OFFICE VISIT (OUTPATIENT)
Dept: UROLOGY | Facility: MEDICAL CENTER | Age: 60
End: 2020-06-26
Payer: COMMERCIAL

## 2020-06-26 VITALS
HEART RATE: 70 BPM | WEIGHT: 261 LBS | DIASTOLIC BLOOD PRESSURE: 70 MMHG | TEMPERATURE: 98.1 F | SYSTOLIC BLOOD PRESSURE: 120 MMHG | HEIGHT: 67 IN | BODY MASS INDEX: 40.97 KG/M2

## 2020-06-26 DIAGNOSIS — N40.0 BPH WITHOUT OBSTRUCTION/LOWER URINARY TRACT SYMPTOMS: ICD-10-CM

## 2020-06-26 DIAGNOSIS — N20.0 KIDNEY STONE: Primary | ICD-10-CM

## 2020-06-26 DIAGNOSIS — R10.9 RIGHT FLANK PAIN: ICD-10-CM

## 2020-06-26 DIAGNOSIS — N39.0 FREQUENT UTI: ICD-10-CM

## 2020-06-26 LAB
SL AMB  POCT GLUCOSE, UA: ABNORMAL
SL AMB LEUKOCYTE ESTERASE,UA: ABNORMAL
SL AMB POCT BILIRUBIN,UA: ABNORMAL
SL AMB POCT BLOOD,UA: ABNORMAL
SL AMB POCT CLARITY,UA: CLEAR
SL AMB POCT COLOR,UA: YELLOW
SL AMB POCT KETONES,UA: ABNORMAL
SL AMB POCT NITRITE,UA: ABNORMAL
SL AMB POCT PH,UA: 5
SL AMB POCT SPECIFIC GRAVITY,UA: 1.02
SL AMB POCT URINE PROTEIN: ABNORMAL
SL AMB POCT UROBILINOGEN: 0.2

## 2020-06-26 PROCEDURE — 99215 OFFICE O/P EST HI 40 MIN: CPT | Performed by: UROLOGY

## 2020-06-26 PROCEDURE — 3051F HG A1C>EQUAL 7.0%<8.0%: CPT | Performed by: UROLOGY

## 2020-06-26 PROCEDURE — 3078F DIAST BP <80 MM HG: CPT | Performed by: UROLOGY

## 2020-06-26 PROCEDURE — 3060F POS MICROALBUMINURIA REV: CPT | Performed by: UROLOGY

## 2020-06-26 PROCEDURE — 1036F TOBACCO NON-USER: CPT | Performed by: UROLOGY

## 2020-06-26 PROCEDURE — 81003 URINALYSIS AUTO W/O SCOPE: CPT | Performed by: UROLOGY

## 2020-06-26 PROCEDURE — 3074F SYST BP LT 130 MM HG: CPT | Performed by: UROLOGY

## 2020-06-26 PROCEDURE — 82360 CALCULUS ASSAY QUANT: CPT | Performed by: UROLOGY

## 2020-06-26 RX ORDER — SULFAMETHOXAZOLE AND TRIMETHOPRIM 800; 160 MG/1; MG/1
1 TABLET ORAL 2 TIMES DAILY
Qty: 14 TABLET | Refills: 1 | Status: SHIPPED | OUTPATIENT
Start: 2020-06-26 | End: 2020-07-03

## 2020-06-29 ENCOUNTER — HOSPITAL ENCOUNTER (OUTPATIENT)
Dept: RADIOLOGY | Facility: IMAGING CENTER | Age: 60
Discharge: HOME | End: 2020-06-29
Payer: COMMERCIAL

## 2020-06-29 DIAGNOSIS — R10.9 RIGHT FLANK PAIN: ICD-10-CM

## 2020-06-29 PROBLEM — N40.0 BPH WITHOUT OBSTRUCTION/LOWER URINARY TRACT SYMPTOMS: Status: ACTIVE | Noted: 2020-06-29

## 2020-06-29 PROBLEM — N20.0 KIDNEY STONE: Status: ACTIVE | Noted: 2020-06-29

## 2020-06-29 PROCEDURE — 76770 US EXAM ABDO BACK WALL COMP: CPT

## 2020-07-07 ENCOUNTER — TELEPHONE (OUTPATIENT)
Dept: UROLOGY | Facility: MEDICAL CENTER | Age: 60
End: 2020-07-07

## 2020-07-07 NOTE — TELEPHONE ENCOUNTER
I called the patient last night  The patient and I discussed his large left lower pole renal stone  I have reviewed the images with Dr Colan Landau  In his opinion, the space can be managed best with a laser ureteroscopy at [facility] because they have a better laser than the 1 at [facility]  The patient and I discussed the procedure in some detail and he is agreeable  He has an appointment with me later in July    I will take care of the history and physical exam in schedule the patient for Dr Colan Landau in August

## 2020-07-09 ENCOUNTER — TRANSCRIBE ORDERS (OUTPATIENT)
Dept: ADMINISTRATIVE | Facility: HOSPITAL | Age: 60
End: 2020-07-09

## 2020-07-09 ENCOUNTER — APPOINTMENT (OUTPATIENT)
Dept: LAB | Facility: IMAGING CENTER | Age: 60
End: 2020-07-09
Payer: COMMERCIAL

## 2020-07-09 DIAGNOSIS — E11.9 TYPE 2 DIABETES MELLITUS WITHOUT COMPLICATION, WITHOUT LONG-TERM CURRENT USE OF INSULIN (HCC): ICD-10-CM

## 2020-07-09 LAB
ALBUMIN SERPL BCP-MCNC: 3.8 G/DL (ref 3.5–5)
ALP SERPL-CCNC: 46 U/L (ref 46–116)
ALT SERPL W P-5'-P-CCNC: 38 U/L (ref 12–78)
ANION GAP SERPL CALCULATED.3IONS-SCNC: 3 MMOL/L (ref 4–13)
AST SERPL W P-5'-P-CCNC: 21 U/L (ref 5–45)
BASOPHILS # BLD AUTO: 0.09 THOUSANDS/ΜL (ref 0–0.1)
BASOPHILS NFR BLD AUTO: 1 % (ref 0–1)
BILIRUB SERPL-MCNC: 0.73 MG/DL (ref 0.2–1)
BUN SERPL-MCNC: 11 MG/DL (ref 5–25)
CALCIUM SERPL-MCNC: 9.3 MG/DL (ref 8.3–10.1)
CHLORIDE SERPL-SCNC: 110 MMOL/L (ref 100–108)
CHOLEST SERPL-MCNC: 142 MG/DL (ref 50–200)
CO2 SERPL-SCNC: 26 MMOL/L (ref 21–32)
CREAT SERPL-MCNC: 0.96 MG/DL (ref 0.6–1.3)
EOSINOPHIL # BLD AUTO: 0.35 THOUSAND/ΜL (ref 0–0.61)
EOSINOPHIL NFR BLD AUTO: 4 % (ref 0–6)
ERYTHROCYTE [DISTWIDTH] IN BLOOD BY AUTOMATED COUNT: 13 % (ref 11.6–15.1)
EST. AVERAGE GLUCOSE BLD GHB EST-MCNC: 160 MG/DL
GFR SERPL CREATININE-BSD FRML MDRD: 85 ML/MIN/1.73SQ M
GLUCOSE P FAST SERPL-MCNC: 124 MG/DL (ref 65–99)
HBA1C MFR BLD: 7.2 %
HCT VFR BLD AUTO: 47.2 % (ref 36.5–49.3)
HDLC SERPL-MCNC: 35 MG/DL
HGB BLD-MCNC: 15.3 G/DL (ref 12–17)
IMM GRANULOCYTES # BLD AUTO: 0.02 THOUSAND/UL (ref 0–0.2)
IMM GRANULOCYTES NFR BLD AUTO: 0 % (ref 0–2)
LDLC SERPL CALC-MCNC: 66 MG/DL (ref 0–100)
LYMPHOCYTES # BLD AUTO: 3.12 THOUSANDS/ΜL (ref 0.6–4.47)
LYMPHOCYTES NFR BLD AUTO: 37 % (ref 14–44)
MCH RBC QN AUTO: 28.7 PG (ref 26.8–34.3)
MCHC RBC AUTO-ENTMCNC: 32.4 G/DL (ref 31.4–37.4)
MCV RBC AUTO: 88 FL (ref 82–98)
MONOCYTES # BLD AUTO: 0.58 THOUSAND/ΜL (ref 0.17–1.22)
MONOCYTES NFR BLD AUTO: 7 % (ref 4–12)
NEUTROPHILS # BLD AUTO: 4.29 THOUSANDS/ΜL (ref 1.85–7.62)
NEUTS SEG NFR BLD AUTO: 51 % (ref 43–75)
NRBC BLD AUTO-RTO: 0 /100 WBCS
PLATELET # BLD AUTO: 258 THOUSANDS/UL (ref 149–390)
PMV BLD AUTO: 12 FL (ref 8.9–12.7)
POTASSIUM SERPL-SCNC: 4.1 MMOL/L (ref 3.5–5.3)
PROT SERPL-MCNC: 7.7 G/DL (ref 6.4–8.2)
RBC # BLD AUTO: 5.34 MILLION/UL (ref 3.88–5.62)
SODIUM SERPL-SCNC: 139 MMOL/L (ref 136–145)
TRIGL SERPL-MCNC: 205 MG/DL
TSH SERPL DL<=0.05 MIU/L-ACNC: 2 UIU/ML (ref 0.36–3.74)
WBC # BLD AUTO: 8.45 THOUSAND/UL (ref 4.31–10.16)

## 2020-07-09 PROCEDURE — 85025 COMPLETE CBC W/AUTO DIFF WBC: CPT

## 2020-07-09 PROCEDURE — 80053 COMPREHEN METABOLIC PANEL: CPT

## 2020-07-09 PROCEDURE — 3051F HG A1C>EQUAL 7.0%<8.0%: CPT | Performed by: UROLOGY

## 2020-07-09 PROCEDURE — 36415 COLL VENOUS BLD VENIPUNCTURE: CPT

## 2020-07-09 PROCEDURE — 80061 LIPID PANEL: CPT

## 2020-07-09 PROCEDURE — 83036 HEMOGLOBIN GLYCOSYLATED A1C: CPT

## 2020-07-09 PROCEDURE — 84443 ASSAY THYROID STIM HORMONE: CPT

## 2020-07-10 ENCOUNTER — OFFICE VISIT (OUTPATIENT)
Dept: FAMILY MEDICINE CLINIC | Facility: CLINIC | Age: 60
End: 2020-07-10
Payer: COMMERCIAL

## 2020-07-10 VITALS
DIASTOLIC BLOOD PRESSURE: 68 MMHG | SYSTOLIC BLOOD PRESSURE: 112 MMHG | RESPIRATION RATE: 16 BRPM | TEMPERATURE: 97.6 F | WEIGHT: 261.5 LBS | HEIGHT: 67 IN | OXYGEN SATURATION: 96 % | BODY MASS INDEX: 41.04 KG/M2 | HEART RATE: 83 BPM

## 2020-07-10 DIAGNOSIS — E66.01 MORBID OBESITY WITH BMI OF 40.0-44.9, ADULT (HCC): ICD-10-CM

## 2020-07-10 DIAGNOSIS — E78.49 OTHER HYPERLIPIDEMIA: ICD-10-CM

## 2020-07-10 DIAGNOSIS — E11.9 TYPE 2 DIABETES MELLITUS WITHOUT COMPLICATION, WITHOUT LONG-TERM CURRENT USE OF INSULIN (HCC): Primary | ICD-10-CM

## 2020-07-10 DIAGNOSIS — N20.0 KIDNEY STONE: ICD-10-CM

## 2020-07-10 DIAGNOSIS — M15.9 PRIMARY OSTEOARTHRITIS INVOLVING MULTIPLE JOINTS: ICD-10-CM

## 2020-07-10 DIAGNOSIS — N40.0 BPH WITHOUT OBSTRUCTION/LOWER URINARY TRACT SYMPTOMS: ICD-10-CM

## 2020-07-10 LAB
COLOR STONE: YELLOW
COMMENT-STONE3: NORMAL
COMPOSITION: NORMAL
LABORATORY COMMENT REPORT: NORMAL
PHOTO: NORMAL
SIZE STONE: NORMAL MM
SPEC SOURCE SUBJ: NORMAL
STONE ANALYSIS-IMP: NORMAL
URATE MFR STONE: 100 %
WT STONE: 28 MG

## 2020-07-10 PROCEDURE — 3060F POS MICROALBUMINURIA REV: CPT | Performed by: FAMILY MEDICINE

## 2020-07-10 PROCEDURE — 1036F TOBACCO NON-USER: CPT | Performed by: FAMILY MEDICINE

## 2020-07-10 PROCEDURE — 3074F SYST BP LT 130 MM HG: CPT | Performed by: FAMILY MEDICINE

## 2020-07-10 PROCEDURE — 3078F DIAST BP <80 MM HG: CPT | Performed by: FAMILY MEDICINE

## 2020-07-10 PROCEDURE — 99214 OFFICE O/P EST MOD 30 MIN: CPT | Performed by: FAMILY MEDICINE

## 2020-07-10 PROCEDURE — 3051F HG A1C>EQUAL 7.0%<8.0%: CPT | Performed by: FAMILY MEDICINE

## 2020-07-10 PROCEDURE — 3008F BODY MASS INDEX DOCD: CPT | Performed by: FAMILY MEDICINE

## 2020-07-10 NOTE — ASSESSMENT & PLAN NOTE
Not well controlled  It was discussed about low-fat diet and regular exercise  Continue same  Will continue to monitor

## 2020-07-10 NOTE — ASSESSMENT & PLAN NOTE
Lab Results   Component Value Date    HGBA1C 7 2 (H) 07/09/2020    not well controlled but improving  Continue same  Will continue to monitor

## 2020-07-10 NOTE — PROGRESS NOTES
Assessment/Plan:  Type 2 diabetes mellitus without complication, without long-term current use of insulin ([facility])    Lab Results   Component Value Date    HGBA1C 7 2 (H) 07/09/2020    not well controlled but improving  Continue same  Will continue to monitor  Hyperlipidemia  Not well controlled  It was discussed about low-fat diet and regular exercise  Continue same  Will continue to monitor  BPH without obstruction/lower urinary tract symptoms  Continue to monitor  Osteoarthritis  Continue with Tylenol as needed  Kidney stone  Continue to follow up with urologist        Diagnoses and all orders for this visit:    Type 2 diabetes mellitus without complication, without long-term current use of insulin ([facility])  -     CBC and differential; Future  -     Comprehensive metabolic panel; Future  -     Lipid Panel with Direct LDL reflex; Future  -     Hemoglobin A1C; Future  -     Microalbumin / creatinine urine ratio  -     TSH, 3rd generation with Free T4 reflex; Future    Other hyperlipidemia    BPH without obstruction/lower urinary tract symptoms    Primary osteoarthritis involving multiple joints    Kidney stone    Morbid obesity with BMI of 40 0-44 9, adult ([facility] )          There are no Patient Instructions on file for this visit  Return in about 6 months (around 1/10/2021)  Subjective:      Patient ID: Luigi Oh is a 63 y o  male  Chief Complaint   Patient presents with    Hyperlipidemia    Diabetes       He is here today for follow-up multiple medical problems  He has been taking his medications  He denies any side effects from his medications  His A1c improved to 7 2  He denies any episode of hypoglycemia  He has been following with a urologist and they found 3 5 cm kidney stone        The following portions of the patient's history were reviewed and updated as appropriate: allergies, current medications, past family history, past medical history, past social history, past surgical history and problem list     Review of Systems   Constitutional: Negative for chills and fever  HENT: Negative for trouble swallowing  Eyes: Negative for visual disturbance  Respiratory: Negative for cough and shortness of breath  Cardiovascular: Negative for chest pain and palpitations  Gastrointestinal: Negative for abdominal pain, blood in stool and vomiting  Endocrine: Negative for cold intolerance and heat intolerance  Genitourinary: Negative for difficulty urinating and dysuria  Musculoskeletal: Negative for gait problem  Skin: Negative for rash  Neurological: Negative for dizziness, syncope and headaches  Hematological: Negative for adenopathy  Psychiatric/Behavioral: Negative for behavioral problems           Current Outpatient Medications   Medication Sig Dispense Refill    ascorbic acid (VITAMIN C) 500 MG tablet Take 1 tablet (500 mg total) by mouth daily 60 tablet 0    cetirizine (ZyrTEC) 10 mg tablet Take 10 mg by mouth daily      cholecalciferol (VITAMIN D3) 400 units tablet Take 400 Units by mouth daily      fluticasone (FLONASE) 50 mcg/act nasal spray 2 sprays into each nostril daily 16 g 0    metFORMIN (GLUCOPHAGE-XR) 500 mg 24 hr tablet Take 1 tablet (500 mg total) by mouth daily with breakfast 90 tablet 1    Multiple Vitamin (MULTIVITAMIN) tablet Take 2 tablets by mouth daily      Omega-3 Fatty Acids (FISH OIL PO) Take 1 capsule by mouth daily      simvastatin (ZOCOR) 40 mg tablet TAKE 1 TABLET BY MOUTH EVERYDAY AT BEDTIME 90 tablet 0    albuterol (PROVENTIL HFA,VENTOLIN HFA) 90 mcg/act inhaler Inhale 2 puffs every 6 (six) hours as needed for wheezing or shortness of breath (Patient not taking: Reported on 6/26/2020) 1 Inhaler 5    benzonatate (TESSALON) 200 MG capsule Take 1 capsule (200 mg total) by mouth 3 (three) times a day as needed for cough (Patient not taking: Reported on 6/26/2020) 30 capsule 0    DULERA 200-5 MCG/ACT inhaler INHALE 2 PUFFS 2 (TWO) TIMES A DAY RINSE MOUTH AFTER USE  (Patient not taking: Reported on 6/26/2020) 13 Inhaler 1     No current facility-administered medications for this visit  Objective:    /68 (BP Location: Left arm, Patient Position: Sitting, Cuff Size: Large)   Pulse 83   Temp 97 6 °F (36 4 °C) (Tympanic)   Resp 16   Ht 5' 7" (1 702 m)   Wt 119 kg (261 lb 8 oz)   SpO2 96%   BMI 40 96 kg/m²        Physical Exam   Constitutional: He is oriented to person, place, and time  He appears well-developed and well-nourished  HENT:   Head: Normocephalic and atraumatic  Eyes: Pupils are equal, round, and reactive to light  EOM are normal    Neck: Normal range of motion  Neck supple  Cardiovascular: Normal rate, regular rhythm and normal heart sounds  Pulmonary/Chest: Effort normal and breath sounds normal    Abdominal: Soft  Bowel sounds are normal    Musculoskeletal: Normal range of motion  He exhibits no edema  Lymphadenopathy:     He has no cervical adenopathy  Neurological: He is alert and oriented to person, place, and time  No cranial nerve deficit  Skin: Skin is warm  No rash noted  Psychiatric: He has a normal mood and affect  Nursing note and vitals reviewed  Tawanna Kendall MD BMI Counseling: Body mass index is 40 96 kg/m²  The BMI is above normal  Nutrition recommendations include reducing portion sizes, decreasing overall calorie intake and 3-5 servings of fruits/vegetables daily  Exercise recommendations include moderate aerobic physical activity for 150 minutes/week

## 2020-07-24 ENCOUNTER — OFFICE VISIT (OUTPATIENT)
Dept: UROLOGY | Facility: MEDICAL CENTER | Age: 60
End: 2020-07-24
Payer: COMMERCIAL

## 2020-07-24 VITALS
SYSTOLIC BLOOD PRESSURE: 130 MMHG | WEIGHT: 261 LBS | DIASTOLIC BLOOD PRESSURE: 70 MMHG | BODY MASS INDEX: 40.97 KG/M2 | HEIGHT: 67 IN | HEART RATE: 65 BPM

## 2020-07-24 DIAGNOSIS — N40.0 BPH WITHOUT OBSTRUCTION/LOWER URINARY TRACT SYMPTOMS: ICD-10-CM

## 2020-07-24 DIAGNOSIS — N39.0 FREQUENT UTI: ICD-10-CM

## 2020-07-24 DIAGNOSIS — N20.0 RENAL STONE: Primary | ICD-10-CM

## 2020-07-24 LAB
SL AMB  POCT GLUCOSE, UA: ABNORMAL
SL AMB LEUKOCYTE ESTERASE,UA: ABNORMAL
SL AMB POCT BILIRUBIN,UA: ABNORMAL
SL AMB POCT BLOOD,UA: ABNORMAL
SL AMB POCT CLARITY,UA: CLEAR
SL AMB POCT COLOR,UA: YELLOW
SL AMB POCT KETONES,UA: ABNORMAL
SL AMB POCT NITRITE,UA: ABNORMAL
SL AMB POCT PH,UA: 5
SL AMB POCT SPECIFIC GRAVITY,UA: >1.03
SL AMB POCT URINE PROTEIN: ABNORMAL
SL AMB POCT UROBILINOGEN: 0.2

## 2020-07-24 PROCEDURE — 3075F SYST BP GE 130 - 139MM HG: CPT | Performed by: UROLOGY

## 2020-07-24 PROCEDURE — 1036F TOBACCO NON-USER: CPT | Performed by: UROLOGY

## 2020-07-24 PROCEDURE — 3061F NEG MICROALBUMINURIA REV: CPT | Performed by: UROLOGY

## 2020-07-24 PROCEDURE — 81003 URINALYSIS AUTO W/O SCOPE: CPT | Performed by: UROLOGY

## 2020-07-24 PROCEDURE — 3008F BODY MASS INDEX DOCD: CPT | Performed by: UROLOGY

## 2020-07-24 PROCEDURE — 3060F POS MICROALBUMINURIA REV: CPT | Performed by: UROLOGY

## 2020-07-24 PROCEDURE — 3078F DIAST BP <80 MM HG: CPT | Performed by: UROLOGY

## 2020-07-24 PROCEDURE — 3051F HG A1C>EQUAL 7.0%<8.0%: CPT | Performed by: UROLOGY

## 2020-07-24 PROCEDURE — 99215 OFFICE O/P EST HI 40 MIN: CPT | Performed by: UROLOGY

## 2020-07-24 RX ORDER — ALLOPURINOL 100 MG/1
100 TABLET ORAL DAILY
Qty: 90 TABLET | Refills: 3 | Status: SHIPPED | OUTPATIENT
Start: 2020-07-24 | End: 2021-07-23

## 2020-07-24 RX ORDER — CEFAZOLIN SODIUM 2 G/50ML
2000 SOLUTION INTRAVENOUS ONCE
Status: CANCELLED | OUTPATIENT
Start: 2020-08-20 | End: 2020-07-24

## 2020-07-24 NOTE — PROGRESS NOTES
I spoke to the patient and confirmed his 8/20/2020 date for the procedure  Instructions given verbally and Emailed

## 2020-07-24 NOTE — PROGRESS NOTES
Assessment/Plan:    Renal stone  The patient will be scheduled for ureteroscopy and laser lithotripsy of his left lower pole renal staghorn  The patient forms uric acid stones and I have started him on allopurinol  Diagnoses and all orders for this visit:    Renal stone  -     allopurinol (ZYLOPRIM) 100 mg tablet; Take 1 tablet (100 mg total) by mouth daily  -     Case request operating room: CYSTOSCOPY URETEROSCOPY WITH LITHOTRIPSY HOLMIUM LASER, RETROGRADE PYELOGRAM AND INSERTION STENT URETERAL; Standing  -     PAT Covid Screening; Future  -     Case request operating room: CYSTOSCOPY URETEROSCOPY WITH LITHOTRIPSY HOLMIUM LASER, RETROGRADE PYELOGRAM AND INSERTION STENT URETERAL    BPH without obstruction/lower urinary tract symptoms  -     POCT urine dip auto non-scope    Frequent UTI    Other orders  -     Diet NPO; Sips with meds; Standing  -     Place sequential compression device; Standing  -     CBC and Platelet; Standing  -     Comprehensive metabolic panel; Standing  -     UA (URINE) with reflex to Scope; Standing  -     Novel Coronavirus (Covid-19),PCR [facility]; Standing  -     ceFAZolin (ANCEF) 2,000 mg in dextrose 5 % 100 mL IVPB          Subjective:      Patient ID: Lynda Sibley is a 63 y o  male  HPI  Renal stone: The patient has a partial staghorn stone in his left kidney  We have discussed treatment by phone and I have reviewed the images with Dr Tyrone Suarez  In his opinion, this can be treated best with ureteroscopy and laser lithotripsy at [facility], because [facility] has the best laser in the area  Note that on CT scan, the staghorn stone is 400-700 Hounsfield units which is denser than usual for uric acid  This increased density may be due to the size of the stone rather than its composition  Since delays will be used, exact stone composition is immaterial     The patient's ureteral stone which he passed last winter was composed of 100% uric acid      Hold fish oil for week pre-op  Not prone to gout  Discussed allopurinol  Pt will start it  AUA SYMPTOM SCORE      Most Recent Value   AUA SYMPTOM SCORE   How often have you had a sensation of not emptying your bladder completely after you finished urinating? 0   How often have you had to urinate again less than two hours after you finished urinating? 0   How often have you found you stopped and started again several times when you urinate?  0   How often have you found it difficult to postpone urination? 1   How often have you had a weak urinary stream?  0   How often have you had to push or strain to begin urination? 0   How many times did you most typically get up to urinate from the time you went to bed at night until the time you got up in the morning? 1   Quality of Life: If you were to spend the rest of your life with your urinary condition just the way it is now, how would you feel about that?  1   AUA SYMPTOM SCORE  2        Accompanied by wife  The following portions of the patient's history were reviewed and updated as appropriate: allergies, current medications, past family history, past medical history, past social history, past surgical history and problem list     Review of Systems    Constitutional: Negative for activity change and fatigue  Respiratory: Negative for shortness of breath and wheezing  Cardiovascular: Negative for chest pain  Hypertension  Gastrointestinal: Negative for abdominal pain  Endocrine:        NIDDM  Recent A1c was 7 5%   Genitourinary: Negative for difficulty urinating, dysuria, frequency, hematuria and urgency  Musculoskeletal: Negative for back pain and gait problem  Skin: Negative  Allergic/Immunologic: Negative  Neurological: Negative  Psychiatric/Behavioral: Negative            Objective:      /70   Pulse 65   Ht 5' 7" (1 702 m)   Wt 118 kg (261 lb)   BMI 40 88 kg/m²          Physical Exam   Constitutional: He is oriented to person, place, and time  He appears well-developed and well-nourished  HENT:   Head: Normocephalic and atraumatic  Eyes: EOM are normal    Neck: Normal range of motion  Neck supple  Cardiovascular: Normal rate, regular rhythm and normal heart sounds  Pulmonary/Chest: Effort normal and breath sounds normal    Abdominal: Soft  There is no tenderness  Musculoskeletal: Normal range of motion  Neurological: He is alert and oriented to person, place, and time  Skin: Skin is warm and dry  Psychiatric: He has a normal mood and affect   His behavior is normal  Judgment and thought content normal

## 2020-07-24 NOTE — H&P
Assessment/Plan:    Renal stone  The patient will be scheduled for ureteroscopy and laser lithotripsy of his left lower pole renal staghorn  The patient forms uric acid stones and I have started him on allopurinol  Diagnoses and all orders for this visit:    Renal stone  -     allopurinol (ZYLOPRIM) 100 mg tablet; Take 1 tablet (100 mg total) by mouth daily  -     Case request operating room: CYSTOSCOPY URETEROSCOPY WITH LITHOTRIPSY HOLMIUM LASER, RETROGRADE PYELOGRAM AND INSERTION STENT URETERAL; Standing  -     PAT Covid Screening; Future  -     Case request operating room: CYSTOSCOPY URETEROSCOPY WITH LITHOTRIPSY HOLMIUM LASER, RETROGRADE PYELOGRAM AND INSERTION STENT URETERAL    BPH without obstruction/lower urinary tract symptoms  -     POCT urine dip auto non-scope    Frequent UTI    Other orders  -     Diet NPO; Sips with meds; Standing  -     Place sequential compression device; Standing  -     CBC and Platelet; Standing  -     Comprehensive metabolic panel; Standing  -     UA (URINE) with reflex to Scope; Standing  -     Novel Coronavirus (Covid-19),PCR [facility]; Standing  -     ceFAZolin (ANCEF) 2,000 mg in dextrose 5 % 100 mL IVPB          Subjective:      Patient ID: Perfecto Sahni is a 63 y o  male  HPI  Renal stone: The patient has a partial staghorn stone in his left kidney  We have discussed treatment by phone and I have reviewed the images with Dr Omar Guerin  In his opinion, this can be treated best with ureteroscopy and laser lithotripsy at [address] [place] [address], because [address] [place] [address] has the best laser in the area  Note that on CT scan, the staghorn stone is 400-700 Hounsfield units which is denser than usual for uric acid  This increased density may be due to the size of the stone rather than its composition  Since delays will be used, exact stone composition is immaterial     The patient's ureteral stone which he passed last winter was composed of 100% uric acid      Hold fish oil for week pre-op  Not prone to gout  Discussed allopurinol  Pt will start it  AUA SYMPTOM SCORE      Most Recent Value   AUA SYMPTOM SCORE   How often have you had a sensation of not emptying your bladder completely after you finished urinating? 0   How often have you had to urinate again less than two hours after you finished urinating? 0   How often have you found you stopped and started again several times when you urinate?  0   How often have you found it difficult to postpone urination? 1   How often have you had a weak urinary stream?  0   How often have you had to push or strain to begin urination? 0   How many times did you most typically get up to urinate from the time you went to bed at night until the time you got up in the morning? 1   Quality of Life: If you were to spend the rest of your life with your urinary condition just the way it is now, how would you feel about that?  1   AUA SYMPTOM SCORE  2        Accompanied by wife  The following portions of the patient's history were reviewed and updated as appropriate: allergies, current medications, past family history, past medical history, past social history, past surgical history and problem list     Review of Systems    Constitutional: Negative for activity change and fatigue  Respiratory: Negative for shortness of breath and wheezing  Cardiovascular: Negative for chest pain  Hypertension  Gastrointestinal: Negative for abdominal pain  Endocrine:        NIDDM  Recent A1c was 7 5%   Genitourinary: Negative for difficulty urinating, dysuria, frequency, hematuria and urgency  Musculoskeletal: Negative for back pain and gait problem  Skin: Negative  Allergic/Immunologic: Negative  Neurological: Negative  Psychiatric/Behavioral: Negative            Objective:      /70   Pulse 65   Ht 5' 7" (1 702 m)   Wt 118 kg (261 lb)   BMI 40 88 kg/m²           Physical Exam   Constitutional: He is oriented to person, place, and time  He appears well-developed and well-nourished  HENT:   Head: Normocephalic and atraumatic  Eyes: EOM are normal    Neck: Normal range of motion  Neck supple  Cardiovascular: Normal rate, regular rhythm and normal heart sounds  Pulmonary/Chest: Effort normal and breath sounds normal    Abdominal: Soft  There is no tenderness  Musculoskeletal: Normal range of motion  Neurological: He is alert and oriented to person, place, and time  Skin: Skin is warm and dry  Psychiatric: He has a normal mood and affect  His behavior is normal  Judgment and thought content normal          I spoke to the patient and confirmed his 8/20/2020 date for the procedure  Instructions given verbally and Emailed

## 2020-07-24 NOTE — ASSESSMENT & PLAN NOTE
The patient will be scheduled for ureteroscopy and laser lithotripsy of his left lower pole renal staghorn  The patient forms uric acid stones and I have started him on allopurinol

## 2020-07-24 NOTE — H&P (VIEW-ONLY)
Assessment/Plan:    Renal stone  The patient will be scheduled for ureteroscopy and laser lithotripsy of his left lower pole renal staghorn  The patient forms uric acid stones and I have started him on allopurinol  Diagnoses and all orders for this visit:    Renal stone  -     allopurinol (ZYLOPRIM) 100 mg tablet; Take 1 tablet (100 mg total) by mouth daily  -     Case request operating room: CYSTOSCOPY URETEROSCOPY WITH LITHOTRIPSY HOLMIUM LASER, RETROGRADE PYELOGRAM AND INSERTION STENT URETERAL; Standing  -     PAT Covid Screening; Future  -     Case request operating room: CYSTOSCOPY URETEROSCOPY WITH LITHOTRIPSY HOLMIUM LASER, RETROGRADE PYELOGRAM AND INSERTION STENT URETERAL    BPH without obstruction/lower urinary tract symptoms  -     POCT urine dip auto non-scope    Frequent UTI    Other orders  -     Diet NPO; Sips with meds; Standing  -     Place sequential compression device; Standing  -     CBC and Platelet; Standing  -     Comprehensive metabolic panel; Standing  -     UA (URINE) with reflex to Scope; Standing  -     Novel Coronavirus (Covid-19),PCR [facility]; Standing  -     ceFAZolin (ANCEF) 2,000 mg in dextrose 5 % 100 mL IVPB          Subjective:      Patient ID: Perfecto Sahni is a 63 y o  male  HPI  Renal stone: The patient has a partial staghorn stone in his left kidney  We have discussed treatment by phone and I have reviewed the images with Dr Omar Guerin  In his opinion, this can be treated best with ureteroscopy and laser lithotripsy at [facility], because [facility] has the best laser in the area  Note that on CT scan, the staghorn stone is 400-700 Hounsfield units which is denser than usual for uric acid  This increased density may be due to the size of the stone rather than its composition  Since delays will be used, exact stone composition is immaterial     The patient's ureteral stone which he passed last winter was composed of 100% uric acid      Hold fish oil for week pre-op  Not prone to gout  Discussed allopurinol  Pt will start it  AUA SYMPTOM SCORE      Most Recent Value   AUA SYMPTOM SCORE   How often have you had a sensation of not emptying your bladder completely after you finished urinating? 0   How often have you had to urinate again less than two hours after you finished urinating? 0   How often have you found you stopped and started again several times when you urinate?  0   How often have you found it difficult to postpone urination? 1   How often have you had a weak urinary stream?  0   How often have you had to push or strain to begin urination? 0   How many times did you most typically get up to urinate from the time you went to bed at night until the time you got up in the morning? 1   Quality of Life: If you were to spend the rest of your life with your urinary condition just the way it is now, how would you feel about that?  1   AUA SYMPTOM SCORE  2        Accompanied by wife  The following portions of the patient's history were reviewed and updated as appropriate: allergies, current medications, past family history, past medical history, past social history, past surgical history and problem list     Review of Systems    Constitutional: Negative for activity change and fatigue  Respiratory: Negative for shortness of breath and wheezing  Cardiovascular: Negative for chest pain  Hypertension  Gastrointestinal: Negative for abdominal pain  Endocrine:        NIDDM  Recent A1c was 7 5%   Genitourinary: Negative for difficulty urinating, dysuria, frequency, hematuria and urgency  Musculoskeletal: Negative for back pain and gait problem  Skin: Negative  Allergic/Immunologic: Negative  Neurological: Negative  Psychiatric/Behavioral: Negative            Objective:      /70   Pulse 65   Ht 5' 7" (1 702 m)   Wt 118 kg (261 lb)   BMI 40 88 kg/m²           Physical Exam   Constitutional: He is oriented to person, place, and time  He appears well-developed and well-nourished  HENT:   Head: Normocephalic and atraumatic  Eyes: EOM are normal    Neck: Normal range of motion  Neck supple  Cardiovascular: Normal rate, regular rhythm and normal heart sounds  Pulmonary/Chest: Effort normal and breath sounds normal    Abdominal: Soft  There is no tenderness  Musculoskeletal: Normal range of motion  Neurological: He is alert and oriented to person, place, and time  Skin: Skin is warm and dry  Psychiatric: He has a normal mood and affect  His behavior is normal  Judgment and thought content normal          I spoke to the patient and confirmed his 8/20/2020 date for the procedure  Instructions given verbally and Emailed

## 2020-08-13 ENCOUNTER — CLINICAL SUPPORT (OUTPATIENT)
Dept: URGENT CARE | Age: 60
End: 2020-08-13

## 2020-08-13 ENCOUNTER — LAB (OUTPATIENT)
Dept: LAB | Age: 60
End: 2020-08-13
Payer: COMMERCIAL

## 2020-08-13 DIAGNOSIS — N20.0 RENAL STONE: ICD-10-CM

## 2020-08-13 LAB
ALBUMIN SERPL BCP-MCNC: 3.8 G/DL (ref 3.5–5)
ALP SERPL-CCNC: 44 U/L (ref 46–116)
ALT SERPL W P-5'-P-CCNC: 43 U/L (ref 12–78)
ANION GAP SERPL CALCULATED.3IONS-SCNC: 6 MMOL/L (ref 4–13)
AST SERPL W P-5'-P-CCNC: 24 U/L (ref 5–45)
ATRIAL RATE: 76 BPM
BASOPHILS # BLD AUTO: 0.11 THOUSANDS/ΜL (ref 0–0.1)
BASOPHILS NFR BLD AUTO: 1 % (ref 0–1)
BILIRUB SERPL-MCNC: 0.64 MG/DL (ref 0.2–1)
BUN SERPL-MCNC: 14 MG/DL (ref 5–25)
CALCIUM SERPL-MCNC: 8.9 MG/DL (ref 8.3–10.1)
CHLORIDE SERPL-SCNC: 109 MMOL/L (ref 100–108)
CO2 SERPL-SCNC: 27 MMOL/L (ref 21–32)
CREAT SERPL-MCNC: 0.9 MG/DL (ref 0.6–1.3)
EOSINOPHIL # BLD AUTO: 0.37 THOUSAND/ΜL (ref 0–0.61)
EOSINOPHIL NFR BLD AUTO: 5 % (ref 0–6)
ERYTHROCYTE [DISTWIDTH] IN BLOOD BY AUTOMATED COUNT: 13.1 % (ref 11.6–15.1)
GFR SERPL CREATININE-BSD FRML MDRD: 92 ML/MIN/1.73SQ M
GLUCOSE P FAST SERPL-MCNC: 114 MG/DL (ref 65–99)
HCT VFR BLD AUTO: 46.5 % (ref 36.5–49.3)
HGB BLD-MCNC: 15.2 G/DL (ref 12–17)
IMM GRANULOCYTES # BLD AUTO: 0.02 THOUSAND/UL (ref 0–0.2)
IMM GRANULOCYTES NFR BLD AUTO: 0 % (ref 0–2)
LYMPHOCYTES # BLD AUTO: 3.57 THOUSANDS/ΜL (ref 0.6–4.47)
LYMPHOCYTES NFR BLD AUTO: 44 % (ref 14–44)
MCH RBC QN AUTO: 29.2 PG (ref 26.8–34.3)
MCHC RBC AUTO-ENTMCNC: 32.7 G/DL (ref 31.4–37.4)
MCV RBC AUTO: 89 FL (ref 82–98)
MONOCYTES # BLD AUTO: 0.58 THOUSAND/ΜL (ref 0.17–1.22)
MONOCYTES NFR BLD AUTO: 7 % (ref 4–12)
NEUTROPHILS # BLD AUTO: 3.47 THOUSANDS/ΜL (ref 1.85–7.62)
NEUTS SEG NFR BLD AUTO: 43 % (ref 43–75)
NRBC BLD AUTO-RTO: 0 /100 WBCS
P AXIS: 26 DEGREES
PLATELET # BLD AUTO: 282 THOUSANDS/UL (ref 149–390)
PMV BLD AUTO: 10.9 FL (ref 8.9–12.7)
POTASSIUM SERPL-SCNC: 3.9 MMOL/L (ref 3.5–5.3)
PR INTERVAL: 140 MS
PROT SERPL-MCNC: 7.8 G/DL (ref 6.4–8.2)
QRS AXIS: -2 DEGREES
QRSD INTERVAL: 84 MS
QT INTERVAL: 388 MS
QTC INTERVAL: 436 MS
RBC # BLD AUTO: 5.21 MILLION/UL (ref 3.88–5.62)
SODIUM SERPL-SCNC: 142 MMOL/L (ref 136–145)
T WAVE AXIS: 8 DEGREES
VENTRICULAR RATE: 76 BPM
WBC # BLD AUTO: 8.12 THOUSAND/UL (ref 4.31–10.16)

## 2020-08-13 PROCEDURE — 85025 COMPLETE CBC W/AUTO DIFF WBC: CPT

## 2020-08-13 PROCEDURE — 93005 ELECTROCARDIOGRAM TRACING: CPT

## 2020-08-13 PROCEDURE — 36415 COLL VENOUS BLD VENIPUNCTURE: CPT

## 2020-08-13 PROCEDURE — 93010 ELECTROCARDIOGRAM REPORT: CPT | Performed by: INTERNAL MEDICINE

## 2020-08-13 PROCEDURE — 80053 COMPREHEN METABOLIC PANEL: CPT

## 2020-08-13 PROCEDURE — 87086 URINE CULTURE/COLONY COUNT: CPT

## 2020-08-13 NOTE — TELEPHONE ENCOUNTER
I spoke to the patient and confirmed that his procedure is scheduled at [facility], Dr Wayne Bustamante had mentioned that it might be at [facility]  Patient is going for his PAT testing today and I did confirm that he does not meet the requirements for COVID testing at this time  Patient expressed understanding

## 2020-08-13 NOTE — TELEPHONE ENCOUNTER
Patient called back and said he thought his surgery was at [facility] and it shows Nikki on [address]  He wanted to verify that and he also wants to know if he needs Covid testing or with the recent change does he not need Covid testing  Please call patient back at [phone]

## 2020-08-14 LAB — BACTERIA UR CULT: NORMAL

## 2020-08-18 ENCOUNTER — TELEPHONE (OUTPATIENT)
Dept: UROLOGY | Facility: MEDICAL CENTER | Age: 60
End: 2020-08-18

## 2020-08-18 NOTE — TELEPHONE ENCOUNTER
Called pt  He started taking Bactrim bid on 8/15/20  He has stone procedure with Dr Columba Virgen scheduled for 8/20/20 and plans on taking Bactrim until 8/19/20      FYI

## 2020-08-18 NOTE — TELEPHONE ENCOUNTER
Patient of Dr Gold Tucker at the [facility] office  Patient called in regard to the new onset of an UTI  He had noted cloudy urine, slight hematuria , pain at end of urination, increased frequency and urgency as well    Patient started to take Sulfamethoxazole Tmp DS tablets  Please call at [phone]  Thank you

## 2020-08-19 ENCOUNTER — ANESTHESIA EVENT (OUTPATIENT)
Dept: PERIOP | Facility: HOSPITAL | Age: 60
End: 2020-08-19
Payer: COMMERCIAL

## 2020-08-20 ENCOUNTER — HOSPITAL ENCOUNTER (OUTPATIENT)
Facility: HOSPITAL | Age: 60
Setting detail: OUTPATIENT SURGERY
Discharge: HOME | End: 2020-08-20
Attending: UROLOGY | Admitting: UROLOGY
Payer: COMMERCIAL

## 2020-08-20 ENCOUNTER — ANESTHESIA (OUTPATIENT)
Dept: PERIOP | Facility: HOSPITAL | Age: 60
End: 2020-08-20
Payer: COMMERCIAL

## 2020-08-20 ENCOUNTER — APPOINTMENT (OUTPATIENT)
Dept: RADIOLOGY | Facility: HOSPITAL | Age: 60
End: 2020-08-20
Payer: COMMERCIAL

## 2020-08-20 ENCOUNTER — TELEPHONE (OUTPATIENT)
Dept: OTHER | Facility: HOSPITAL | Age: 60
End: 2020-08-20

## 2020-08-20 VITALS
SYSTOLIC BLOOD PRESSURE: 126 MMHG | DIASTOLIC BLOOD PRESSURE: 67 MMHG | RESPIRATION RATE: 18 BRPM | WEIGHT: 261 LBS | BODY MASS INDEX: 40.97 KG/M2 | HEIGHT: 67 IN | OXYGEN SATURATION: 97 % | TEMPERATURE: 97.5 F | HEART RATE: 68 BPM

## 2020-08-20 DIAGNOSIS — N20.0 RENAL STONE: Primary | ICD-10-CM

## 2020-08-20 PROBLEM — T88.59XA DELAYED EMERGENCE FROM ANESTHESIA: Status: ACTIVE | Noted: 2020-08-20

## 2020-08-20 LAB
GLUCOSE SERPL-MCNC: 122 MG/DL (ref 65–140)
GLUCOSE SERPL-MCNC: 141 MG/DL (ref 65–140)

## 2020-08-20 PROCEDURE — 82948 REAGENT STRIP/BLOOD GLUCOSE: CPT

## 2020-08-20 PROCEDURE — 52356 CYSTO/URETERO W/LITHOTRIPSY: CPT | Performed by: UROLOGY

## 2020-08-20 PROCEDURE — 74420 UROGRAPHY RTRGR +-KUB: CPT

## 2020-08-20 PROCEDURE — C2617 STENT, NON-COR, TEM W/O DEL: HCPCS | Performed by: UROLOGY

## 2020-08-20 PROCEDURE — NC001 PR NO CHARGE: Performed by: UROLOGY

## 2020-08-20 DEVICE — VARIABLE LENGTH INJECTION STENT SET
Type: IMPLANTABLE DEVICE | Site: URETER | Status: FUNCTIONAL
Brand: CONTOUR VL™ INJECTION STENT SET

## 2020-08-20 RX ORDER — KETOROLAC TROMETHAMINE 30 MG/ML
15 INJECTION, SOLUTION INTRAMUSCULAR; INTRAVENOUS EVERY 6 HOURS SCHEDULED
Status: DISCONTINUED | OUTPATIENT
Start: 2020-08-20 | End: 2020-08-20 | Stop reason: HOSPADM

## 2020-08-20 RX ORDER — PROPOFOL 10 MG/ML
INJECTION, EMULSION INTRAVENOUS AS NEEDED
Status: DISCONTINUED | OUTPATIENT
Start: 2020-08-20 | End: 2020-08-20

## 2020-08-20 RX ORDER — SUCCINYLCHOLINE/SOD CL,ISO/PF 100 MG/5ML
SYRINGE (ML) INTRAVENOUS AS NEEDED
Status: DISCONTINUED | OUTPATIENT
Start: 2020-08-20 | End: 2020-08-20

## 2020-08-20 RX ORDER — FENTANYL CITRATE 50 UG/ML
INJECTION, SOLUTION INTRAMUSCULAR; INTRAVENOUS AS NEEDED
Status: DISCONTINUED | OUTPATIENT
Start: 2020-08-20 | End: 2020-08-20

## 2020-08-20 RX ORDER — FENTANYL CITRATE 50 UG/ML
25 INJECTION, SOLUTION INTRAMUSCULAR; INTRAVENOUS
Status: DISCONTINUED | OUTPATIENT
Start: 2020-08-20 | End: 2020-08-20 | Stop reason: HOSPADM

## 2020-08-20 RX ORDER — OXYCODONE HYDROCHLORIDE 5 MG/1
TABLET ORAL
Qty: 8 TABLET | Refills: 0 | Status: SHIPPED | OUTPATIENT
Start: 2020-08-20 | End: 2020-10-23

## 2020-08-20 RX ORDER — CEFAZOLIN SODIUM 2 G/50ML
2000 SOLUTION INTRAVENOUS ONCE
Status: COMPLETED | OUTPATIENT
Start: 2020-08-20 | End: 2020-08-20

## 2020-08-20 RX ORDER — ONDANSETRON 2 MG/ML
INJECTION INTRAMUSCULAR; INTRAVENOUS AS NEEDED
Status: DISCONTINUED | OUTPATIENT
Start: 2020-08-20 | End: 2020-08-20

## 2020-08-20 RX ORDER — ACETAMINOPHEN 325 MG/1
650 TABLET ORAL EVERY 6 HOURS PRN
Status: DISCONTINUED | OUTPATIENT
Start: 2020-08-20 | End: 2020-08-20 | Stop reason: HOSPADM

## 2020-08-20 RX ORDER — SODIUM CHLORIDE 9 MG/ML
125 INJECTION, SOLUTION INTRAVENOUS CONTINUOUS
Status: DISCONTINUED | OUTPATIENT
Start: 2020-08-20 | End: 2020-08-20 | Stop reason: HOSPADM

## 2020-08-20 RX ORDER — DEXAMETHASONE SODIUM PHOSPHATE 10 MG/ML
INJECTION, SOLUTION INTRAMUSCULAR; INTRAVENOUS AS NEEDED
Status: DISCONTINUED | OUTPATIENT
Start: 2020-08-20 | End: 2020-08-20

## 2020-08-20 RX ORDER — SULFAMETHOXAZOLE AND TRIMETHOPRIM 400; 80 MG/1; MG/1
1 TABLET ORAL EVERY 12 HOURS SCHEDULED
COMMUNITY
End: 2020-09-03 | Stop reason: ALTCHOICE

## 2020-08-20 RX ORDER — TAMSULOSIN HYDROCHLORIDE 0.4 MG/1
0.4 CAPSULE ORAL EVERY EVENING
Qty: 30 CAPSULE | Refills: 0 | Status: SHIPPED | OUTPATIENT
Start: 2020-08-20 | End: 2020-09-03 | Stop reason: ALTCHOICE

## 2020-08-20 RX ORDER — ONDANSETRON 2 MG/ML
4 INJECTION INTRAMUSCULAR; INTRAVENOUS EVERY 6 HOURS PRN
Status: DISCONTINUED | OUTPATIENT
Start: 2020-08-20 | End: 2020-08-20 | Stop reason: HOSPADM

## 2020-08-20 RX ORDER — MAGNESIUM HYDROXIDE 1200 MG/15ML
LIQUID ORAL AS NEEDED
Status: DISCONTINUED | OUTPATIENT
Start: 2020-08-20 | End: 2020-08-20 | Stop reason: HOSPADM

## 2020-08-20 RX ORDER — OXYCODONE HYDROCHLORIDE 5 MG/1
5 TABLET ORAL EVERY 4 HOURS PRN
Status: DISCONTINUED | OUTPATIENT
Start: 2020-08-20 | End: 2020-08-20 | Stop reason: HOSPADM

## 2020-08-20 RX ADMIN — ONDANSETRON 4 MG: 2 INJECTION INTRAMUSCULAR; INTRAVENOUS at 16:26

## 2020-08-20 RX ADMIN — CEFAZOLIN SODIUM 2000 MG: 2 SOLUTION INTRAVENOUS at 15:25

## 2020-08-20 RX ADMIN — DEXAMETHASONE SODIUM PHOSPHATE 4 MG: 10 INJECTION, SOLUTION INTRAMUSCULAR; INTRAVENOUS at 16:26

## 2020-08-20 RX ADMIN — KETOROLAC TROMETHAMINE 15 MG: 30 INJECTION, SOLUTION INTRAMUSCULAR at 19:02

## 2020-08-20 RX ADMIN — SODIUM CHLORIDE 125 ML/HR: 0.9 INJECTION, SOLUTION INTRAVENOUS at 13:35

## 2020-08-20 RX ADMIN — SODIUM CHLORIDE: 0.9 INJECTION, SOLUTION INTRAVENOUS at 16:36

## 2020-08-20 RX ADMIN — Medication 100 MG: at 15:32

## 2020-08-20 RX ADMIN — PROPOFOL 200 MG: 10 INJECTION, EMULSION INTRAVENOUS at 15:32

## 2020-08-20 RX ADMIN — FENTANYL CITRATE 50 MCG: 50 INJECTION, SOLUTION INTRAMUSCULAR; INTRAVENOUS at 15:21

## 2020-08-20 RX ADMIN — FENTANYL CITRATE 50 MCG: 50 INJECTION, SOLUTION INTRAMUSCULAR; INTRAVENOUS at 15:31

## 2020-08-20 NOTE — OP NOTE
OPERATIVE REPORT  PATIENT NAME: Miladys Pérez    :  1960  MRN: [id number]  Pt Location: [facility] OR ROOM 07    SURGERY DATE: 2020    Surgeon(s) and Role:     * Little Harding MD - Primary    Preop Diagnosis:  Renal stone [N20 0]    Post-Op Diagnosis Codes:     * Renal stone [N20 0]    Procedure(s) (LRB):  CYSTO, URETEROSCOPY W/ LASER, RETROGRADE PYELOGRAM, STENT (Left)    Specimen(s):  * No specimens in log *    Estimated Blood Loss:   Minimal    Drains:  Ureteral Drain/Stent Left ureter 6 Fr  (Active)   Site Assessment Clean; Intact 20   Dressing Status Open to air 20   Securement Method Other (Comment) 20   Number of days: 0       Anesthesia Type:   General    Operative Indications:  Renal stone [N20 0]      Operative Findings:  Normal urethra with minimal prostatic hypertrophy  The bladder was unremarkable  Ureteral orifices are normal   On preliminary fluoroscopic images stone was visible in the pelvis and lower pole of the left kidney  Flexible ureteroscopy was performed and the stone dusted as much as possible  Access to the lower pole was limited leaving approximately 50% of the stone untreated  A 6 Andorran stent was placed in standard fashion  We will plan to obtain a KUB in approximately 1-2 weeks and discuss further treatment of the stone with the patient as an outpatient  Complications:   None    Procedure and Technique:  The patient was brought to the operating room properly identified  General anesthesia was administered  He was placed in lithotomy position and padded appropriately  He was prepped and draped in usual sterile fashion  Compression boots were deployed  Intravenous antibiotic was administered by anesthesia  Appropriate time-out was performed  Cystourethroscopy was performed 25 Andorran cystoscope with findings as above    Next an open-ended ureteral catheter and dilute Omnipaque were utilized to perform a left retrograde pyelogram   The left ureter was unremarkable  The calcification in the renal pelvis and lower pole of the kidney was confirmed to be the stone  A 0 035 guidewire was then easily passed up the left collecting system to the kidney  A 10-12 Greek ureteral access sheath was then easily placed into the upper ureter  The obturator and guidewire withdrawn  Flexible ureteroscopy was then performed  The stone was easily visualized  Using the holmium laser on dusting and fragmentation settings and the 272 micron fiber laser lithotripsy of the stone was performed  I was able to treat the intra pelvic portion of the stone however I could not flex the scope the ureteroscopy with the laser fiber and place to access the lower pole  The stone was felt too big to attempt to basket and reposition  After multiple attempts to access the stone procedure was abandoned  Systematic pyeloscopy was performed and all fragments in the renal pelvis were felt passable  The years scope was withdrawn after placement of a guidewire  The ureteral access sheath was withdrawn along with the scope and no damage to the ureter or ureteral stones noted  The guidewire was backloaded through the cystoscope and a 6 Greek stent placed in standard fashion  It was seen to be well coiled in the renal pelvis fluoroscopic and in the bladder cystoscopically after removal of the guidewire  The bladder was drained and the cystoscope removed  The stent string was cut at the level of the urethral meatus so was not visible  He will need to be removed cystoscopically  The patient was awakened from anesthesia  He was taken to the recovery room in satisfactory condition    Blood loss was minimal   I was present for the entire procedure    Patient Disposition:  PACU     SIGNATURE: Abby Nelson MD  DATE: August 20, 2020  TIME: 5:43 PM

## 2020-08-20 NOTE — DISCHARGE SUMMARY
DISCHARGE SUMMARY     Patient Name: Concha Porras    Patient MRN: [id number]    Admitting Provider: Abby Nelson MD    Discharging Provider: Abby Nelson MD    Primary Care Physician at Discharge: Ginny Pond [phone]     Admission Date: 8/20/2020     Discharge Date: 8/20/2020    Admission Diagnosis   Renal stone [N20 0]    Discharge Diagnoses  Active Problems:    Delayed emergence from anesthesia      Medications  Current Discharge Medication List      START taking these medications    Details   oxyCODONE (Roxicodone) 5 mg immediate release tablet Take 1-2 tablets every 6 hours prn  Qty: 8 tablet, Refills: 0    Associated Diagnoses: Renal stone            Current Discharge Medication List      CONTINUE these medications which have NOT CHANGED    Details   allopurinol (ZYLOPRIM) 100 mg tablet Take 1 tablet (100 mg total) by mouth daily  Qty: 90 tablet, Refills: 3    Associated Diagnoses: Renal stone      ascorbic acid (VITAMIN C) 500 MG tablet Take 1 tablet (500 mg total) by mouth daily  Qty: 60 tablet, Refills: 0    Associated Diagnoses: Primary osteoarthritis of left knee      cetirizine (ZyrTEC) 10 mg tablet Take 10 mg by mouth daily      cholecalciferol (VITAMIN D3) 400 units tablet Take 400 Units by mouth daily      metFORMIN (GLUCOPHAGE-XR) 500 mg 24 hr tablet Take 1 tablet (500 mg total) by mouth daily with breakfast  Qty: 90 tablet, Refills: 1    Associated Diagnoses: Type 2 diabetes mellitus without complication, without long-term current use of insulin (HCC)      Multiple Vitamin (MULTIVITAMIN) tablet Take 2 tablets by mouth daily      Omega-3 Fatty Acids (FISH OIL PO) Take 1 capsule by mouth daily      simvastatin (ZOCOR) 40 mg tablet TAKE 1 TABLET BY MOUTH EVERYDAY AT BEDTIME  Qty: 90 tablet, Refills: 0    Associated Diagnoses: Hyperlipidemia, unspecified hyperlipidemia type      sulfamethoxazole-trimethoprim (BACTRIM) 400-80 mg per tablet Take 1 tablet by mouth every 12 (twelve) hours      fluticasone (FLONASE) 50 mcg/act nasal spray 2 sprays into each nostril daily  Qty: 16 g, Refills: 0    Associated Diagnoses: Cough             Allergies  No Known Allergies    Outpatient Follow-Up  Frank Guerrero MD  [address] [facility] [address]  [phone]    Follow up      ? Discharge Disposition  Home    Operative Procedures Performed  Procedure(s):  CYSTO, URETEROSCOPY W/ LASER, RETROGRADE PYELOGRAM, STENT  ? Physical Exam at Discharge  Condition of Patient on Discharge: stable  ?   Frank Guerrero MD

## 2020-08-20 NOTE — ANESTHESIA POSTPROCEDURE EVALUATION
Post-Op Assessment Note    CV Status:  Stable    Pain management: adequate     Mental Status:  Alert and awake   Hydration Status:  Euvolemic   PONV Controlled:  Controlled   Airway Patency:  Patent      Post Op Vitals Reviewed: Yes      Staff: Anesthesiologist         No complications documented      /70 (08/20/20 1802)    Temp 97 5 °F (36 4 °C) (08/20/20 1802)    Pulse 78 (08/20/20 1802)   Resp 21 (08/20/20 1802)    SpO2 93 % (08/20/20 1802)

## 2020-08-20 NOTE — INTERVAL H&P NOTE
H&P reviewed  After examining the patient I find no changes in the patients condition since the H&P had been written  Vitals:    08/20/20 1328   BP:    Pulse:    Resp:    Temp: 98 6 °F (37 °C)   SpO2:    Procedure reviewed with patient in the ambulatory unit  Risks discussed and consent signed  Laterality marked - left

## 2020-08-20 NOTE — TELEPHONE ENCOUNTER
Pt underwent ureteroscopy today  Please call to arrange a follow up appt in 2 weeks  He should have a KUB done   We can then discuss stent removal vs PCNL/ESWL

## 2020-08-20 NOTE — PROGRESS NOTES
TT sent to Dr Lucero Queen to update  Bactrim ds started 8/15 by Dr Rodriguez Rule for UTI  Denies any symptoms today

## 2020-08-20 NOTE — ANESTHESIA PREPROCEDURE EVALUATION
Procedure:  CYSTO, URETEROSCOPY W/ LASER, RETROGRADE PYELOGRAM, STENT (Left Bladder)    Relevant Problems   ANESTHESIA   (+) Delayed emergence from anesthesia      CARDIO   (+) Hyperlipidemia      ENDO   (+) Type 2 diabetes mellitus without complication, without long-term current use of insulin (HCC)      GI/HEPATIC (within normal limits)      /RENAL   (+) BPH without obstruction/lower urinary tract symptoms   (+) Renal stone      HEMATOLOGY (within normal limits)      MUSCULOSKELETAL  TKR    (+) Osteoarthritis   (+) Primary osteoarthritis of right knee      NEURO/PSYCH  glaucoma       PULMONARY  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         Physical Exam    Airway    Mallampati score: III  TM Distance: >3 FB  Neck ROM: full     Dental   upper dentures,     Cardiovascular  Rhythm: regular, Rate: normal, Cardiovascular exam normal    Pulmonary  Pulmonary exam normal Breath sounds clear to auscultation,     Other Findings        Anesthesia Plan  ASA Score- 2     Anesthesia Type- general with ASA Monitors  Additional Monitors:   Airway Plan:     Comment: Please re eval dental exam prior to OR  Plan Factors-    Chart reviewed  Existing labs reviewed  Patient summary reviewed  Patient did not smoke on day of surgery  Induction- intravenous  Postoperative Plan-     Informed Consent- Anesthetic plan and risks discussed with patient

## 2020-08-20 NOTE — DISCHARGE INSTRUCTIONS
Ureteroscopy   WHAT YOU NEED TO KNOW:   A ureteroscopy is a procedure to examine in the inside of your urinary tract, which includes your urethra, bladder, ureters, and kidneys  A ureteroscope is a small, thin tube with a light and camera on the end  Ureteroscopy can help your healthcare provider diagnose and treat problems in your urinary tract, such as kidney stones  DISCHARGE INSTRUCTIONS:   Medicine:   · Antibiotics  may be given to treat or prevent an infection  · Take your medicine as directed  Contact your healthcare provider if you think your medicine is not helping or if you have side effects  Tell him or her if you are allergic to any medicine  Keep a list of the medicines, vitamins, and herbs you take  Include the amounts, and when and why you take them  Bring the list or the pill bottles to follow-up visits  Carry your medicine list with you in case of an emergency  Follow up with your healthcare provider as directed:  Write down your questions so you remember to ask them during your visits  Drink liquids as directed  Liquids can help prevent kidney stones and urinary tract infections  Drink water and limit the amount of caffeine you drink  Caffeine may be found in coffee, tea, soda, sports drinks, and foods  Ask your healthcare provider how much liquid to drink each day  Contact your healthcare provider if:   · You have a fever  · You cannot urinate  · You have blood clots  in your urine  · You are vomiting  · You have pain in your abdomen or side  · You have questions or concerns about your condition or care  © 2017 [employer] Information is for End User's use only and may not be sold, redistributed or otherwise used for commercial purposes  All illustrations and images included in CareNotes® are the copyrighted property of [employer]  or [employer]  The above information is an  only   It is not intended as medical advice for individual conditions or treatments  Talk to your doctor, nurse or pharmacist before following any medical regimen to see if it is safe and effective for you

## 2020-08-21 NOTE — TELEPHONE ENCOUNTER
No further antibiotics at this time  Patient should continue to monitor for urinary  Infection symptoms  Otherwise follow-up as scheduled in the office  Thank you

## 2020-08-21 NOTE — TELEPHONE ENCOUNTER
Call placed to patient and spoke with him  2 week follow up scheduled with Dr Jw Davalos on 9/3/2020 at 10:45am  Pt is aware that he needs to have KUB done prior to this appointment  Pt also informed me that last night he took the Flomax as prescribed and  around midnight he became "very dizzy" and was having heart palpitations  Pt stated he does not want to take this medication anymore secondary to the side effects but is asking if there is anything else he should be taking  Pt is also asking when he can return to work  He is a  and is supposed to start school on 8/31/2020  He is currently taking the narcotic pain medication as needed for pain at this time  Informed patient that if he is taking narcotic pain medication is it usually advised that you not drive or operate heavy machinery  Pt is aware but would like clarification in case he needs a note for his employer until he is seen in the office  Will route encounter to provider at this time for further clarification and review

## 2020-08-21 NOTE — TELEPHONE ENCOUNTER
Okay to discontinue tamsulosin secondary to lightheadedness and dizziness  A note stating that patient will need to not be working into his follow-up appointment with Dr Frances 09/03/2020 is fine if he is still taking the narcotic pain medication  Thank you

## 2020-08-21 NOTE — TELEPHONE ENCOUNTER
Call placed to patient and spoke with him  Informed him of the recommendations of AMARA at this time  He is aware and will contact the office if he requires a return to work note at this time  Also, patient is stating that he will be taking his last dose of antibiotics tomorrow and is wondering if he needs to be prescribed more until he is seen in  The office for follow  Up on 9/3/2020  Will route to provider for review at this time  ,

## 2020-08-27 ENCOUNTER — APPOINTMENT (OUTPATIENT)
Dept: RADIOLOGY | Age: 60
End: 2020-08-27
Payer: COMMERCIAL

## 2020-08-27 DIAGNOSIS — N20.0 RENAL STONE: ICD-10-CM

## 2020-08-27 PROCEDURE — 74018 RADEX ABDOMEN 1 VIEW: CPT

## 2020-09-03 ENCOUNTER — OFFICE VISIT (OUTPATIENT)
Dept: UROLOGY | Facility: MEDICAL CENTER | Age: 60
End: 2020-09-03
Payer: COMMERCIAL

## 2020-09-03 VITALS — WEIGHT: 255 LBS | BODY MASS INDEX: 40.02 KG/M2 | TEMPERATURE: 98 F | HEIGHT: 67 IN

## 2020-09-03 DIAGNOSIS — N40.0 BPH WITHOUT OBSTRUCTION/LOWER URINARY TRACT SYMPTOMS: ICD-10-CM

## 2020-09-03 DIAGNOSIS — N20.0 RENAL STONE: Primary | ICD-10-CM

## 2020-09-03 DIAGNOSIS — N39.0 FREQUENT UTI: ICD-10-CM

## 2020-09-03 LAB
SL AMB  POCT GLUCOSE, UA: ABNORMAL
SL AMB LEUKOCYTE ESTERASE,UA: ABNORMAL
SL AMB POCT BILIRUBIN,UA: ABNORMAL
SL AMB POCT BLOOD,UA: ABNORMAL
SL AMB POCT CLARITY,UA: ABNORMAL
SL AMB POCT COLOR,UA: ABNORMAL
SL AMB POCT KETONES,UA: ABNORMAL
SL AMB POCT NITRITE,UA: ABNORMAL
SL AMB POCT PH,UA: 5
SL AMB POCT SPECIFIC GRAVITY,UA: 1.02
SL AMB POCT URINE PROTEIN: 100
SL AMB POCT UROBILINOGEN: 0.2

## 2020-09-03 PROCEDURE — 99215 OFFICE O/P EST HI 40 MIN: CPT | Performed by: UROLOGY

## 2020-09-03 PROCEDURE — 3061F NEG MICROALBUMINURIA REV: CPT | Performed by: PHYSICIAN ASSISTANT

## 2020-09-03 PROCEDURE — 81003 URINALYSIS AUTO W/O SCOPE: CPT | Performed by: UROLOGY

## 2020-09-03 NOTE — PROGRESS NOTES
Assessment/Plan:      Diagnoses and all orders for this visit:    Renal stone  -     Case request operating room: NEPHROLITHOTOMY  PERCUTANEOUS (PCNL); Standing  -     Case request operating room: NEPHROLITHOTOMY  PERCUTANEOUS (PCNL)    BPH without obstruction/lower urinary tract symptoms    Frequent UTI  -     Urine culture; Future    Other orders  -     Diet NPO; Sips with meds; Standing  -     Place sequential compression device; Standing        Assessment/Plan:  Still a quite sizable renal stone will likely not be remedied by repeated ureteroscopic laser lithotripsy, so probably need to have a PCNL  Will discuss with Dr Raymond Hernandes as to whether the stent should be removed or should be maintained until the definitive procedure  Subjective:  Some urgency likely due to the stent     Patient ID: Woo Prado is a 63 y o  male  HPI  Patient was treated with ureteroscopy and laser lithotripsy and stenting for left renal stones, approximately 2 weeks ago  He has residual stones in the kidney as per his recent KUB  He is here today for discussions concerning further management of such  He denies any current hematuria, dysuria, fevers, chills, flank or abdominal pains  Review of Systems   Constitutional: Negative  HENT: Negative  Eyes: Negative  Respiratory: Negative  Cardiovascular: Negative  Gastrointestinal: Negative  Endocrine: Negative  Genitourinary: Positive for urgency  Negative for dysuria  Musculoskeletal: Negative  Skin: Negative  Allergic/Immunologic: Negative  Neurological: Negative  Hematological: Negative  Psychiatric/Behavioral: Negative  Objective:     Physical Exam  Vitals signs and nursing note reviewed  Constitutional:       General: He is not in acute distress  Appearance: He is well-developed  HENT:      Head: Normocephalic and atraumatic  Nose: Nose normal    Eyes:      General: No scleral icterus       Conjunctiva/sclera: Conjunctivae normal       Pupils: Pupils are equal, round, and reactive to light  Neck:      Musculoskeletal: Normal range of motion and neck supple  Pulmonary:      Effort: Pulmonary effort is normal  No respiratory distress  Breath sounds: Normal breath sounds  Abdominal:      General: Bowel sounds are normal  There is no distension  Palpations: Abdomen is soft  There is no mass  Tenderness: There is no abdominal tenderness  There is no right CVA tenderness or left CVA tenderness  Musculoskeletal: Normal range of motion  General: No tenderness  Skin:     General: Skin is warm and dry  Coloration: Skin is not pale  Findings: No erythema or rash  Neurological:      Mental Status: He is alert and oriented to person, place, and time  Cranial Nerves: No cranial nerve deficit  Psychiatric:         Behavior: Behavior normal          Thought Content: Thought content normal          Judgment: Judgment normal          KUB from 08/27/2020:  IMPRESSION:  2 3 cm Left renal lower pole calculus and/or stone fragments  Left ureteral stent

## 2020-09-06 DIAGNOSIS — E78.5 HYPERLIPIDEMIA, UNSPECIFIED HYPERLIPIDEMIA TYPE: ICD-10-CM

## 2020-09-08 ENCOUNTER — OFFICE VISIT (OUTPATIENT)
Dept: OBGYN CLINIC | Facility: HOSPITAL | Age: 60
End: 2020-09-08
Payer: COMMERCIAL

## 2020-09-08 ENCOUNTER — HOSPITAL ENCOUNTER (OUTPATIENT)
Dept: RADIOLOGY | Facility: HOSPITAL | Age: 60
Discharge: HOME | End: 2020-09-08
Attending: ORTHOPAEDIC SURGERY
Payer: COMMERCIAL

## 2020-09-08 VITALS
BODY MASS INDEX: 40.02 KG/M2 | HEIGHT: 67 IN | SYSTOLIC BLOOD PRESSURE: 148 MMHG | WEIGHT: 255 LBS | HEART RATE: 83 BPM | DIASTOLIC BLOOD PRESSURE: 78 MMHG

## 2020-09-08 DIAGNOSIS — M25.562 ACUTE PAIN OF LEFT KNEE: ICD-10-CM

## 2020-09-08 DIAGNOSIS — M25.562 ACUTE PAIN OF LEFT KNEE: Primary | ICD-10-CM

## 2020-09-08 PROCEDURE — 99213 OFFICE O/P EST LOW 20 MIN: CPT | Performed by: ORTHOPAEDIC SURGERY

## 2020-09-08 PROCEDURE — 73560 X-RAY EXAM OF KNEE 1 OR 2: CPT

## 2020-09-08 RX ORDER — SIMVASTATIN 40 MG
TABLET ORAL
Qty: 90 TABLET | Refills: 0 | Status: SHIPPED | OUTPATIENT
Start: 2020-09-08 | End: 2020-12-19

## 2020-09-08 NOTE — PROGRESS NOTES
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  He denies instability of his left knee occasionally has some clicking popping of his left knee that is not painful  Patient is very happy regarding the outcome of his left total knee surgery                  Review Of Systems:   · Skin: Normal  · Neuro: See HPI  · Musculoskeletal: See HPI  · All other systems reviewed and are negative    Past Medical History:   Past Medical History:   Diagnosis Date    Anesthesia complication     difficulty awakening- dyspnea    Anxiety     Arthritis     Cleft hard palate     Diabetes (HCC)     borderline does not check blood sugar    Glaucoma suspect, both eyes     Hyperlipidemia     Kidney stone     left    PONV (postoperative nausea and vomiting)     Right inguinal hernia     Right ureteral stone     Seasonal allergies     Wears dentures     partial upper    Wears glasses        Past Surgical History:   Past Surgical History:   Procedure Laterality Date    CLEFT PALATE REPAIR      multiple surgeries    FL RETROGRADE PYELOGRAM  8/20/2020    INGUINAL HERNIA REPAIR Right     JOINT REPLACEMENT Bilateral     KNEE CARTILAGE SURGERY Left     UT CYSTO/URETERO W/LITHOTRIPSY &INDWELL STENT INSRT Right 8/4/2017    Procedure: CYSTOSCOPY URETEROSCOPY WITH LITHOTRIPSY HOLMIUM LASER, RETROGRADE PYELOGRAM AND INSERTION STENT URETERAL;  Surgeon: Hanny Mooney MD;  [facility];  Service: Urology    UT CYSTO/URETERO W/LITHOTRIPSY &INDWELL STENT INSRT Left 8/20/2020    Procedure: CYSTO, URETEROSCOPY W/ LASER, RETROGRADE PYELOGRAM, STENT;  Surgeon: Omi Sheets MD;  Location[facility];  Service: Urology    AZ TOTAL KNEE ARTHROPLASTY Right 3/4/2019    Procedure: TOTAL KNEE ARTHROPLASTY;  Surgeon: Ginny Rodriguez MD;  [facility];  Service: Orthopedics    AZ TOTAL KNEE ARTHROPLASTY Left 9/13/2019    Procedure: ARTHROPLASTY KNEE TOTAL;  Surgeon: Ginny Rodriguez MD;  [facility];  Service: Orthopedics    TOTAL KNEE ARTHROPLASTY Left     l 9/14/2019 right 3/4/2019       Family History:  Family history reviewed and non-contributory  Family History   Problem Relation Age of Onset    Diabetes Mother     Heart disease Mother     Hypertension Mother     Esophageal cancer Father     Diabetes Sister     Other Sister          Social History:  Social History     Socioeconomic History    Marital status: /Civil Union     Spouse name: None    Number of children: None    Years of education: None    Highest education level: None   Occupational History    None   Social Needs    Financial resource strain: None    Food insecurity     Worry: None     Inability: None    Transportation needs     Medical: None     Non-medical: None   Tobacco Use    Smoking status: Never Smoker    Smokeless tobacco: Never Used    Tobacco comment: no passive smoke exposure   Substance and Sexual Activity    Alcohol use: Yes     Frequency: 2-4 times a month    Drug use: No    Sexual activity: Yes     Partners: Female   Lifestyle    Physical activity     Days per week: None     Minutes per session: None    Stress: None   Relationships    Social connections     Talks on phone: None     Gets together: None     Attends Congregational service: None     Active member of club or organization: None     Attends meetings of clubs or organizations: None     Relationship status: None    Intimate partner violence     Fear of current or ex partner: None     Emotionally abused: None     Physically abused: None     Forced sexual activity: None   Other Topics Concern    None   Social History Narrative    None Allergies:    Allergies   Allergen Reactions    Flomax [Tamsulosin] Dizziness       Labs:  0   Lab Value Date/Time    HCT 46 5 08/13/2020 1006    HCT 47 2 07/09/2020 1031    HCT 46 7 04/06/2020 0933    HGB 15 2 08/13/2020 1006    HGB 15 3 07/09/2020 1031    HGB 15 1 04/06/2020 0933    INR 1 06 08/12/2019 1146    WBC 8 12 08/13/2020 1006    WBC 8 45 07/09/2020 1031    WBC 9 04 04/06/2020 0933    CRP 3 2 (H) 08/12/2019 1517       Meds:    Current Outpatient Medications:     allopurinol (ZYLOPRIM) 100 mg tablet, Take 1 tablet (100 mg total) by mouth daily, Disp: 90 tablet, Rfl: 3    ascorbic acid (VITAMIN C) 500 MG tablet, Take 1 tablet (500 mg total) by mouth daily, Disp: 60 tablet, Rfl: 0    cetirizine (ZyrTEC) 10 mg tablet, Take 10 mg by mouth daily, Disp: , Rfl:     cholecalciferol (VITAMIN D3) 400 units tablet, Take 400 Units by mouth daily, Disp: , Rfl:     fluticasone (FLONASE) 50 mcg/act nasal spray, 2 sprays into each nostril daily, Disp: 16 g, Rfl: 0    metFORMIN (GLUCOPHAGE-XR) 500 mg 24 hr tablet, Take 1 tablet (500 mg total) by mouth daily with breakfast, Disp: 90 tablet, Rfl: 1    Multiple Vitamin (MULTIVITAMIN) tablet, Take 2 tablets by mouth daily, Disp: , Rfl:     Omega-3 Fatty Acids (FISH OIL PO), Take 1 capsule by mouth daily, Disp: , Rfl:     simvastatin (ZOCOR) 40 mg tablet, TAKE 1 TABLET BY MOUTH EVERYDAY AT BEDTIME, Disp: 90 tablet, Rfl: 0    oxyCODONE (Roxicodone) 5 mg immediate release tablet, Take 1-2 tablets every 6 hours prn (Patient not taking: Reported on 9/8/2020), Disp: 8 tablet, Rfl: 0      Physical Exam:     General Appearance:    Alert, cooperative, no distress, appears stated age   Head:    Normocephalic, without obvious abnormality, atraumatic   Eyes:    conjunctiva/corneas clear, both eyes        Nose:   Nares normal, septum midline, no drainage    Throat:   Lips normal; teeth and gums normal   Neck:    symmetrical, trachea midline, ;     thyroid:  no enlargement/ Back:     Symmetric, no curvature, ROM normal   Lungs:   No audible wheezing or labored breathing   Chest Wall:    No tenderness or deformity    Heart:    Regular rate and rhythm               Pulses:   2+ and symmetric all extremities   Skin:   Skin color, texture, turgor normal, no rashes or lesions   Neurologic:   normal strength, sensation and reflexes     throughout       Musculoskeletal: left lower extremity  · Examination patient's left lower extremity well-healed anterior incision full active extension flexion greater than 120° quadriceps strength 5/5 no pain palpation quad tendon no pain palpation IT band no pain palpation pes anserine bursa region no mid flexion instability sensation intact distal pulses present left lower extremity  Radiology:   I personally reviewed the films    X-rays left knee shows excellent aligned left total knee arthroplasty without evidence of loosening    _*_*_*_*_*_*_*_*_*_*_*_*_*_*_*_*_*_*_*_*_*_*_*_*_*_*_*_*_*_*_*_*_*_*_*_*_*_*_*_*_*    Assessment:  63 y o male status post 1 year left total knee arthroplasty doing well    Plan:   · Weight bearing as tolerated  left lower extremity  · Patient interviewed and examined by Dr Tyrone Shelby and myself  · Continue home range of motion stretching strengthening exercises  · Recommend lifetime antibiotic dental prophylaxis  · Follow up on as-needed basis regarding patient's left knee      Ute Paige PA-C

## 2020-09-18 ENCOUNTER — TELEPHONE (OUTPATIENT)
Dept: UROLOGY | Facility: MEDICAL CENTER | Age: 60
End: 2020-09-18

## 2020-09-18 DIAGNOSIS — N20.0 NEPHROLITHIASIS: Primary | ICD-10-CM

## 2020-09-18 NOTE — TELEPHONE ENCOUNTER
I spoke to Vianney at [facility] IR department  They will call patient to schedule tube placement once they receive the referral  I message Dr Gerry Ewing and told him to place the orders for IR  I called patient and left message to call me back to schedule surgery

## 2020-09-18 NOTE — TELEPHONE ENCOUNTER
I called patient back and scheduled surgery for 10/29 with Dr Gerry Ewing at [facility]  I told patient that he will receive a call from IR to schedule an appointment acouple days prior to surgery  He will have pats prior to surgery  I am mailing him his surgery packet

## 2020-09-18 NOTE — TELEPHONE ENCOUNTER
----- Message from Kena Emerson MD sent at 9/3/2020 11:12 AM EDT -----  Patti Rao,  He probably needs a PCNL  The question is do you want his stent to remain until that occurs?

## 2020-09-22 ENCOUNTER — PREP FOR PROCEDURE (OUTPATIENT)
Dept: INTERVENTIONAL RADIOLOGY/VASCULAR | Facility: CLINIC | Age: 60
End: 2020-09-22

## 2020-09-22 DIAGNOSIS — N20.0 RENAL STONE: Primary | ICD-10-CM

## 2020-09-22 RX ORDER — SODIUM CHLORIDE 9 MG/ML
75 INJECTION, SOLUTION INTRAVENOUS CONTINUOUS
Status: CANCELLED | OUTPATIENT
Start: 2020-09-22

## 2020-09-22 RX ORDER — CEFAZOLIN SODIUM 2 G/50ML
2000 SOLUTION INTRAVENOUS ONCE
Status: CANCELLED | OUTPATIENT
Start: 2020-10-29 | End: 2020-09-22

## 2020-09-28 ENCOUNTER — TELEPHONE (OUTPATIENT)
Dept: OBGYN CLINIC | Facility: HOSPITAL | Age: 60
End: 2020-09-28

## 2020-09-28 DIAGNOSIS — Z96.652 S/P TOTAL KNEE REPLACEMENT, LEFT: Primary | ICD-10-CM

## 2020-09-28 RX ORDER — AMOXICILLIN 500 MG/1
CAPSULE ORAL
Qty: 4 CAPSULE | Refills: 2 | Status: SHIPPED | OUTPATIENT
Start: 2020-09-28 | End: 2020-09-29

## 2020-09-28 NOTE — TELEPHONE ENCOUNTER
Dr Aracelis Roth    Patent had knee replacement  He is requesting an antibiotic for his dental work  Please call into [facility] on file

## 2020-09-29 DIAGNOSIS — N20.0 RENAL STONE: Primary | ICD-10-CM

## 2020-10-02 DIAGNOSIS — E11.9 TYPE 2 DIABETES MELLITUS WITHOUT COMPLICATION, WITHOUT LONG-TERM CURRENT USE OF INSULIN (HCC): ICD-10-CM

## 2020-10-02 RX ORDER — METFORMIN HYDROCHLORIDE 500 MG/1
TABLET, EXTENDED RELEASE ORAL
Qty: 90 TABLET | Refills: 1 | Status: SHIPPED | OUTPATIENT
Start: 2020-10-02 | End: 2021-01-28

## 2020-10-13 NOTE — TELEPHONE ENCOUNTER
I spoke to patient about medical clearance form  I will fax form over to his PCP   He also wants his return to work note to have the day of tube placement as well

## 2020-10-14 ENCOUNTER — CONSULT (OUTPATIENT)
Dept: FAMILY MEDICINE CLINIC | Facility: CLINIC | Age: 60
End: 2020-10-14
Payer: COMMERCIAL

## 2020-10-14 ENCOUNTER — APPOINTMENT (OUTPATIENT)
Dept: LAB | Age: 60
End: 2020-10-14
Payer: COMMERCIAL

## 2020-10-14 VITALS
TEMPERATURE: 97.8 F | OXYGEN SATURATION: 95 % | HEART RATE: 92 BPM | HEIGHT: 67 IN | WEIGHT: 253.4 LBS | DIASTOLIC BLOOD PRESSURE: 78 MMHG | SYSTOLIC BLOOD PRESSURE: 138 MMHG | BODY MASS INDEX: 39.77 KG/M2 | RESPIRATION RATE: 16 BRPM

## 2020-10-14 DIAGNOSIS — E78.49 OTHER HYPERLIPIDEMIA: ICD-10-CM

## 2020-10-14 DIAGNOSIS — N20.0 NEPHROLITHIASIS: ICD-10-CM

## 2020-10-14 DIAGNOSIS — N20.0 RENAL STONE: ICD-10-CM

## 2020-10-14 DIAGNOSIS — E11.9 TYPE 2 DIABETES MELLITUS WITHOUT COMPLICATION, WITHOUT LONG-TERM CURRENT USE OF INSULIN (HCC): ICD-10-CM

## 2020-10-14 DIAGNOSIS — Z01.818 PRE-OPERATIVE CLEARANCE: Primary | ICD-10-CM

## 2020-10-14 PROBLEM — E66.01 MORBID OBESITY WITH BMI OF 45.0-49.9, ADULT (HCC): Status: RESOLVED | Noted: 2019-08-19 | Resolved: 2020-10-14

## 2020-10-14 LAB
ABO GROUP BLD: NORMAL
ALBUMIN SERPL BCP-MCNC: 3.9 G/DL (ref 3.5–5)
ALP SERPL-CCNC: 47 U/L (ref 46–116)
ALT SERPL W P-5'-P-CCNC: 44 U/L (ref 12–78)
ANION GAP SERPL CALCULATED.3IONS-SCNC: 6 MMOL/L (ref 4–13)
AST SERPL W P-5'-P-CCNC: 21 U/L (ref 5–45)
BASOPHILS # BLD AUTO: 0.11 THOUSANDS/ΜL (ref 0–0.1)
BASOPHILS NFR BLD AUTO: 1 % (ref 0–1)
BILIRUB SERPL-MCNC: 0.6 MG/DL (ref 0.2–1)
BLD GP AB SCN SERPL QL: NEGATIVE
BUN SERPL-MCNC: 16 MG/DL (ref 5–25)
CALCIUM SERPL-MCNC: 9.5 MG/DL (ref 8.3–10.1)
CHLORIDE SERPL-SCNC: 111 MMOL/L (ref 100–108)
CO2 SERPL-SCNC: 27 MMOL/L (ref 21–32)
CREAT SERPL-MCNC: 1.07 MG/DL (ref 0.6–1.3)
EOSINOPHIL # BLD AUTO: 0.53 THOUSAND/ΜL (ref 0–0.61)
EOSINOPHIL NFR BLD AUTO: 6 % (ref 0–6)
ERYTHROCYTE [DISTWIDTH] IN BLOOD BY AUTOMATED COUNT: 13 % (ref 11.6–15.1)
GFR SERPL CREATININE-BSD FRML MDRD: 75 ML/MIN/1.73SQ M
GLUCOSE P FAST SERPL-MCNC: 142 MG/DL (ref 65–99)
HCT VFR BLD AUTO: 46.9 % (ref 36.5–49.3)
HGB BLD-MCNC: 15.4 G/DL (ref 12–17)
IMM GRANULOCYTES # BLD AUTO: 0.03 THOUSAND/UL (ref 0–0.2)
IMM GRANULOCYTES NFR BLD AUTO: 0 % (ref 0–2)
LYMPHOCYTES # BLD AUTO: 3.66 THOUSANDS/ΜL (ref 0.6–4.47)
LYMPHOCYTES NFR BLD AUTO: 42 % (ref 14–44)
MCH RBC QN AUTO: 29.1 PG (ref 26.8–34.3)
MCHC RBC AUTO-ENTMCNC: 32.8 G/DL (ref 31.4–37.4)
MCV RBC AUTO: 89 FL (ref 82–98)
MONOCYTES # BLD AUTO: 0.64 THOUSAND/ΜL (ref 0.17–1.22)
MONOCYTES NFR BLD AUTO: 7 % (ref 4–12)
NEUTROPHILS # BLD AUTO: 3.79 THOUSANDS/ΜL (ref 1.85–7.62)
NEUTS SEG NFR BLD AUTO: 44 % (ref 43–75)
NRBC BLD AUTO-RTO: 0 /100 WBCS
PLATELET # BLD AUTO: 285 THOUSANDS/UL (ref 149–390)
PMV BLD AUTO: 11.2 FL (ref 8.9–12.7)
POTASSIUM SERPL-SCNC: 4.4 MMOL/L (ref 3.5–5.3)
PROT SERPL-MCNC: 7.9 G/DL (ref 6.4–8.2)
RBC # BLD AUTO: 5.3 MILLION/UL (ref 3.88–5.62)
RH BLD: POSITIVE
SODIUM SERPL-SCNC: 144 MMOL/L (ref 136–145)
SPECIMEN EXPIRATION DATE: NORMAL
WBC # BLD AUTO: 8.76 THOUSAND/UL (ref 4.31–10.16)

## 2020-10-14 PROCEDURE — 86850 RBC ANTIBODY SCREEN: CPT

## 2020-10-14 PROCEDURE — 99214 OFFICE O/P EST MOD 30 MIN: CPT | Performed by: PHYSICIAN ASSISTANT

## 2020-10-14 PROCEDURE — 87086 URINE CULTURE/COLONY COUNT: CPT

## 2020-10-14 PROCEDURE — 86901 BLOOD TYPING SEROLOGIC RH(D): CPT

## 2020-10-14 PROCEDURE — 1036F TOBACCO NON-USER: CPT | Performed by: PHYSICIAN ASSISTANT

## 2020-10-14 PROCEDURE — 85025 COMPLETE CBC W/AUTO DIFF WBC: CPT

## 2020-10-14 PROCEDURE — 3078F DIAST BP <80 MM HG: CPT | Performed by: PHYSICIAN ASSISTANT

## 2020-10-14 PROCEDURE — 86900 BLOOD TYPING SEROLOGIC ABO: CPT

## 2020-10-14 PROCEDURE — 36415 COLL VENOUS BLD VENIPUNCTURE: CPT

## 2020-10-14 PROCEDURE — 3075F SYST BP GE 130 - 139MM HG: CPT | Performed by: PHYSICIAN ASSISTANT

## 2020-10-14 PROCEDURE — 80053 COMPREHEN METABOLIC PANEL: CPT

## 2020-10-14 NOTE — TELEPHONE ENCOUNTER
Patient called back and he has some questions about testing  He asked about EKG and I told him he had it in August already  He would like some clarification  Please call patient back at [phone]

## 2020-10-14 NOTE — TELEPHONE ENCOUNTER
I spoke to patient and told him that his EKG that was done in august is still good for his procedure

## 2020-10-15 LAB — BACTERIA UR CULT: NORMAL

## 2020-10-20 ENCOUNTER — TELEPHONE (OUTPATIENT)
Dept: UROLOGY | Facility: AMBULATORY SURGERY CENTER | Age: 60
End: 2020-10-20

## 2020-10-21 ENCOUNTER — TELEPHONE (OUTPATIENT)
Dept: RADIOLOGY | Facility: HOSPITAL | Age: 60
End: 2020-10-21

## 2020-10-21 DIAGNOSIS — Z11.59 SPECIAL SCREENING EXAMINATION FOR VIRAL DISEASE: ICD-10-CM

## 2020-10-21 PROCEDURE — U0003 INFECTIOUS AGENT DETECTION BY NUCLEIC ACID (DNA OR RNA); SEVERE ACUTE RESPIRATORY SYNDROME CORONAVIRUS 2 (SARS-COV-2) (CORONAVIRUS DISEASE [COVID-19]), AMPLIFIED PROBE TECHNIQUE, MAKING USE OF HIGH THROUGHPUT TECHNOLOGIES AS DESCRIBED BY CMS-2020-01-R: HCPCS | Performed by: UROLOGY

## 2020-10-21 RX ORDER — SODIUM CHLORIDE 9 MG/ML
75 INJECTION, SOLUTION INTRAVENOUS CONTINUOUS
Status: CANCELLED | OUTPATIENT
Start: 2020-10-21

## 2020-10-22 ENCOUNTER — TELEPHONE (OUTPATIENT)
Dept: RADIOLOGY | Facility: HOSPITAL | Age: 60
End: 2020-10-22

## 2020-10-23 LAB — SARS-COV-2 RNA SPEC QL NAA+PROBE: NOT DETECTED

## 2020-10-23 RX ORDER — ACETAMINOPHEN 500 MG
500-1000 TABLET ORAL EVERY 6 HOURS PRN
COMMUNITY

## 2020-10-23 RX ORDER — IBUPROFEN 200 MG
200-800 TABLET ORAL EVERY 6 HOURS PRN
COMMUNITY

## 2020-10-26 PROCEDURE — NC001 PR NO CHARGE: Performed by: UROLOGY

## 2020-10-27 ENCOUNTER — TELEPHONE (OUTPATIENT)
Dept: INPATIENT UNIT | Facility: HOSPITAL | Age: 60
End: 2020-10-27

## 2020-10-28 ENCOUNTER — ANESTHESIA EVENT (OUTPATIENT)
Dept: PERIOP | Facility: HOSPITAL | Age: 60
End: 2020-10-28
Payer: COMMERCIAL

## 2020-10-28 ENCOUNTER — HOSPITAL ENCOUNTER (OUTPATIENT)
Dept: RADIOLOGY | Facility: HOSPITAL | Age: 60
Discharge: HOME | End: 2020-10-28
Attending: STUDENT IN AN ORGANIZED HEALTH CARE EDUCATION/TRAINING PROGRAM | Admitting: RADIOLOGY
Payer: COMMERCIAL

## 2020-10-28 ENCOUNTER — ANESTHESIA (OUTPATIENT)
Dept: RADIOLOGY | Facility: HOSPITAL | Age: 60
End: 2020-10-28
Payer: COMMERCIAL

## 2020-10-28 ENCOUNTER — ANESTHESIA EVENT (OUTPATIENT)
Dept: RADIOLOGY | Facility: HOSPITAL | Age: 60
End: 2020-10-28
Payer: COMMERCIAL

## 2020-10-28 VITALS — HEART RATE: 74 BPM

## 2020-10-28 VITALS
DIASTOLIC BLOOD PRESSURE: 70 MMHG | BODY MASS INDEX: 39.71 KG/M2 | OXYGEN SATURATION: 92 % | TEMPERATURE: 97.9 F | SYSTOLIC BLOOD PRESSURE: 116 MMHG | HEART RATE: 68 BPM | HEIGHT: 67 IN | WEIGHT: 253 LBS | RESPIRATION RATE: 16 BRPM

## 2020-10-28 DIAGNOSIS — N20.0 RENAL STONE: ICD-10-CM

## 2020-10-28 PROBLEM — R11.2 PONV (POSTOPERATIVE NAUSEA AND VOMITING): Status: ACTIVE | Noted: 2020-10-28

## 2020-10-28 PROBLEM — Z98.890 PONV (POSTOPERATIVE NAUSEA AND VOMITING): Status: ACTIVE | Noted: 2020-10-28

## 2020-10-28 LAB
INR PPP: 1.03 (ref 0.84–1.19)
PROTHROMBIN TIME: 13.5 SECONDS (ref 11.6–14.5)

## 2020-10-28 PROCEDURE — NC001 PR NO CHARGE: Performed by: STUDENT IN AN ORGANIZED HEALTH CARE EDUCATION/TRAINING PROGRAM

## 2020-10-28 PROCEDURE — C1769 GUIDE WIRE: HCPCS

## 2020-10-28 PROCEDURE — 85610 PROTHROMBIN TIME: CPT | Performed by: RADIOLOGY

## 2020-10-28 PROCEDURE — C2625 STENT, NON-COR, TEM W/DEL SY: HCPCS

## 2020-10-28 PROCEDURE — 50433 PLMT NEPHROURETERAL CATHETER: CPT | Performed by: STUDENT IN AN ORGANIZED HEALTH CARE EDUCATION/TRAINING PROGRAM

## 2020-10-28 PROCEDURE — 50433 PLMT NEPHROURETERAL CATHETER: CPT

## 2020-10-28 PROCEDURE — 82948 REAGENT STRIP/BLOOD GLUCOSE: CPT

## 2020-10-28 RX ORDER — ONDANSETRON 2 MG/ML
4 INJECTION INTRAMUSCULAR; INTRAVENOUS ONCE AS NEEDED
Status: CANCELLED | OUTPATIENT
Start: 2020-10-28

## 2020-10-28 RX ORDER — FENTANYL CITRATE/PF 50 MCG/ML
50 SYRINGE (ML) INJECTION
Status: CANCELLED | OUTPATIENT
Start: 2020-10-28

## 2020-10-28 RX ORDER — DIPHENHYDRAMINE HYDROCHLORIDE 50 MG/ML
12.5 INJECTION INTRAMUSCULAR; INTRAVENOUS ONCE AS NEEDED
Status: CANCELLED | OUTPATIENT
Start: 2020-10-28

## 2020-10-28 RX ORDER — MIDAZOLAM HYDROCHLORIDE 2 MG/2ML
INJECTION, SOLUTION INTRAMUSCULAR; INTRAVENOUS AS NEEDED
Status: DISCONTINUED | OUTPATIENT
Start: 2020-10-28 | End: 2020-10-28

## 2020-10-28 RX ORDER — HYDROMORPHONE HCL/PF 1 MG/ML
0.5 SYRINGE (ML) INJECTION
Status: CANCELLED | OUTPATIENT
Start: 2020-10-28

## 2020-10-28 RX ORDER — LIDOCAINE HYDROCHLORIDE 10 MG/ML
0.5 INJECTION, SOLUTION EPIDURAL; INFILTRATION; INTRACAUDAL; PERINEURAL ONCE AS NEEDED
Status: CANCELLED | OUTPATIENT
Start: 2020-10-29

## 2020-10-28 RX ORDER — HYDROMORPHONE HCL/PF 1 MG/ML
0.2 SYRINGE (ML) INJECTION
Status: CANCELLED | OUTPATIENT
Start: 2020-10-28

## 2020-10-28 RX ORDER — FENTANYL CITRATE 50 UG/ML
INJECTION, SOLUTION INTRAMUSCULAR; INTRAVENOUS AS NEEDED
Status: DISCONTINUED | OUTPATIENT
Start: 2020-10-28 | End: 2020-10-28

## 2020-10-28 RX ORDER — SODIUM CHLORIDE 9 MG/ML
75 INJECTION, SOLUTION INTRAVENOUS CONTINUOUS
Status: DISCONTINUED | OUTPATIENT
Start: 2020-10-28 | End: 2020-10-28 | Stop reason: HOSPADM

## 2020-10-28 RX ORDER — METOCLOPRAMIDE HYDROCHLORIDE 5 MG/ML
10 INJECTION INTRAMUSCULAR; INTRAVENOUS ONCE AS NEEDED
Status: CANCELLED | OUTPATIENT
Start: 2020-10-28

## 2020-10-28 RX ORDER — LIDOCAINE WITH 8.4% SOD BICARB 0.9%(10ML)
SYRINGE (ML) INJECTION CODE/TRAUMA/SEDATION MEDICATION
Status: COMPLETED | OUTPATIENT
Start: 2020-10-28 | End: 2020-10-28

## 2020-10-28 RX ADMIN — Medication 2000 MG: at 11:19

## 2020-10-28 RX ADMIN — MIDAZOLAM 1 MG: 1 INJECTION INTRAMUSCULAR; INTRAVENOUS at 11:20

## 2020-10-28 RX ADMIN — IOHEXOL 25 ML: 350 INJECTION, SOLUTION INTRAVENOUS at 12:54

## 2020-10-28 RX ADMIN — SODIUM CHLORIDE 75 ML/HR: 0.9 INJECTION, SOLUTION INTRAVENOUS at 10:57

## 2020-10-28 RX ADMIN — Medication 2000 MG: at 10:57

## 2020-10-28 RX ADMIN — FENTANYL CITRATE 25 MCG: 50 INJECTION, SOLUTION INTRAMUSCULAR; INTRAVENOUS at 11:16

## 2020-10-28 RX ADMIN — FENTANYL CITRATE 25 MCG: 50 INJECTION, SOLUTION INTRAMUSCULAR; INTRAVENOUS at 11:11

## 2020-10-28 RX ADMIN — MIDAZOLAM 2 MG: 1 INJECTION INTRAMUSCULAR; INTRAVENOUS at 11:04

## 2020-10-28 RX ADMIN — Medication 10 ML: at 11:23

## 2020-10-28 RX ADMIN — DEXMEDETOMIDINE 0.4 MCG/KG/HR: 100 INJECTION, SOLUTION, CONCENTRATE INTRAVENOUS at 11:10

## 2020-10-29 ENCOUNTER — APPOINTMENT (OUTPATIENT)
Dept: RADIOLOGY | Facility: HOSPITAL | Age: 60
End: 2020-10-29
Payer: COMMERCIAL

## 2020-10-29 ENCOUNTER — ANESTHESIA (OUTPATIENT)
Dept: PERIOP | Facility: HOSPITAL | Age: 60
End: 2020-10-29
Payer: COMMERCIAL

## 2020-10-29 ENCOUNTER — HOSPITAL ENCOUNTER (OUTPATIENT)
Facility: HOSPITAL | Age: 60
Setting detail: OUTPATIENT SURGERY
Discharge: HOME | End: 2020-10-30
Attending: UROLOGY | Admitting: UROLOGY
Payer: COMMERCIAL

## 2020-10-29 ENCOUNTER — TELEPHONE (OUTPATIENT)
Dept: UROLOGY | Facility: MEDICAL CENTER | Age: 60
End: 2020-10-29

## 2020-10-29 VITALS — HEART RATE: 59 BPM

## 2020-10-29 DIAGNOSIS — N20.0 RENAL STONE: ICD-10-CM

## 2020-10-29 LAB
ABO GROUP BLD: NORMAL
GLUCOSE SERPL-MCNC: 110 MG/DL (ref 65–140)
GLUCOSE SERPL-MCNC: 142 MG/DL (ref 65–140)
GLUCOSE SERPL-MCNC: 153 MG/DL (ref 65–140)
HCT VFR BLD AUTO: 40.2 % (ref 36.5–49.3)
HGB BLD-MCNC: 13 G/DL (ref 12–17)
RH BLD: POSITIVE

## 2020-10-29 PROCEDURE — 85018 HEMOGLOBIN: CPT | Performed by: UROLOGY

## 2020-10-29 PROCEDURE — 85014 HEMATOCRIT: CPT | Performed by: UROLOGY

## 2020-10-29 PROCEDURE — C1726 CATH, BAL DIL, NON-VASCULAR: HCPCS | Performed by: UROLOGY

## 2020-10-29 PROCEDURE — 74420 UROGRAPHY RTRGR +-KUB: CPT

## 2020-10-29 PROCEDURE — 82360 CALCULUS ASSAY QUANT: CPT | Performed by: UROLOGY

## 2020-10-29 PROCEDURE — C2617 STENT, NON-COR, TEM W/O DEL: HCPCS | Performed by: UROLOGY

## 2020-10-29 PROCEDURE — C1758 CATHETER, URETERAL: HCPCS | Performed by: UROLOGY

## 2020-10-29 PROCEDURE — 50081 PERQ NL/PL LITHOTRP CPLX>2CM: CPT | Performed by: UROLOGY

## 2020-10-29 PROCEDURE — C1769 GUIDE WIRE: HCPCS | Performed by: UROLOGY

## 2020-10-29 PROCEDURE — 50389 REMOVE RENAL TUBE W/FLUORO: CPT | Performed by: UROLOGY

## 2020-10-29 PROCEDURE — 82948 REAGENT STRIP/BLOOD GLUCOSE: CPT

## 2020-10-29 DEVICE — INLAY OPTIMA URETERAL STENT W/O GUIDEWIRE
Type: IMPLANTABLE DEVICE | Site: URETER | Status: FUNCTIONAL
Brand: BARD® INLAY OPTIMA® URETERAL STENT

## 2020-10-29 RX ORDER — CEFAZOLIN SODIUM 2 G/50ML
2000 SOLUTION INTRAVENOUS ONCE
Status: COMPLETED | OUTPATIENT
Start: 2020-10-29 | End: 2020-10-29

## 2020-10-29 RX ORDER — MAGNESIUM HYDROXIDE 1200 MG/15ML
LIQUID ORAL AS NEEDED
Status: DISCONTINUED | OUTPATIENT
Start: 2020-10-29 | End: 2020-10-29 | Stop reason: HOSPADM

## 2020-10-29 RX ORDER — CEFAZOLIN SODIUM 2 G/50ML
2000 SOLUTION INTRAVENOUS ONCE
Status: DISCONTINUED | OUTPATIENT
Start: 2020-10-29 | End: 2020-10-29 | Stop reason: HOSPADM

## 2020-10-29 RX ORDER — NEOSTIGMINE METHYLSULFATE 1 MG/ML
INJECTION INTRAVENOUS AS NEEDED
Status: DISCONTINUED | OUTPATIENT
Start: 2020-10-29 | End: 2020-10-29

## 2020-10-29 RX ORDER — OXYCODONE HYDROCHLORIDE 5 MG/1
5 TABLET ORAL EVERY 4 HOURS PRN
Status: DISCONTINUED | OUTPATIENT
Start: 2020-10-29 | End: 2020-10-30 | Stop reason: HOSPADM

## 2020-10-29 RX ORDER — PRAVASTATIN SODIUM 80 MG/1
80 TABLET ORAL
Status: DISCONTINUED | OUTPATIENT
Start: 2020-10-29 | End: 2020-10-30 | Stop reason: HOSPADM

## 2020-10-29 RX ORDER — DEXAMETHASONE SODIUM PHOSPHATE 4 MG/ML
INJECTION, SOLUTION INTRA-ARTICULAR; INTRALESIONAL; INTRAMUSCULAR; INTRAVENOUS; SOFT TISSUE AS NEEDED
Status: DISCONTINUED | OUTPATIENT
Start: 2020-10-29 | End: 2020-10-29

## 2020-10-29 RX ORDER — FENTANYL CITRATE/PF 50 MCG/ML
25 SYRINGE (ML) INJECTION
Status: COMPLETED | OUTPATIENT
Start: 2020-10-29 | End: 2020-10-29

## 2020-10-29 RX ORDER — ALLOPURINOL 100 MG/1
100 TABLET ORAL DAILY
Status: DISCONTINUED | OUTPATIENT
Start: 2020-10-29 | End: 2020-10-30 | Stop reason: HOSPADM

## 2020-10-29 RX ORDER — MIDAZOLAM HYDROCHLORIDE 2 MG/2ML
INJECTION, SOLUTION INTRAMUSCULAR; INTRAVENOUS AS NEEDED
Status: DISCONTINUED | OUTPATIENT
Start: 2020-10-29 | End: 2020-10-29

## 2020-10-29 RX ORDER — CEFAZOLIN SODIUM 1 G/50ML
1000 SOLUTION INTRAVENOUS EVERY 8 HOURS
Status: DISCONTINUED | OUTPATIENT
Start: 2020-10-29 | End: 2020-10-30 | Stop reason: HOSPADM

## 2020-10-29 RX ORDER — KETOROLAC TROMETHAMINE 30 MG/ML
15 INJECTION, SOLUTION INTRAMUSCULAR; INTRAVENOUS EVERY 6 HOURS SCHEDULED
Status: COMPLETED | OUTPATIENT
Start: 2020-10-29 | End: 2020-10-30

## 2020-10-29 RX ORDER — PROPOFOL 10 MG/ML
INJECTION, EMULSION INTRAVENOUS AS NEEDED
Status: DISCONTINUED | OUTPATIENT
Start: 2020-10-29 | End: 2020-10-29

## 2020-10-29 RX ORDER — ONDANSETRON 2 MG/ML
4 INJECTION INTRAMUSCULAR; INTRAVENOUS EVERY 6 HOURS PRN
Status: DISCONTINUED | OUTPATIENT
Start: 2020-10-29 | End: 2020-10-30 | Stop reason: HOSPADM

## 2020-10-29 RX ORDER — FLUTICASONE PROPIONATE 50 MCG
2 SPRAY, SUSPENSION (ML) NASAL DAILY PRN
Status: DISCONTINUED | OUTPATIENT
Start: 2020-10-29 | End: 2020-10-30 | Stop reason: HOSPADM

## 2020-10-29 RX ORDER — SCOLOPAMINE TRANSDERMAL SYSTEM 1 MG/1
1 PATCH, EXTENDED RELEASE TRANSDERMAL ONCE AS NEEDED
Status: DISCONTINUED | OUTPATIENT
Start: 2020-10-29 | End: 2020-10-29

## 2020-10-29 RX ORDER — GLYCOPYRROLATE 0.2 MG/ML
INJECTION INTRAMUSCULAR; INTRAVENOUS AS NEEDED
Status: DISCONTINUED | OUTPATIENT
Start: 2020-10-29 | End: 2020-10-29

## 2020-10-29 RX ORDER — ASCORBIC ACID 500 MG
500 TABLET ORAL DAILY
Status: DISCONTINUED | OUTPATIENT
Start: 2020-10-29 | End: 2020-10-30 | Stop reason: HOSPADM

## 2020-10-29 RX ORDER — LIDOCAINE HYDROCHLORIDE 20 MG/ML
INJECTION, SOLUTION INFILTRATION; PERINEURAL AS NEEDED
Status: DISCONTINUED | OUTPATIENT
Start: 2020-10-29 | End: 2020-10-29

## 2020-10-29 RX ORDER — SODIUM CHLORIDE 9 MG/ML
125 INJECTION, SOLUTION INTRAVENOUS CONTINUOUS
Status: DISCONTINUED | OUTPATIENT
Start: 2020-10-29 | End: 2020-10-30 | Stop reason: HOSPADM

## 2020-10-29 RX ORDER — METFORMIN HYDROCHLORIDE 500 MG/1
500 TABLET, EXTENDED RELEASE ORAL
Status: DISCONTINUED | OUTPATIENT
Start: 2020-10-30 | End: 2020-10-30 | Stop reason: HOSPADM

## 2020-10-29 RX ORDER — ROCURONIUM BROMIDE 10 MG/ML
INJECTION, SOLUTION INTRAVENOUS AS NEEDED
Status: DISCONTINUED | OUTPATIENT
Start: 2020-10-29 | End: 2020-10-29

## 2020-10-29 RX ORDER — ALBUTEROL SULFATE 2.5 MG/3ML
2.5 SOLUTION RESPIRATORY (INHALATION) ONCE AS NEEDED
Status: DISCONTINUED | OUTPATIENT
Start: 2020-10-29 | End: 2020-10-29 | Stop reason: HOSPADM

## 2020-10-29 RX ORDER — ACETAMINOPHEN 325 MG/1
975 TABLET ORAL EVERY 6 HOURS SCHEDULED
Status: DISCONTINUED | OUTPATIENT
Start: 2020-10-29 | End: 2020-10-30 | Stop reason: HOSPADM

## 2020-10-29 RX ORDER — ONDANSETRON 2 MG/ML
INJECTION INTRAMUSCULAR; INTRAVENOUS AS NEEDED
Status: DISCONTINUED | OUTPATIENT
Start: 2020-10-29 | End: 2020-10-29

## 2020-10-29 RX ORDER — FENTANYL CITRATE 50 UG/ML
INJECTION, SOLUTION INTRAMUSCULAR; INTRAVENOUS AS NEEDED
Status: DISCONTINUED | OUTPATIENT
Start: 2020-10-29 | End: 2020-10-29

## 2020-10-29 RX ORDER — LORATADINE 10 MG/1
10 TABLET ORAL DAILY
Status: DISCONTINUED | OUTPATIENT
Start: 2020-10-30 | End: 2020-10-30 | Stop reason: HOSPADM

## 2020-10-29 RX ORDER — HYDROMORPHONE HCL/PF 1 MG/ML
SYRINGE (ML) INJECTION AS NEEDED
Status: DISCONTINUED | OUTPATIENT
Start: 2020-10-29 | End: 2020-10-29

## 2020-10-29 RX ADMIN — ACETAMINOPHEN 975 MG: 325 TABLET ORAL at 14:11

## 2020-10-29 RX ADMIN — SODIUM CHLORIDE: 0.9 INJECTION, SOLUTION INTRAVENOUS at 09:57

## 2020-10-29 RX ADMIN — LIDOCAINE HYDROCHLORIDE 5 ML: 20 INJECTION, SOLUTION INFILTRATION; PERINEURAL at 08:57

## 2020-10-29 RX ADMIN — HYDROMORPHONE HYDROCHLORIDE 0.5 MG: 1 INJECTION, SOLUTION INTRAMUSCULAR; INTRAVENOUS; SUBCUTANEOUS at 11:06

## 2020-10-29 RX ADMIN — PRAVASTATIN SODIUM 80 MG: 80 TABLET ORAL at 17:25

## 2020-10-29 RX ADMIN — FENTANYL CITRATE 100 MCG: 50 INJECTION, SOLUTION INTRAMUSCULAR; INTRAVENOUS at 08:57

## 2020-10-29 RX ADMIN — KETOROLAC TROMETHAMINE 15 MG: 30 INJECTION, SOLUTION INTRAMUSCULAR at 14:12

## 2020-10-29 RX ADMIN — FENTANYL CITRATE 25 MCG: 50 INJECTION INTRAMUSCULAR; INTRAVENOUS at 13:00

## 2020-10-29 RX ADMIN — FENTANYL CITRATE 25 MCG: 50 INJECTION INTRAMUSCULAR; INTRAVENOUS at 13:22

## 2020-10-29 RX ADMIN — KETOROLAC TROMETHAMINE 15 MG: 30 INJECTION, SOLUTION INTRAMUSCULAR at 17:26

## 2020-10-29 RX ADMIN — SODIUM CHLORIDE 125 ML/HR: 0.9 INJECTION, SOLUTION INTRAVENOUS at 08:21

## 2020-10-29 RX ADMIN — GLYCOPYRROLATE 0.4 MG: 0.2 INJECTION, SOLUTION INTRAMUSCULAR; INTRAVENOUS at 11:17

## 2020-10-29 RX ADMIN — MORPHINE SULFATE 2 MG: 2 INJECTION, SOLUTION INTRAMUSCULAR; INTRAVENOUS at 14:11

## 2020-10-29 RX ADMIN — ROCURONIUM BROMIDE 50 MG: 10 INJECTION, SOLUTION INTRAVENOUS at 08:57

## 2020-10-29 RX ADMIN — OXYCODONE HYDROCHLORIDE 5 MG: 5 TABLET ORAL at 18:36

## 2020-10-29 RX ADMIN — ACETAMINOPHEN 975 MG: 325 TABLET ORAL at 17:26

## 2020-10-29 RX ADMIN — SODIUM CHLORIDE 125 ML/HR: 0.9 INJECTION, SOLUTION INTRAVENOUS at 12:43

## 2020-10-29 RX ADMIN — FENTANYL CITRATE 25 MCG: 50 INJECTION INTRAMUSCULAR; INTRAVENOUS at 12:20

## 2020-10-29 RX ADMIN — CEFAZOLIN SODIUM 2000 MG: 2 SOLUTION INTRAVENOUS at 08:45

## 2020-10-29 RX ADMIN — NEOSTIGMINE METHYLSULFATE 3 MG: 1 INJECTION, SOLUTION INTRAVENOUS at 11:17

## 2020-10-29 RX ADMIN — OXYCODONE HYDROCHLORIDE AND ACETAMINOPHEN 500 MG: 500 TABLET ORAL at 14:11

## 2020-10-29 RX ADMIN — PROPOFOL 200 MG: 10 INJECTION, EMULSION INTRAVENOUS at 08:57

## 2020-10-29 RX ADMIN — ONDANSETRON 4 MG: 2 INJECTION INTRAMUSCULAR; INTRAVENOUS at 11:17

## 2020-10-29 RX ADMIN — MIDAZOLAM 2 MG: 1 INJECTION INTRAMUSCULAR; INTRAVENOUS at 08:46

## 2020-10-29 RX ADMIN — ALLOPURINOL 100 MG: 100 TABLET ORAL at 14:11

## 2020-10-29 RX ADMIN — PHENYLEPHRINE HYDROCHLORIDE 100 MCG: 10 INJECTION INTRAVENOUS at 09:29

## 2020-10-29 RX ADMIN — DEXAMETHASONE SODIUM PHOSPHATE 4 MG: 4 INJECTION, SOLUTION INTRAMUSCULAR; INTRAVENOUS at 09:30

## 2020-10-29 RX ADMIN — FENTANYL CITRATE 25 MCG: 50 INJECTION INTRAMUSCULAR; INTRAVENOUS at 12:40

## 2020-10-29 RX ADMIN — ONDANSETRON 4 MG: 2 INJECTION INTRAMUSCULAR; INTRAVENOUS at 13:36

## 2020-10-29 RX ADMIN — FENTANYL CITRATE 100 MCG: 50 INJECTION, SOLUTION INTRAMUSCULAR; INTRAVENOUS at 09:33

## 2020-10-29 RX ADMIN — SODIUM CHLORIDE: 0.9 INJECTION, SOLUTION INTRAVENOUS at 09:25

## 2020-10-29 RX ADMIN — SODIUM CHLORIDE 125 ML/HR: 0.9 INJECTION, SOLUTION INTRAVENOUS at 21:22

## 2020-10-29 RX ADMIN — CEFAZOLIN SODIUM 1000 MG: 1 SOLUTION INTRAVENOUS at 17:27

## 2020-10-30 VITALS
BODY MASS INDEX: 39.79 KG/M2 | OXYGEN SATURATION: 97 % | TEMPERATURE: 97.2 F | HEIGHT: 67 IN | DIASTOLIC BLOOD PRESSURE: 60 MMHG | SYSTOLIC BLOOD PRESSURE: 107 MMHG | WEIGHT: 253.53 LBS | HEART RATE: 78 BPM | RESPIRATION RATE: 18 BRPM

## 2020-10-30 PROBLEM — N20.1 CALCULI, URETER: Status: RESOLVED | Noted: 2017-07-03 | Resolved: 2020-10-30

## 2020-10-30 PROBLEM — M17.11 PRIMARY OSTEOARTHRITIS OF RIGHT KNEE: Status: RESOLVED | Noted: 2019-01-31 | Resolved: 2020-10-30

## 2020-10-30 PROBLEM — Z01.818 PRE-OPERATIVE CLEARANCE: Status: RESOLVED | Noted: 2019-08-19 | Resolved: 2020-10-30

## 2020-10-30 PROBLEM — Z00.00 HEALTHCARE MAINTENANCE: Status: RESOLVED | Noted: 2018-10-16 | Resolved: 2020-10-30

## 2020-10-30 PROBLEM — N20.0 RENAL STONE: Status: RESOLVED | Noted: 2020-06-29 | Resolved: 2020-10-30

## 2020-10-30 LAB
ANION GAP SERPL CALCULATED.3IONS-SCNC: 11 MMOL/L (ref 4–13)
BUN SERPL-MCNC: 15 MG/DL (ref 5–25)
CALCIUM SERPL-MCNC: 7.6 MG/DL (ref 8.3–10.1)
CHLORIDE SERPL-SCNC: 108 MMOL/L (ref 100–108)
CO2 SERPL-SCNC: 23 MMOL/L (ref 21–32)
CREAT SERPL-MCNC: 1.23 MG/DL (ref 0.6–1.3)
ERYTHROCYTE [DISTWIDTH] IN BLOOD BY AUTOMATED COUNT: 12.9 % (ref 11.6–15.1)
GFR SERPL CREATININE-BSD FRML MDRD: 63 ML/MIN/1.73SQ M
GLUCOSE P FAST SERPL-MCNC: 146 MG/DL (ref 65–99)
GLUCOSE SERPL-MCNC: 146 MG/DL (ref 65–140)
HCT VFR BLD AUTO: 38.3 % (ref 36.5–49.3)
HGB BLD-MCNC: 12.4 G/DL (ref 12–17)
MCH RBC QN AUTO: 29.3 PG (ref 26.8–34.3)
MCHC RBC AUTO-ENTMCNC: 32.4 G/DL (ref 31.4–37.4)
MCV RBC AUTO: 91 FL (ref 82–98)
PLATELET # BLD AUTO: 219 THOUSANDS/UL (ref 149–390)
PMV BLD AUTO: 10.9 FL (ref 8.9–12.7)
POTASSIUM SERPL-SCNC: 4 MMOL/L (ref 3.5–5.3)
RBC # BLD AUTO: 4.23 MILLION/UL (ref 3.88–5.62)
SODIUM SERPL-SCNC: 142 MMOL/L (ref 136–145)
WBC # BLD AUTO: 14.2 THOUSAND/UL (ref 4.31–10.16)

## 2020-10-30 PROCEDURE — 85027 COMPLETE CBC AUTOMATED: CPT | Performed by: UROLOGY

## 2020-10-30 PROCEDURE — 80048 BASIC METABOLIC PNL TOTAL CA: CPT | Performed by: UROLOGY

## 2020-10-30 PROCEDURE — 99024 POSTOP FOLLOW-UP VISIT: CPT | Performed by: UROLOGY

## 2020-10-30 RX ORDER — CEPHALEXIN 500 MG/1
500 CAPSULE ORAL EVERY 12 HOURS SCHEDULED
Qty: 14 CAPSULE | Refills: 0 | Status: SHIPPED | OUTPATIENT
Start: 2020-10-30 | End: 2020-11-06

## 2020-10-30 RX ORDER — OXYCODONE HYDROCHLORIDE 5 MG/1
TABLET ORAL
Qty: 8 TABLET | Refills: 0 | Status: SHIPPED | OUTPATIENT
Start: 2020-10-30

## 2020-10-30 RX ADMIN — OXYCODONE HYDROCHLORIDE AND ACETAMINOPHEN 500 MG: 500 TABLET ORAL at 08:40

## 2020-10-30 RX ADMIN — ACETAMINOPHEN 975 MG: 325 TABLET ORAL at 05:40

## 2020-10-30 RX ADMIN — KETOROLAC TROMETHAMINE 15 MG: 30 INJECTION, SOLUTION INTRAMUSCULAR at 01:00

## 2020-10-30 RX ADMIN — CEFAZOLIN SODIUM 1000 MG: 1 SOLUTION INTRAVENOUS at 01:10

## 2020-10-30 RX ADMIN — CEFAZOLIN SODIUM 1000 MG: 1 SOLUTION INTRAVENOUS at 08:41

## 2020-10-30 RX ADMIN — ACETAMINOPHEN 975 MG: 325 TABLET ORAL at 01:00

## 2020-10-30 RX ADMIN — SODIUM CHLORIDE 125 ML/HR: 0.9 INJECTION, SOLUTION INTRAVENOUS at 05:42

## 2020-10-30 RX ADMIN — METFORMIN HYDROCHLORIDE 500 MG: 500 TABLET, EXTENDED RELEASE ORAL at 08:40

## 2020-10-30 RX ADMIN — LORATADINE 10 MG: 10 TABLET ORAL at 08:40

## 2020-10-30 RX ADMIN — ALLOPURINOL 100 MG: 100 TABLET ORAL at 08:40

## 2020-11-05 DIAGNOSIS — N20.0 KIDNEY STONE: Primary | ICD-10-CM

## 2020-11-06 ENCOUNTER — APPOINTMENT (OUTPATIENT)
Dept: RADIOLOGY | Age: 60
End: 2020-11-06
Payer: COMMERCIAL

## 2020-11-06 DIAGNOSIS — N20.0 KIDNEY STONE: ICD-10-CM

## 2020-11-06 LAB
COLOR STONE: NORMAL
COM MFR STONE: 50 %
COMMENT-STONE3: NORMAL
COMPOSITION: NORMAL
LABORATORY COMMENT REPORT: NORMAL
PHOTO: NORMAL
SIZE STONE: NORMAL MM
SPEC SOURCE SUBJ: NORMAL
STONE ANALYSIS-IMP: NORMAL
URATE MFR STONE: 50 %
WT STONE: 1673 MG

## 2020-11-06 PROCEDURE — 74018 RADEX ABDOMEN 1 VIEW: CPT

## 2020-11-11 ENCOUNTER — PROCEDURE VISIT (OUTPATIENT)
Dept: UROLOGY | Facility: MEDICAL CENTER | Age: 60
End: 2020-11-11
Payer: COMMERCIAL

## 2020-11-11 VITALS
HEIGHT: 67 IN | TEMPERATURE: 97.7 F | DIASTOLIC BLOOD PRESSURE: 68 MMHG | BODY MASS INDEX: 39.71 KG/M2 | WEIGHT: 253 LBS | SYSTOLIC BLOOD PRESSURE: 122 MMHG

## 2020-11-11 DIAGNOSIS — N20.0 KIDNEY STONE: Primary | ICD-10-CM

## 2020-11-11 PROCEDURE — 3008F BODY MASS INDEX DOCD: CPT | Performed by: PHYSICIAN ASSISTANT

## 2020-11-11 PROCEDURE — 52310 CYSTOSCOPY AND TREATMENT: CPT | Performed by: UROLOGY

## 2020-11-11 PROCEDURE — 81003 URINALYSIS AUTO W/O SCOPE: CPT | Performed by: UROLOGY

## 2020-11-19 ENCOUNTER — VBI (OUTPATIENT)
Dept: ADMINISTRATIVE | Facility: OTHER | Age: 60
End: 2020-11-19

## 2020-12-16 ENCOUNTER — APPOINTMENT (OUTPATIENT)
Dept: LAB | Facility: IMAGING CENTER | Age: 60
End: 2020-12-16
Payer: COMMERCIAL

## 2020-12-16 ENCOUNTER — HOSPITAL ENCOUNTER (OUTPATIENT)
Dept: RADIOLOGY | Facility: IMAGING CENTER | Age: 60
Discharge: HOME | End: 2020-12-16
Payer: COMMERCIAL

## 2020-12-16 DIAGNOSIS — N20.0 KIDNEY STONE: ICD-10-CM

## 2020-12-16 DIAGNOSIS — E11.9 TYPE 2 DIABETES MELLITUS WITHOUT COMPLICATION, WITHOUT LONG-TERM CURRENT USE OF INSULIN (HCC): ICD-10-CM

## 2020-12-16 LAB
ALBUMIN SERPL BCP-MCNC: 3.8 G/DL (ref 3.5–5)
ALP SERPL-CCNC: 47 U/L (ref 46–116)
ALT SERPL W P-5'-P-CCNC: 48 U/L (ref 12–78)
ANION GAP SERPL CALCULATED.3IONS-SCNC: 6 MMOL/L (ref 4–13)
AST SERPL W P-5'-P-CCNC: 30 U/L (ref 5–45)
BASOPHILS # BLD AUTO: 0.07 THOUSANDS/ΜL (ref 0–0.1)
BASOPHILS NFR BLD AUTO: 1 % (ref 0–1)
BILIRUB SERPL-MCNC: 0.65 MG/DL (ref 0.2–1)
BUN SERPL-MCNC: 13 MG/DL (ref 5–25)
CALCIUM SERPL-MCNC: 8.9 MG/DL (ref 8.3–10.1)
CHLORIDE SERPL-SCNC: 109 MMOL/L (ref 100–108)
CHOLEST SERPL-MCNC: 134 MG/DL (ref 50–200)
CO2 SERPL-SCNC: 26 MMOL/L (ref 21–32)
CREAT SERPL-MCNC: 0.91 MG/DL (ref 0.6–1.3)
CREAT UR-MCNC: 20.5 MG/DL
EOSINOPHIL # BLD AUTO: 0.36 THOUSAND/ΜL (ref 0–0.61)
EOSINOPHIL NFR BLD AUTO: 5 % (ref 0–6)
ERYTHROCYTE [DISTWIDTH] IN BLOOD BY AUTOMATED COUNT: 13 % (ref 11.6–15.1)
EST. AVERAGE GLUCOSE BLD GHB EST-MCNC: 151 MG/DL
GFR SERPL CREATININE-BSD FRML MDRD: 91 ML/MIN/1.73SQ M
GLUCOSE P FAST SERPL-MCNC: 103 MG/DL (ref 65–99)
HBA1C MFR BLD: 6.9 %
HCT VFR BLD AUTO: 45.3 % (ref 36.5–49.3)
HDLC SERPL-MCNC: 35 MG/DL
HGB BLD-MCNC: 14.4 G/DL (ref 12–17)
IMM GRANULOCYTES # BLD AUTO: 0.02 THOUSAND/UL (ref 0–0.2)
IMM GRANULOCYTES NFR BLD AUTO: 0 % (ref 0–2)
LDLC SERPL CALC-MCNC: 64 MG/DL (ref 0–100)
LYMPHOCYTES # BLD AUTO: 3.07 THOUSANDS/ΜL (ref 0.6–4.47)
LYMPHOCYTES NFR BLD AUTO: 44 % (ref 14–44)
MCH RBC QN AUTO: 28.6 PG (ref 26.8–34.3)
MCHC RBC AUTO-ENTMCNC: 31.8 G/DL (ref 31.4–37.4)
MCV RBC AUTO: 90 FL (ref 82–98)
MICROALBUMIN UR-MCNC: 6.3 MG/L (ref 0–20)
MICROALBUMIN/CREAT 24H UR: 31 MG/G CREATININE (ref 0–30)
MONOCYTES # BLD AUTO: 0.59 THOUSAND/ΜL (ref 0.17–1.22)
MONOCYTES NFR BLD AUTO: 9 % (ref 4–12)
NEUTROPHILS # BLD AUTO: 2.83 THOUSANDS/ΜL (ref 1.85–7.62)
NEUTS SEG NFR BLD AUTO: 41 % (ref 43–75)
NRBC BLD AUTO-RTO: 0 /100 WBCS
PLATELET # BLD AUTO: 272 THOUSANDS/UL (ref 149–390)
PMV BLD AUTO: 11.6 FL (ref 8.9–12.7)
POTASSIUM SERPL-SCNC: 4.1 MMOL/L (ref 3.5–5.3)
PROT SERPL-MCNC: 7.6 G/DL (ref 6.4–8.2)
RBC # BLD AUTO: 5.04 MILLION/UL (ref 3.88–5.62)
SODIUM SERPL-SCNC: 141 MMOL/L (ref 136–145)
TRIGL SERPL-MCNC: 175 MG/DL
TSH SERPL DL<=0.05 MIU/L-ACNC: 2.49 UIU/ML (ref 0.36–3.74)
URATE SERPL-MCNC: 4.9 MG/DL (ref 4.2–8)
WBC # BLD AUTO: 6.94 THOUSAND/UL (ref 4.31–10.16)

## 2020-12-16 PROCEDURE — 84300 ASSAY OF URINE SODIUM: CPT

## 2020-12-16 PROCEDURE — 82131 AMINO ACIDS SINGLE QUANT: CPT

## 2020-12-16 PROCEDURE — 82043 UR ALBUMIN QUANTITATIVE: CPT | Performed by: FAMILY MEDICINE

## 2020-12-16 PROCEDURE — 84443 ASSAY THYROID STIM HORMONE: CPT

## 2020-12-16 PROCEDURE — 85025 COMPLETE CBC W/AUTO DIFF WBC: CPT

## 2020-12-16 PROCEDURE — 84560 ASSAY OF URINE/URIC ACID: CPT

## 2020-12-16 PROCEDURE — 3044F HG A1C LEVEL LT 7.0%: CPT | Performed by: PHYSICIAN ASSISTANT

## 2020-12-16 PROCEDURE — 84392 ASSAY OF URINE SULFATE: CPT

## 2020-12-16 PROCEDURE — 82140 ASSAY OF AMMONIA: CPT

## 2020-12-16 PROCEDURE — 82507 ASSAY OF CITRATE: CPT

## 2020-12-16 PROCEDURE — 83735 ASSAY OF MAGNESIUM: CPT

## 2020-12-16 PROCEDURE — 84133 ASSAY OF URINE POTASSIUM: CPT

## 2020-12-16 PROCEDURE — 83036 HEMOGLOBIN GLYCOSYLATED A1C: CPT

## 2020-12-16 PROCEDURE — 82570 ASSAY OF URINE CREATININE: CPT

## 2020-12-16 PROCEDURE — 83945 ASSAY OF OXALATE: CPT

## 2020-12-16 PROCEDURE — 84550 ASSAY OF BLOOD/URIC ACID: CPT

## 2020-12-16 PROCEDURE — 83935 ASSAY OF URINE OSMOLALITY: CPT

## 2020-12-16 PROCEDURE — 84105 ASSAY OF URINE PHOSPHORUS: CPT

## 2020-12-16 PROCEDURE — 82436 ASSAY OF URINE CHLORIDE: CPT

## 2020-12-16 PROCEDURE — 80061 LIPID PANEL: CPT

## 2020-12-16 PROCEDURE — 82340 ASSAY OF CALCIUM IN URINE: CPT

## 2020-12-16 PROCEDURE — 36415 COLL VENOUS BLD VENIPUNCTURE: CPT

## 2020-12-16 PROCEDURE — 81003 URINALYSIS AUTO W/O SCOPE: CPT

## 2020-12-16 PROCEDURE — 76770 US EXAM ABDO BACK WALL COMP: CPT

## 2020-12-16 PROCEDURE — 82570 ASSAY OF URINE CREATININE: CPT | Performed by: FAMILY MEDICINE

## 2020-12-16 PROCEDURE — 80053 COMPREHEN METABOLIC PANEL: CPT

## 2020-12-19 DIAGNOSIS — E78.5 HYPERLIPIDEMIA, UNSPECIFIED HYPERLIPIDEMIA TYPE: ICD-10-CM

## 2020-12-19 RX ORDER — SIMVASTATIN 40 MG
TABLET ORAL
Qty: 90 TABLET | Refills: 0 | Status: SHIPPED | OUTPATIENT
Start: 2020-12-19 | End: 2021-03-16

## 2020-12-21 ENCOUNTER — TELEPHONE (OUTPATIENT)
Dept: FAMILY MEDICINE CLINIC | Facility: CLINIC | Age: 60
End: 2020-12-21

## 2020-12-24 ENCOUNTER — TELEPHONE (OUTPATIENT)
Dept: UROLOGY | Facility: MEDICAL CENTER | Age: 60
End: 2020-12-24

## 2020-12-24 DIAGNOSIS — N20.0 KIDNEY STONE: Primary | ICD-10-CM

## 2020-12-24 DIAGNOSIS — N20.0 NEPHROLITHIASIS: ICD-10-CM

## 2020-12-30 LAB
AMMONIA 24H UR-MRATE: 14 MEQ/24 HR
AMMONIA UR-SCNC: ABNORMAL UG/DL
CA H2 PHOS DIHYD CRY URNS QL MICRO: 0.99 RATIO (ref 0–3)
CALCIUM 24H UR-MCNC: 16.8 MG/DL
CALCIUM 24H UR-MRATE: 168 MG/24 HR (ref 100–300)
CHLORIDE 24H UR-SCNC: 127 MMOL/L
CHLORIDE 24H UR-SRATE: 127 MMOL/24 HR (ref 110–250)
CITRATE 24H UR-MCNC: 534 MG/L
CITRATE 24H UR-MRATE: 534 MG/24 HR (ref 320–1240)
COM CRY STONE QL IR: 14.68 RATIO (ref 0–6)
CREAT 24H UR-MCNC: 151.1 MG/DL
CREAT 24H UR-MRATE: 1511 MG/24 HR (ref 1000–2000)
CYSTINE 24H UR-MCNC: 31.9 MG/L
CYSTINE 24H UR-MRATE: 31.9 MG/24 HR (ref 10–100)
MAGNESIUM 24H UR-MRATE: 101 MG/24 HR (ref 12–293)
MAGNESIUM UR-MCNC: 10.1 MG/DL
NA URATE CRY STONE QL IR: 5.06 RATIO (ref 0–4)
OSMOLALITY UR: 735 MOSMOL/KG (ref 300–900)
OXALATE 24H UR-MRATE: 46 MG/24 HR (ref 7–44)
OXALATE UR-MCNC: 46 MG/L
PH 24H UR: 5.5 [PH]
PHOSPHATE 24H UR-MCNC: 80.8 MG/DL
PHOSPHATE 24H UR-MRATE: 808 MG/24 HR (ref 400–1300)
PLEASE NOTE (STONE RISK): ABNORMAL
POTASSIUM 24H UR-SCNC: 39.1 MMOL/24 HR (ref 25–125)
POTASSIUM UR-SCNC: 39.1 MMOL/L
PRESERVED URINE: 1000 ML/24 HR (ref 800–1800)
SODIUM 24H UR-SCNC: 152 MMOL/L
SODIUM 24H UR-SRATE: 152 MMOL/24 HR (ref 58–337)
SPECIMEN VOL 24H UR: 1000 ML/24 HR (ref 800–1800)
SULFATE 24H UR-MCNC: 22 MEQ/24 HR (ref 0–30)
SULFATE UR-MCNC: 22 MEQ/L
TRI-PHOS CRY STONE MICRO: 0.01 RATIO (ref 0–1)
URATE 24H UR-MCNC: 44.9 MG/DL
URATE 24H UR-MRATE: 449 MG/24 HR (ref 250–750)
URATE DIHYD CRY STONE QL IR: 4.19 RATIO (ref 0–1.2)

## 2021-01-28 DIAGNOSIS — E11.9 TYPE 2 DIABETES MELLITUS WITHOUT COMPLICATION, WITHOUT LONG-TERM CURRENT USE OF INSULIN (HCC): ICD-10-CM

## 2021-01-28 RX ORDER — METFORMIN HYDROCHLORIDE 500 MG/1
TABLET, EXTENDED RELEASE ORAL
Qty: 90 TABLET | Refills: 1 | Status: SHIPPED | OUTPATIENT
Start: 2021-01-28 | End: 2021-10-14

## 2021-02-08 ENCOUNTER — VBI (OUTPATIENT)
Dept: ADMINISTRATIVE | Facility: OTHER | Age: 61
End: 2021-02-08

## 2021-02-11 ENCOUNTER — OFFICE VISIT (OUTPATIENT)
Dept: UROLOGY | Facility: MEDICAL CENTER | Age: 61
End: 2021-02-11
Payer: COMMERCIAL

## 2021-02-11 VITALS
WEIGHT: 256 LBS | SYSTOLIC BLOOD PRESSURE: 120 MMHG | BODY MASS INDEX: 40.18 KG/M2 | HEART RATE: 90 BPM | HEIGHT: 67 IN | DIASTOLIC BLOOD PRESSURE: 80 MMHG

## 2021-02-11 DIAGNOSIS — N20.0 NEPHROLITHIASIS: Primary | ICD-10-CM

## 2021-02-11 DIAGNOSIS — N20.0 CALCULUS OF KIDNEY: ICD-10-CM

## 2021-02-11 LAB
SL AMB  POCT GLUCOSE, UA: ABNORMAL
SL AMB LEUKOCYTE ESTERASE,UA: ABNORMAL
SL AMB POCT BILIRUBIN,UA: ABNORMAL
SL AMB POCT BLOOD,UA: ABNORMAL
SL AMB POCT CLARITY,UA: CLEAR
SL AMB POCT COLOR,UA: YELLOW
SL AMB POCT KETONES,UA: ABNORMAL
SL AMB POCT NITRITE,UA: ABNORMAL
SL AMB POCT PH,UA: 6
SL AMB POCT SPECIFIC GRAVITY,UA: 1.02
SL AMB POCT URINE PROTEIN: ABNORMAL
SL AMB POCT UROBILINOGEN: 0.2

## 2021-02-11 PROCEDURE — 3008F BODY MASS INDEX DOCD: CPT | Performed by: NURSE PRACTITIONER

## 2021-02-11 PROCEDURE — 1036F TOBACCO NON-USER: CPT | Performed by: NURSE PRACTITIONER

## 2021-02-11 PROCEDURE — 99214 OFFICE O/P EST MOD 30 MIN: CPT | Performed by: NURSE PRACTITIONER

## 2021-02-11 PROCEDURE — 3061F NEG MICROALBUMINURIA REV: CPT | Performed by: NURSE PRACTITIONER

## 2021-02-11 PROCEDURE — 81003 URINALYSIS AUTO W/O SCOPE: CPT | Performed by: NURSE PRACTITIONER

## 2021-02-11 NOTE — PATIENT INSTRUCTIONS
Continue with Allopurinol  Make sure you increase the water intake slightly   Add lemon to water   Call the office for concerns or questions

## 2021-02-12 NOTE — PROGRESS NOTES
2/11/2021      Chief Complaint   Patient presents with    Nephrolithiasis     Assessment and Plan    63 y o  male managed by Dr Adolph Sanon    1  Nephrolithiasis  · Status post PCNL (Left) 10/30/2020  · US kidney and bladder reveal non-obstructing stones int he left lower pole , no hydronephrosis noted  · Stone risk profile results discussed with patient  · Continue with Allopurinol  · Will continue with dietary and behavioral modifications to reduce future stone formation  · Maintain hydration upwards to 40-60 of water per day  · Will continue to add lemon to water  · Repeat US kidney and bladder in 6 months  · ER precautions discussed  · Will follow up in the office in 6 months        History of Present Illness  Alison Galarza is a 63 y o  male here for follow up evaluation of nephrolithiasis  He is status post left PCNL 10/29/2020  Postoperatively, he is doing well with no complaints of lower urinary symptoms  He reports sensation of complete bladder emptying  He denies flank pain and suprapubic abdominal pain  He reports to continue to drink an increased amount of water and adds lemon to water  He did have a 24 hour stone risk profile performed which revealed a lower 24 hour urine output at 1000 ml and an elevated calcium oxalate ratio and urinary citrate level of of 534  Review of Systems   Constitutional: Negative for chills and fever  Respiratory: Negative for cough and shortness of breath  Cardiovascular: Negative for chest pain  Gastrointestinal: Negative for abdominal distention, abdominal pain, blood in stool, nausea and vomiting  Genitourinary: Negative for difficulty urinating, dysuria, enuresis, flank pain, frequency, hematuria and urgency  Skin: Negative for rash         Past Medical History  Past Medical History:   Diagnosis Date    Anesthesia complication     difficulty awakening- dyspnea    Anxiety     Arthritis     BPH (benign prostatic hyperplasia)     Cataract     starting  Cleft hard palate     Diabetes ([facility] [address] )     borderline does not check blood sugar    Glaucoma suspect, both eyes     Hyperlipidemia     Kidney stone     left    PONV (postoperative nausea and vomiting)     Right ureteral stone     Seasonal allergies     Wears dentures     partial upper    Wears glasses        Past Social History  Past Surgical History:   Procedure Laterality Date    CLEFT PALATE REPAIR      multiple surgeries    FL RETROGRADE PYELOGRAM  8/20/2020    FL RETROGRADE PYELOGRAM  10/29/2020    INGUINAL HERNIA REPAIR Right     IR NEPHROURETERAL ACCESS FOR UROLOGY PCNL  10/28/2020    JOINT REPLACEMENT Bilateral     KNEE CARTILAGE SURGERY Left     IA CYSTO/URETERO W/LITHOTRIPSY &INDWELL STENT INSRT Right 8/4/2017    Procedure: CYSTOSCOPY URETEROSCOPY WITH LITHOTRIPSY HOLMIUM LASER, RETROGRADE PYELOGRAM AND INSERTION STENT URETERAL;  Surgeon: Ada Schneider MD;  [facility];  Service: Urology    IA CYSTO/URETERO W/LITHOTRIPSY &INDWELL STENT INSRT Left 8/20/2020    Procedure: CYSTO, URETEROSCOPY W/ LASER, RETROGRADE PYELOGRAM, STENT;  Surgeon: Keiko Miller MD;  [facility];  Service: Urology    IA MIHAELAUT [place] CM Left 10/29/2020    Procedure: P C N L  antegrade insertion double j stent insertion and removal nephrostomy tube;   Surgeon: Keiko Miller MD;  [facility];  Service: Urology    IA TOTAL KNEE ARTHROPLASTY Right 3/4/2019    Procedure: TOTAL KNEE ARTHROPLASTY;  Surgeon: Abdiaziz Licona MD;  [facility];  Service: Orthopedics    IA TOTAL KNEE ARTHROPLASTY Left 9/13/2019    Procedure: ARTHROPLASTY KNEE TOTAL;  Surgeon: Abdiaziz Licona MD;  [facility];  Service: Orthopedics    TOTAL KNEE ARTHROPLASTY Left     l 9/14/2019 right 3/4/2019     Social History     Tobacco Use   Smoking Status Never Smoker   Smokeless Tobacco Never Used   Tobacco Comment    no passive smoke exposure       Past Family History  Family History   Problem Relation Age of Onset    Diabetes Mother     Heart disease Mother     Hypertension Mother    [facility] Esophageal cancer Father     Diabetes Sister     Other Sister        Past Social history  Social History     Socioeconomic History    Marital status: /Civil Union     Spouse name: Not on file    Number of children: Not on file    Years of education: Not on file    Highest education level: Not on file   Occupational History    Not on file   Social Needs    Financial resource strain: Not on file    Food insecurity     Worry: Not on file     Inability: Not on file    Transportation needs     Medical: Not on file     Non-medical: Not on file   Tobacco Use    Smoking status: Never Smoker    Smokeless tobacco: Never Used    Tobacco comment: no passive smoke exposure   Substance and Sexual Activity    Alcohol use: Yes     Frequency: 2-4 times a month     Drinks per session: 1 or 2     Binge frequency: Never     Comment: beer    Drug use: No    Sexual activity: Yes     Partners: Female   Lifestyle    Physical activity     Days per week: Not on file     Minutes per session: Not on file    Stress: Not on file   Relationships    Social connections     Talks on phone: Not on file     Gets together: Not on file     Attends Buddhist service: Not on file     Active member of club or organization: Not on file     Attends meetings of clubs or organizations: Not on file     Relationship status: Not on file    Intimate partner violence     Fear of current or ex partner: Not on file     Emotionally abused: Not on file     Physically abused: Not on file     Forced sexual activity: Not on file   Other Topics Concern    Not on file   Social History Narrative    Not on file       Current Medications  Current Outpatient Medications   Medication Sig Dispense Refill    acetaminophen (TYLENOL) 500 mg tablet Take 500-1,000 mg by mouth every 6 (six) hours as needed for mild pain      allopurinol (ZYLOPRIM) 100 mg tablet Take 1 tablet (100 mg total) by mouth daily 90 tablet 3    ascorbic acid (VITAMIN C) 500 MG tablet Take 1 tablet (500 mg total) by mouth daily 60 tablet 0    cetirizine (ZyrTEC) 10 mg tablet Take 10 mg by mouth daily      metFORMIN (GLUCOPHAGE-XR) 500 mg 24 hr tablet TAKE 1 TABLET BY MOUTH EVERY DAY WITH BREAKFAST 90 tablet 1    Multiple Vitamin (MULTIVITAMIN) tablet Take 2 tablets by mouth daily      Omega-3 Fatty Acids (FISH OIL PO) Take 1 capsule by mouth daily      simvastatin (ZOCOR) 40 mg tablet TAKE 1 TABLET BY MOUTH EVERYDAY AT BEDTIME 90 tablet 0    fluticasone (FLONASE) 50 mcg/act nasal spray 2 sprays into each nostril daily (Patient not taking: Reported on 2/11/2021) 16 g 0    ibuprofen (MOTRIN) 200 mg tablet Take 200-800 mg by mouth every 6 (six) hours as needed for mild pain      oxyCODONE (Roxicodone) 5 mg immediate release tablet Take 1-2 tablets every 6 hours prn (Patient not taking: Reported on 11/11/2020) 8 tablet 0     No current facility-administered medications for this visit  Allergies  Allergies   Allergen Reactions    Flomax [Tamsulosin] Dizziness         The following portions of the patient's history were reviewed and updated as appropriate: allergies, current medications, past medical history, past social history, past surgical history and problem list       Vitals  Vitals:    02/11/21 1554   BP: 120/80   Pulse: 90   Weight: 116 kg (256 lb)   Height: 5' 7" (1 702 m)           Physical Exam  Physical Exam  Vitals signs reviewed  Constitutional:       General: He is not in acute distress  Appearance: Normal appearance  He is normal weight  HENT:      Head: Normocephalic  Eyes:      Pupils: Pupils are equal, round, and reactive to light  Cardiovascular:      Rate and Rhythm: Normal rate  Pulmonary:      Effort: No respiratory distress  Breath sounds: Normal breath sounds  Skin:     General: Skin is warm and dry     Neurological: General: No focal deficit present  Mental Status: He is alert and oriented to person, place, and time  Psychiatric:         Mood and Affect: Mood normal          Behavior: Behavior normal            Results  No results found for this or any previous visit (from the past 1 hour(s)) ]  Lab Results   Component Value Date    PSA 0 4 04/06/2020    PSA 0 2 09/08/2016     Lab Results   Component Value Date    CALCIUM 8 9 12/16/2020    K 4 1 12/16/2020    CO2 26 12/16/2020     (H) 12/16/2020    BUN 13 12/16/2020    CREATININE 0 91 12/16/2020     Lab Results   Component Value Date    WBC 6 94 12/16/2020    HGB 14 4 12/16/2020    HCT 45 3 12/16/2020    MCV 90 12/16/2020     12/16/2020     Orders  Orders Placed This Encounter   Procedures    US kidney and bladder     Standing Status:   Future     Standing Expiration Date:   2/11/2025     Scheduling Instructions:      "Prep required if being scheduled in conjunction with other studies, refer to those examination's Preps first before scheduling  All patients for US Kidney and Bladder they must drink 24 oz of water 60 minutes before your scheduled appointment time  This test requires you to have a FULL bladder  Please do not urinate before your test             Please bring your physician order, insurance cards, a form of photo ID and a list of your medications with you  Arrive 15 minutes prior to your appointment time in order to register              If you need to have lab work or a urinalysis, please do this AFTER your ultrasound  "     Order Specific Question:   Reason for Exam:     Answer:   nephrolithiasis    POCT urine dip auto non-scope       AMARA Unger

## 2021-02-16 LAB
LEFT EYE DIABETIC RETINOPATHY: NORMAL
RIGHT EYE DIABETIC RETINOPATHY: NORMAL

## 2021-03-16 DIAGNOSIS — E78.5 HYPERLIPIDEMIA, UNSPECIFIED HYPERLIPIDEMIA TYPE: ICD-10-CM

## 2021-03-16 RX ORDER — SIMVASTATIN 40 MG
TABLET ORAL
Qty: 90 TABLET | Refills: 0 | Status: SHIPPED | OUTPATIENT
Start: 2021-03-16 | End: 2021-06-09

## 2021-06-09 DIAGNOSIS — E78.5 HYPERLIPIDEMIA, UNSPECIFIED HYPERLIPIDEMIA TYPE: ICD-10-CM

## 2021-06-09 RX ORDER — SIMVASTATIN 40 MG
TABLET ORAL
Qty: 90 TABLET | Refills: 0 | Status: SHIPPED | OUTPATIENT
Start: 2021-06-09 | End: 2021-09-09

## 2021-06-15 ENCOUNTER — VBI (OUTPATIENT)
Dept: ADMINISTRATIVE | Facility: OTHER | Age: 61
End: 2021-06-15

## 2021-07-21 DIAGNOSIS — N20.0 RENAL STONE: ICD-10-CM

## 2021-07-23 RX ORDER — ALLOPURINOL 100 MG/1
TABLET ORAL
Qty: 90 TABLET | Refills: 3 | Status: SHIPPED | OUTPATIENT
Start: 2021-07-23

## 2021-09-01 DIAGNOSIS — E11.9 TYPE 2 DIABETES MELLITUS WITHOUT COMPLICATION, WITHOUT LONG-TERM CURRENT USE OF INSULIN (HCC): ICD-10-CM

## 2021-09-01 RX ORDER — METFORMIN HYDROCHLORIDE 500 MG/1
TABLET, EXTENDED RELEASE ORAL
Qty: 90 TABLET | Refills: 1 | OUTPATIENT
Start: 2021-09-01

## 2021-09-03 ENCOUNTER — OFFICE VISIT (OUTPATIENT)
Dept: FAMILY MEDICINE CLINIC | Facility: CLINIC | Age: 61
End: 2021-09-03
Payer: COMMERCIAL

## 2021-09-03 ENCOUNTER — APPOINTMENT (OUTPATIENT)
Dept: LAB | Facility: IMAGING CENTER | Age: 61
End: 2021-09-03
Payer: COMMERCIAL

## 2021-09-03 VITALS
HEIGHT: 67 IN | OXYGEN SATURATION: 94 % | DIASTOLIC BLOOD PRESSURE: 84 MMHG | TEMPERATURE: 97.3 F | BODY MASS INDEX: 40.49 KG/M2 | HEART RATE: 76 BPM | RESPIRATION RATE: 16 BRPM | SYSTOLIC BLOOD PRESSURE: 128 MMHG | WEIGHT: 258 LBS

## 2021-09-03 DIAGNOSIS — Z12.5 PROSTATE CANCER SCREENING: ICD-10-CM

## 2021-09-03 DIAGNOSIS — Z12.11 ENCOUNTER FOR COLORECTAL CANCER SCREENING: ICD-10-CM

## 2021-09-03 DIAGNOSIS — E66.01 MORBID OBESITY WITH BMI OF 40.0-44.9, ADULT (HCC): ICD-10-CM

## 2021-09-03 DIAGNOSIS — N20.0 RENAL STONE: ICD-10-CM

## 2021-09-03 DIAGNOSIS — E78.5 HYPERLIPIDEMIA, UNSPECIFIED HYPERLIPIDEMIA TYPE: ICD-10-CM

## 2021-09-03 DIAGNOSIS — N39.0 FREQUENT UTI: ICD-10-CM

## 2021-09-03 DIAGNOSIS — Z12.12 ENCOUNTER FOR COLORECTAL CANCER SCREENING: ICD-10-CM

## 2021-09-03 DIAGNOSIS — Z00.00 HEALTHCARE MAINTENANCE: Primary | ICD-10-CM

## 2021-09-03 DIAGNOSIS — E11.9 TYPE 2 DIABETES MELLITUS WITHOUT COMPLICATION, WITHOUT LONG-TERM CURRENT USE OF INSULIN (HCC): ICD-10-CM

## 2021-09-03 DIAGNOSIS — Z23 ENCOUNTER FOR IMMUNIZATION: ICD-10-CM

## 2021-09-03 LAB
ALBUMIN SERPL BCP-MCNC: 3.9 G/DL (ref 3.5–5)
ALP SERPL-CCNC: 47 U/L (ref 46–116)
ALT SERPL W P-5'-P-CCNC: 78 U/L (ref 12–78)
ANION GAP SERPL CALCULATED.3IONS-SCNC: 7 MMOL/L (ref 4–13)
AST SERPL W P-5'-P-CCNC: 71 U/L (ref 5–45)
BASOPHILS # BLD AUTO: 0.07 THOUSANDS/ΜL (ref 0–0.1)
BASOPHILS NFR BLD AUTO: 1 % (ref 0–1)
BILIRUB SERPL-MCNC: 0.74 MG/DL (ref 0.2–1)
BUN SERPL-MCNC: 13 MG/DL (ref 5–25)
CALCIUM SERPL-MCNC: 9 MG/DL (ref 8.3–10.1)
CHLORIDE SERPL-SCNC: 108 MMOL/L (ref 100–108)
CHOLEST SERPL-MCNC: 143 MG/DL (ref 50–200)
CO2 SERPL-SCNC: 23 MMOL/L (ref 21–32)
CREAT SERPL-MCNC: 0.86 MG/DL (ref 0.6–1.3)
CREAT UR-MCNC: 207 MG/DL
EOSINOPHIL # BLD AUTO: 0.37 THOUSAND/ΜL (ref 0–0.61)
EOSINOPHIL NFR BLD AUTO: 5 % (ref 0–6)
ERYTHROCYTE [DISTWIDTH] IN BLOOD BY AUTOMATED COUNT: 12.8 % (ref 11.6–15.1)
EST. AVERAGE GLUCOSE BLD GHB EST-MCNC: 151 MG/DL
GFR SERPL CREATININE-BSD FRML MDRD: 93 ML/MIN/1.73SQ M
GLUCOSE P FAST SERPL-MCNC: 120 MG/DL (ref 65–99)
HBA1C MFR BLD: 6.9 %
HCT VFR BLD AUTO: 47 % (ref 36.5–49.3)
HDLC SERPL-MCNC: 35 MG/DL
HGB BLD-MCNC: 15.6 G/DL (ref 12–17)
IMM GRANULOCYTES # BLD AUTO: 0.02 THOUSAND/UL (ref 0–0.2)
IMM GRANULOCYTES NFR BLD AUTO: 0 % (ref 0–2)
LDLC SERPL CALC-MCNC: 72 MG/DL (ref 0–100)
LYMPHOCYTES # BLD AUTO: 2.82 THOUSANDS/ΜL (ref 0.6–4.47)
LYMPHOCYTES NFR BLD AUTO: 37 % (ref 14–44)
MCH RBC QN AUTO: 29.3 PG (ref 26.8–34.3)
MCHC RBC AUTO-ENTMCNC: 33.2 G/DL (ref 31.4–37.4)
MCV RBC AUTO: 88 FL (ref 82–98)
MICROALBUMIN UR-MCNC: 12.5 MG/L (ref 0–20)
MICROALBUMIN/CREAT 24H UR: 6 MG/G CREATININE (ref 0–30)
MONOCYTES # BLD AUTO: 0.69 THOUSAND/ΜL (ref 0.17–1.22)
MONOCYTES NFR BLD AUTO: 9 % (ref 4–12)
NEUTROPHILS # BLD AUTO: 3.75 THOUSANDS/ΜL (ref 1.85–7.62)
NEUTS SEG NFR BLD AUTO: 48 % (ref 43–75)
NRBC BLD AUTO-RTO: 0 /100 WBCS
PLATELET # BLD AUTO: 245 THOUSANDS/UL (ref 149–390)
PMV BLD AUTO: 12.3 FL (ref 8.9–12.7)
POTASSIUM SERPL-SCNC: 4.2 MMOL/L (ref 3.5–5.3)
PROT SERPL-MCNC: 8 G/DL (ref 6.4–8.2)
RBC # BLD AUTO: 5.32 MILLION/UL (ref 3.88–5.62)
SODIUM SERPL-SCNC: 138 MMOL/L (ref 136–145)
TRIGL SERPL-MCNC: 179 MG/DL
TSH SERPL DL<=0.05 MIU/L-ACNC: 1.17 UIU/ML (ref 0.36–3.74)
URATE SERPL-MCNC: 4.9 MG/DL (ref 4.2–8)
WBC # BLD AUTO: 7.72 THOUSAND/UL (ref 4.31–10.16)

## 2021-09-03 PROCEDURE — 90750 HZV VACC RECOMBINANT IM: CPT

## 2021-09-03 PROCEDURE — 84443 ASSAY THYROID STIM HORMONE: CPT

## 2021-09-03 PROCEDURE — 83036 HEMOGLOBIN GLYCOSYLATED A1C: CPT

## 2021-09-03 PROCEDURE — 82570 ASSAY OF URINE CREATININE: CPT | Performed by: FAMILY MEDICINE

## 2021-09-03 PROCEDURE — 3725F SCREEN DEPRESSION PERFORMED: CPT | Performed by: FAMILY MEDICINE

## 2021-09-03 PROCEDURE — 3044F HG A1C LEVEL LT 7.0%: CPT | Performed by: FAMILY MEDICINE

## 2021-09-03 PROCEDURE — 99396 PREV VISIT EST AGE 40-64: CPT | Performed by: FAMILY MEDICINE

## 2021-09-03 PROCEDURE — 3061F NEG MICROALBUMINURIA REV: CPT | Performed by: FAMILY MEDICINE

## 2021-09-03 PROCEDURE — 80061 LIPID PANEL: CPT

## 2021-09-03 PROCEDURE — 85025 COMPLETE CBC W/AUTO DIFF WBC: CPT

## 2021-09-03 PROCEDURE — 82043 UR ALBUMIN QUANTITATIVE: CPT | Performed by: FAMILY MEDICINE

## 2021-09-03 PROCEDURE — 80053 COMPREHEN METABOLIC PANEL: CPT

## 2021-09-03 PROCEDURE — 36415 COLL VENOUS BLD VENIPUNCTURE: CPT

## 2021-09-03 PROCEDURE — 90471 IMMUNIZATION ADMIN: CPT

## 2021-09-03 PROCEDURE — 3074F SYST BP LT 130 MM HG: CPT | Performed by: FAMILY MEDICINE

## 2021-09-03 PROCEDURE — 3079F DIAST BP 80-89 MM HG: CPT | Performed by: FAMILY MEDICINE

## 2021-09-03 PROCEDURE — G0103 PSA SCREENING: HCPCS

## 2021-09-03 PROCEDURE — 3008F BODY MASS INDEX DOCD: CPT | Performed by: FAMILY MEDICINE

## 2021-09-03 PROCEDURE — 87086 URINE CULTURE/COLONY COUNT: CPT

## 2021-09-03 PROCEDURE — 84550 ASSAY OF BLOOD/URIC ACID: CPT

## 2021-09-03 PROCEDURE — 1036F TOBACCO NON-USER: CPT | Performed by: FAMILY MEDICINE

## 2021-09-03 NOTE — ASSESSMENT & PLAN NOTE
Lab Results   Component Value Date    HGBA1C 6 9 (H) 12/16/2020     Well controlled  Discussed about low carb diet and regular exercise  Continue same medications for now  It was discussed about possibly starting him on Ozempic

## 2021-09-04 LAB
BACTERIA UR CULT: NORMAL
PSA SERPL-MCNC: 0.4 NG/ML (ref 0–4)

## 2021-09-09 ENCOUNTER — TELEPHONE (OUTPATIENT)
Dept: FAMILY MEDICINE CLINIC | Facility: CLINIC | Age: 61
End: 2021-09-09

## 2021-09-09 DIAGNOSIS — E78.5 HYPERLIPIDEMIA, UNSPECIFIED HYPERLIPIDEMIA TYPE: ICD-10-CM

## 2021-09-09 RX ORDER — SIMVASTATIN 40 MG
TABLET ORAL
Qty: 90 TABLET | Refills: 1 | Status: SHIPPED | OUTPATIENT
Start: 2021-09-09 | End: 2022-03-17

## 2021-09-09 NOTE — TELEPHONE ENCOUNTER
Last seen 9/3/21 and I refilled as directed on med list and changed to allowed refills to 1 since he was just seen

## 2021-09-09 NOTE — TELEPHONE ENCOUNTER
----- Message from Clare Otero MD sent at 9/4/2021  5:56 PM EDT -----    A1c well controlled at 6 9  Triglycerides elevated  All the rest blood work came back stable

## 2021-09-14 ENCOUNTER — TELEPHONE (OUTPATIENT)
Dept: FAMILY MEDICINE CLINIC | Facility: CLINIC | Age: 61
End: 2021-09-14

## 2021-09-14 NOTE — TELEPHONE ENCOUNTER
3rd attempt call to inform pt of recent labs with no success  I sent a letter to his home since he does not have my chart to inform him

## 2021-09-14 NOTE — LETTER
September 14, 2021     [address] [facility] [address] [place] [id number]      Dear Mr Mere Napier:    Below are the results from your recent visit:    Resulted Orders   CBC and differential   Result Value Ref Range    WBC 7 72 4 31 - 10 16 Thousand/uL    RBC 5 32 3 88 - 5 62 Million/uL    Hemoglobin 15 6 12 0 - 17 0 g/dL    Hematocrit 47 0 36 5 - 49 3 %    MCV 88 82 - 98 fL    MCH 29 3 26 8 - 34 3 pg    MCHC 33 2 31 4 - 37 4 g/dL    RDW 12 8 11 6 - 15 1 %    MPV 12 3 8 9 - 12 7 fL    Platelets 572 394 - 339 Thousands/uL    nRBC 0 /100 WBCs    Neutrophils Relative 48 43 - 75 %    Immat GRANS % 0 0 - 2 %    Lymphocytes Relative 37 14 - 44 %    Monocytes Relative 9 4 - 12 %    Eosinophils Relative 5 0 - 6 %    Basophils Relative 1 0 - 1 %    Neutrophils Absolute 3 75 1 85 - 7 62 Thousands/µL    Immature Grans Absolute 0 02 0 00 - 0 20 Thousand/uL    Lymphocytes Absolute 2 82 0 60 - 4 47 Thousands/µL    Monocytes Absolute 0 69 0 17 - 1 22 Thousand/µL    Eosinophils Absolute 0 37 0 00 - 0 61 Thousand/µL    Basophils Absolute 0 07 0 00 - 0 10 Thousands/µL   Comprehensive metabolic panel   Result Value Ref Range    Sodium 138 136 - 145 mmol/L    Potassium 4 2 3 5 - 5 3 mmol/L    Chloride 108 100 - 108 mmol/L    CO2 23 21 - 32 mmol/L    ANION GAP 7 4 - 13 mmol/L    BUN 13 5 - 25 mg/dL    Creatinine 0 86 0 60 - 1 30 mg/dL      Comment:      Standardized to IDMS reference method    Glucose, Fasting 120 (H) 65 - 99 mg/dL      Comment:      Specimen collection should occur prior to Sulfasalazine administration due to the potential for falsely depressed results  Specimen collection should occur prior to Sulfapyridine administration due to the potential for falsely elevated results  Calcium 9 0 8 3 - 10 1 mg/dL    AST 71 (H) 5 - 45 U/L      Comment:      Specimen collection should occur prior to Sulfasalazine administration due to the potential for falsely depressed results       ALT 78 12 - 78 U/L      Comment:      Specimen collection should occur prior to Sulfasalazine and/or Sulfapyridine administration due to the potential for falsely depressed results  Alkaline Phosphatase 47 46 - 116 U/L    Total Protein 8 0 6 4 - 8 2 g/dL    Albumin 3 9 3 5 - 5 0 g/dL    Total Bilirubin 0 74 0 20 - 1 00 mg/dL      Comment:      Use of this assay is not recommended for patients undergoing treatment with eltrombopag due to the potential for falsely elevated results  eGFR 93 ml/min/1 73sq m    Narrative    National Kidney Disease Foundation guidelines for Chronic Kidney Disease (CKD):     Stage 1 with normal or high GFR (GFR > 90 mL/min/1 73 square meters)    Stage 2 Mild CKD (GFR = 60-89 mL/min/1 73 square meters)    Stage 3A Moderate CKD (GFR = 45-59 mL/min/1 73 square meters)    Stage 3B Moderate CKD (GFR = 30-44 mL/min/1 73 square meters)    Stage 4 Severe CKD (GFR = 15-29 mL/min/1 73 square meters)    Stage 5 End Stage CKD (GFR <15 mL/min/1 73 square meters)  Note: GFR calculation is accurate only with a steady state creatinine   Hemoglobin A1C   Result Value Ref Range    Hemoglobin A1C 6 9 (H) Normal 3 8-5 6%; PreDiabetic 5 7-6 4%; Diabetic >=6 5%; Glycemic control for adults with diabetes <7 0% %     mg/dl   Lipid Panel with Direct LDL reflex   Result Value Ref Range    Cholesterol 143 50 - 200 mg/dL      Comment:      Cholesterol:       Desirable         <200 mg/dl       Borderline         200-239 mg/dl       High              >239           Triglycerides 179 (H) <=150 mg/dL      Comment:      Triglyceride:     Normal          <150 mg/dl     Borderline High 150-199 mg/dl     High            200-499 mg/dl        Very High       >499 mg/dl    Specimen collection should occur prior to N-Acetylcysteine or Metamizole administration due to the potential for falsely depressed results      HDL, Direct 35 (L) >=40 mg/dL      Comment:      HDL Cholesterol:       Low     <41 mg/dL    LDL Calculated 72 0 - 100 mg/dL      Comment: This screening LDL is a calculated result  It does not have the accuracy of the Direct Measured LDL in the monitoring of patients with hyperlipidemia and/or statin therapy  Direct Measure LDL (LOW262) must be ordered separately in these patients  LDL Cholesterol:     Optimal           <100 mg/dl     Near Optimal      100-129 mg/dl     Above Optimal       Borderline High 130-159 mg/dl       High            160-189 mg/dl       Very High       >189 mg/dl          TSH, 3rd generation with Free T4 reflex   Result Value Ref Range    TSH 3RD GENERATON 1 170 0 358 - 3 740 uIU/mL    Narrative    Patients undergoing fluorescein dye angiography may retain small amounts of fluorescein in the body for 48-72 hours post procedure  Samples containing fluorescein can produce falsely depressed TSH values  If the patient had this procedure,a specimen should be resubmitted post fluorescein clearance  PSA, Total Screen   Result Value Ref Range    PSA 0 4 0 0 - 4 0 ng/mL      Comment:      American Urological Association Guidelines define biochemical recurrence of prostate cancer as a detectable or rising PSA value post-radical prostatectomy that is greater than or equal to 0 2 ng/mL with a second confirmatory level of greater than or equal to 0 2 ng/mL  Uric acid   Result Value Ref Range    Uric Acid 4 9 4 2 - 8 0 mg/dL      Comment:      Specimen collection should occur prior to Metamizole administration due to the potential for falsely depressed results  The test results show that your current treatment is working  Your HgA1C is well controlled at 6 9  Your triglycerides were elevated so be careful with your diet  All the rest of the labs were stable  Please call our office if you have any questions  If you have any questions or concerns, please don't hesitate to call           Sincerely,  Macy Jeffery MD/Korin Augustin

## 2021-09-14 NOTE — TELEPHONE ENCOUNTER
----- Message from Jessica Holliday MD sent at 9/4/2021  5:56 PM EDT -----    A1c well controlled at 6 9  Triglycerides elevated  All the rest blood work came back stable

## 2021-10-13 DIAGNOSIS — E11.9 TYPE 2 DIABETES MELLITUS WITHOUT COMPLICATION, WITHOUT LONG-TERM CURRENT USE OF INSULIN (HCC): ICD-10-CM

## 2021-10-14 RX ORDER — METFORMIN HYDROCHLORIDE 500 MG/1
TABLET, EXTENDED RELEASE ORAL
Qty: 90 TABLET | Refills: 1 | Status: SHIPPED | OUTPATIENT
Start: 2021-10-14 | End: 2022-04-11

## 2021-11-04 ENCOUNTER — CLINICAL SUPPORT (OUTPATIENT)
Dept: FAMILY MEDICINE CLINIC | Facility: CLINIC | Age: 61
End: 2021-11-04
Payer: COMMERCIAL

## 2021-11-04 DIAGNOSIS — Z23 ENCOUNTER FOR IMMUNIZATION: Primary | ICD-10-CM

## 2021-11-04 PROCEDURE — 90732 PPSV23 VACC 2 YRS+ SUBQ/IM: CPT

## 2021-11-04 PROCEDURE — 90471 IMMUNIZATION ADMIN: CPT

## 2021-12-03 ENCOUNTER — CLINICAL SUPPORT (OUTPATIENT)
Dept: FAMILY MEDICINE CLINIC | Facility: CLINIC | Age: 61
End: 2021-12-03
Payer: COMMERCIAL

## 2021-12-03 DIAGNOSIS — Z23 ENCOUNTER FOR IMMUNIZATION: Primary | ICD-10-CM

## 2021-12-03 PROCEDURE — 90750 HZV VACC RECOMBINANT IM: CPT

## 2021-12-03 PROCEDURE — 90471 IMMUNIZATION ADMIN: CPT

## 2021-12-20 ENCOUNTER — HOSPITAL ENCOUNTER (OUTPATIENT)
Dept: RADIOLOGY | Facility: IMAGING CENTER | Age: 61
Discharge: HOME | End: 2021-12-20
Payer: COMMERCIAL

## 2021-12-20 DIAGNOSIS — N20.0 NEPHROLITHIASIS: ICD-10-CM

## 2021-12-20 PROCEDURE — 76770 US EXAM ABDO BACK WALL COMP: CPT

## 2022-01-05 ENCOUNTER — OFFICE VISIT (OUTPATIENT)
Dept: UROLOGY | Facility: CLINIC | Age: 62
End: 2022-01-05
Payer: COMMERCIAL

## 2022-01-05 VITALS
BODY MASS INDEX: 41.44 KG/M2 | SYSTOLIC BLOOD PRESSURE: 126 MMHG | WEIGHT: 264.6 LBS | HEART RATE: 72 BPM | DIASTOLIC BLOOD PRESSURE: 88 MMHG

## 2022-01-05 DIAGNOSIS — Z12.11 COLON CANCER SCREENING: Primary | ICD-10-CM

## 2022-01-05 DIAGNOSIS — N20.0 NEPHROLITHIASIS: ICD-10-CM

## 2022-01-05 PROBLEM — N39.41 URGE INCONTINENCE: Status: ACTIVE | Noted: 2022-01-05

## 2022-01-05 LAB
SL AMB  POCT GLUCOSE, UA: NORMAL
SL AMB LEUKOCYTE ESTERASE,UA: NORMAL
SL AMB POCT BILIRUBIN,UA: NORMAL
SL AMB POCT BLOOD,UA: NORMAL
SL AMB POCT CLARITY,UA: CLEAR
SL AMB POCT COLOR,UA: YELLOW
SL AMB POCT KETONES,UA: NORMAL
SL AMB POCT NITRITE,UA: NORMAL
SL AMB POCT PH,UA: 5
SL AMB POCT SPECIFIC GRAVITY,UA: 1.03
SL AMB POCT URINE PROTEIN: NORMAL
SL AMB POCT UROBILINOGEN: 0.2

## 2022-01-05 PROCEDURE — 3079F DIAST BP 80-89 MM HG: CPT | Performed by: NURSE PRACTITIONER

## 2022-01-05 PROCEDURE — 81002 URINALYSIS NONAUTO W/O SCOPE: CPT | Performed by: NURSE PRACTITIONER

## 2022-01-05 PROCEDURE — 3074F SYST BP LT 130 MM HG: CPT | Performed by: NURSE PRACTITIONER

## 2022-01-05 PROCEDURE — 99213 OFFICE O/P EST LOW 20 MIN: CPT | Performed by: NURSE PRACTITIONER

## 2022-01-05 PROCEDURE — 3061F NEG MICROALBUMINURIA REV: CPT | Performed by: FAMILY MEDICINE

## 2022-01-05 NOTE — PATIENT INSTRUCTIONS
BEHAVIORAL THERAPY FOR IMPROVED BLADDER CONTROL      1  Urge Control Techniques       A  Stop whatever you are doing and concentrate on thad your pelvic floor muscles  B   Contract your pelvic floor muscles repetitively (as in your "flick" exercises)  C   Once the urgency has subsided, realize that sometimes the urgency is because you have a             full bladder and have to urinate  Other times the urgency is a false signal and you do not have a full bladder  D  If you have urgency triggered by running water, "key in the door," getting up from a sitting position, etc , contract your pelvic floor muscles prior to       initiating the activity  2   Daily Fluid Intake       A  Avoid drinking too little (less than 3 cups/day) or drinking too much (more than 6             cups/day)  B   Avoid caffeinated beverages  C   If voiding frequently at night, limit your liquid intake after 7 p m  3   Bowel Regularity--Constipation Makes Bladder Symptoms Worse       A  Drink enough liquids, eat plenty of high fiber food  B  Exercise       C  Avoid laxatives and enemas on a regular basis, this decreases the bowel's normal function  4   Voiding Schedules       A  Empty your bladder at 1½ to 2 hour intervals even if you may not have the urge to urinate             at that time  You may gradually increase the time between voidings to 30  minutes, until you can comfortably void every 3 hours  B  If you are voiding more frequently than every 1½ to 2 hours, resist the urge to go  by using urge control techniques (described in 1 )  Kegel Exercises for Men   AMBULATORY CARE:   Kegel exercises  help strengthen your pelvic muscles  Pelvic muscles hold your pelvic organs, such as your bladder, in place  Kegel exercises help prevent or control problems with urine incontinence (leakage)  Incontinence may be caused by an enlarged prostate, older age, or obesity  Contact your healthcare provider if:   · You cannot feel your pelvic muscles tighten or relax  · You continue to leak urine  · You have questions or concerns about your condition or care  Use the correct muscles:  Pelvic muscles are the muscles you use to control urine flow  To target these muscles, stop and start the flow of urine several times  This will help you become familiar with how it feels to tighten and relax these muscles  How to do Kegel exercises:   · Empty your bladder  You may lie down, stand up, or sit down to do these exercises  When you first try to do these exercises, it may be easier if you lie down  Tighten or squeeze your pelvic muscles slowly  It may feel like you are trying to hold back urine or gas  Hold this position for 3 seconds  Relax for 3 seconds  Repeat this cycle 10 times  · Do 10 sets of Kegel exercises, at least 3 times a day  Do not hold your breath when you do Kegel exercises  Keep your stomach, back, and leg muscles relaxed  · As your muscles get stronger, you will be able to hold the squeeze longer  Your healthcare provider may ask that you increase your pelvic muscle squeeze to 10 seconds  After you squeeze for 10 seconds, relax for 10 seconds  What else you should know:   · Once you know how to do Kegel exercises, use different positions  This will help to strengthen your pelvic muscles as much as possible  You can do these exercises while you lie on the floor, watch TV, or stand  · You may notice improved bladder control within about 6 weeks  · Tighten your pelvic muscles before you sneeze, cough, or lift to prevent urine leakage  Follow up with your doctor as directed:  Write down your questions so you remember to ask them during your visits  © Copyright [employer] 2021 Information is for End User's use only and may not be sold, redistributed or otherwise used for commercial purposes   All illustrations and images included in CareNotes® are the copyrighted property of A D A M , Inc  or [facility] [employer]hemiasalexis Dominic   The above information is an  only  It is not intended as medical advice for individual conditions or treatments  Talk to your doctor, nurse or pharmacist before following any medical regimen to see if it is safe and effective for you  Dietary Management of Kidney Stone Disease    The dietary recommendations for most people who make kidney stones (especially the most common calcium oxalate stones) are uncomplicated and are not too tedious or bland  Most importantly, the following recommendations also promote better health for a variety of reasons  FLUIDS:  The single most important change for the majority patients is the need to greatly increase fluid intake  You should at least produce two liters (about two quarts) of urine each day  Depending on the heat outdoors and your level of physical activity, this usually means consuming ten, 10 ounce glasses (100 ounces) of fluid per day  Water is always a good choice, but other drinks including tea, coffee, soda, and juice are also allowed as long as no one beverage becomes the sole source of fluid  CALCIUM:  There is excellent evidence that calcium should not be avoided, but instead moderated  A range of 600 to 1,100 mg of calcium per day, especially consumed at meals is probably a reasonable target  (i e  2-3 dairy servings per day) This might include small servings of yogurt, milk or ice cream   This amount helps avoid over-absorption of oxalate from the digestive tract and also allows for healthy bone maintenance  SODIUM (SALT): Too much salt in your diet (both from the shaker and in the prepared foods that we buy) is bad for your blood pressure, bad for your heart, and also increases the amount of calcium in your urine  A reasonable sodium restriction to 2,000-2,500mg/day (about the amount in one teaspoon) is an excellent target    You should get into the habit of reading the Nutrients labels on all the foods that you eat and watch out for the foods that have a high sodium content (snack foods, smoked or processed foods, caned foods)  Fresh and frozen foods usually have the least amount of sodium  PROTEIN:  High protein diets from animal meat (beef, chicken, pork, fish) also increases the rate of kidney stone formation and is equally unhealthy for your heart  All patients should moderate their meat intake to 3-7 ounces per day, and particularly stay away from red meat protein  OXALATE:  Most stone-formers should avoid heavy intake of oxalate-rich foods  These include green roughage (spinach, mustard, kale), strawberries, chocolate, tea, iced tea, and nuts  In addition, heavy, excess doses of Vitamin C can also produce surges in urinary oxalate levels and should be avoided  ** THESE FOODS ARE HIGH IN OXALATE - TRY TO LIMIT HOW MUCH OF THESE YOU EAT:  Coke and Pepsi  Nuts  Dark Leafy Greens:     Spinach and Kale, Rhubarb, Chard  Asparagus  Beets  Sweet potatoes   Blueberries, Strawberries   Dark tea (Green tea is okay)  Tofu      DRINK 3 QUARTS (96 Oz ) LIQUIDS EACH DAY - ALL LIQUIDS COUNT        ADD LEMON JUICE 3 OZ  DAILY - Fresh squeezed or lemon juice concentrate - Not MinuteMaid, [facility] Hill, Crystal Lite, etc         ** Recipe for JOSE L'S OLDARIK TYME LEMONADE:         One ounce of lemon juice, glass of water, sweetener of your choice    Coffee is okay! Cranberry juice is good to prevent infections, but does not help for stones  BARE-BONES RECOMMENDATIONS:  1  Fluids, fluids, fluids  2  Low salt diet (your primary care doctor will love you)  3  Moderate red meat intake  4  Moderate calcium (dairy products), especially with meals

## 2022-01-05 NOTE — PROGRESS NOTES
Assessment and plan:     Urge incontinence  · Recommended pelvic floor physical therapy, he declined  · kegel exercises  · Follow up 1 year    Nephrolithiasis  · Status post PCNL 10/30/2020  · Ultrasound revealed 5 millimeter lower pole stone  · Reviewed AUA dietary recommendations and hydration goals  · Ultrasound/KUB 1 year  · Follow-up 1 year      AMARA Parker    History of Present Illness     Denice Lomeli is a 57 y o  male patient managed by Dr Sumaya Carter who presents for a 1 year follow-up of nephrolithiasis  He is status post left PCNL 10/29/2020  He had URS 8/2020 and 2017  He did complete a 24 hour stone risk profile which revealed an output of only 1000 mL  He also was noted to have elevated calcium oxalate ratio and urinary citrate level of 534  He continues take allopurinol  Was recommend he increase his water intake  We reviewed again today the importance of increasing his water intake as well as avoiding high oxalate foods  Had gross hematuria about 1 month ago that lasted a couple of days  It resolved on its own  No further episodes will continue to monitor  He reports dribbling urine when he gets the urge to go  Recommended pelvic floor physical therapy which he declined  Also recommended Kegel exercises well as timed voiding  IPSS Questionnaire (AUA-7): Over the past month    1)  How often have you had a sensation of not emptying your bladder completely after you finish urinating? 0 - Not at all   2)  How often have you had to urinate again less than two hours after you finished urinating? 2 - Less than half the time   3)  How often have you found you stopped and started again several times when you urinated? 0 - Not at all   4) How difficult have you found it to postpone urination? 5 - Almost always   5) How often have you had a weak urinary stream?  0 - Not at all   6) How often have you had to push or strain to begin urination?   1 - Less than 1 time in 5   7) How many times did you most typically get up to urinate from the time you went to bed until the time you got up in the morning? 1 - 1 time   Total score:  0-7 mildly symptomatic    8-19 moderately symptomatic    20-35 severely symptomatic     He follows with his pcp for prostate cancer screening  He would like to continue this with them  Laboratory     Lab Results   Component Value Date    BUN 13 09/03/2021    CREATININE 0 86 09/03/2021       No components found for: GFR    Lab Results   Component Value Date    CALCIUM 9 0 09/03/2021    K 4 2 09/03/2021    CO2 23 09/03/2021     09/03/2021       Lab Results   Component Value Date    WBC 7 72 09/03/2021    HGB 15 6 09/03/2021    HCT 47 0 09/03/2021    MCV 88 09/03/2021     09/03/2021       Lab Results   Component Value Date    PSA 0 4 09/03/2021    PSA 0 4 04/06/2020    PSA 0 2 09/08/2016       Recent Results (from the past 1 hour(s))   POCT urine dip    Collection Time: 01/05/22 11:29 AM   Result Value Ref Range    LEUKOCYTE ESTERASE,UA -     NITRITE,UA -     SL AMB POCT UROBILINOGEN 0 2     POCT URINE PROTEIN -      PH,UA 5 0     BLOOD,UA -     SPECIFIC GRAVITY,UA 1 030     KETONES,UA -     BILIRUBIN,UA -     GLUCOSE, UA -      COLOR,UA yellow     CLARITY,UA clear        @RESULT(URINEMICROSCOPIC)@    @RESULT(URINECULTURE)@    Radiology     RENAL ULTRASOUND 12/20/2021     INDICATION:   N20 0: Calculus of kidney      COMPARISON: December 16, 2020      TECHNIQUE:   Ultrasound of the retroperitoneum was performed with a curvilinear transducer utilizing volumetric sweeps and still imaging techniques       FINDINGS:     KIDNEYS:  Symmetric and normal size  Right kidney:  12 1 x 5 1 x 5 8 cm  Left kidney:  12 8 x 5 9 x 6 7 cm      Right kidney  Normal echogenicity and contour  No suspicious masses detected  No hydronephrosis  No shadowing calculi  No perinephric fluid collections      Left kidney  Normal echogenicity and contour     No suspicious masses detected  Small cortical cyst in the upper pole, similar to prior exam   No hydronephrosis  There is a 5 mm shadowing calculus with twinkle artifact lower pole  Previously noted additional nonobstructing calculi are not visualized on the current examination  No perinephric fluid collections      URETERS:  Nonvisualized      BLADDER:   Normally distended  No focal thickening or mass lesions  Bilateral ureteral jets detected         IMPRESSION:     5 mm left lower pole nonobstructing calculus  Previously noted additional nonobstructing calculi are not visualized with current examination      No hydronephrosis  Review of Systems     Review of Systems   Constitutional: Negative for activity change, appetite change, chills, fatigue, fever and unexpected weight change  HENT: Negative for facial swelling  Eyes: Negative for discharge  Respiratory: Negative  Negative for cough and shortness of breath  Cardiovascular: Negative for chest pain and leg swelling  Gastrointestinal: Negative  Negative for abdominal distention, abdominal pain, constipation, diarrhea, nausea and vomiting  Endocrine: Negative  Genitourinary: Positive for urgency  Negative for decreased urine volume, difficulty urinating, dysuria, enuresis, flank pain, frequency, genital sores and hematuria  Urge incontinence twice a day   Musculoskeletal: Negative for back pain and myalgias  Skin: Negative for pallor and rash  Allergic/Immunologic: Negative  Negative for immunocompromised state  Neurological: Negative for facial asymmetry and speech difficulty  Psychiatric/Behavioral: Negative for agitation and confusion  Allergies     Allergies   Allergen Reactions    Flomax [Tamsulosin] Dizziness       Physical Exam     Physical Exam  Constitutional:       General: He is not in acute distress  Appearance: Normal appearance  He is obese  He is not ill-appearing, toxic-appearing or diaphoretic     HENT:      Head: Normocephalic and atraumatic  Eyes:      General: No scleral icterus  Cardiovascular:      Rate and Rhythm: Normal rate  Pulmonary:      Effort: Pulmonary effort is normal  No respiratory distress  Abdominal:      General: Abdomen is flat  There is no distension  Palpations: Abdomen is soft  Tenderness: There is no abdominal tenderness  There is no right CVA tenderness, left CVA tenderness, guarding or rebound  Musculoskeletal:         General: No swelling  Cervical back: Normal range of motion  Skin:     General: Skin is warm and dry  Coloration: Skin is not jaundiced or pale  Findings: No rash  Neurological:      General: No focal deficit present  Mental Status: He is alert and oriented to person, place, and time  Gait: Gait normal    Psychiatric:         Mood and Affect: Mood normal          Behavior: Behavior normal          Thought Content:  Thought content normal          Judgment: Judgment normal          Vital Signs     Vitals:    01/05/22 1032   BP: 126/88   Pulse: 72   Weight: 120 kg (264 lb 9 6 oz)       Current Medications       Current Outpatient Medications:     acetaminophen (TYLENOL) 500 mg tablet, Take 500-1,000 mg by mouth every 6 (six) hours as needed for mild pain, Disp: , Rfl:     allopurinol (ZYLOPRIM) 100 mg tablet, TAKE 1 TABLET BY MOUTH EVERY DAY, Disp: 90 tablet, Rfl: 3    ascorbic acid (VITAMIN C) 500 MG tablet, Take 1 tablet (500 mg total) by mouth daily, Disp: 60 tablet, Rfl: 0    cetirizine (ZyrTEC) 10 mg tablet, Take 10 mg by mouth daily, Disp: , Rfl:     metFORMIN (GLUCOPHAGE-XR) 500 mg 24 hr tablet, TAKE 1 TABLET BY MOUTH EVERY DAY WITH BREAKFAST, Disp: 90 tablet, Rfl: 1    Multiple Vitamin (MULTIVITAMIN) tablet, Take 2 tablets by mouth daily, Disp: , Rfl:     Omega-3 Fatty Acids (FISH OIL PO), Take 1 capsule by mouth daily, Disp: , Rfl:     simvastatin (ZOCOR) 40 mg tablet, TAKE 1 TABLET BY MOUTH EVERYDAY AT BEDTIME, Disp: 90 tablet, Rfl: 1    fluticasone (FLONASE) 50 mcg/act nasal spray, 2 sprays into each nostril daily (Patient not taking: Reported on 2/11/2021), Disp: 16 g, Rfl: 0    ibuprofen (MOTRIN) 200 mg tablet, Take 200-800 mg by mouth every 6 (six) hours as needed for mild pain (Patient not taking: Reported on 1/5/2022 ), Disp: , Rfl:     oxyCODONE (Roxicodone) 5 mg immediate release tablet, Take 1-2 tablets every 6 hours prn (Patient not taking: Reported on 11/11/2020), Disp: 8 tablet, Rfl: 0    Active Problems     Patient Active Problem List   Diagnosis    Poison ivy dermatitis    Candidiasis    Glaucoma    Abdominal hernia    Hyperlipidemia    Obesity    Osteoarthritis    Vitamin D insufficiency    Healthcare maintenance    Status post right knee replacement    Leucocytosis    Elevated blood-pressure reading without diagnosis of hypertension    Drug-induced constipation    Hyponatremia    Prostate cancer screening    Type 2 diabetes mellitus without complication, without long-term current use of insulin (HCC)    Ulnar neuropathy of right upper extremity    Carpal tunnel syndrome of right wrist    Cough    Painful urination    Frequent UTI    Frequency of urination    Nephrolithiasis    BPH without obstruction/lower urinary tract symptoms    Delayed emergence from anesthesia    BMI 39 0-39 9,adult    PONV (postoperative nausea and vomiting)    Urge incontinence       Past Medical History     Past Medical History:   Diagnosis Date    Anesthesia complication     difficulty awakening- dyspnea    Anxiety     Arthritis     BPH (benign prostatic hyperplasia)     Cataract     starting    Cleft hard palate     Diabetes ([address] )     borderline does not check blood sugar    Glaucoma suspect, both eyes     Hyperlipidemia     Kidney stone     left    PONV (postoperative nausea and vomiting)     Right ureteral stone     Seasonal allergies     Wears dentures     partial upper    Wears glasses Surgical History     Past Surgical History:   Procedure Laterality Date    CLEFT PALATE REPAIR      multiple surgeries    FL RETROGRADE PYELOGRAM  8/20/2020    FL RETROGRADE PYELOGRAM  10/29/2020    INGUINAL HERNIA REPAIR Right     IR NEPHROURETERAL ACCESS FOR UROLOGY PCNL  10/28/2020    JOINT REPLACEMENT Bilateral     KNEE CARTILAGE SURGERY Left     DE CYSTO/URETERO W/LITHOTRIPSY &INDWELL STENT INSRT Right 8/4/2017    Procedure: CYSTOSCOPY URETEROSCOPY WITH LITHOTRIPSY HOLMIUM LASER, RETROGRADE PYELOGRAM AND INSERTION STENT URETERAL;  Surgeon: Jesika Marte MD;  [facility];  Service: Urology    DE CYSTO/URETERO W/LITHOTRIPSY &INDWELL STENT INSRT Left 8/20/2020    Procedure: CYSTO, URETEROSCOPY W/ LASER, RETROGRADE PYELOGRAM, STENT;  Surgeon: Martha Deluca MD;  [facility];  Service: Urology    DE PERCUT Margothtad CM Left 10/29/2020    Procedure: P C N L  antegrade insertion double j stent insertion and removal nephrostomy tube;   Surgeon: Martha Deluca MD;  [facility];  Service: Urology    DE TOTAL KNEE ARTHROPLASTY Right 3/4/2019    Procedure: TOTAL KNEE ARTHROPLASTY;  Surgeon: Hussain Currie MD;  [facility];  Service: Orthopedics    DE TOTAL KNEE ARTHROPLASTY Left 9/13/2019    Procedure: ARTHROPLASTY KNEE TOTAL;  Surgeon: Hussain Currie MD;  [facility];  Service: Orthopedics    TOTAL KNEE ARTHROPLASTY Left     l 9/14/2019 right 3/4/2019       Family History     Family History   Problem Relation Age of Onset    Diabetes Mother     Heart disease Mother     Hypertension Mother     Esophageal cancer Father     Diabetes Sister     Other Sister        Social History     Social History     Social History     Tobacco Use   Smoking Status Never Smoker   Smokeless Tobacco Never Used   Tobacco Comment    no passive smoke exposure       Past Surgical History:   Procedure Laterality Date    CLEFT PALATE REPAIR      multiple surgeries    FL RETROGRADE PYELOGRAM  8/20/2020    FL RETROGRADE PYELOGRAM  10/29/2020    INGUINAL HERNIA REPAIR Right     IR NEPHROURETERAL ACCESS FOR UROLOGY PCNL  10/28/2020    JOINT REPLACEMENT Bilateral     KNEE CARTILAGE SURGERY Left     AZ CYSTO/URETERO W/LITHOTRIPSY &INDWELL STENT INSRT Right 8/4/2017    Procedure: CYSTOSCOPY URETEROSCOPY WITH LITHOTRIPSY HOLMIUM LASER, RETROGRADE PYELOGRAM AND INSERTION STENT URETERAL;  Surgeon: Jarord Jama MD;  [facility];  Service: Urology    AZ CYSTO/URETERO W/LITHOTRIPSY &INDWELL STENT INSRT Left 8/20/2020    Procedure: CYSTO, URETEROSCOPY W/ LASER, RETROGRADE PYELOGRAM, STENT;  Surgeon: Ana Laura Urrutia MD;  [facility];  Service: Urology    AZ MIHAELAUT Margothtajulienne CM Left 10/29/2020    Procedure: P C N L  antegrade insertion double j stent insertion and removal nephrostomy tube; Surgeon: Ana Laura Urrutia MD;  [facility];  Service: Urology    AZ TOTAL KNEE ARTHROPLASTY Right 3/4/2019    Procedure: TOTAL KNEE ARTHROPLASTY;  Surgeon: Haleigh García MD;  [facility];  Service: Orthopedics    AZ TOTAL KNEE ARTHROPLASTY Left 9/13/2019    Procedure: ARTHROPLASTY KNEE TOTAL;  Surgeon: Haleigh García MD;  [facility];  Service: Orthopedics    TOTAL KNEE ARTHROPLASTY Left     l 9/14/2019 right 3/4/2019         The following portions of the patient's history were reviewed and updated as appropriate: allergies, current medications, past family history, past medical history, past social history, past surgical history and problem list    Please note :  Voice dictation software has been used to create this document  There may be inadvertent transcription errors      [id number] [id number] Carlitos Martínez

## 2022-01-05 NOTE — ASSESSMENT & PLAN NOTE
· Status post PCNL 10/30/2020  · Ultrasound revealed 5 millimeter lower pole stone  · Reviewed AUA dietary recommendations and hydration goals  · Ultrasound/KUB 1 year  · Follow-up 1 year

## 2022-01-08 ENCOUNTER — OFFICE VISIT (OUTPATIENT)
Dept: URGENT CARE | Age: 62
End: 2022-01-08
Payer: COMMERCIAL

## 2022-01-08 VITALS
WEIGHT: 257 LBS | TEMPERATURE: 101 F | BODY MASS INDEX: 40.34 KG/M2 | RESPIRATION RATE: 18 BRPM | OXYGEN SATURATION: 95 % | HEIGHT: 67 IN | HEART RATE: 97 BPM

## 2022-01-08 DIAGNOSIS — B34.9 VIRAL ILLNESS: Primary | ICD-10-CM

## 2022-01-08 PROCEDURE — U0005 INFEC AGEN DETEC AMPLI PROBE: HCPCS | Performed by: PHYSICIAN ASSISTANT

## 2022-01-08 PROCEDURE — U0003 INFECTIOUS AGENT DETECTION BY NUCLEIC ACID (DNA OR RNA); SEVERE ACUTE RESPIRATORY SYNDROME CORONAVIRUS 2 (SARS-COV-2) (CORONAVIRUS DISEASE [COVID-19]), AMPLIFIED PROBE TECHNIQUE, MAKING USE OF HIGH THROUGHPUT TECHNOLOGIES AS DESCRIBED BY CMS-2020-01-R: HCPCS | Performed by: PHYSICIAN ASSISTANT

## 2022-01-08 PROCEDURE — 99213 OFFICE O/P EST LOW 20 MIN: CPT | Performed by: PHYSICIAN ASSISTANT

## 2022-01-08 RX ORDER — BROMPHENIRAMINE MALEATE, PSEUDOEPHEDRINE HYDROCHLORIDE, AND DEXTROMETHORPHAN HYDROBROMIDE 2; 30; 10 MG/5ML; MG/5ML; MG/5ML
10 SYRUP ORAL 4 TIMES DAILY PRN
Qty: 120 ML | Refills: 0 | Status: SHIPPED | OUTPATIENT
Start: 2022-01-08

## 2022-01-08 RX ORDER — AZELASTINE 1 MG/ML
1 SPRAY, METERED NASAL 2 TIMES DAILY
Qty: 1 ML | Refills: 0 | Status: SHIPPED | OUTPATIENT
Start: 2022-01-08

## 2022-01-08 NOTE — PATIENT INSTRUCTIONS
Use medications as directed for symptomatic relief  Motrin and/or Tylenol as needed for fevers aches and pains  Follow up with PCP in 3-5 days  Proceed to  ER if symptoms worsen  COVID-19 (Coronavirus Disease 2019)   AMBULATORY CARE:   Coronavirus disease 2019 (COVID-19)  is the disease caused by a coronavirus first discovered in December 2019  Coronaviruses generally cause upper respiratory (nose, throat, and lung) infections, such as a cold  The new virus spreads quickly and easily  The virus can be spread starting 2 days before symptoms even begin  The virus has also changed into several new forms (called variants) since it was discovered  The variants may be more contagious (easily spread) than the original form  Some may also cause more severe illness than others  It is important to follow local, national, and worldwide measures to protect yourself and others from infection  Signs and symptoms of COVID-19  may not develop  Signs and symptoms that develop usually start about 5 days after infection but can take 2 to 14 days  You may feel like you have the flu or a bad cold  Some signs and symptoms go away in a few days  Others can last weeks, months, or possibly years  Information on COVID-19 is still being learned   Tell your healthcare provider if you think you were infected but develop signs or symptoms not listed below:  · A cough    · Shortness of breath or trouble breathing that may become severe    · A fever of at least 100 4°F, or 38°C (may be lower in adults 65 or older)    · Chills that might include shaking    · Muscle pain, body aches, or a headache    · A sore throat    · Suddenly not being able to taste or smell anything    · Feeling mentally and physically tired (fatigue)    · Congestion (stuffy head and nose), or a runny nose    · Diarrhea, nausea, or vomiting    If you think you or someone you know may be infected:  Do the following to protect others:  · If emergency care is needed,  tell the  about the possible infection, or call ahead and tell the emergency department  · Call a healthcare provider  for instructions if symptoms are mild  Anyone who may be infected should not  arrive without calling first  The provider will need to protect staff members and other patients  · The person who may be infected needs to wear a face covering  while getting medical care  This will help lower the risk of infecting others  Coverings are not used for anyone who is younger than 2 years, has breathing problems, or cannot remove it  The provider can give you instructions for anyone who cannot wear a covering  Call your local emergency number (911 in the [address] [place] [address],[address]) or an emergency department if:   · You have trouble breathing or shortness of breath at rest     · You have chest pain or pressure that lasts longer than 5 minutes  · You become confused or hard to wake  · Your lips or face are blue  · You have a fever of 104°F (40°C) or higher  Call your doctor if:   · You do not  have symptoms of COVID-19 but had close physical contact within 14 days with someone who tested positive  · You have questions or concerns about your condition or care  How COVID-19 is diagnosed: If you think you have COVID-19, call your healthcare provider  He or she will tell you what to do based on your symptoms and testing guidelines in your area  In general, the following may be used:  · A viral test  shows if you have a current infection  Samples are taken from your nose and throat, usually with swabs  You may need to wait several days to get the test results  Your healthcare provider will tell you how to get your results  You will need to quarantine (stay physically away from others) until you get your results  If results show you have COVID-19, you will need to continue until you are well  Your provider or other health official may give you more directions   You will also need to prevent another infection until it is known if you can get COVID-19 again  · An antibody test  shows if you had a past infection  Blood samples are used for this test  Antibodies are made by your immune system to attack the virus that causes COVID-19  Antibodies will form 1 to 3 weeks after you are infected  It is not known if antibodies prevent a second infection, or for how long a person might be protected  If you have antibodies, you will still need to be careful around others until more is known  · CT scans or x-rays  may be used to check for signs of pneumonia  The 2019 coronavirus causes a specific kind of pneumonia, usually in both lungs  The pictures may also be used to check for health problems in other parts of your body  Treatment  such as monoclonal antibodies and convalescent plasma have emergency use authorization (EUA)  This means they may be given only to patients who are hospitalized with severe signs and symptoms  The following may be used to manage your symptoms:  · Mild symptoms  may get better on their own  If you do not need to be treated in a hospital, you will be given instructions to use at home  Your condition will be closely monitored  You will need to watch for worsening symptoms and seek immediate care if needed  Talk to your healthcare provider about the following:    ? Decongestants  help reduce nasal congestion and help you breathe more easily  If you take decongestant pills, they may make you feel restless or cause problems with your sleep  Do not use decongestant sprays for more than a few days  ? Cough suppressants  help reduce coughing  Ask your healthcare provider which type of cough medicine is best for you  ? To soothe a sore throat,  gargle with warm salt water, or use throat lozenges or a throat spray  Drink more liquids to thin and loosen mucus and to prevent dehydration  ? NSAIDs or acetaminophen  can help lower a fever and relieve body aches or a headache  Follow directions   If not taken correctly, NSAIDs can cause kidney damage and acetaminophen can cause liver damage  · Severe or life-threatening symptoms  are treated in the hospital  You may need a combination of the following:    ? Medicines  may be given to lower or prevent inflammation or to fight the virus  You may also need blood thinners to prevent or treat blood clots  If you have a deep vein thrombosis (DVT) or pulmonary embolism (PE), you may need to keep using blood thinners for 3 months  ? Extra oxygen  may be given if you have respiratory failure  This means your lungs cannot get enough oxygen into your blood and out to your organs  Extra oxygen can help prevent organ failure  ? A ventilator  may be used to help you breathe  What you need to know about health problems the virus may cause:  Serious health problems may improve or continue for weeks, months, and possibly years  Health problems that continue may be called long COVID  Anyone can develop serious problems from this virus, but your risk is higher if you are 72 or older  A weak immune system, diabetes, or a heart or lung condition can also increase your risk  Your risk is also higher if you are a current or former cigarette smoker  COVID-19 can lead to any of the following:  · Multisymptom inflammatory syndrome in adults (MIS-A) or in children (MIS-C), causing inflammation in the heart, digestive system, skin, or brain    · Serious lower respiratory conditions, such as pneumonia or acute respiratory distress syndrome (ARDS)    · Blood vessel damage, leading to blood clots    · Organ damage from a lack of oxygen or from blood clots    · Sleep problems    · Problems thinking clearly, remembering information, or concentrating    · Mood changes, depression, or anxiety    What you need to know about COVID-19 vaccines:  Get a vaccine even if you already had COVID-19  · COVID-19 vaccines are given as a shot in 1 or 2 doses    Some vaccines have emergency use authorization (EUA)  An EUA means the vaccine is not approved but is given because the benefits outweigh the risks  A 2-dose vaccine is fully approved for use in those 16 years or older  This vaccine also has an EUA for adolescents 12 to 15 years  Your healthcare provider can help you understand the benefits and risks  · A third dose is recommended for adults with a weakened immune system who get a 2-dose vaccine  The third dose is given at least 28 days after the second  · Even after you get the vaccine, continue social distancing and other measures  Experts are still learning how well the vaccines work to prevent infection, transmission, and severe illness  Although rare, you can become infected after you get the vaccine  You may also be able to pass the virus to others without knowing you are infected  · After you get the vaccine, check local, national, and international travel rules  Check to see if you need to be tested before you travel  You may also need to quarantine after you return  Some countries require proof of a negative test before you leave  You should also be tested 3 to 5 days after you return from another country  How the 2019 coronavirus spreads: The following are ways the virus is thought to spread, but more information may be coming:  · Droplets are the main way all coronaviruses spread  The virus travels in droplets that form when a person talks, coughs, or sneezes  The droplets can also float in the air for minutes or hours  Infection happens when you breathe in the droplets or get them in your eyes or nose  Close personal contact with an infected person increases your risk for infection  This means being within 6 feet (2 meters) of the person for at least 15 minutes over 24 hours  · Person-to-person contact can spread the virus  For example, a person with the virus on his or her hands can spread it by shaking hands with someone      · The virus can stay on objects and surfaces for a short time  You may become infected by touching the object or surface and then touching your eyes or mouth  · An infected animal may be able to infect a person who touches it  This may happen at live markets or on a farm  Help lower the risk for COVID-19:  The best way to prevent infection is to avoid anyone who is infected, but this can be hard to do  An infected person can spread the virus before signs or symptoms begin, or even if signs or symptoms never develop  The following can help lower the risk for infection:      · Wash your hands often throughout the day  Use soap and water  Rub your soapy hands together, lacing your fingers, for at least 20 seconds  Rinse with warm, running water  Dry your hands with a clean towel or paper towel  Use hand  that contains alcohol if soap and water are not available  Teach children how to wash their hands and use hand   · Cover sneezes and coughs  Turn your face away and cover your mouth and nose with a tissue  Throw the tissue away  Use the bend of your arm if a tissue is not available  Then wash your hands well with soap and water or use hand   Teach children how to cover a cough or sneeze  · Wear a face covering (mask) around anyone who does not live in your home  Use a cloth covering with at least 2 layers  You can also create layers by putting a cloth covering over a disposable non-medical mask  Cover your mouth and your nose  The covering should fit snugly against the bridge of your nose  Securely fasten it under your chin and on the sides of your face  Do not  wear a plastic face shield instead of a covering  Continue social distancing and washing your hands often  A face covering is not a substitute for social distancing safety measures  · Follow worldwide, national, and local social distancing guidelines  Keep at least 6 feet (2 meters) between you and others   Also keep this distance from anyone who comes to your home, such as someone making a delivery  Wear a face covering while you are around others  You will need to wear a covering in restaurants, stores, and other public buildings  You will also need a covering on mass transit, such as a bus, subway, or airplane  Remember to use a covering made from thick material or wear 2 coverings together  · Make a habit of not touching your face  If you get the virus on your hands, you can transfer it to your eyes, nose, or mouth and become infected  You can also transfer it to objects, surfaces, or people  Do not touch your eyes, nose, or mouth without washing your hands first     · Clean and disinfect high-touch surfaces and objects often  Use disinfecting wipes, or make a solution of 4 teaspoons of bleach in 1 quart (4 cups) of water  Clean and disinfect even if you think no one living in or coming to your home is infected with the virus  · Ask about other vaccines you may need  Get the influenza (flu) vaccine as soon as recommended each year, usually starting in September or October  Get the pneumonia vaccine if recommended  Your healthcare provider can tell you if you should also get other vaccines, and when to get them  Follow social distancing guidelines:  National and local social distancing rules vary  Rules may change over time as restrictions are lifted  Restrictions may return if an outbreak happens where you live  It is important to know and follow all current social distancing rules in your area  The following are general guidelines:  · Stay home if you are sick or think you may have COVID-19  It is important to stay home if you are waiting for a testing appointment or for test results  Even if you do not have symptoms, you can pass the virus to others  · Limit trips out of your home  Have food, medicines, and other supplies delivered and left at your door or other area, if possible   Plan trips out of your home so you make the fewest stops possible to limit close personal contact  Keep track of places you go  This will help contact tracers notify others if you become infected  · Avoid close physical contact with anyone who does not live in your home  Do not shake hands with, hug, or kiss a person as a greeting  If you must use public transportation (such as a bus or subway), try to sit or stand away from others  Only allow necessary people into your home  Wear your face covering, and remind others to wear a face covering  Remind them to wash their hands when they arrive and before they leave  Do not  let someone into your home or go to someone's home just to visit  Even if you both do not feel sick, the virus can pass from one of you to the other  · Avoid in-person gatherings and crowds  Gatherings or crowds of 10 or more individuals can cause the virus to spread  Avoid places such as bruno, beaches, sporting events, and tourist attractions  For events such as parties, holiday meals, Sikh services, and conferences, attend virtually (on a computer), if possible  · Ask your healthcare provider for other ways to have appointments  Some providers offer phone, video, or other types of appointments  You may also be able to get prescriptions for a few months of your medicines at a time  · Stay safe if you must go out to work  Keep physical distance between you and other workers as much as possible  Follow your employer's rules so everyone stays safe  If you have COVID-19 and are recovering at home,  healthcare providers will give you specific instructions to follow  The following are general guidelines to remind you how to keep others safe until you are well:  · Wash your hands often  Use soap and water as much as possible  Use hand  that contains alcohol if soap and water are not available  Dry your hands with a clean towel or paper towel  Do not share towels with anyone   If you use paper towels, throw them away in a lined trash can kept in your room or area  Use a covered trash can, if possible  · Do not go out of your home unless it is necessary  Ask someone who is not infected to go out for groceries or supplies, or have them delivered  Do not go to your healthcare provider's office without an appointment  · Only have close physical contact with a person giving direct care, or a baby or child you must care for  Family members and friends should not visit you  If possible, stay in a separate area or room of your home if you live with others  No one should go into the area or room except to give you care  You can visit with others by phone, video chat, e-mail, or similar systems  · Wear a face covering while others are near you  This can help prevent droplets from spreading the virus when you talk, sneeze, or cough  Put the covering on before anyone comes into your room or area  Remind the person to cover his or her nose and mouth before coming in to provide care for you  · Do not share items  Do not share dishes, towels, or other items with anyone  Items need to be washed after you use them  · Protect your baby  Some newborns have tested positive for the virus  It is not known if they became infected before or after birth  The highest risk is when a  has close contact with an infected person  If you are pregnant or breastfeeding, talk to your healthcare provider or obstetrician about any concerns you have  He or she will tell you when to bring your baby in for check-ups and vaccines  He or she will also tell you what to do if you think your baby was infected with the coronavirus  Wash your hands and put on a clean face covering before you breastfeed or care for your baby  · Do not handle live animals unless it is necessary  Some animals, including pets, have been infected with the new coronavirus  Ask someone who is not infected to take care of your pet until you are well   If you must care for a pet, wear a face covering  Wash your hands before and after you give care  Talk to your healthcare provider about how to keep a service animal safe, if needed  · Follow directions from your healthcare provider for being around others after you recover  It is not known if or for how long a recovered person can pass the virus to others  Your provider may give you instructions, such as continuing social distancing and wearing a face covering  He or she will tell you when it is okay to be around others again  This may be 10 to 20 days after symptoms started or you had a positive test  Most symptoms will also need to be gone  Your provider will give you more information  Follow up with your doctor as directed:  Write down your questions so you remember to ask them during your visits  For more information:   · Centers for Disease Control and Prevention  [address] , [address]  Phone: 2- 911 - 578-6747  Web Address: [employer]    © [facility] [facility] 2021 Information is for End User's use only and may not be sold, redistributed or otherwise used for commercial purposes  All illustrations and images included in CareNotes® are the copyrighted property of [employer]  or [address] [place], [place] [address]   The above information is an  only  It is not intended as medical advice for individual conditions or treatments  Talk to your doctor, nurse or pharmacist before following any medical regimen to see if it is safe and effective for you

## 2022-01-08 NOTE — LETTER
[facility] Strong [address]  [address] [address]  Dept: [phone]    January 8, 2022    Patient: Sarah Beth Berry  YOB: 1960    Sarah Beth Berry was seen and evaluated at our [facility]  Please note if Covid and Flu tests are negative, they may return to work when fever free for 24 hours without the use of a fever reducing agent  If Covid or Flu test is positive, they may return to work on 01/12/2022, as this is 5 days from the onset of symptoms  Upon return, they must then adhere to strict masking for an additional 5 days  Sincerely,    Ailyn Sutherland PA-C

## 2022-01-08 NOTE — PROGRESS NOTES
[facility]        NAME: Shagufta Blanc is a 57 y o  male  : 1960    MRN: [id number]  DATE: 2022  TIME: 6:02 PM    Assessment and Plan   Viral illness [B34 9]  1  Viral illness  COVID Only -Office Collect    brompheniramine-pseudoephedrine-DM 30-2-10 MG/5ML syrup    azelastine (ASTELIN) 0 1 % nasal spray         Patient Instructions     Use medications as directed for symptomatic relief  Motrin and/or Tylenol as needed for fevers aches and pains  Follow up with PCP in 3-5 days  Proceed to  ER if symptoms worsen  Chief Complaint     Chief Complaint   Patient presents with    Cough     cough, sore throat, congestion, chills began yesterday         History of Present Illness       79-year-old male presents with cough sore throat congestion chills and fever that started yesterday  Denies any abdominal pain nausea vomiting or diarrhea  Denies any headaches  No loss of taste or smell  Cough  This is a new problem  The current episode started yesterday  The problem has been waxing and waning  The problem occurs constantly  The cough is non-productive  Associated symptoms include chills, a fever, myalgias, nasal congestion, rhinorrhea and a sore throat  Pertinent negatives include no chest pain, ear congestion, ear pain or headaches  Nothing aggravates the symptoms  He has tried nothing for the symptoms  The treatment provided no relief  Review of Systems   Review of Systems   Constitutional: Positive for chills and fever  HENT: Positive for rhinorrhea and sore throat  Negative for ear pain  Eyes: Negative  Respiratory: Positive for cough  Cardiovascular: Negative  Negative for chest pain  Gastrointestinal: Negative  Musculoskeletal: Positive for myalgias  Skin: Negative  Neurological: Negative  Negative for headaches           Current Medications       Current Outpatient Medications:     acetaminophen (TYLENOL) 500 mg tablet, Take 500-1,000 mg by mouth every 6 (six) hours as needed for mild pain, Disp: , Rfl:     allopurinol (ZYLOPRIM) 100 mg tablet, TAKE 1 TABLET BY MOUTH EVERY DAY, Disp: 90 tablet, Rfl: 3    ascorbic acid (VITAMIN C) 500 MG tablet, Take 1 tablet (500 mg total) by mouth daily, Disp: 60 tablet, Rfl: 0    cetirizine (ZyrTEC) 10 mg tablet, Take 10 mg by mouth daily, Disp: , Rfl:     metFORMIN (GLUCOPHAGE-XR) 500 mg 24 hr tablet, TAKE 1 TABLET BY MOUTH EVERY DAY WITH BREAKFAST, Disp: 90 tablet, Rfl: 1    Multiple Vitamin (MULTIVITAMIN) tablet, Take 2 tablets by mouth daily, Disp: , Rfl:     Omega-3 Fatty Acids (FISH OIL PO), Take 1 capsule by mouth daily, Disp: , Rfl:     simvastatin (ZOCOR) 40 mg tablet, TAKE 1 TABLET BY MOUTH EVERYDAY AT BEDTIME, Disp: 90 tablet, Rfl: 1    azelastine (ASTELIN) 0 1 % nasal spray, 1 spray into each nostril 2 (two) times a day Use in each nostril as directed, Disp: 1 mL, Rfl: 0    brompheniramine-pseudoephedrine-DM 30-2-10 MG/5ML syrup, Take 10 mL by mouth 4 (four) times a day as needed for congestion or cough, Disp: 120 mL, Rfl: 0    fluticasone (FLONASE) 50 mcg/act nasal spray, 2 sprays into each nostril daily (Patient not taking: Reported on 2/11/2021), Disp: 16 g, Rfl: 0    ibuprofen (MOTRIN) 200 mg tablet, Take 200-800 mg by mouth every 6 (six) hours as needed for mild pain (Patient not taking: Reported on 1/5/2022 ), Disp: , Rfl:     oxyCODONE (Roxicodone) 5 mg immediate release tablet, Take 1-2 tablets every 6 hours prn (Patient not taking: Reported on 11/11/2020), Disp: 8 tablet, Rfl: 0    Current Allergies     Allergies as of 01/08/2022 - Reviewed 01/08/2022   Allergen Reaction Noted    Flomax [tamsulosin] Dizziness 09/03/2020            The following portions of the patient's history were reviewed and updated as appropriate: allergies, current medications, past family history, past medical history, past social history, past surgical history and problem list      Past Medical History:   Diagnosis Date    Anesthesia complication     difficulty awakening- dyspnea    Anxiety     Arthritis     BPH (benign prostatic hyperplasia)     Cataract     starting    Cleft hard palate     Diabetes ([address] )     borderline does not check blood sugar    Glaucoma suspect, both eyes     Hyperlipidemia     Kidney stone     left    PONV (postoperative nausea and vomiting)     Right ureteral stone     Seasonal allergies     Wears dentures     partial upper    Wears glasses        Past Surgical History:   Procedure Laterality Date    CLEFT PALATE REPAIR      multiple surgeries    FL RETROGRADE PYELOGRAM  8/20/2020    FL RETROGRADE PYELOGRAM  10/29/2020    INGUINAL HERNIA REPAIR Right     IR NEPHROURETERAL ACCESS FOR UROLOGY PCNL  10/28/2020    JOINT REPLACEMENT Bilateral     KNEE CARTILAGE SURGERY Left     NE CYSTO/URETERO W/LITHOTRIPSY &INDWELL STENT INSRT Right 8/4/2017    Procedure: CYSTOSCOPY URETEROSCOPY WITH LITHOTRIPSY HOLMIUM LASER, RETROGRADE PYELOGRAM AND INSERTION STENT URETERAL;  Surgeon: Jesika Marte MD;  [facility];  Service: Urology    NE CYSTO/URETERO W/LITHOTRIPSY &INDWELL STENT INSRT Left 8/20/2020    Procedure: CYSTO, URETEROSCOPY W/ LASER, RETROGRADE PYELOGRAM, STENT;  Surgeon: Martha Deluca MD;  [facility];  Service: Urology    NE PERCUT Quentinmarytad CM Left 10/29/2020    Procedure: P C N L  antegrade insertion double j stent insertion and removal nephrostomy tube;   Surgeon: Martha Deluca MD;  [facility];  Service: Urology    NE TOTAL KNEE ARTHROPLASTY Right 3/4/2019    Procedure: TOTAL KNEE ARTHROPLASTY;  Surgeon: Hussain Currie MD;  [facility];  Service: Orthopedics    NE TOTAL KNEE ARTHROPLASTY Left 9/13/2019    Procedure: ARTHROPLASTY KNEE TOTAL;  Surgeon: Hussain Currie MD;  [facility];  Service: Orthopedics    TOTAL KNEE ARTHROPLASTY Left     l 9/14/2019 right 3/4/2019       Family History   Problem Relation Age of Onset    Diabetes Mother     Heart disease Mother     Hypertension Mother     Esophageal cancer Father     Diabetes Sister     Other Sister          Medications have been verified  Objective   Pulse 97   Temp (!) 101 °F (38 3 °C) (Tympanic)   Resp 18   Ht 5' 7" (1 702 m)   Wt 117 kg (257 lb)   SpO2 95%   BMI 40 25 kg/m²   No LMP for male patient  Physical Exam     Physical Exam  Vitals and nursing note reviewed  Constitutional:       General: He is not in acute distress  Appearance: Normal appearance  He is well-developed  HENT:      Head: Normocephalic and atraumatic  Right Ear: Hearing, tympanic membrane, ear canal and external ear normal  There is no impacted cerumen  Left Ear: Hearing, tympanic membrane, ear canal and external ear normal  There is no impacted cerumen  Nose: Congestion and rhinorrhea present  Mouth/Throat:      Pharynx: Uvula midline  No oropharyngeal exudate  Eyes:      General:         Right eye: No discharge  Left eye: No discharge  Conjunctiva/sclera: Conjunctivae normal    Cardiovascular:      Rate and Rhythm: Normal rate and regular rhythm  Heart sounds: Normal heart sounds  No murmur heard  Pulmonary:      Effort: Pulmonary effort is normal  No respiratory distress  Breath sounds: Normal breath sounds  No wheezing or rales  Abdominal:      General: Bowel sounds are normal       Palpations: Abdomen is soft  Tenderness: There is no abdominal tenderness  Musculoskeletal:         General: Normal range of motion  Cervical back: Normal range of motion and neck supple  Lymphadenopathy:      Cervical: No cervical adenopathy  Skin:     General: Skin is warm and dry  Neurological:      Mental Status: He is alert and oriented to person, place, and time     Psychiatric:         Mood and Affect: Mood normal

## 2022-01-10 ENCOUNTER — TELEPHONE (OUTPATIENT)
Dept: URGENT CARE | Age: 62
End: 2022-01-10

## 2022-01-10 ENCOUNTER — TELEPHONE (OUTPATIENT)
Dept: FAMILY MEDICINE CLINIC | Facility: CLINIC | Age: 62
End: 2022-01-10

## 2022-01-10 LAB — SARS-COV-2 RNA RESP QL NAA+PROBE: POSITIVE

## 2022-01-10 NOTE — TELEPHONE ENCOUNTER
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

## 2022-01-10 NOTE — TELEPHONE ENCOUNTER
Pt is concerned about going back to work since he is a    I did schedule him appointment for 1/11/22 with Dr Lenore Taylor virtually

## 2022-01-10 NOTE — TELEPHONE ENCOUNTER
Spoke with pt about positive covid  Pt understands  Told to folllow up with pcp for further monitoring

## 2022-01-11 ENCOUNTER — TELEMEDICINE (OUTPATIENT)
Dept: FAMILY MEDICINE CLINIC | Facility: CLINIC | Age: 62
End: 2022-01-11
Payer: COMMERCIAL

## 2022-01-11 VITALS — WEIGHT: 257 LBS | BODY MASS INDEX: 40.34 KG/M2 | HEIGHT: 67 IN

## 2022-01-11 DIAGNOSIS — U07.1 COVID-19 VIRUS INFECTION: ICD-10-CM

## 2022-01-11 DIAGNOSIS — J21.8 ACUTE BRONCHIOLITIS DUE TO OTHER SPECIFIED ORGANISMS: Primary | ICD-10-CM

## 2022-01-11 PROCEDURE — 1036F TOBACCO NON-USER: CPT | Performed by: FAMILY MEDICINE

## 2022-01-11 PROCEDURE — 3008F BODY MASS INDEX DOCD: CPT | Performed by: FAMILY MEDICINE

## 2022-01-11 PROCEDURE — 99214 OFFICE O/P EST MOD 30 MIN: CPT | Performed by: FAMILY MEDICINE

## 2022-01-11 RX ORDER — ALBUTEROL SULFATE 90 UG/1
2 AEROSOL, METERED RESPIRATORY (INHALATION) EVERY 6 HOURS PRN
Qty: 6.7 G | Refills: 5 | Status: SHIPPED | OUTPATIENT
Start: 2022-01-11

## 2022-01-11 RX ORDER — AZITHROMYCIN 250 MG/1
TABLET, FILM COATED ORAL
Qty: 6 TABLET | Refills: 0 | Status: SHIPPED | OUTPATIENT
Start: 2022-01-11 | End: 2022-01-15

## 2022-01-11 RX ORDER — PREDNISONE 50 MG/1
50 TABLET ORAL DAILY
Qty: 5 TABLET | Refills: 0 | Status: SHIPPED | OUTPATIENT
Start: 2022-01-11 | End: 2022-01-16

## 2022-01-11 NOTE — ASSESSMENT & PLAN NOTE
This is day 3 since symptoms started  Respiratory status stable but he complained of hoarseness and wheezing  He was given prescription for prednisone and albuterol inhaler  Increase oral hydration use humidifier at home  If symptoms worse call or go to the emergency room

## 2022-01-11 NOTE — PROGRESS NOTES
COVID-19 Outpatient Progress Note    Assessment/Plan:    Problem List Items Addressed This Visit        Respiratory    Acute bronchiolitis due to other specified organisms - Primary     He was given prescriptions for prednisone, Z-Jose and albuterol inhaler  Increase oral hydration and use humidifier at home  If symptoms worse call back  Relevant Medications    azithromycin (ZITHROMAX) 250 mg tablet    albuterol (Proventil HFA) 90 mcg/act inhaler    predniSONE 50 mg tablet       Other    COVID-19 virus infection     This is day 3 since symptoms started  Respiratory status stable but he complained of hoarseness and wheezing  He was given prescription for prednisone and albuterol inhaler  Increase oral hydration use humidifier at home  If symptoms worse call or go to the emergency room  Disposition:     I recommended continued isolation until at least 24 hours have passed since recovery defined as resolution of fever without the use of fever-reducing medications AND improvement in COVID symptoms AND 10 days have passed since onset of symptoms (or 10 days have passed since date of first positive viral diagnostic test for asymptomatic patients)  I have spent 25 minutes directly with the patient  Greater than 50% of this time was spent in counseling/coordination of care regarding: risks and benefits of treatment options, instructions for management and patient and family education        Encounter provider Angy Valdez MD    Provider located at [address]  [id number] [address] [phone]    Recent Visits  Date Type Provider Dept   01/10/22 Telephone [facility]   Showing recent visits within past 7 days and meeting all other requirements  Today's Visits  Date Type Provider Dept   01/11/22 Telemedicine Angy Valdez MD [facility] [address] [facility] Primary Care   Showing today's visits and meeting all other requirements  Future Appointments  No visits were found meeting these conditions  Showing future appointments within next 150 days and meeting all other requirements     This virtual check-in was done via [employer] and patient was informed that this is a secure, HIPAA-compliant platform  He agrees to proceed  Patient agrees to participate in a virtual check in via telephone or video visit instead of presenting to the office to address urgent/immediate medical needs  Patient is aware this is a billable service  After connecting through [facility], the patient was identified by name and date of birth  Danyel Sánchez was informed that this was a telemedicine visit and that the exam was being conducted confidentially over secure lines  My office door was closed  No one else was in the room  Danyel Sánchez acknowledged consent and understanding of privacy and security of the telemedicine visit  I informed the patient that I have reviewed his record in Epic and presented the opportunity for him to ask any questions regarding the visit today  The patient agreed to participate  Verification of patient location:  Patient is located in the following state in which I hold an active license: [place]    Subjective:   Danyel Sánchez is a 57 y o  male who has been screened for COVID-19  Symptom change since last report: unchanged  Patient's symptoms include nasal congestion, rhinorrhea, cough and shortness of breath  Patient denies fever, chills, vomiting and diarrhea  - Date of symptom onset: 1/8/2022  - Date of positive COVID-19 PCR: 1/8/2022  Type of test: PCR    COVID-19 vaccination status: Fully vaccinated with booster    Kuldeep Thakur has been staying home and has isolated themselves in his home  He is taking care to not share personal items and is cleaning all surfaces that are touched often, like counters, tabletops, and doorknobs using household cleaning sprays or wipes   He is wearing a mask when he leaves his room  Lab Results   Component Value Date    SARSCOV2 Positive (A) 01/08/2022    SARSCOV2 Not Detected 10/21/2020     Past Medical History:   Diagnosis Date    Anesthesia complication     difficulty awakening- dyspnea    Anxiety     Arthritis     BPH (benign prostatic hyperplasia)     Cataract     starting    Cleft hard palate     Diabetes ([address] )     borderline does not check blood sugar    Glaucoma suspect, both eyes     Hyperlipidemia     Kidney stone     left    PONV (postoperative nausea and vomiting)     Right ureteral stone     Seasonal allergies     Wears dentures     partial upper    Wears glasses      Past Surgical History:   Procedure Laterality Date    CLEFT PALATE REPAIR      multiple surgeries    FL RETROGRADE PYELOGRAM  8/20/2020    FL RETROGRADE PYELOGRAM  10/29/2020    INGUINAL HERNIA REPAIR Right     IR NEPHROURETERAL ACCESS FOR UROLOGY PCNL  10/28/2020    JOINT REPLACEMENT Bilateral     KNEE CARTILAGE SURGERY Left     ND CYSTO/URETERO W/LITHOTRIPSY &INDWELL STENT INSRT Right 8/4/2017    Procedure: CYSTOSCOPY URETEROSCOPY WITH LITHOTRIPSY HOLMIUM LASER, RETROGRADE PYELOGRAM AND INSERTION STENT URETERAL;  Surgeon: Jesika Marte MD;  [facility];  Service: Urology    ND CYSTO/URETERO W/LITHOTRIPSY &INDWELL STENT INSRT Left 8/20/2020    Procedure: CYSTO, URETEROSCOPY W/ LASER, RETROGRADE PYELOGRAM, STENT;  Surgeon: Martha Deluca MD;  [facility];  Service: Urology    ND STEVEN Gonzalez CM Left 10/29/2020    Procedure: P C N L  antegrade insertion double j stent insertion and removal nephrostomy tube;   Surgeon: Martha Deluca MD;  [facility];  Service: Urology    ND TOTAL KNEE ARTHROPLASTY Right 3/4/2019    Procedure: TOTAL KNEE ARTHROPLASTY;  Surgeon: Hussain Currie MD;  [facility];  Service: Orthopedics    ND TOTAL KNEE ARTHROPLASTY Left 9/13/2019    Procedure: ARTHROPLASTY KNEE TOTAL;  Surgeon: Marty Vega MD Samantha;  [facility];  Service: Orthopedics    TOTAL KNEE ARTHROPLASTY Left     l 9/14/2019 right 3/4/2019     Current Outpatient Medications   Medication Sig Dispense Refill    acetaminophen (TYLENOL) 500 mg tablet Take 500-1,000 mg by mouth every 6 (six) hours as needed for mild pain      allopurinol (ZYLOPRIM) 100 mg tablet TAKE 1 TABLET BY MOUTH EVERY DAY 90 tablet 3    ascorbic acid (VITAMIN C) 500 MG tablet Take 1 tablet (500 mg total) by mouth daily 60 tablet 0    cetirizine (ZyrTEC) 10 mg tablet Take 10 mg by mouth daily      fluticasone (FLONASE) 50 mcg/act nasal spray 2 sprays into each nostril daily 16 g 0    metFORMIN (GLUCOPHAGE-XR) 500 mg 24 hr tablet TAKE 1 TABLET BY MOUTH EVERY DAY WITH BREAKFAST 90 tablet 1    Multiple Vitamin (MULTIVITAMIN) tablet Take 2 tablets by mouth daily      Omega-3 Fatty Acids (FISH OIL PO) Take 1 capsule by mouth daily      simvastatin (ZOCOR) 40 mg tablet TAKE 1 TABLET BY MOUTH EVERYDAY AT BEDTIME 90 tablet 1    albuterol (Proventil HFA) 90 mcg/act inhaler Inhale 2 puffs every 6 (six) hours as needed for wheezing 6 7 g 5    azelastine (ASTELIN) 0 1 % nasal spray 1 spray into each nostril 2 (two) times a day Use in each nostril as directed (Patient not taking: Reported on 1/11/2022 ) 1 mL 0    azithromycin (ZITHROMAX) 250 mg tablet Take 2 tablets today then 1 tablet daily x 4 days 6 tablet 0    brompheniramine-pseudoephedrine-DM 30-2-10 MG/5ML syrup Take 10 mL by mouth 4 (four) times a day as needed for congestion or cough (Patient not taking: Reported on 1/11/2022 ) 120 mL 0    ibuprofen (MOTRIN) 200 mg tablet Take 200-800 mg by mouth every 6 (six) hours as needed for mild pain (Patient not taking: Reported on 1/5/2022 )      oxyCODONE (Roxicodone) 5 mg immediate release tablet Take 1-2 tablets every 6 hours prn (Patient not taking: Reported on 11/11/2020) 8 tablet 0    predniSONE 50 mg tablet Take 1 tablet (50 mg total) by mouth daily for 5 days 5 tablet 0     No current facility-administered medications for this visit  Allergies   Allergen Reactions    Flomax [Tamsulosin] Dizziness       Review of Systems   Constitutional: Negative for activity change, chills and fever  HENT: Positive for congestion, postnasal drip, rhinorrhea and sinus pressure  Respiratory: Positive for cough, shortness of breath and wheezing  Negative for apnea  Gastrointestinal: Negative for diarrhea and vomiting  Skin: Negative for rash  Neurological: Negative for dizziness  Objective:    Vitals:    01/11/22 1415   Weight: 117 kg (257 lb)   Height: 5' 7" (1 702 m)       Physical Exam  Vitals and nursing note reviewed  Constitutional:       Appearance: Normal appearance  HENT:      Head: Normocephalic and atraumatic  Pulmonary:      Effort: No respiratory distress  Musculoskeletal:      Right lower leg: No edema  Left lower leg: No edema  Skin:     Findings: No rash  Neurological:      Mental Status: He is alert and oriented to person, place, and time  VIRTUAL VISIT DISCLAIMER    Luigi Oh verbally agrees to participate in [employer]  Pt is aware that [employer] could be limited without vital signs or the ability to perform a full hands-on physical Anupama Girard understands he or the provider may request at any time to terminate the video visit and request the patient to seek care or treatment in person

## 2022-01-11 NOTE — LETTER
January 11, 2022     Patient: Azeem Wilson   YOB: 1960   Date of Visit: 1/11/2022       To Whom it May Concern:    Azeem Wilson is under my professional care  He was seen in virtual visit on 1/11/2022  He may return to work on 01/18/2022  If you have any questions or concerns, please don't hesitate to call           Sincerely,          Gonsalo Diamond MD        CC: Katherineisrael Wilson

## 2022-01-11 NOTE — ASSESSMENT & PLAN NOTE
He was given prescriptions for prednisone, Z-Jose and albuterol inhaler  Increase oral hydration and use humidifier at home  If symptoms worse call back

## 2022-03-17 DIAGNOSIS — E78.5 HYPERLIPIDEMIA, UNSPECIFIED HYPERLIPIDEMIA TYPE: ICD-10-CM

## 2022-03-17 RX ORDER — SIMVASTATIN 40 MG
TABLET ORAL
Qty: 90 TABLET | Refills: 1 | Status: SHIPPED | OUTPATIENT
Start: 2022-03-17

## 2022-04-10 DIAGNOSIS — E11.9 TYPE 2 DIABETES MELLITUS WITHOUT COMPLICATION, WITHOUT LONG-TERM CURRENT USE OF INSULIN (HCC): ICD-10-CM

## 2022-04-11 RX ORDER — METFORMIN HYDROCHLORIDE 500 MG/1
TABLET, EXTENDED RELEASE ORAL
Qty: 90 TABLET | Refills: 1 | Status: SHIPPED | OUTPATIENT
Start: 2022-04-11

## 2022-04-27 ENCOUNTER — CONSULT (OUTPATIENT)
Dept: GASTROENTEROLOGY | Facility: CLINIC | Age: 62
End: 2022-04-27
Payer: COMMERCIAL

## 2022-04-27 VITALS
DIASTOLIC BLOOD PRESSURE: 86 MMHG | TEMPERATURE: 98.1 F | WEIGHT: 267 LBS | HEART RATE: 88 BPM | BODY MASS INDEX: 41.91 KG/M2 | HEIGHT: 67 IN | SYSTOLIC BLOOD PRESSURE: 141 MMHG

## 2022-04-27 DIAGNOSIS — E66.9 OBESITY (BMI 30-39.9): ICD-10-CM

## 2022-04-27 DIAGNOSIS — R79.89 ABNORMAL LFTS: ICD-10-CM

## 2022-04-27 DIAGNOSIS — R16.0 HEPATOMEGALY: ICD-10-CM

## 2022-04-27 DIAGNOSIS — K52.9 CHRONIC DIARRHEA: Primary | ICD-10-CM

## 2022-04-27 DIAGNOSIS — Z12.11 COLON CANCER SCREENING: ICD-10-CM

## 2022-04-27 PROCEDURE — 1036F TOBACCO NON-USER: CPT | Performed by: INTERNAL MEDICINE

## 2022-04-27 PROCEDURE — 99204 OFFICE O/P NEW MOD 45 MIN: CPT | Performed by: INTERNAL MEDICINE

## 2022-04-27 PROCEDURE — 3079F DIAST BP 80-89 MM HG: CPT | Performed by: INTERNAL MEDICINE

## 2022-04-27 PROCEDURE — 3077F SYST BP >= 140 MM HG: CPT | Performed by: INTERNAL MEDICINE

## 2022-04-27 PROCEDURE — 3008F BODY MASS INDEX DOCD: CPT | Performed by: INTERNAL MEDICINE

## 2022-04-27 NOTE — PROGRESS NOTES
[facility] - Outpatient Consultation  Alison Galarza 57 y o  male MRN: [id number]  Encounter: [id number]          ASSESSMENT AND PLAN:      76-year-old with diabetes, hyperlipidemia, obesity presents for colon cancer screening  1  Chronic diarrhea  Unclear etiology  Differentials include malabsorption disorder, like celiac disease, IBD, chronic infections, functional disorder like IBS  Will get EGD and colonoscopy, stool culture, ova and parasites and celiac panel  Patient agreeable to get procedures done  Further management plan based on investigation results  2  Colon cancer screening  Patient is appropriate candidate for screening  He has never had colonoscopy  Ordered procedure and prep today  3  Abnormal LFTs  4  Hepatomegaly   5  Obesity    Occasional alcohol intake  Hepatitis-C antibody negative in 2019  Has risk factors for nonalcoholic fatty liver disease  Will get workup including hepatitis B screening, immune status to hepatitis-A and B, autoimmune serologies, iron panel, alpha-1 antitrypsin  Get ultrasound elastography to check status of disease  Patient interested in referral to weight loss management  Referral placed  RTC 4 months        Mara Harris MD  Gastroenterology Fellow  [address]  Date: April 27, 2022    Orders Placed This Encounter   Procedures    Giardia lamblia, EIA and Ova and Parasites Examination    Stool culture    US elastography    Celiac Disease Antibody Profile    Hepatitis A antibody, total    Hepatitis B core antibody, total    Hepatitis B surface antibody    Anti-smooth muscle antibody, IgG    Antimitochondrial antibody    Antinuclear Antibodies (ERIKA), IFA    Ferritin    Iron Saturation %    Alpha 1 Antitrypsin Phenotype    Hepatitis B surface antigen    IgG, IgA, IgM    Ambulatory Referral to Weight Management    EGD    Colonoscopy ______________________________________________________________________    HPI:  71-year-old with diabetes, hyperlipidemia, obesity presents for colon cancer screening  Patient denies any nausea, vomiting, abdominal pain, constipation  He has intermittent loose bowel movements  He does not correlate loose bowel movement with any type of meal intake  No heartburn  Appetite is good  He has had a weight gain in the last 1 year  Labs done in September 2021 showed normal CBC,  time elevation, ALT 78 chronically elevated  CT abdomen in 2020 showed hepatomegaly  REVIEW OF SYSTEMS:    CONSTITUTIONAL: Denies any fever, chills, rigors, and weight loss  HEENT: No earache or tinnitus  Denies hearing loss or visual disturbances  CARDIOVASCULAR: No chest pain or palpitations  RESPIRATORY: Denies any cough, hemoptysis, shortness of breath or dyspnea on exertion  GASTROINTESTINAL: As noted in the History of Present Illness  GENITOURINARY: No problems with urination  Denies any hematuria or dysuria  NEUROLOGIC: No dizziness or vertigo, denies headaches  MUSCULOSKELETAL: Denies any muscle or joint pain  SKIN: Denies skin rashes or itching  ENDOCRINE: Denies excessive thirst  Denies intolerance to heat or cold  PSYCHOSOCIAL: Denies depression or anxiety  Denies any recent memory loss         Historical Information   Past Medical History:   Diagnosis Date    Anesthesia complication     difficulty awakening- dyspnea    Anxiety     Arthritis     BPH (benign prostatic hyperplasia)     Cataract     starting    Cleft hard palate     Diabetes ([address] )     borderline does not check blood sugar    Glaucoma suspect, both eyes     Hyperlipidemia     Kidney stone     left    PONV (postoperative nausea and vomiting)     Right ureteral stone     Seasonal allergies     Wears dentures     partial upper    Wears glasses      Past Surgical History:   Procedure Laterality Date    CLEFT PALATE REPAIR      multiple surgeries    FL RETROGRADE PYELOGRAM  8/20/2020    FL RETROGRADE PYELOGRAM  10/29/2020    INGUINAL HERNIA REPAIR Right     IR NEPHROURETERAL ACCESS FOR UROLOGY PCNL  10/28/2020    JOINT REPLACEMENT Bilateral     KNEE CARTILAGE SURGERY Left     VT CYSTO/URETERO W/LITHOTRIPSY &INDWELL STENT INSRT Right 8/4/2017    Procedure: CYSTOSCOPY URETEROSCOPY WITH LITHOTRIPSY HOLMIUM LASER, RETROGRADE PYELOGRAM AND INSERTION STENT URETERAL;  Surgeon: Johanna Sánchez MD;  [facility];  Service: Urology    VT CYSTO/URETERO W/LITHOTRIPSY &INDWELL STENT INSRT Left 8/20/2020    Procedure: CYSTO, URETEROSCOPY W/ LASER, RETROGRADE PYELOGRAM, STENT;  Surgeon: Joe Lino MD;  [facility];  Service: Urology    VT MIHAELAUT Lisa CM Left 10/29/2020    Procedure: P C N L  antegrade insertion double j stent insertion and removal nephrostomy tube;   Surgeon: Joe Lino MD;  [facility];  Service: Urology    VT TOTAL KNEE ARTHROPLASTY Right 3/4/2019    Procedure: TOTAL KNEE ARTHROPLASTY;  Surgeon: Jolie Rabago MD;  [facility];  Service: Orthopedics    VT TOTAL KNEE ARTHROPLASTY Left 9/13/2019    Procedure: ARTHROPLASTY KNEE TOTAL;  Surgeon: Jolie Rabago MD;  [facility];  Service: Orthopedics    TOTAL KNEE ARTHROPLASTY Left     l 9/14/2019 right 3/4/2019     Social History   Social History     Substance and Sexual Activity   Alcohol Use Yes    Comment: beer     Social History     Substance and Sexual Activity   Drug Use No     Social History     Tobacco Use   Smoking Status Never Smoker   Smokeless Tobacco Never Used   Tobacco Comment    no passive smoke exposure     Family History   Problem Relation Age of Onset    Diabetes Mother     Heart disease Mother     Hypertension Mother     Esophageal cancer Father     Diabetes Sister     Other Sister        Meds/Allergies       Current Outpatient Medications:     acetaminophen (TYLENOL) 500 mg tablet    albuterol (Proventil HFA) 90 mcg/act inhaler    allopurinol (ZYLOPRIM) 100 mg tablet    ascorbic acid (VITAMIN C) 500 MG tablet    azelastine (ASTELIN) 0 1 % nasal spray    brompheniramine-pseudoephedrine-DM 30-2-10 MG/5ML syrup    cetirizine (ZyrTEC) 10 mg tablet    fluticasone (FLONASE) 50 mcg/act nasal spray    ibuprofen (MOTRIN) 200 mg tablet    metFORMIN (GLUCOPHAGE-XR) 500 mg 24 hr tablet    Multiple Vitamin (MULTIVITAMIN) tablet    Omega-3 Fatty Acids (FISH OIL PO)    oxyCODONE (Roxicodone) 5 mg immediate release tablet    simvastatin (ZOCOR) 40 mg tablet    Allergies   Allergen Reactions    Flomax [Tamsulosin] Dizziness           Objective     There were no vitals taken for this visit  There is no height or weight on file to calculate BMI  PHYSICAL EXAM:      General Appearance:   Alert, cooperative, no distress   HEENT:   Normocephalic, atraumatic, anicteric      Neck:  Supple, symmetrical, trachea midline   Lungs:   Clear to auscultation bilaterally; no rales, rhonchi or wheezing; respirations unlabored    Heart[de-identified]   Regular rate and rhythm; no murmur, rub, or gallop  Abdomen:   Soft, non-tender, non-distended; normal bowel sounds; no masses, no organomegaly    Genitalia:   Deferred    Rectal:   Deferred    Extremities:  No cyanosis, clubbing or edema    Pulses:  2+ and symmetric    Skin:  No jaundice, rashes, or lesions    Lymph nodes:  No palpable cervical lymphadenopathy        Lab Results:   No visits with results within 1 Day(s) from this visit  Latest known visit with results is:   Office Visit on 01/08/2022   Component Date Value    SARS-CoV-2 01/08/2022 Positive*         Radiology Results:   No results found

## 2022-06-14 ENCOUNTER — HOSPITAL ENCOUNTER (OUTPATIENT)
Dept: RADIOLOGY | Facility: IMAGING CENTER | Age: 62
Discharge: HOME | End: 2022-06-14
Attending: INTERNAL MEDICINE

## 2022-06-14 ENCOUNTER — TELEPHONE (OUTPATIENT)
Dept: OBGYN CLINIC | Facility: HOSPITAL | Age: 62
End: 2022-06-14

## 2022-06-14 DIAGNOSIS — R79.89 ABNORMAL LFTS: ICD-10-CM

## 2022-06-14 DIAGNOSIS — R16.0 HEPATOMEGALY: ICD-10-CM

## 2022-06-14 DIAGNOSIS — Z12.11 COLON CANCER SCREENING: ICD-10-CM

## 2022-06-14 DIAGNOSIS — Z96.652 S/P TOTAL KNEE REPLACEMENT, LEFT: Primary | ICD-10-CM

## 2022-06-14 DIAGNOSIS — K52.9 CHRONIC DIARRHEA: ICD-10-CM

## 2022-06-14 RX ORDER — AMOXICILLIN 500 MG/1
CAPSULE ORAL
Qty: 4 CAPSULE | Refills: 2 | Status: SHIPPED | OUTPATIENT
Start: 2022-06-14 | End: 2022-06-15

## 2022-06-23 ENCOUNTER — HOSPITAL ENCOUNTER (OUTPATIENT)
Dept: RADIOLOGY | Facility: IMAGING CENTER | Age: 62
Discharge: HOME | End: 2022-06-23
Attending: INTERNAL MEDICINE
Payer: COMMERCIAL

## 2022-06-23 PROCEDURE — 76981 USE PARENCHYMA: CPT

## 2022-07-12 ENCOUNTER — VBI (OUTPATIENT)
Dept: ADMINISTRATIVE | Facility: OTHER | Age: 62
End: 2022-07-12

## 2022-07-19 ENCOUNTER — TELEPHONE (OUTPATIENT)
Dept: GASTROENTEROLOGY | Facility: AMBULARY SURGERY CENTER | Age: 62
End: 2022-07-19

## 2022-07-19 NOTE — TELEPHONE ENCOUNTER
Patients GI provider:  Dr Nati Callaway    Number to return call: [phone]     Reason for call: Pt calling to reschedule his procedure    Scheduled procedure/appointment date if applicable: Apt/procedure 7/21/22

## 2022-07-20 NOTE — TELEPHONE ENCOUNTER
Pt rescheduled procedure on 10/27 at [place] with [address]  Pt confirmed he has prep instructions

## 2022-07-26 NOTE — RESULT ENCOUNTER NOTE
Dear staff,     Omaira Alanis shows significant amount of fat in the liver indicating liver disease  Please complete blood work to find out cause for liver disease  Please kindly communicate this with patient  Thank you

## 2022-08-09 ENCOUNTER — LAB (OUTPATIENT)
Dept: LAB | Facility: IMAGING CENTER | Age: 62
End: 2022-08-09
Payer: COMMERCIAL

## 2022-08-09 DIAGNOSIS — K52.9 CHRONIC DIARRHEA: ICD-10-CM

## 2022-08-09 DIAGNOSIS — R79.89 ABNORMAL LFTS: ICD-10-CM

## 2022-08-09 DIAGNOSIS — Z12.11 COLON CANCER SCREENING: ICD-10-CM

## 2022-08-09 DIAGNOSIS — R16.0 HEPATOMEGALY: ICD-10-CM

## 2022-08-09 LAB
FERRITIN SERPL-MCNC: 180 NG/ML (ref 8–388)
HAV AB SER QL IA: NORMAL
HBV CORE AB SER QL: NORMAL
HBV SURFACE AB SER-ACNC: <3.1 MIU/ML
HBV SURFACE AG SER QL: NORMAL
IGA SERPL-MCNC: 362 MG/DL (ref 70–400)
IGG SERPL-MCNC: 918 MG/DL (ref 700–1600)
IGM SERPL-MCNC: 114 MG/DL (ref 40–230)
IRON SATN MFR SERPL: 22 % (ref 20–50)
IRON SERPL-MCNC: 77 UG/DL (ref 65–175)
TIBC SERPL-MCNC: 349 UG/DL (ref 250–450)

## 2022-08-09 PROCEDURE — 86706 HEP B SURFACE ANTIBODY: CPT

## 2022-08-09 PROCEDURE — 86708 HEPATITIS A ANTIBODY: CPT

## 2022-08-09 PROCEDURE — 82728 ASSAY OF FERRITIN: CPT

## 2022-08-09 PROCEDURE — 82784 ASSAY IGA/IGD/IGG/IGM EACH: CPT

## 2022-08-09 PROCEDURE — 86381 MITOCHONDRIAL ANTIBODY EACH: CPT

## 2022-08-09 PROCEDURE — 87340 HEPATITIS B SURFACE AG IA: CPT

## 2022-08-09 PROCEDURE — 82103 ALPHA-1-ANTITRYPSIN TOTAL: CPT

## 2022-08-09 PROCEDURE — 86258 DGP ANTIBODY EACH IG CLASS: CPT

## 2022-08-09 PROCEDURE — 86364 TISS TRNSGLTMNASE EA IG CLAS: CPT

## 2022-08-09 PROCEDURE — 83540 ASSAY OF IRON: CPT

## 2022-08-09 PROCEDURE — 36415 COLL VENOUS BLD VENIPUNCTURE: CPT

## 2022-08-09 PROCEDURE — 86704 HEP B CORE ANTIBODY TOTAL: CPT

## 2022-08-09 PROCEDURE — 86038 ANTINUCLEAR ANTIBODIES: CPT

## 2022-08-09 PROCEDURE — 82104 ALPHA-1-ANTITRYPSIN PHENO: CPT

## 2022-08-09 PROCEDURE — 86015 ACTIN ANTIBODY EACH: CPT

## 2022-08-09 PROCEDURE — 83550 IRON BINDING TEST: CPT

## 2022-08-09 PROCEDURE — 86231 EMA EACH IG CLASS: CPT

## 2022-08-10 LAB
ACTIN IGG SERPL-ACNC: 2 UNITS (ref 0–19)
ANA TITR SER IF: NEGATIVE {TITER}
ENDOMYSIUM IGA SER QL: NEGATIVE
GLIADIN PEPTIDE IGA SER-ACNC: 9 UNITS (ref 0–19)
GLIADIN PEPTIDE IGG SER-ACNC: 3 UNITS (ref 0–19)
IGA SERPL-MCNC: 391 MG/DL (ref 61–437)
MITOCHONDRIA M2 IGG SER-ACNC: <20 UNITS (ref 0–20)
TTG IGA SER-ACNC: <2 U/ML (ref 0–3)
TTG IGG SER-ACNC: 3 U/ML (ref 0–5)

## 2022-08-12 LAB
A1AT PHENOTYP SERPL IFE: NORMAL
A1AT SERPL-MCNC: 121 MG/DL (ref 101–187)

## 2022-08-14 DIAGNOSIS — R79.89 ABNORMAL LFTS: Primary | ICD-10-CM

## 2022-08-14 NOTE — RESULT ENCOUNTER NOTE
Blood work was unrevealing  I would like to repeat LFTs and if still elevated then get liver biopsy to find out cause  Dear staff: please communicate with patient and check with lab if the LFT order can be added on to blood drawn recently for labs

## 2022-08-22 ENCOUNTER — TELEPHONE (OUTPATIENT)
Dept: OBGYN CLINIC | Facility: HOSPITAL | Age: 62
End: 2022-08-22

## 2022-08-22 NOTE — TELEPHONE ENCOUNTER
Dr Aaron Zhu    [phone]    Patient had left knee replacement  He is having dental work done  Does he need antibiotics?

## 2022-08-23 ENCOUNTER — TELEPHONE (OUTPATIENT)
Dept: OBGYN CLINIC | Facility: HOSPITAL | Age: 62
End: 2022-08-23

## 2022-08-23 DIAGNOSIS — Z96.652 S/P TOTAL KNEE REPLACEMENT, LEFT: Primary | ICD-10-CM

## 2022-08-23 RX ORDER — AMOXICILLIN 500 MG/1
CAPSULE ORAL
Qty: 4 CAPSULE | Refills: 4 | Status: SHIPPED | OUTPATIENT
Start: 2022-08-23 | End: 2022-08-24

## 2022-08-23 NOTE — TELEPHONE ENCOUNTER
See previous notes     DR Travon Romano  Re TKA 2019/  Upcoming dental visits   [phone]    Patient has dental visit , Thursday, 8/25 which is one of 8 visits  Dental office will supply additional antibiotics but will need Dr Travon Romano to Rx the first round       [facility]   [employer]

## 2022-08-30 ENCOUNTER — LAB (OUTPATIENT)
Dept: LAB | Facility: IMAGING CENTER | Age: 62
End: 2022-08-30
Payer: COMMERCIAL

## 2022-08-30 DIAGNOSIS — R79.89 ABNORMAL LFTS: ICD-10-CM

## 2022-08-30 LAB
ALBUMIN SERPL BCP-MCNC: 3.7 G/DL (ref 3.5–5)
ALP SERPL-CCNC: 47 U/L (ref 46–116)
ALT SERPL W P-5'-P-CCNC: 63 U/L (ref 12–78)
AST SERPL W P-5'-P-CCNC: 43 U/L (ref 5–45)
BILIRUB DIRECT SERPL-MCNC: 0.14 MG/DL (ref 0–0.2)
BILIRUB SERPL-MCNC: 0.62 MG/DL (ref 0.2–1)
PROT SERPL-MCNC: 7.4 G/DL (ref 6.4–8.4)

## 2022-08-30 PROCEDURE — 80076 HEPATIC FUNCTION PANEL: CPT

## 2022-08-30 PROCEDURE — 36415 COLL VENOUS BLD VENIPUNCTURE: CPT

## 2022-08-31 NOTE — RESULT ENCOUNTER NOTE
"""Type 2 diabetes (IDDM) DEAR STAFF:    Please let patient know that LFTs continue to be elevated mildly  I would like to pursue liver biopsy to investigate further  If patient is agreeable I can order the liver biopsy

## 2022-09-01 DIAGNOSIS — E11.9 TYPE 2 DIABETES MELLITUS WITHOUT COMPLICATION, WITHOUT LONG-TERM CURRENT USE OF INSULIN (HCC): ICD-10-CM

## 2022-09-01 DIAGNOSIS — E78.5 HYPERLIPIDEMIA, UNSPECIFIED HYPERLIPIDEMIA TYPE: ICD-10-CM

## 2022-09-02 ENCOUNTER — TELEPHONE (OUTPATIENT)
Dept: GASTROENTEROLOGY | Facility: CLINIC | Age: 62
End: 2022-09-02

## 2022-09-02 RX ORDER — SIMVASTATIN 40 MG
TABLET ORAL
Qty: 90 TABLET | Refills: 1 | Status: SHIPPED | OUTPATIENT
Start: 2022-09-02

## 2022-09-02 RX ORDER — METFORMIN HYDROCHLORIDE 500 MG/1
TABLET, EXTENDED RELEASE ORAL
Qty: 90 TABLET | Refills: 1 | Status: SHIPPED | OUTPATIENT
Start: 2022-09-02

## 2022-09-02 NOTE — TELEPHONE ENCOUNTER
----- Message from Rubin Pierce MD sent at 8/31/2022 12:27 PM EDT -----  DEAR STAFF:    Please let patient know that LFTs continue to be elevated mildly  I would like to pursue liver biopsy to investigate further  If patient is agreeable I can order the liver biopsy

## 2022-09-02 NOTE — TELEPHONE ENCOUNTER
Spoke with pt regarding hi slab work and exp;ained necessity of liver biopsy  Pt was hesitant  I explained process of liver biopsy as well as results of US elastography  Pt deferred liver biopsy at this time and would like to think about it   Will give our office a call back

## 2022-09-19 ENCOUNTER — VBI (OUTPATIENT)
Dept: ADMINISTRATIVE | Facility: OTHER | Age: 62
End: 2022-09-19

## 2022-10-12 PROBLEM — J21.8 ACUTE BRONCHIOLITIS DUE TO OTHER SPECIFIED ORGANISMS: Status: RESOLVED | Noted: 2022-01-11 | Resolved: 2022-10-12

## 2022-10-12 PROBLEM — Z00.00 HEALTHCARE MAINTENANCE: Status: RESOLVED | Noted: 2018-10-16 | Resolved: 2022-10-12

## 2022-10-25 ENCOUNTER — OFFICE VISIT (OUTPATIENT)
Dept: URGENT CARE | Age: 62
End: 2022-10-25
Payer: COMMERCIAL

## 2022-10-25 ENCOUNTER — NURSE TRIAGE (OUTPATIENT)
Dept: OTHER | Facility: OTHER | Age: 62
End: 2022-10-25

## 2022-10-25 VITALS
WEIGHT: 258.4 LBS | OXYGEN SATURATION: 99 % | SYSTOLIC BLOOD PRESSURE: 138 MMHG | HEIGHT: 67 IN | TEMPERATURE: 97.6 F | DIASTOLIC BLOOD PRESSURE: 82 MMHG | RESPIRATION RATE: 20 BRPM | HEART RATE: 86 BPM | BODY MASS INDEX: 40.56 KG/M2

## 2022-10-25 DIAGNOSIS — J20.9 ACUTE BRONCHITIS, UNSPECIFIED ORGANISM: Primary | ICD-10-CM

## 2022-10-25 PROCEDURE — 99213 OFFICE O/P EST LOW 20 MIN: CPT

## 2022-10-25 RX ORDER — AZITHROMYCIN 250 MG/1
TABLET, FILM COATED ORAL
Qty: 6 TABLET | Refills: 0 | Status: SHIPPED | OUTPATIENT
Start: 2022-10-25 | End: 2022-10-29

## 2022-10-25 RX ORDER — ALBUTEROL SULFATE 90 UG/1
2 AEROSOL, METERED RESPIRATORY (INHALATION) EVERY 6 HOURS PRN
Qty: 8.5 G | Refills: 0 | Status: SHIPPED | OUTPATIENT
Start: 2022-10-25

## 2022-10-25 RX ORDER — PREDNISONE 20 MG/1
40 TABLET ORAL DAILY
Qty: 8 TABLET | Refills: 0 | Status: SHIPPED | OUTPATIENT
Start: 2022-10-25 | End: 2022-10-29

## 2022-10-25 NOTE — PROGRESS NOTES
[facility]        NAME: Claudell Bathe is a 60 y o  male  : 1960    MRN: [id number]  DATE: 2022  TIME: 6:15 PM    Assessment and Plan   Acute bronchitis, unspecified organism [J20 9]  1  Acute bronchitis, unspecified organism  predniSONE 20 mg tablet    albuterol (ProAir HFA) 90 mcg/act inhaler    azithromycin (ZITHROMAX) 250 mg tablet         Patient Instructions     Start tx for Bronchitis as at 3-wk goldy with no improvement in symptoms  Continue OTC meds & supportive care  Follow up with PCP in 2-3 days  Proceed to ER if symptoms worsen  Chief Complaint     Chief Complaint   Patient presents with   • Cough     Cough with Shortness of Breath and feels as if he is aspirating x3 weeks         History of Present Illness     60 y o  M presents with complaints of cough, chest tightness, chest congestion and SOB x 3 weeks  Denies N/V/D, fevers, chills, body aches, sinus pressure, rhinorrhea, headache  Denies sick contacts  Vaccinated against COVID-19  Taking Dayquil OTC  No significant pulmonary hx  Symptoms started to improve about 1 5 weeks ago however shortly started up again  States he has an EGD/colonscopy on Thursday which prompted him to be seen today  States he "feels like he's aspirating" at times  No tobacco use  Works as   Review of Systems   Review of Systems   Constitutional: Negative for chills, fatigue and fever  HENT: Positive for congestion  Negative for ear discharge, ear pain, facial swelling, postnasal drip, rhinorrhea, sinus pressure, sinus pain, sore throat and trouble swallowing  Eyes: Negative for photophobia, pain and visual disturbance  Respiratory: Positive for cough, chest tightness and shortness of breath  Negative for wheezing  Cardiovascular: Negative for chest pain, palpitations and leg swelling  Gastrointestinal: Negative for abdominal pain, diarrhea, nausea and vomiting     Genitourinary: Negative for dysuria, flank pain and hematuria  Musculoskeletal: Negative for arthralgias, back pain, myalgias and neck pain  Skin: Negative for pallor and wound  Neurological: Negative for dizziness, seizures, syncope, weakness, light-headedness, numbness and headaches  Psychiatric/Behavioral: Negative for confusion and sleep disturbance  All other systems reviewed and are negative          Current Medications       Current Outpatient Medications:   •  acetaminophen (TYLENOL) 500 mg tablet, Take 500-1,000 mg by mouth every 6 (six) hours as needed for mild pain, Disp: , Rfl:   •  albuterol (ProAir HFA) 90 mcg/act inhaler, Inhale 2 puffs every 6 (six) hours as needed for wheezing or shortness of breath, Disp: 8 5 g, Rfl: 0  •  albuterol (Proventil HFA) 90 mcg/act inhaler, Inhale 2 puffs every 6 (six) hours as needed for wheezing, Disp: 6 7 g, Rfl: 5  •  allopurinol (ZYLOPRIM) 100 mg tablet, TAKE 1 TABLET BY MOUTH EVERY DAY, Disp: 90 tablet, Rfl: 3  •  ascorbic acid (VITAMIN C) 500 MG tablet, Take 1 tablet (500 mg total) by mouth daily, Disp: 60 tablet, Rfl: 0  •  azithromycin (ZITHROMAX) 250 mg tablet, Take 2 tablets today then 1 tablet daily x 4 days, Disp: 6 tablet, Rfl: 0  •  cetirizine (ZyrTEC) 10 mg tablet, Take 10 mg by mouth daily, Disp: , Rfl:   •  fluticasone (FLONASE) 50 mcg/act nasal spray, 2 sprays into each nostril daily, Disp: 16 g, Rfl: 0  •  metFORMIN (GLUCOPHAGE-XR) 500 mg 24 hr tablet, TAKE 1 TABLET BY MOUTH EVERY DAY WITH BREAKFAST, Disp: 90 tablet, Rfl: 1  •  Multiple Vitamin (MULTIVITAMIN) tablet, Take 2 tablets by mouth daily, Disp: , Rfl:   •  Omega-3 Fatty Acids (FISH OIL PO), Take 1 capsule by mouth daily, Disp: , Rfl:   •  predniSONE 20 mg tablet, Take 2 tablets (40 mg total) by mouth daily for 4 days, Disp: 8 tablet, Rfl: 0  •  simvastatin (ZOCOR) 40 mg tablet, TAKE 1 TABLET BY MOUTH EVERYDAY AT BEDTIME, Disp: 90 tablet, Rfl: 1  •  azelastine (ASTELIN) 0 1 % nasal spray, 1 spray into each nostril 2 (two) times a day Use in each nostril as directed (Patient not taking: No sig reported), Disp: 1 mL, Rfl: 0  •  bisacodyl (DULCOLAX) 5 mg EC tablet, Take 4 tablets (20 mg total) by mouth once for 1 dose Colonoscopy prep (Patient not taking: Reported on 10/25/2022), Disp: 4 tablet, Rfl: 0  •  brompheniramine-pseudoephedrine-DM 30-2-10 MG/5ML syrup, Take 10 mL by mouth 4 (four) times a day as needed for congestion or cough (Patient not taking: No sig reported), Disp: 120 mL, Rfl: 0  •  ibuprofen (MOTRIN) 200 mg tablet, Take 200-800 mg by mouth every 6 (six) hours as needed for mild pain (Patient not taking: Reported on 1/5/2022 ), Disp: , Rfl:   •  oxyCODONE (Roxicodone) 5 mg immediate release tablet, Take 1-2 tablets every 6 hours prn (Patient not taking: No sig reported), Disp: 8 tablet, Rfl: 0  •  polyethylene glycol (GOLYTELY) 4000 mL solution, Take 4,000 mL by mouth once for 1 dose Colonoscopy prep (Patient not taking: Reported on 10/25/2022), Disp: 4000 mL, Rfl: 0    Current Allergies     Allergies as of 10/25/2022 - Reviewed 10/25/2022   Allergen Reaction Noted   • Flomax [tamsulosin] Dizziness 09/03/2020            The following portions of the patient's history were reviewed and updated as appropriate: allergies, current medications, past family history, past medical history, past social history, past surgical history and problem list      Past Medical History:   Diagnosis Date   • Anesthesia complication     difficulty awakening- dyspnea   • Anxiety    • Arthritis    • BPH (benign prostatic hyperplasia)    • Cataract     starting   • Cleft hard palate    • Diabetes ([address] )     borderline does not check blood sugar   • Glaucoma suspect, both eyes    • Hyperlipidemia    • Kidney stone     left   • PONV (postoperative nausea and vomiting)    • Right ureteral stone    • Seasonal allergies    • Wears dentures     partial upper   • Wears glasses        Past Surgical History:   Procedure Laterality Date   • CLEFT PALATE REPAIR multiple surgeries   • FL RETROGRADE PYELOGRAM  8/20/2020   • FL RETROGRADE PYELOGRAM  10/29/2020   • INGUINAL HERNIA REPAIR Right    • IR NEPHROURETERAL ACCESS FOR UROLOGY PCNL  10/28/2020   • JOINT REPLACEMENT Bilateral    • KNEE CARTILAGE SURGERY Left    • OK CYSTO/URETERO W/LITHOTRIPSY &INDWELL STENT INSRT Right 8/4/2017    Procedure: CYSTOSCOPY URETEROSCOPY WITH LITHOTRIPSY HOLMIUM LASER, RETROGRADE PYELOGRAM AND INSERTION STENT URETERAL;  Surgeon: Opal White MD;  [facility];  Service: Urology   • OK CYSTO/URETERO W/LITHOTRIPSY &INDWELL STENT INSRT Left 8/20/2020    Procedure: CYSTO, URETEROSCOPY W/ LASER, RETROGRADE PYELOGRAM, STENT;  Surgeon: Christina Stout MD;  [facility];  Service: Urology   • OK PERCUT Margothtad CM Left 10/29/2020    Procedure: P C N L  antegrade insertion double j stent insertion and removal nephrostomy tube; Surgeon: Christina Stout MD;  [facility];  Service: Urology   • OK TOTAL KNEE ARTHROPLASTY Right 3/4/2019    Procedure: TOTAL KNEE ARTHROPLASTY;  Surgeon: Ruben Mckeon MD;  [facility];  Service: Orthopedics   • OK TOTAL KNEE ARTHROPLASTY Left 9/13/2019    Procedure: ARTHROPLASTY KNEE TOTAL;  Surgeon: Ruben Mckeon MD;  [facility];  Service: Orthopedics   • TOTAL KNEE ARTHROPLASTY Left     l 9/14/2019 right 3/4/2019       Family History   Problem Relation Age of Onset   • Diabetes Mother    • Heart disease Mother    • Hypertension Mother    • Esophageal cancer Father    • Diabetes Sister    • Other Sister          Medications have been verified  Objective   /82   Pulse 86   Temp 97 6 °F (36 4 °C)   Resp 20   Ht 5' 7" (1 702 m)   Wt 117 kg (258 lb 6 4 oz)   SpO2 99%   BMI 40 47 kg/m²   No LMP for male patient  Physical Exam     Physical Exam  Vitals reviewed  Constitutional:       General: He is not in acute distress  Appearance: He is normal weight   He is not ill-appearing or toxic-appearing  HENT:      Head: Normocephalic  Right Ear: Tympanic membrane normal  No middle ear effusion  Tympanic membrane is not erythematous or bulging  Left Ear: Tympanic membrane normal   No middle ear effusion  Tympanic membrane is not erythematous or bulging  Nose: Nose normal       Right Sinus: No maxillary sinus tenderness or frontal sinus tenderness  Left Sinus: No maxillary sinus tenderness or frontal sinus tenderness  Mouth/Throat:      Mouth: Mucous membranes are moist       Pharynx: Uvula midline  No oropharyngeal exudate, posterior oropharyngeal erythema or uvula swelling  Tonsils: No tonsillar exudate or tonsillar abscesses  Eyes:      Extraocular Movements: Extraocular movements intact  Conjunctiva/sclera: Conjunctivae normal       Pupils: Pupils are equal, round, and reactive to light  Cardiovascular:      Rate and Rhythm: Normal rate and regular rhythm  Pulses: Normal pulses  Heart sounds: Normal heart sounds  Pulmonary:      Effort: Pulmonary effort is normal  No tachypnea, accessory muscle usage or respiratory distress  Breath sounds: Normal breath sounds and air entry  No decreased air movement  No decreased breath sounds, wheezing, rhonchi or rales  Comments:   CTAB no wheezing or stridor  Respirations unlabored  99% RA  Abdominal:      General: Bowel sounds are normal       Palpations: Abdomen is soft  Tenderness: There is no abdominal tenderness  Musculoskeletal:         General: Normal range of motion  Cervical back: Normal range of motion and neck supple  Lymphadenopathy:      Cervical: No cervical adenopathy  Skin:     General: Skin is warm and dry  Capillary Refill: Capillary refill takes less than 2 seconds  Neurological:      General: No focal deficit present  Mental Status: He is alert  Cranial Nerves: Cranial nerves are intact  No cranial nerve deficit        Sensory: Sensation is intact  Motor: Motor function is intact  Coordination: Coordination is intact  Deep Tendon Reflexes: Reflexes are normal and symmetric

## 2022-10-25 NOTE — PATIENT INSTRUCTIONS
Start steroid and azithromycin as directed  Use Albuterol inhaler as needed for chest tightness, shortness of breath or wheezing  Follow up with GI tomorrow regarding double scope on Thursday  OTC Mucinex  Follow up with PCP in 2-3 days  Proceed to ER if symptoms worsen

## 2022-10-25 NOTE — TELEPHONE ENCOUNTER
Sob is not present when not coughing but cough is lingering  Reason for Disposition  • [1] MILD difficulty breathing (e g , minimal/no SOB at rest, SOB with walking, pulse <100) AND [2] still present when not coughing    Answer Assessment - Initial Assessment Questions  1  ONSET: "When did the cough begin?"       The cough began two weeks ago but has not calmed down completely  2  SEVERITY: "How bad is the cough today?"      He has sob with the cough  3  SPUTUM: "Describe the color of your sputum" (none, dry cough; clear, white, yellow, green)      Minimal mucous  4  HEMOPTYSIS: "Are you coughing up any blood?" If so ask: "How much?" (flecks, streaks, tablespoons, etc )      No blood  5  DIFFICULTY BREATHING: "Are you having difficulty breathing?" If Yes, ask: "How bad is it?" (e g , mild, moderate, severe)     - MILD: No SOB at rest, mild SOB with walking, speaks normally in sentences, can lay down, no retractions, pulse < 100      - MODERATE: SOB at rest, SOB with minimal exertion and prefers to sit, cannot lie down flat, speaks in phrases, mild retractions, audible wheezing, pulse 100-120      - SEVERE: Very SOB at rest, speaks in single words, struggling to breathe, sitting hunched forward, retractions, pulse > 120       Sob with coughing  6  FEVER: "Do you have a fever?" If Yes, ask: "What is your temperature, how was it measured, and when did it start?"      none  7  CARDIAC HISTORY: "Do you have any history of heart disease?" (e g , heart attack, congestive heart failure)       none  8  LUNG HISTORY: "Do you have any history of lung disease?"  (e g , pulmonary embolus, asthma, emphysema)      none  9  PE RISK FACTORS: "Do you have a history of blood clots?" (or: recent major surgery, recent prolonged travel, bedridden)      none  10  OTHER SYMPTOMS: "Do you have any other symptoms?" (e g , runny nose, wheezing, chest pain)        Post nasal, no wheezing, minimal running nose, no chest pain      12  TRAVEL: "Have you traveled out of the country in the last month?" (e g , travel history, exposures)        no    Protocols used: COUGH - ACUTE PRODUCTIVE-ADULT-AH

## 2022-10-25 NOTE — TELEPHONE ENCOUNTER
Pt is going to be seen by walk in urgent care now  Mild Sob with coughing  No sob when not coughing  He would like a call tomorrow to discuss if he should still come for his colonoscopy on Thursday   10/27/2022

## 2022-10-26 ENCOUNTER — TELEPHONE (OUTPATIENT)
Dept: OTHER | Facility: OTHER | Age: 62
End: 2022-10-26

## 2022-10-26 NOTE — TELEPHONE ENCOUNTER
Spoke to patient  Colon/egd rescheduled for 12/12/22 @[facility] with Dr Richmond Murguia  Pt confirmed he has all prep instructions

## 2022-10-26 NOTE — TELEPHONE ENCOUNTER
Called patient  Gave him the option of coming to procedure and being assessed by the provider or to reschedule  Patient wishes to be rescheduled to soonest date possible

## 2022-10-26 NOTE — TELEPHONE ENCOUNTER
Patient called today regarding his Colonoscopy and EGD that's is scheduled for 10-  Patient is now on Antibiotics, Steroids and an Inhaler for a Sinus Infection and Cough  Please call Patient back to advise him on these Procedures

## 2022-11-02 ENCOUNTER — VBI (OUTPATIENT)
Dept: ADMINISTRATIVE | Facility: OTHER | Age: 62
End: 2022-11-02

## 2022-11-14 DIAGNOSIS — N20.0 NEPHROLITHIASIS: Primary | ICD-10-CM

## 2022-11-14 DIAGNOSIS — Z12.5 PROSTATE CANCER SCREENING: ICD-10-CM

## 2022-11-14 DIAGNOSIS — N20.0 RENAL STONE: ICD-10-CM

## 2022-11-14 RX ORDER — ALLOPURINOL 100 MG/1
100 TABLET ORAL DAILY
Qty: 90 TABLET | Refills: 3 | Status: SHIPPED | OUTPATIENT
Start: 2022-11-14

## 2022-11-14 NOTE — TELEPHONE ENCOUNTER
Patient returned call  Made patient aware of medication refill  Informed patient of Kori Cantu notes verbatim  Also, per BMP orders, advised fasting is required for 8 hrs or longer  Patient has no further questions

## 2022-11-14 NOTE — TELEPHONE ENCOUNTER
Medication Refill Request     Name Allopurinol   Dose/[id number] daily   Quantity 90 day supply   Verified pharmacy   [facility]  Verified ordering Provider   [x]  Does patient have enough for the next 3 days?  Yes [x] No []

## 2022-11-15 ENCOUNTER — HOSPITAL ENCOUNTER (OUTPATIENT)
Dept: RADIOLOGY | Facility: IMAGING CENTER | Age: 62
Discharge: HOME | End: 2022-11-15

## 2022-11-15 DIAGNOSIS — N20.0 NEPHROLITHIASIS: ICD-10-CM

## 2022-11-28 ENCOUNTER — ANESTHESIA EVENT (OUTPATIENT)
Dept: ANESTHESIOLOGY | Facility: HOSPITAL | Age: 62
End: 2022-11-28

## 2022-11-28 ENCOUNTER — ANESTHESIA (OUTPATIENT)
Dept: ANESTHESIOLOGY | Facility: HOSPITAL | Age: 62
End: 2022-11-28

## 2022-11-30 ENCOUNTER — TELEPHONE (OUTPATIENT)
Dept: FAMILY MEDICINE CLINIC | Facility: CLINIC | Age: 62
End: 2022-11-30

## 2022-11-30 ENCOUNTER — OFFICE VISIT (OUTPATIENT)
Dept: FAMILY MEDICINE CLINIC | Facility: CLINIC | Age: 62
End: 2022-11-30

## 2022-11-30 VITALS
HEIGHT: 67 IN | BODY MASS INDEX: 40.81 KG/M2 | RESPIRATION RATE: 16 BRPM | TEMPERATURE: 97.3 F | DIASTOLIC BLOOD PRESSURE: 84 MMHG | OXYGEN SATURATION: 95 % | WEIGHT: 260 LBS | SYSTOLIC BLOOD PRESSURE: 136 MMHG | HEART RATE: 90 BPM

## 2022-11-30 DIAGNOSIS — E78.5 HYPERLIPIDEMIA, UNSPECIFIED HYPERLIPIDEMIA TYPE: ICD-10-CM

## 2022-11-30 DIAGNOSIS — E11.9 TYPE 2 DIABETES MELLITUS WITHOUT COMPLICATION, WITHOUT LONG-TERM CURRENT USE OF INSULIN (HCC): ICD-10-CM

## 2022-11-30 DIAGNOSIS — Z00.00 HEALTHCARE MAINTENANCE: Primary | ICD-10-CM

## 2022-11-30 LAB — SL AMB POCT HEMOGLOBIN AIC: 11.1 (ref ?–6.5)

## 2022-11-30 RX ORDER — LANCETS 33 GAUGE
EACH MISCELLANEOUS
Qty: 100 EACH | Refills: 3 | Status: SHIPPED | OUTPATIENT
Start: 2022-11-30

## 2022-11-30 RX ORDER — BLOOD SUGAR DIAGNOSTIC
STRIP MISCELLANEOUS
Qty: 100 EACH | Refills: 3 | Status: SHIPPED | OUTPATIENT
Start: 2022-11-30

## 2022-11-30 RX ORDER — DULAGLUTIDE 1.5 MG/.5ML
1.5 INJECTION, SOLUTION SUBCUTANEOUS WEEKLY
Qty: 2 ML | Refills: 0 | Status: SHIPPED | OUTPATIENT
Start: 2022-11-30 | End: 2022-12-22

## 2022-11-30 RX ORDER — BLOOD-GLUCOSE METER
KIT MISCELLANEOUS
Qty: 1 KIT | Refills: 0 | Status: SHIPPED | OUTPATIENT
Start: 2022-11-30

## 2022-11-30 RX ORDER — DULAGLUTIDE 0.75 MG/.5ML
0.75 INJECTION, SOLUTION SUBCUTANEOUS WEEKLY
Qty: 2 ML | Refills: 1 | Status: SHIPPED | OUTPATIENT
Start: 2022-11-30 | End: 2022-11-30

## 2022-11-30 RX ORDER — DULAGLUTIDE 1.5 MG/.5ML
1.5 INJECTION, SOLUTION SUBCUTANEOUS WEEKLY
Qty: 2 ML | Refills: 0 | Status: SHIPPED | OUTPATIENT
Start: 2022-11-30 | End: 2022-11-30

## 2022-11-30 NOTE — ASSESSMENT & PLAN NOTE
Component      Latest Ref Rng & Units 12/16/2020 9/3/2021   Cholesterol      50 - 200 mg/dL 134 143   Triglycerides      <=150 mg/dL 175 (H) 179 (H)   HDL      >=40 mg/dL 35 (L) 35 (L)   LDL Calculated      0 - 100 mg/dL 64 72     - Elevated triglycerides on last lipid panel   - Discussed healthy diet and regular exercise  - Continue Zocor and Omega 3 daily  - Will obtain updated fasting lipid panel

## 2022-11-30 NOTE — TELEPHONE ENCOUNTER
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

## 2022-11-30 NOTE — TELEPHONE ENCOUNTER
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   Call him

## 2022-11-30 NOTE — PATIENT INSTRUCTIONS
Type 2 Diabetes Management for Adults   AMBULATORY CARE:   Type 2 diabetes  is a disease that affects how your body uses glucose (sugar)  Either your body cannot make enough insulin, or it cannot use the insulin correctly  It is important to keep diabetes controlled to prevent damage to your heart, blood vessels, and other organs  Have someone call your local emergency number (911 in the [address] [facility]) if:   · You cannot be woken  · You have signs of diabetic ketoacidosis:     ? confusion, fatigue    ? vomiting    ? rapid heartbeat    ? fruity smelling breath    ? extreme thirst    ? dry mouth and skin    · You have any of the following signs of a heart attack:      ? Squeezing, pressure, or pain in your chest    ? You may  also have any of the following:     § Discomfort or pain in your back, neck, jaw, stomach, or arm    § Shortness of breath    § Nausea or vomiting    § Lightheadedness or a sudden cold sweat    · You have any of the following signs of a stroke:      ? Numbness or drooping on one side of your face     ? Weakness in an arm or leg    ? Confusion or difficulty speaking    ? Dizziness, a severe headache, or vision loss    Call your doctor or diabetes care team if:   · You have a sore or wound that will not heal     · You have a change in the amount you urinate  · Your blood sugar levels are higher than your target goals  · You often have lower blood sugar levels than your target goals  · Your skin is red, dry, warm, or swollen  · You have trouble coping with diabetes, or you feel anxious or depressed  · You have questions or concerns about your condition or care  What you need to know about high blood sugar levels:  High blood sugar levels may not cause any symptoms  You may feel more thirsty or urinate more often than usual  Over time, high blood sugar levels can damage your nerves, blood vessels, tissues, and organs   The following can increase your blood sugar levels:  · Large meals or large amounts of carbohydrates at one time    · Less physical activity    · Stress    · Illness    · A lower dose of medicine or insulin, or a late dose    What you need to know about low blood sugar levels: You can prevent symptoms such as shakiness, dizziness, irritability, or confusion by preventing your blood sugar levels from going too low  · Treat a low blood sugar level right away  ? Drink 4 ounces of juice or have 1 tube of glucose gel  ? Check your blood sugar level again 10 to 15 minutes later  ? When the level goes back to normal, eat a meal or snack to prevent another decrease  · Keep glucose gel, raisins, or hard candy with you at all times to treat a low blood sugar level  · Your blood sugar level can get too low if you take diabetes medicine or insulin and do not eat enough food  · If you use insulin, check your blood sugar level before you exercise  ? If your blood sugar level is below 100 mg/dL, eat 4 crackers or 2 ounces of raisins, or drink 4 ounces of juice  ? Check your level every 30 minutes if you exercise more than 1 hour  ? You may need a snack during or after exercise  What you can do to manage your blood sugar levels:   · Check your blood sugar levels as directed and as needed  Several items are available to use to check your levels  You may need to check by testing a drop of blood in a glucose monitor  You may instead be given a continuous glucose monitoring (CGM) device  The device is worn at all times  The CGM checks your blood sugar level every 5 minutes  It sends results to an electronic device such as a smart phone  A CGM can be used with or without an insulin pump  Talk with your provider to find out which method is best for you  The goal for blood sugar levels before meals  is between 80 and 130 mg/dL and 2 hours after eating  is lower than 180 mg/dL  · Make healthy food choices    Work with a dietitian to develop a meal plan that works for you and your schedule  A dietitian can help you learn how to eat the right amount of carbohydrates during your meals and snacks  Carbohydrates can raise your blood sugar level if you eat too many at one time  Examples of foods that contain carbohydrates are breads, cereals, rice, pasta, and sweets  · Get regular physical activity  Physical activity can help you get to your target blood sugar level goal and manage your weight  Get at least 150 minutes of moderate to vigorous aerobic physical activity each week  Do not miss more than 2 days in a row  Do not sit longer than 30 minutes at a time  Your healthcare provider can help you create an activity plan  The plan can include the best activities for you and can help you build your strength and endurance  · Maintain a healthy weight  Ask your healthcare provider what a healthy weight is for you  Ask him or her to help you create a safe weight loss plan if you are overweight  · Take your diabetes medicine or insulin as directed  You may need diabetes medicine, insulin, or both to help control your blood sugar levels  Your healthcare provider will teach you how and when to take your diabetes medicine or insulin  You will also be taught about side effects oral diabetes medicine can cause  Insulin may be injected, or given through a pump or pen  You and your care team will discuss which method is best for you  ? An insulin pump  is an implanted device that gives your insulin 24 hours a day  An insulin pump prevents the need for multiple insulin injections in a day  ? An insulin pen  is a device prefilled with the right amount of insulin  ? You and your family members will be taught how to draw up and give insulin  if this is the best method for you  Your education team will also teach you how to dispose of needles and syringes  ? You will learn how much insulin you need  and when to give it   You will be taught when not to give insulin  You will also be taught what to do if your blood sugar level drops too low  This may happen if you take insulin and do not eat the right amount of carbohydrates  Other things you can do to manage type 2 diabetes:   · Wear medical alert identification  Wear medical alert jewelry or carry a card that says you have diabetes  Ask your provider where to get these items  · Do not smoke  Nicotine and other chemicals in cigarettes and cigars can cause lung and blood vessel damage  It also makes it more difficult to manage your diabetes  Ask your provider for information if you currently smoke and need help to quit  Do not use e-cigarettes or smokeless tobacco in place of cigarettes or to help you quit  They still contain nicotine  · Check your feet each day for cuts, scratches, calluses, or other wounds  Look for redness and swelling, and feel for warmth  Wear shoes that fit well  Check your shoes for rocks or other objects that can hurt your feet  Do not walk barefoot or wear shoes without socks  Wear cotton socks to help keep your feet dry  · Ask about vaccines you may need  You have a higher risk for serious illness if you get the flu, pneumonia, COVID-19, or hepatitis  Ask your provider if you should get vaccines to prevent these or other diseases, and when to get the vaccines  · Talk to your care team if you become stressed about diabetes care  Sometimes being able to fit diabetes care into your life can cause increased stress  The stress can cause you not to take care of yourself properly  Your care team can help by offering tips about self-care  Your care team may suggest you talk to a mental health provider  The provider can listen and offer help with self-care issues  Follow up with your doctor or diabetes care team as directed: You may need to have blood tests done before your follow-up visit   The test results will show if changes need to be made in your treatment or self-care  Write down your questions so you remember to ask them during your visits  Talk to your provider if you cannot afford your medicine  © Copyright [employer] 2022 Information is for End User's use only and may not be sold, redistributed or otherwise used for commercial purposes  All illustrations and images included in CareNotes® are the copyrighted property of A D A M [employer] Inc  or Destinee Alfaro   The above information is an  only  It is not intended as medical advice for individual conditions or treatments  Talk to your doctor, nurse or pharmacist before following any medical regimen to see if it is safe and effective for you  Basic Carbohydrate Counting   AMBULATORY CARE:   Carbohydrate counting  is a way to plan your meals by counting the amount of carbohydrate in foods  Carbohydrates are the sugars, starches, and fiber found in fruit, grains, vegetables, and milk products  Carbohydrates increase your blood sugar levels  Carbohydrate counting can help you eat the right amount of carbohydrate to keep your blood sugar levels under control  What you need to know about planning meals using carbohydrate counting:  · A dietitian or healthcare provider will help you develop a healthy meal plan that works best for you  You will be taught how much carbohydrate to eat or drink for each meal and snack  Your meal plan will be based on your age, weight, usual food intake, and physical activity level  If you have diabetes, it will also include your blood sugar levels and diabetes medicine  Once you know how much carbohydrate you should eat, you can decide what type of food you want to eat  · You will need to know what foods contain carbohydrate and how much they contain  Keep track of the amount of carbohydrate in meals and snacks in order to follow your meal plan  Do not avoid carbohydrates or skip meals   Your blood sugar may fall too low if you do not eat enough carbohydrate or you skip meals  Foods that contain carbohydrate:   · Breads:  Each serving of food listed below contains about 15 g of carbohydrate   ? 1 slice of bread (1 ounce) or 1 flour or corn tortilla (6 inch)    ? ½ of a hamburger bun or ¼ of a large bagel (about 1 ounce)    ? 1 pancake (about 4 inches across and ¼ inch thick)    · Cereals and grains:  Serving sizes of ready-to-eat cereals vary  Look at the serving size and the total carbohydrate amount listed on the food label  Each serving of food listed below contains about 15 g of carbohydrate   ? ¾ cup of dry, unsweetened, ready-to-eat cereal or ¼ cup of low-fat granola     ? ½ cup of oatmeal or other cooked cereal     ? ? cup of cooked rice or pasta    · Starchy vegetables and beans:  Each serving of food listed below contains about 15 g of carbohydrate   ? ½ cup of corn, green peas, sweet potatoes, or mashed potatoes    ? ¼ of a large baked potato    ? ½ cup of beans, lentils, and peas (garbanzo, dolan, kidney, white, split, black-eyed)    · Crackers and snacks:  Each serving of food listed below contains about 15 g of carbohydrate   ? 3 marycruz cracker squares or 8 animal crackers     ? 6 saltine-type crackers    ? 3 cups of popcorn or ¾ ounce of pretzels, potato chips, or tortilla chips    · Fruit:  Each serving of food listed below contains about 15 g of carbohydrate   ? 1 small (4 ounce) piece of fresh fruit or ¾ to 1 cup of fresh fruit    ? ½ cup of canned or frozen fruit, packed in natural juice    ? ½ cup (4 ounces) of unsweetened fruit juice    ? 2 tablespoons of dried fruit    · Desserts or sugary foods:  Each serving of food listed below contains about 15 g of carbohydrate   ? 2-inch square unfrosted cake or brownie     ? 2 small cookies    ? ½ cup of ice cream, frozen yogurt, or nondairy frozen yogurt    ? ¼ cup of sherbet or sorbet    ? 1 tablespoon of regular syrup, jam, or jelly    ?  2 tablespoons of light syrup    · Milk and yogurt:  Foods from the milk group contain about 12 g of carbohydrate per serving  ? 1 cup of fat-free or low-fat milk    ? 1 cup of soy milk    ? ? cup of fat-free, yogurt sweetened with artificial sweetener    · Non-starchy vegetables:  Each serving contains about 5 g of carbohydrate   Three servings of non-starch vegetables count as 1 carbohydrate serving  ? ½ cup of cooked vegetables or 1 cup of raw vegetables  This includes beets, broccoli, cabbage, cauliflower, cucumber, mushrooms, tomatoes, and zucchini    ? ½ cup of vegetable juice    How to use carbohydrate counting to plan meals:   · Count carbohydrate amounts using serving sizes:      ? Pasta dinner example: You plan to have pasta, tossed salad, and an 8-ounce glass of milk  Your healthcare provider tells you that you may have 4 carbohydrate servings for dinner  One carbohydrate serving of pasta is ? cup  One cup of pasta will equal 3 carbohydrate servings  An 8-ounce glass of milk will count as 1 carbohydrate serving  These amounts of food would equal 4 carbohydrate servings  One cup of tossed salad does not count toward your carbohydrate servings  · Count carbohydrate amounts using food labels:  Find the total amount of carbohydrate in a packaged food by reading the food label  Food labels tell you the serving size of the food and the total carbohydrate amount in each serving  Find the serving size on the food label and then decide how many servings you will eat  Multiply the number of servings you plan to eat by the carbohydrate amount per serving  ? Granola bar snack example: Your meal plan allows you to have 2 carbohydrate servings (30 grams) of carbohydrate for a snack  You plan to eat 1 package of granola bars, which contains 2 bars  According to the food label, the serving size of food in this package is 1 bar  Each serving (1 bar) contains 25 grams of carbohydrate   The total amount of carbohydrate in this package of granola bars would be 50 g  Based on your meal plan, you should eat only 1 bar  Follow up with your doctor as directed:  Write down your questions so you remember to ask them during your visits  © Copyright [employer] 2022 Information is for End User's use only and may not be sold, redistributed or otherwise used for commercial purposes  All illustrations and images included in CareNotes® are the copyrighted property of A D A M , Inc  or Destinee Durham  The above information is an  only  It is not intended as medical advice for individual conditions or treatments  Talk to your doctor, nurse or pharmacist before following any medical regimen to see if it is safe and effective for you  Foot Care for People with Diabetes   AMBULATORY CARE:   What you need to know about foot care:   · Foot care helps protect your feet and prevent foot ulcers or sores  Long-term high blood sugar levels can damage the blood vessels and nerves in your legs and feet  This damage makes it hard to feel pressure, pain, temperature, and touch  You may not be able to feel a cut or sore, or shoes that are too tight  Foot care is needed to prevent serious problems, such as an infection or amputation  · Diabetes may cause your toes to become crooked or curved under  These changes may affect the way you walk and can lead to increased pressure on your foot  The pressure can decrease blood flow to your feet  Lack of blood flow increases your risk for a foot ulcer  Do not ignore small problems, such as dry skin or small wounds  These can become life-threatening over time without proper care  Call your care team provider if:   · Your feet become numb, weak, or hard to move  · You have pus draining from a sore on your foot  · You have a wound on your foot that gets bigger, deeper, or does not heal      · You see blisters, cuts, scratches, calluses, or sores on your foot       · You have a fever, and your feet become red, warm, and swollen  · Your toenails become thick, curled, or yellow  · You find it hard to check your feet because your vision is poor  · You have questions or concerns about your condition or care  How to care for your feet:   · Check your feet each day  Look at your whole foot, including the bottom, and between and under your toes  Check for wounds, corns, and calluses  Use a mirror to see the bottom of your feet  The skin on your feet may be shiny, tight, or darker than normal  Your feet may also be cold and pale  Feel your feet by running your hands along the tops, bottoms, sides, and between your toes  Redness, swelling, and warmth are signs of blood flow problems that can lead to a foot ulcer  Do not try to remove corns or calluses yourself  · Wash your feet each day with soap and warm water  Do not use hot water, because this can injure your foot  Dry your feet gently with a towel after you wash them  Dry between and under your toes  · Apply lotion or a moisturizer on your dry feet  Ask your care team provider what lotions are best to use  Do not put lotion or moisturizer between your toes  Moisture between your toes could lead to skin breakdown  · Cut your toenails correctly  File or cut your toenails straight across  Use a soft brush to clean around your toenails  If your toenails are very thick, you may need to have a care team provider or specialist cut them  · Protect your feet  Do not walk barefoot or wear your shoes without socks  Check your shoes for rocks or other objects that can hurt your feet  Wear cotton socks to help keep your feet dry  Wear socks without toe seams, or wear them with the seams inside out  Change your socks each day  Do not wear socks that are dirty or damp  · Wear shoes that fit well  Wear shoes that do not rub against any area of your feet   Your shoes should be ½ to ¾ inch (1 to 2 centimeters) longer than your feet  Your shoes should also have extra space around the widest part of your feet  Walking or athletic shoes with laces or straps that adjust are best  Ask your care team provider for help to choose shoes that fit you best  Ask him or her if you need to wear an insert, orthotic, or bandage on your feet  · Go to your follow-up visits  Your care team provider will do a foot exam at least once a year  You may need a foot exam more often if you have nerve damage, foot deformities, or ulcers  He will check for nerve damage and how well you can feel your feet  He will check your shoes to see if they fit well  · Do not smoke  Smoking can damage your blood vessels and put you at increased risk for foot ulcers  Ask your care team provider for information if you currently smoke and need help to quit  E-cigarettes or smokeless tobacco still contain nicotine  Talk to your care team provider before you use these products  Follow up with your diabetes care team provider or foot specialist as directed: You will need to have your feet checked at least once a year  You may need a foot exam more often if you have nerve damage, foot deformities, or ulcers  Write down your questions so you remember to ask them during your visits  © Copyright [employer] 2022 Information is for End User's use only and may not be sold, redistributed or otherwise used for commercial purposes  All illustrations and images included in CareNotes® are the copyrighted property of [employer] M [employer] Inc  or Destinee Alfaro   The above information is an  only  It is not intended as medical advice for individual conditions or treatments  Talk to your doctor, nurse or pharmacist before following any medical regimen to see if it is safe and effective for you  What to Do if Your Blood Sugar is Low   AMBULATORY CARE:   Low blood sugar levels  (hypoglycemia) can happen with Type 1 and Type 2 diabetes   Low levels are more likely to happen if you use insulin  Hypoglycemia can cause you to have falls, accidents, and injuries  A blood sugar level that gets too low can lead to seizures, coma, and death  Learn to recognize the symptoms early so you can get treatment quickly  When your blood sugar is low you may feel:  · Sweaty    · Nervous or shaky    · Anxious or irritable    · Confused    · A fast, pounding heartbeat    · Extremely hungry    Have someone call your local emergency number (911 in the [address]) if:   · You cannot be woken  · You have a seizure  Call your doctor if:   · You have symptoms of a low blood sugar level, such as trouble thinking, sweating, or a pounding heartbeat  · Your blood sugar level is lower than normal and it does not improve with treatment  · You often have lower blood sugar levels than your target goals  · You have trouble coping with your illness, or you feel anxious or depressed  · You have questions or concerns about your condition or care  What to do if you have symptoms of low blood sugar:   · Check your blood sugar level, if possible  Your blood sugar level is too low if it is at or below 70 mg/dL  · Eat or drink 15 grams of fast-acting carbohydrate  Fast-acting carbohydrates will raise your blood sugar level quickly  Examples of 15 grams of fast-acting carbohydrates:     ? 4 ounces (½ cup) of fruit juice     ? 4 ounces of regular soda    ? 2 tablespoons of raisins     ? 1 tube of glucose gel or 3 to 4 glucose tablets       · Check your blood sugar level 15 minutes later  If the level is still low (less than 100 mg/dL), eat another 15 grams of carbohydrate  When the level returns to 100 mg/dL, eat a snack or meal that contains carbohydrates  This will help prevent another drop in blood sugar  · Teach people close to you how to use your glucagon kit  Your blood sugar may be too low for you to be awake  People need to know when and how to use your kit      Prevent low blood sugar levels: Prevent low blood sugar by knowing what increases your risk  Ask your healthcare provider for ways to prevent low blood sugar levels  Any of the following can increase your risk of low blood sugar:  · Fasting for tests or procedures    · During or after intense exercise    · Late or postponed meals    · Sleeping (you may need a bedtime snack)     · Drinking alcohol if you use insulin or insulin releasing pills    Follow up with your doctor as directed:  Write down your questions so you remember to ask them during your visits  © Copyright [employer] 2022 Information is for End User's use only and may not be sold, redistributed or otherwise used for commercial purposes  All illustrations and images included in CareNotes® are the copyrighted property of [employer]  or [facility] Marcy Alfaro   The above information is an  only  It is not intended as medical advice for individual conditions or treatments  Talk to your doctor, nurse or pharmacist before following any medical regimen to see if it is safe and effective for you

## 2022-11-30 NOTE — TELEPHONE ENCOUNTER
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

## 2022-11-30 NOTE — ASSESSMENT & PLAN NOTE
- Not well controlled  - Will increase Metformin to 1,000 mg BID  Advised of side effects   - Will start Trulicity  0 75 mg weekly x 2 weeks then increase to 1 5 weekly  - DM foot exam performed in office today  - Urine microalbumin obtained  - Blood glucose testing supplies sent to patient's pharmacy  Discussed fasting blood sugar goals  - Discussed healthy diet and regular exercise   - Recommend follow up in 3 weeks     Lab Results   Component Value Date    HGBA1C 11 1 (A) 11/30/2022

## 2022-11-30 NOTE — ASSESSMENT & PLAN NOTE
- Reviewed immunizations and screenings  - UTD with dental and vision exams    - Has colonoscopy scheduled for December    - Routine labs ordered

## 2022-11-30 NOTE — PROGRESS NOTES
Name: Mayte Trimble      : 1960      MRN: [id number]  Encounter Provider: AMARA Camacho  Encounter Date: 2022   Encounter department: [address] [place] [address]  Healthcare maintenance  Assessment & Plan:  - Reviewed immunizations and screenings  - UTD with dental and vision exams    - Has colonoscopy scheduled for December    - Routine labs ordered  2  Type 2 diabetes mellitus without complication, without long-term current use of insulin ([address] )  Assessment & Plan:  - Not well controlled  - Will increase Metformin to 1,000 mg BID  Advised of side effects   - Will start Trulicity  0 75 mg weekly x 2 weeks then increase to 1 5 weekly  - DM foot exam performed in office today  - Urine microalbumin obtained  - Blood glucose testing supplies sent to patient's pharmacy  Discussed fasting blood sugar goals  - Discussed healthy diet and regular exercise   - Recommend follow up in 3 weeks  Lab Results   Component Value Date    HGBA1C 11 1 (A) 2022       Orders:  -     POCT hemoglobin A1c  -     Microalbumin / creatinine urine ratio  -     Blood Glucose Monitoring Suppl (OneTouch Verio Reflect) w/Device KIT; Check blood sugars once daily  Please substitute with appropriate alternative as covered by patient's insurance  Dx: E11 65  -     glucose blood (OneTouch Verio) test strip; Check blood sugars once daily  Please substitute with appropriate alternative as covered by patient's insurance  Dx: E11 65  -     OneTouch Delica Lancets 57C MISC; Check blood sugars once daily  Please substitute with appropriate alternative as covered by patient's insurance  Dx: E11 65  -     metFORMIN (GLUCOPHAGE) 1000 MG tablet; Take 1 tablet (1,000 mg total) by mouth 2 (two) times a day with meals  -     CBC and differential; Future  -     Comprehensive metabolic panel; Future  -     Lipid Panel with Direct LDL reflex;  Future  -     TSH, 3rd generation with Free T4 reflex; Future  -     Dulaglutide (Trulicity) 1 5 DX/8 0RC SOPN; Inject 0 5 mL (1 5 mg total) under the skin once a week for 4 doses    3  Hyperlipidemia, unspecified hyperlipidemia type  Assessment & Plan:  Component      Latest Ref Rng & Units 12/16/2020 9/3/2021   Cholesterol      50 - 200 mg/dL 134 143   Triglycerides      <=150 mg/dL 175 (H) 179 (H)   HDL      >=40 mg/dL 35 (L) 35 (L)   LDL Calculated      0 - 100 mg/dL 64 72     - Elevated triglycerides on last lipid panel   - Discussed healthy diet and regular exercise  - Continue Zocor and Omega 3 daily  - Will obtain updated fasting lipid panel  Orders:  -     CBC and differential; Future  -     Comprehensive metabolic panel; Future  -     Lipid Panel with Direct LDL reflex; Future  -     TSH, 3rd generation with Free T4 reflex; Future           Subjective     Patient with PMH of hyperlipidemia and type 2 diabetes presents today for annual physical  He is taking his prescribed medication and reports no side effects  His hemoglobin A1c in the office today is 11 1  His last A1c was well controlled at 6 9 which was in September 2021  Patient denies any recent diet changes  Was on a short course of prednisone for a sinus infection a few weeks ago  Denies any other infections or causes of increased blood sugar  He does not monitor his blood sugar at home  He denies any concerns or complaints  Review of Systems   Constitutional: Negative for fatigue and fever  HENT: Negative for congestion, rhinorrhea and trouble swallowing  Eyes: Negative for visual disturbance  Respiratory: Negative for cough and shortness of breath  Cardiovascular: Negative for chest pain and palpitations  Gastrointestinal: Negative for abdominal pain and blood in stool  Endocrine: Negative for cold intolerance and heat intolerance  Genitourinary: Negative for difficulty urinating, dysuria and frequency  Musculoskeletal: Negative for gait problem     Skin: Negative for rash  Neurological: Negative for dizziness, syncope and headaches  Hematological: Negative for adenopathy  Psychiatric/Behavioral: Negative for behavioral problems  Past Medical History:   Diagnosis Date   • Anesthesia complication     difficulty awakening- dyspnea   • Anxiety    • Arthritis    • BPH (benign prostatic hyperplasia)    • Cataract     starting   • Cleft hard palate    • Diabetes (HCC)     borderline does not check blood sugar   • Glaucoma suspect, both eyes    • Hyperlipidemia    • Kidney stone     left   • PONV (postoperative nausea and vomiting)    • Right ureteral stone    • Seasonal allergies    • Wears dentures     partial upper   • Wears glasses      Past Surgical History:   Procedure Laterality Date   • CLEFT PALATE REPAIR      multiple surgeries   • FL RETROGRADE PYELOGRAM  8/20/2020   • FL RETROGRADE PYELOGRAM  10/29/2020   • INGUINAL HERNIA REPAIR Right    • IR NEPHROURETERAL ACCESS FOR UROLOGY PCNL  10/28/2020   • JOINT REPLACEMENT Bilateral    • KNEE CARTILAGE SURGERY Left    • IN CYSTO/URETERO W/LITHOTRIPSY &INDWELL STENT INSRT Right 8/4/2017    Procedure: CYSTOSCOPY URETEROSCOPY WITH LITHOTRIPSY HOLMIUM LASER, RETROGRADE PYELOGRAM AND INSERTION STENT URETERAL;  Surgeon: Babatunde Gomez MD;  [facility];  Service: Urology   • IN CYSTO/URETERO W/LITHOTRIPSY &INDWELL STENT INSRT Left 8/20/2020    Procedure: CYSTO, URETEROSCOPY W/ LASER, RETROGRADE PYELOGRAM, STENT;  Surgeon: Layo Garcia MD;  [facility];  Service: Urology   • IN STEVEN Gonzalez CM Left 10/29/2020    Procedure: P C N L  antegrade insertion double j stent insertion and removal nephrostomy tube;   Surgeon: Layo Garcia MD;  [facility];  Service: Urology   • IN TOTAL KNEE ARTHROPLASTY Right 3/4/2019    Procedure: TOTAL KNEE ARTHROPLASTY;  Surgeon: Michelle Reed MD;  [facility];  Service: Orthopedics   • IN TOTAL KNEE ARTHROPLASTY Left 9/13/2019    Procedure: ARTHROPLASTY KNEE TOTAL;  Surgeon: Asmita Willis MD;  [facility];  Service: Orthopedics   • TOTAL KNEE ARTHROPLASTY Left     l 9/14/2019 right 3/4/2019     Family History   Problem Relation Age of Onset   • Diabetes Mother    • Heart disease Mother    • Hypertension Mother    • Esophageal cancer Father    • Diabetes Sister    • Other Sister      Social History     Socioeconomic History   • Marital status: /Civil Union     Spouse name: None   • Number of children: None   • Years of education: None   • Highest education level: None   Occupational History   • None   Tobacco Use   • Smoking status: Never   • Smokeless tobacco: Never   • Tobacco comments:     no passive smoke exposure   Vaping Use   • Vaping Use: Never used   Substance and Sexual Activity   • Alcohol use: Yes     Comment: beer   • Drug use: No   • Sexual activity: Yes     Partners: Female   Other Topics Concern   • None   Social History Narrative   • None     Social Determinants of Health     Financial Resource Strain: Not on file   Food Insecurity: Not on file   Transportation Needs: Not on file   Physical Activity: Not on file   Stress: Not on file   Social Connections: Not on file   Intimate Partner Violence: Not on file   Housing Stability: Not on file     Current Outpatient Medications on File Prior to Visit   Medication Sig   • acetaminophen (TYLENOL) 500 mg tablet Take 500-1,000 mg by mouth every 6 (six) hours as needed for mild pain   • albuterol (ProAir HFA) 90 mcg/act inhaler Inhale 2 puffs every 6 (six) hours as needed for wheezing or shortness of breath   • allopurinol (ZYLOPRIM) 100 mg tablet Take 1 tablet (100 mg total) by mouth daily   • ascorbic acid (VITAMIN C) 500 MG tablet Take 1 tablet (500 mg total) by mouth daily   • cetirizine (ZyrTEC) 10 mg tablet Take 10 mg by mouth daily   • fluticasone (FLONASE) 50 mcg/act nasal spray 2 sprays into each nostril daily   • Multiple Vitamin (MULTIVITAMIN) tablet Take 2 tablets by mouth daily   • Omega-3 Fatty Acids (FISH OIL PO) Take 1 capsule by mouth daily   • simvastatin (ZOCOR) 40 mg tablet TAKE 1 TABLET BY MOUTH EVERYDAY AT BEDTIME   • [DISCONTINUED] metFORMIN (GLUCOPHAGE-XR) 500 mg 24 hr tablet TAKE 1 TABLET BY MOUTH EVERY DAY WITH BREAKFAST   • albuterol (Proventil HFA) 90 mcg/act inhaler Inhale 2 puffs every 6 (six) hours as needed for wheezing   • azelastine (ASTELIN) 0 1 % nasal spray 1 spray into each nostril 2 (two) times a day Use in each nostril as directed (Patient not taking: Reported on 1/11/2022)   • bisacodyl (DULCOLAX) 5 mg EC tablet Take 4 tablets (20 mg total) by mouth once for 1 dose Colonoscopy prep (Patient not taking: Reported on 10/25/2022)   • brompheniramine-pseudoephedrine-DM 30-2-10 MG/5ML syrup Take 10 mL by mouth 4 (four) times a day as needed for congestion or cough (Patient not taking: Reported on 1/11/2022)   • ibuprofen (MOTRIN) 200 mg tablet Take 200-800 mg by mouth every 6 (six) hours as needed for mild pain (Patient not taking: Reported on 1/5/2022)   • oxyCODONE (Roxicodone) 5 mg immediate release tablet Take 1-2 tablets every 6 hours prn (Patient not taking: Reported on 11/11/2020)   • polyethylene glycol (GOLYTELY) 4000 mL solution Take 4,000 mL by mouth once for 1 dose Colonoscopy prep (Patient not taking: Reported on 10/25/2022)     Allergies   Allergen Reactions   • Flomax [Tamsulosin] Dizziness     Immunization History   Administered Date(s) Administered   • COVID-19 MODERNA VACC 0 5 ML IM 01/14/2021, 02/11/2021, 11/15/2021   • INFLUENZA 10/16/2017, 10/27/2020, 10/29/2021, 10/31/2022   • Influenza, injectable, quadrivalent, preservative free 0 5 mL 11/02/2018, 10/27/2020   • Influenza, recombinant, quadrivalent,injectable, preservative free 10/03/2019   • Pneumococcal Polysaccharide PPV23 11/04/2021   • Tdap 11/01/2012   • Zoster Vaccine Recombinant 09/03/2021, 12/03/2021       Objective     /84 (BP Location: Left arm, Patient Position: Sitting, Cuff Size: Large)   Pulse 90   Temp (!) 97 3 °F (36 3 °C) (Tympanic)   Resp 16   Ht 5' 7" (1 702 m)   Wt 118 kg (260 lb)   SpO2 95%   BMI 40 72 kg/m²     Physical Exam  Vitals and nursing note reviewed  Constitutional:       Appearance: Normal appearance  HENT:      Head: Normocephalic and atraumatic  Right Ear: Tympanic membrane, ear canal and external ear normal       Left Ear: Tympanic membrane, ear canal and external ear normal       Nose: No congestion  Eyes:      Conjunctiva/sclera: Conjunctivae normal       Pupils: Pupils are equal, round, and reactive to light  Cardiovascular:      Rate and Rhythm: Normal rate and regular rhythm  Pulses:           Dorsalis pedis pulses are 2+ on the right side and 2+ on the left side  Heart sounds: Normal heart sounds  Pulmonary:      Effort: Pulmonary effort is normal       Breath sounds: Normal breath sounds  Abdominal:      General: Bowel sounds are normal       Palpations: Abdomen is soft  Musculoskeletal:         General: Normal range of motion  Cervical back: Normal range of motion  Lymphadenopathy:      Cervical: No cervical adenopathy  Skin:     General: Skin is warm and dry  Neurological:      Mental Status: He is alert and oriented to person, place, and time  Cranial Nerves: No cranial nerve deficit     Psychiatric:         Mood and Affect: Mood normal          Behavior: Behavior normal        AMARA Cunha

## 2022-12-01 ENCOUNTER — APPOINTMENT (OUTPATIENT)
Dept: LAB | Facility: IMAGING CENTER | Age: 62
End: 2022-12-01

## 2022-12-01 DIAGNOSIS — E11.9 TYPE 2 DIABETES MELLITUS WITHOUT COMPLICATION, WITHOUT LONG-TERM CURRENT USE OF INSULIN (HCC): ICD-10-CM

## 2022-12-01 DIAGNOSIS — N20.0 NEPHROLITHIASIS: ICD-10-CM

## 2022-12-01 DIAGNOSIS — Z12.5 PROSTATE CANCER SCREENING: ICD-10-CM

## 2022-12-01 DIAGNOSIS — E78.5 HYPERLIPIDEMIA, UNSPECIFIED HYPERLIPIDEMIA TYPE: ICD-10-CM

## 2022-12-01 LAB
BASOPHILS # BLD AUTO: 0.09 THOUSANDS/ÂΜL (ref 0–0.1)
BASOPHILS NFR BLD AUTO: 1 % (ref 0–1)
CREAT UR-MCNC: 95.9 MG/DL
EOSINOPHIL # BLD AUTO: 0.43 THOUSAND/ÂΜL (ref 0–0.61)
EOSINOPHIL NFR BLD AUTO: 5 % (ref 0–6)
ERYTHROCYTE [DISTWIDTH] IN BLOOD BY AUTOMATED COUNT: 12.3 % (ref 11.6–15.1)
HCT VFR BLD AUTO: 45.9 % (ref 36.5–49.3)
HGB BLD-MCNC: 15.1 G/DL (ref 12–17)
IMM GRANULOCYTES # BLD AUTO: 0.02 THOUSAND/UL (ref 0–0.2)
IMM GRANULOCYTES NFR BLD AUTO: 0 % (ref 0–2)
LYMPHOCYTES # BLD AUTO: 2.47 THOUSANDS/ÂΜL (ref 0.6–4.47)
LYMPHOCYTES NFR BLD AUTO: 30 % (ref 14–44)
MCH RBC QN AUTO: 28.3 PG (ref 26.8–34.3)
MCHC RBC AUTO-ENTMCNC: 32.9 G/DL (ref 31.4–37.4)
MCV RBC AUTO: 86 FL (ref 82–98)
MICROALBUMIN UR-MCNC: 15.2 MG/L (ref 0–20)
MICROALBUMIN/CREAT 24H UR: 16 MG/G CREATININE (ref 0–30)
MONOCYTES # BLD AUTO: 0.63 THOUSAND/ÂΜL (ref 0.17–1.22)
MONOCYTES NFR BLD AUTO: 8 % (ref 4–12)
NEUTROPHILS # BLD AUTO: 4.72 THOUSANDS/ÂΜL (ref 1.85–7.62)
NEUTS SEG NFR BLD AUTO: 56 % (ref 43–75)
NRBC BLD AUTO-RTO: 0 /100 WBCS
PLATELET # BLD AUTO: 247 THOUSANDS/UL (ref 149–390)
PMV BLD AUTO: 12.6 FL (ref 8.9–12.7)
RBC # BLD AUTO: 5.33 MILLION/UL (ref 3.88–5.62)
WBC # BLD AUTO: 8.36 THOUSAND/UL (ref 4.31–10.16)

## 2022-12-02 ENCOUNTER — TELEPHONE (OUTPATIENT)
Dept: FAMILY MEDICINE CLINIC | Facility: CLINIC | Age: 62
End: 2022-12-02

## 2022-12-02 LAB
ALBUMIN SERPL BCP-MCNC: 3.9 G/DL (ref 3.5–5)
ALP SERPL-CCNC: 49 U/L (ref 46–116)
ALT SERPL W P-5'-P-CCNC: 68 U/L (ref 12–78)
ANION GAP SERPL CALCULATED.3IONS-SCNC: 5 MMOL/L (ref 4–13)
AST SERPL W P-5'-P-CCNC: 51 U/L (ref 5–45)
BILIRUB SERPL-MCNC: 1.02 MG/DL (ref 0.2–1)
BUN SERPL-MCNC: 12 MG/DL (ref 5–25)
CALCIUM SERPL-MCNC: 9.5 MG/DL (ref 8.3–10.1)
CHLORIDE SERPL-SCNC: 107 MMOL/L (ref 96–108)
CHOLEST SERPL-MCNC: 132 MG/DL
CO2 SERPL-SCNC: 25 MMOL/L (ref 21–32)
CREAT SERPL-MCNC: 0.94 MG/DL (ref 0.6–1.3)
GFR SERPL CREATININE-BSD FRML MDRD: 85 ML/MIN/1.73SQ M
GLUCOSE P FAST SERPL-MCNC: 243 MG/DL (ref 65–99)
HDLC SERPL-MCNC: 34 MG/DL
LDLC SERPL CALC-MCNC: 56 MG/DL (ref 0–100)
POTASSIUM SERPL-SCNC: 4.2 MMOL/L (ref 3.5–5.3)
PROT SERPL-MCNC: 7.6 G/DL (ref 6.4–8.4)
PSA SERPL-MCNC: 0.3 NG/ML (ref 0–4)
SODIUM SERPL-SCNC: 137 MMOL/L (ref 135–147)
TRIGL SERPL-MCNC: 209 MG/DL
TSH SERPL DL<=0.05 MIU/L-ACNC: 2.53 UIU/ML (ref 0.45–4.5)

## 2022-12-02 NOTE — TELEPHONE ENCOUNTER
----- Message from [id number] sent at 12/2/2022 10:46 AM EST -----  Labs show elevated fasting glucose and triglycerides  Will discuss in more detail at his upcoming appointment

## 2022-12-07 ENCOUNTER — VBI (OUTPATIENT)
Dept: ADMINISTRATIVE | Facility: OTHER | Age: 62
End: 2022-12-07

## 2022-12-08 LAB
LEFT EYE DIABETIC RETINOPATHY: NORMAL
RIGHT EYE DIABETIC RETINOPATHY: NORMAL

## 2022-12-08 PROCEDURE — 2023F DILAT RTA XM W/O RTNOPTHY: CPT | Performed by: FAMILY MEDICINE

## 2022-12-12 ENCOUNTER — HOSPITAL ENCOUNTER (OUTPATIENT)
Dept: GASTROENTEROLOGY | Facility: AMBULARY SURGERY CENTER | Age: 62
Setting detail: OUTPATIENT SURGERY
Discharge: HOME | End: 2022-12-12
Attending: INTERNAL MEDICINE

## 2022-12-12 ENCOUNTER — ANESTHESIA EVENT (OUTPATIENT)
Dept: GASTROENTEROLOGY | Facility: AMBULARY SURGERY CENTER | Age: 62
End: 2022-12-12

## 2022-12-12 ENCOUNTER — ANESTHESIA (OUTPATIENT)
Dept: GASTROENTEROLOGY | Facility: AMBULARY SURGERY CENTER | Age: 62
End: 2022-12-12

## 2022-12-12 VITALS
BODY MASS INDEX: 40.02 KG/M2 | OXYGEN SATURATION: 95 % | DIASTOLIC BLOOD PRESSURE: 73 MMHG | HEIGHT: 67 IN | HEART RATE: 78 BPM | TEMPERATURE: 97.2 F | SYSTOLIC BLOOD PRESSURE: 125 MMHG | WEIGHT: 255 LBS | RESPIRATION RATE: 18 BRPM

## 2022-12-12 DIAGNOSIS — R16.0 HEPATOMEGALY: ICD-10-CM

## 2022-12-12 DIAGNOSIS — K52.9 CHRONIC DIARRHEA: ICD-10-CM

## 2022-12-12 DIAGNOSIS — R79.89 ABNORMAL LFTS: ICD-10-CM

## 2022-12-12 DIAGNOSIS — Z12.11 COLON CANCER SCREENING: ICD-10-CM

## 2022-12-12 LAB — GLUCOSE SERPL-MCNC: 114 MG/DL (ref 65–140)

## 2022-12-12 RX ORDER — SODIUM CHLORIDE, SODIUM LACTATE, POTASSIUM CHLORIDE, CALCIUM CHLORIDE 600; 310; 30; 20 MG/100ML; MG/100ML; MG/100ML; MG/100ML
INJECTION, SOLUTION INTRAVENOUS CONTINUOUS PRN
Status: DISCONTINUED | OUTPATIENT
Start: 2022-12-12 | End: 2022-12-12

## 2022-12-12 RX ORDER — PROPOFOL 10 MG/ML
INJECTION, EMULSION INTRAVENOUS AS NEEDED
Status: DISCONTINUED | OUTPATIENT
Start: 2022-12-12 | End: 2022-12-12

## 2022-12-12 RX ORDER — LIDOCAINE HYDROCHLORIDE 10 MG/ML
INJECTION, SOLUTION EPIDURAL; INFILTRATION; INTRACAUDAL; PERINEURAL AS NEEDED
Status: DISCONTINUED | OUTPATIENT
Start: 2022-12-12 | End: 2022-12-12

## 2022-12-12 RX ADMIN — PROPOFOL 30 MG: 10 INJECTION, EMULSION INTRAVENOUS at 09:09

## 2022-12-12 RX ADMIN — SODIUM CHLORIDE, SODIUM LACTATE, POTASSIUM CHLORIDE, AND CALCIUM CHLORIDE: .6; .31; .03; .02 INJECTION, SOLUTION INTRAVENOUS at 08:56

## 2022-12-12 RX ADMIN — PROPOFOL 30 MG: 10 INJECTION, EMULSION INTRAVENOUS at 09:01

## 2022-12-12 RX ADMIN — PROPOFOL 30 MG: 10 INJECTION, EMULSION INTRAVENOUS at 09:05

## 2022-12-12 RX ADMIN — PROPOFOL 30 MG: 10 INJECTION, EMULSION INTRAVENOUS at 09:07

## 2022-12-12 RX ADMIN — PROPOFOL 30 MG: 10 INJECTION, EMULSION INTRAVENOUS at 09:03

## 2022-12-12 RX ADMIN — PROPOFOL 30 MG: 10 INJECTION, EMULSION INTRAVENOUS at 09:11

## 2022-12-12 RX ADMIN — PROPOFOL 30 MG: 10 INJECTION, EMULSION INTRAVENOUS at 09:13

## 2022-12-12 RX ADMIN — LIDOCAINE HYDROCHLORIDE 50 MG: 10 INJECTION, SOLUTION EPIDURAL; INFILTRATION; INTRACAUDAL at 08:58

## 2022-12-12 RX ADMIN — PROPOFOL 100 MG: 10 INJECTION, EMULSION INTRAVENOUS at 08:58

## 2022-12-12 RX ADMIN — PROPOFOL 30 MG: 10 INJECTION, EMULSION INTRAVENOUS at 09:15

## 2022-12-12 RX ADMIN — PROPOFOL 50 MG: 10 INJECTION, EMULSION INTRAVENOUS at 08:59

## 2022-12-12 NOTE — ANESTHESIA POSTPROCEDURE EVALUATION
Post-Op Assessment Note    CV Status:  Stable  Pain Score: 0    Pain management: adequate     Mental Status:  Alert and awake   Hydration Status:  Euvolemic   PONV Controlled:  Controlled   Airway Patency:  Patent      Post Op Vitals Reviewed: Yes      Staff: Anesthesiologist, CRNA         No notable events documented      /76 (12/12/22 0927)    Temp  97   Pulse 93 (12/12/22 0927)   Resp 16 (12/12/22 0927)    SpO2 95 % (12/12/22 0927)

## 2022-12-12 NOTE — ANESTHESIA PREPROCEDURE EVALUATION
Procedure:  EGD  COLONOSCOPY    Relevant Problems   ANESTHESIA   (+) PONV (postoperative nausea and vomiting)      CARDIO   (+) Hyperlipidemia      ENDO   (+) Type 2 diabetes mellitus without complication, without long-term current use of insulin (HCC)      /RENAL   (+) BPH without obstruction/lower urinary tract symptoms   (+) Nephrolithiasis      MUSCULOSKELETAL   (+) Osteoarthritis      Other   (+) Glaucoma        Recently had URI and was on albuterol PRN,  Stopped using it a couple weeks ago    Physical Exam    Airway    Mallampati score: I  TM Distance: >3 FB  Neck ROM: full     Dental   Comment: None loose,     Cardiovascular      Pulmonary      Other Findings        Anesthesia Plan  ASA Score- 3     Anesthesia Type- IV sedation with anesthesia with ASA Monitors  Additional Monitors:   Airway Plan:     Comment: 04:00 - last of PO prep (clears)  Plan Factors-Exercise tolerance (METS): >4 METS  Chart reviewed  Patient summary reviewed  Patient is not a current smoker  Induction- intravenous  Postoperative Plan-     Informed Consent- Anesthetic plan and risks discussed with patient  I personally reviewed this patient with the CRNA  Discussed and agreed on the Anesthesia Plan with the CRNA  Cuauhtemoc Carreon

## 2022-12-12 NOTE — H&P
History and Physical - [facility]  Elaine Trimble 60 y o  male MRN: [id number]                  HPI: Elaine Trimble is a 60y o  year old male who presents for EGD/colon      REVIEW OF SYSTEMS: Per the HPI, and otherwise unremarkable  Historical Information   Past Medical History:   Diagnosis Date   • Anesthesia complication     difficulty awakening- dyspnea   • Anxiety    • Arthritis    • BPH (benign prostatic hyperplasia)    • Cataract     starting   • Cleft hard palate    • Diabetes (HCC)     borderline does not check blood sugar   • Glaucoma suspect, both eyes    • Hyperlipidemia    • Kidney stone     left   • PONV (postoperative nausea and vomiting)    • Right ureteral stone    • Seasonal allergies    • Wears dentures     partial upper   • Wears glasses      Past Surgical History:   Procedure Laterality Date   • CLEFT PALATE REPAIR      multiple surgeries   • FL RETROGRADE PYELOGRAM  8/20/2020   • FL RETROGRADE PYELOGRAM  10/29/2020   • INGUINAL HERNIA REPAIR Right    • IR NEPHROURETERAL ACCESS FOR UROLOGY PCNL  10/28/2020   • JOINT REPLACEMENT Bilateral    • KNEE CARTILAGE SURGERY Left    • MA CYSTO/URETERO W/LITHOTRIPSY &INDWELL STENT INSRT Right 8/4/2017    Procedure: CYSTOSCOPY URETEROSCOPY WITH LITHOTRIPSY HOLMIUM LASER, RETROGRADE PYELOGRAM AND INSERTION STENT URETERAL;  Surgeon: Fuad Monroe MD;  [facility];  Service: Urology   • MA CYSTO/URETERO W/LITHOTRIPSY &INDWELL STENT INSRT Left 8/20/2020    Procedure: CYSTO, URETEROSCOPY W/ LASER, RETROGRADE PYELOGRAM, STENT;  Surgeon: Augusta Tiwari MD;  [facility];  Service: Urology   • MA MIHAELAUT Lisa CM Left 10/29/2020    Procedure: P C N L  antegrade insertion double j stent insertion and removal nephrostomy tube;   Surgeon: Augusta Tiwari MD;  [facility];  Service: Urology   • MA TOTAL KNEE ARTHROPLASTY Right 3/4/2019    Procedure: TOTAL KNEE ARTHROPLASTY;  Surgeon: Megha Rendon MD Samantha;  [facility];  Service: Orthopedics   • OH TOTAL KNEE ARTHROPLASTY Left 9/13/2019    Procedure: ARTHROPLASTY KNEE TOTAL;  Surgeon: Kira Heard MD;  [facility];  Service: Orthopedics   • TOTAL KNEE ARTHROPLASTY Left     l 9/14/2019 right 3/4/2019     Social History   Social History     Substance and Sexual Activity   Alcohol Use Yes    Comment: beer     Social History     Substance and Sexual Activity   Drug Use No     Social History     Tobacco Use   Smoking Status Never   Smokeless Tobacco Never   Tobacco Comments    no passive smoke exposure     Family History   Problem Relation Age of Onset   • Diabetes Mother    • Heart disease Mother    • Hypertension Mother    • Esophageal cancer Father    • Diabetes Sister    • Other Sister        Meds/Allergies       Current Outpatient Medications:   •  acetaminophen (TYLENOL) 500 mg tablet  •  albuterol (ProAir HFA) 90 mcg/act inhaler  •  albuterol (Proventil HFA) 90 mcg/act inhaler  •  allopurinol (ZYLOPRIM) 100 mg tablet  •  ascorbic acid (VITAMIN C) 500 MG tablet  •  azelastine (ASTELIN) 0 1 % nasal spray  •  bisacodyl (DULCOLAX) 5 mg EC tablet  •  Blood Glucose Monitoring Suppl (OneTouch Verio Reflect) w/Device KIT  •  brompheniramine-pseudoephedrine-DM 30-2-10 MG/5ML syrup  •  cetirizine (ZyrTEC) 10 mg tablet  •  Dulaglutide (Trulicity) 1 5 RD/7 7SN SOPN  •  fluticasone (FLONASE) 50 mcg/act nasal spray  •  glucose blood (OneTouch Verio) test strip  •  ibuprofen (MOTRIN) 200 mg tablet  •  metFORMIN (GLUCOPHAGE) 1000 MG tablet  •  Multiple Vitamin (MULTIVITAMIN) tablet  •  Omega-3 Fatty Acids (FISH OIL PO)  •  OneTouch Delica Lancets 51M MISC  •  oxyCODONE (Roxicodone) 5 mg immediate release tablet  •  polyethylene glycol (GOLYTELY) 4000 mL solution  •  simvastatin (ZOCOR) 40 mg tablet    Allergies   Allergen Reactions   • Flomax [Tamsulosin] Dizziness       Objective     There were no vitals taken for this visit        PHYSICAL EXAM    Gen: NAD  Head: NCAT  CV: RRR  CHEST: Clear  ABD: soft, NT/ND  EXT: no edema      ASSESSMENT/PLAN:  This is a 60y o  year old male here for EGD/colon, and he is stable and optimized for his procedure

## 2022-12-21 ENCOUNTER — OFFICE VISIT (OUTPATIENT)
Dept: FAMILY MEDICINE CLINIC | Facility: CLINIC | Age: 62
End: 2022-12-21

## 2022-12-21 VITALS
SYSTOLIC BLOOD PRESSURE: 132 MMHG | HEIGHT: 67 IN | OXYGEN SATURATION: 97 % | TEMPERATURE: 97.9 F | BODY MASS INDEX: 39.55 KG/M2 | HEART RATE: 89 BPM | WEIGHT: 252 LBS | DIASTOLIC BLOOD PRESSURE: 82 MMHG

## 2022-12-21 DIAGNOSIS — E11.9 TYPE 2 DIABETES MELLITUS WITHOUT COMPLICATION, WITHOUT LONG-TERM CURRENT USE OF INSULIN (HCC): ICD-10-CM

## 2022-12-21 DIAGNOSIS — E78.49 OTHER HYPERLIPIDEMIA: ICD-10-CM

## 2022-12-21 DIAGNOSIS — I10 ESSENTIAL HYPERTENSION: ICD-10-CM

## 2022-12-21 DIAGNOSIS — E11.9 TYPE 2 DIABETES MELLITUS WITHOUT COMPLICATION, WITHOUT LONG-TERM CURRENT USE OF INSULIN (HCC): Primary | ICD-10-CM

## 2022-12-21 LAB
SL AMB POCT GLUCOSE BLD: 123
SL AMB POCT HEMOGLOBIN AIC: 10.2 (ref ?–6.5)

## 2022-12-21 RX ORDER — DULAGLUTIDE 3 MG/.5ML
3 INJECTION, SOLUTION SUBCUTANEOUS WEEKLY
Qty: 6 ML | Refills: 1 | Status: SHIPPED | OUTPATIENT
Start: 2022-12-21 | End: 2022-12-28 | Stop reason: SDUPTHER

## 2022-12-21 RX ORDER — METFORMIN HYDROCHLORIDE 500 MG/1
1000 TABLET, EXTENDED RELEASE ORAL
Qty: 180 TABLET | Refills: 1 | Status: SHIPPED | OUTPATIENT
Start: 2022-12-21

## 2022-12-21 NOTE — PATIENT INSTRUCTIONS

## 2022-12-21 NOTE — ASSESSMENT & PLAN NOTE
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

## 2022-12-21 NOTE — PROGRESS NOTES
Name: Debbe Nyhan      : 1960      MRN: [id number]  Encounter Provider: Prasad Iqbal MD  Encounter Date: 2022   Encounter department: [address] [place] [address]  Type 2 diabetes mellitus without complication, without long-term current use of insulin ([facility])  Assessment & Plan:    Lab Results   Component Value Date    HGBA1C 10 2 (A) 2022   Are controlled but improving  I am going to increase his Trulicity to 3 mg weekly  He was given prescription for Jardiance with 2 samples  I am going to cut back on his metformin to 500 mg extended release 2 tablet in the evening  Discussed about low-carb diet  Come back in 3 weeks to reevaluate and 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     Orders:  -     POCT hemoglobin A1c  -     metFORMIN (GLUCOPHAGE-XR) 500 mg 24 hr tablet; Take 2 tablets (1,000 mg total) by mouth daily with dinner  -     Dulaglutide (Trulicity) 3 NZ/5 2BA SOPN; Inject 0 5 mL (3 mg total) under the skin once a week  -     POCT blood glucose  -     Empagliflozin (Jardiance) 25 MG TABS; Take 1 tablet (25 mg total) by mouth every morning  -     Comprehensive metabolic panel; Future; Expected date: 2023    2  BMI 39 0-39 9,adult  Assessment & Plan:  Improving  He lost 8 pounds since last visit  3  Other hyperlipidemia  Assessment & Plan:  Component      Latest Ref Rng & Units 2020 9/3/2021 2022   Cholesterol      See Comment mg/dL 134 143 132   Triglycerides      See Comment mg/dL 175 (H) 179 (H) 209 (H)   HDL      >=40 mg/dL 35 (L) 35 (L) 34 (L)   LDL Calculated      0 - 100 mg/dL 64 72 56   His triglyceride is elevated but his LDL is well controlled  Continue on simvastatin 40 mg daily  He was told that Trulicity will help with his triglyceride  Discussed about low-fat diet and regular exercise  We will continue to monitor  4  Essential hypertension  Assessment & Plan:  Blood pressure 132/82    I will consider adding low-dose of lisinopril next visit  Subjective     Is here today for follow-up multiple medical problems  He was seen here recently for not well controlled diabetes mellitus type 2  His A1c was elevated at 11 1  He brought with him form for   His medications were adjusted  His metformin was increased to 1000 mg twice a day and he was started on Trulicity 4 07 and increased to 1 5 mg weekly  He complains of having diarrhea after he takes his metformin  Today in the office his A1c improved to 10 2  His fasting glucose is improving  He denies any complaint  Denies any hypoglycemic episodes  Review of Systems   Constitutional: Negative for chills and fever  HENT: Negative for trouble swallowing  Eyes: Negative for visual disturbance  Respiratory: Negative for cough and shortness of breath  Cardiovascular: Negative for chest pain and palpitations  Gastrointestinal: Negative for abdominal pain, blood in stool and vomiting  Endocrine: Negative for cold intolerance and heat intolerance  Genitourinary: Negative for difficulty urinating and dysuria  Musculoskeletal: Negative for gait problem  Skin: Negative for rash  Neurological: Negative for dizziness, syncope and headaches  Hematological: Negative for adenopathy  Psychiatric/Behavioral: Negative for behavioral problems         Past Medical History:   Diagnosis Date   • Anesthesia complication     difficulty awakening- dyspnea   • Anxiety    • Arthritis    • BPH (benign prostatic hyperplasia)    • Cataract     starting   • Cleft hard palate    • Diabetes ([facility] [address] )     borderline does not check blood sugar   • Glaucoma suspect, both eyes    • Hyperlipidemia    • Kidney stone     left   • PONV (postoperative nausea and vomiting)    • Right ureteral stone    • Seasonal allergies    • Wears dentures     partial upper   • Wears glasses      Past Surgical History:   Procedure Laterality Date   • CLEFT PALATE REPAIR      multiple surgeries   • FL RETROGRADE PYELOGRAM  8/20/2020   • FL RETROGRADE PYELOGRAM  10/29/2020   • INGUINAL HERNIA REPAIR Right    • IR NEPHROURETERAL ACCESS FOR UROLOGY PCNL  10/28/2020   • JOINT REPLACEMENT Bilateral    • KNEE CARTILAGE SURGERY Left    • FL CYSTO/URETERO W/LITHOTRIPSY &INDWELL STENT INSRT Right 8/4/2017    Procedure: CYSTOSCOPY URETEROSCOPY WITH LITHOTRIPSY HOLMIUM LASER, RETROGRADE PYELOGRAM AND INSERTION STENT URETERAL;  Surgeon: Marvin Romero MD;  [facility];  Service: Urology   • FL CYSTO/URETERO W/LITHOTRIPSY &INDWELL STENT INSRT Left 8/20/2020    Procedure: CYSTO, URETEROSCOPY W/ LASER, RETROGRADE PYELOGRAM, STENT;  Surgeon: Cheri Jackson MD;  [facility];  Service: Urology   • FL PERCUT Margothtad CM Left 10/29/2020    Procedure: P C N L  antegrade insertion double j stent insertion and removal nephrostomy tube;   Surgeon: Cheri Jackson MD;  [facility];  Service: Urology   • FL TOTAL KNEE ARTHROPLASTY Right 3/4/2019    Procedure: TOTAL KNEE ARTHROPLASTY;  Surgeon: Manisha Mckeon MD;  [facility];  Service: Orthopedics   • FL TOTAL KNEE ARTHROPLASTY Left 9/13/2019    Procedure: ARTHROPLASTY KNEE TOTAL;  Surgeon: Manisha Mckeon MD;  [facility];  Service: Orthopedics   • TOTAL KNEE ARTHROPLASTY Left     l 9/14/2019 right 3/4/2019     Family History   Problem Relation Age of Onset   • Stroke Mother    • Diabetes Mother    • Heart disease Mother    • Hypertension Mother    • Esophageal cancer Father    • Diabetes Sister    • Other Sister    • Colon cancer Brother      Social History     Socioeconomic History   • Marital status: Single     Spouse name: None   • Number of children: None   • Years of education: None   • Highest education level: None   Occupational History   • None   Tobacco Use   • Smoking status: Never   • Smokeless tobacco: Never   • Tobacco comments:     no passive smoke exposure Vaping Use   • Vaping Use: Never used   Substance and Sexual Activity   • Alcohol use: Yes     Alcohol/week: 1 0 standard drink     Types: 1 Cans of beer per week     Comment: beer   • Drug use: No   • Sexual activity: Yes     Partners: Female   Other Topics Concern   • None   Social History Narrative   • None     Social Determinants of Health     Financial Resource Strain: Not on file   Food Insecurity: Not on file   Transportation Needs: Not on file   Physical Activity: Not on file   Stress: Not on file   Social Connections: Not on file   Intimate Partner Violence: Not on file   Housing Stability: Not on file     Current Outpatient Medications on File Prior to Visit   Medication Sig   • acetaminophen (TYLENOL) 500 mg tablet Take 500-1,000 mg by mouth every 6 (six) hours as needed for mild pain   • allopurinol (ZYLOPRIM) 100 mg tablet Take 1 tablet (100 mg total) by mouth daily   • ascorbic acid (VITAMIN C) 500 MG tablet Take 1 tablet (500 mg total) by mouth daily   • Blood Glucose Monitoring Suppl (OneTouch Verio Reflect) w/Device KIT Check blood sugars once daily  Please substitute with appropriate alternative as covered by patient's insurance  Dx: E11 65   • cetirizine (ZyrTEC) 10 mg tablet Take 10 mg by mouth daily   • glucose blood (OneTouch Verio) test strip Check blood sugars once daily  Please substitute with appropriate alternative as covered by patient's insurance  Dx: E11 65   • Multiple Vitamin (MULTIVITAMIN) tablet Take 2 tablets by mouth daily   • Omega-3 Fatty Acids (FISH OIL PO) Take 1 capsule by mouth daily   • OneTouch Delica Lancets 21N MISC Check blood sugars once daily  Please substitute with appropriate alternative as covered by patient's insurance   Dx: E11 65   • simvastatin (ZOCOR) 40 mg tablet TAKE 1 TABLET BY MOUTH EVERYDAY AT BEDTIME   • [DISCONTINUED] Dulaglutide (Trulicity) 1 5 VG/0 6ZO SOPN Inject 0 5 mL (1 5 mg total) under the skin once a week for 4 doses   • [DISCONTINUED] metFORMIN (GLUCOPHAGE) 1000 MG tablet Take 1 tablet (1,000 mg total) by mouth 2 (two) times a day with meals   • albuterol (ProAir HFA) 90 mcg/act inhaler Inhale 2 puffs every 6 (six) hours as needed for wheezing or shortness of breath (Patient not taking: Reported on 12/21/2022)   • albuterol (Proventil HFA) 90 mcg/act inhaler Inhale 2 puffs every 6 (six) hours as needed for wheezing (Patient not taking: Reported on 12/21/2022)   • azelastine (ASTELIN) 0 1 % nasal spray 1 spray into each nostril 2 (two) times a day Use in each nostril as directed (Patient not taking: Reported on 12/21/2022)   • fluticasone (FLONASE) 50 mcg/act nasal spray 2 sprays into each nostril daily (Patient not taking: Reported on 12/21/2022)   • ibuprofen (MOTRIN) 200 mg tablet Take 200-800 mg by mouth every 6 (six) hours as needed for mild pain (Patient not taking: Reported on 1/5/2022)     Allergies   Allergen Reactions   • Flomax [Tamsulosin] Dizziness     Immunization History   Administered Date(s) Administered   • COVID-19 MODERNA VACC 0 5 ML IM 01/14/2021, 02/11/2021, 11/15/2021   • INFLUENZA 10/16/2017, 10/27/2020, 10/29/2021, 10/31/2022   • Influenza, injectable, quadrivalent, preservative free 0 5 mL 11/02/2018, 10/27/2020   • Influenza, recombinant, quadrivalent,injectable, preservative free 10/03/2019   • Pneumococcal Polysaccharide PPV23 11/04/2021   • Tdap 11/01/2012   • Zoster Vaccine Recombinant 09/03/2021, 12/03/2021       Objective     /82 (BP Location: Left arm, Patient Position: Sitting, Cuff Size: Large)   Pulse 89   Temp 97 9 °F (36 6 °C) (Tympanic)   Ht 5' 7" (1 702 m)   Wt 114 kg (252 lb)   SpO2 97%   BMI 39 47 kg/m²     Physical Exam  Vitals and nursing note reviewed  Constitutional:       Appearance: He is well-developed  HENT:      Head: Normocephalic and atraumatic  Eyes:      Pupils: Pupils are equal, round, and reactive to light     Cardiovascular:      Rate and Rhythm: Normal rate and regular rhythm  Heart sounds: Normal heart sounds  Pulmonary:      Effort: Pulmonary effort is normal       Breath sounds: Normal breath sounds  Abdominal:      General: Bowel sounds are normal       Palpations: Abdomen is soft  Musculoskeletal:         General: Normal range of motion  Cervical back: Normal range of motion and neck supple  Lymphadenopathy:      Cervical: No cervical adenopathy  Skin:     General: Skin is warm  Findings: No rash  Neurological:      Mental Status: He is alert and oriented to person, place, and time  Cranial Nerves: No cranial nerve deficit  Elayne Shone, MD BMI Counseling: Body mass index is 39 47 kg/m²  The BMI is above normal  Nutrition recommendations include reducing portion sizes, decreasing overall calorie intake and 3-5 servings of fruits/vegetables daily  Exercise recommendations include moderate aerobic physical activity for 150 minutes/week

## 2022-12-21 NOTE — ASSESSMENT & PLAN NOTE
Component      Latest Ref Rng & Units 12/16/2020 9/3/2021 12/1/2022   Cholesterol      See Comment mg/dL 134 143 132   Triglycerides      See Comment mg/dL 175 (H) 179 (H) 209 (H)   HDL      >=40 mg/dL 35 (L) 35 (L) 34 (L)   LDL Calculated      0 - 100 mg/dL 64 72 56   His triglyceride is elevated but his LDL is well controlled  Continue on simvastatin 40 mg daily  He was told that Trulicity will help with his triglyceride  Discussed about low-fat diet and regular exercise  We will continue to monitor

## 2022-12-22 ENCOUNTER — TELEPHONE (OUTPATIENT)
Dept: ADMINISTRATIVE | Facility: OTHER | Age: 62
End: 2022-12-22

## 2022-12-22 NOTE — TELEPHONE ENCOUNTER
----- Message from Shai Ortiz sent at 12/21/2022 10:11 AM EST -----  Regarding: diabetic eye exam  12/21/22 10:12 AM    Hello, our patient Suzanna Jo has had diabetic eye exam  completed/performed   Please assist in updating the patient chart by Zoey Pitts of the [facility] The date of service is 12/06/22    Thank you,  Shai Ortiz  [facility] [address]

## 2022-12-22 NOTE — TELEPHONE ENCOUNTER
Upon review of the In Basket request we HM is already up todate with 12/2022 report     Any additional questions or concerns should be emailed to the Practice Liaisons via the appropriate education email address, please do not reply via In Basket      Thank you  Key Escoto MA

## 2022-12-27 ENCOUNTER — TELEPHONE (OUTPATIENT)
Dept: FAMILY MEDICINE CLINIC | Facility: CLINIC | Age: 62
End: 2022-12-27

## 2022-12-27 NOTE — TELEPHONE ENCOUNTER
Trulicity prior Eunice [place] was started through cover my meds  Per [employer] was denied   Requesting additional clinical   Additional clinicals faxed to [employer]

## 2022-12-28 ENCOUNTER — RA CDI HCC (OUTPATIENT)
Dept: OTHER | Facility: HOSPITAL | Age: 62
End: 2022-12-28

## 2022-12-28 ENCOUNTER — TELEPHONE (OUTPATIENT)
Dept: FAMILY MEDICINE CLINIC | Facility: CLINIC | Age: 62
End: 2022-12-28

## 2022-12-28 DIAGNOSIS — E11.9 TYPE 2 DIABETES MELLITUS WITHOUT COMPLICATION, WITHOUT LONG-TERM CURRENT USE OF INSULIN (HCC): ICD-10-CM

## 2022-12-28 RX ORDER — DULAGLUTIDE 3 MG/.5ML
3 INJECTION, SOLUTION SUBCUTANEOUS WEEKLY
Refills: 1 | OUTPATIENT
Start: 2022-12-28

## 2022-12-28 RX ORDER — DULAGLUTIDE 3 MG/.5ML
3 INJECTION, SOLUTION SUBCUTANEOUS WEEKLY
Qty: 6 ML | Refills: 1 | Status: SHIPPED | OUTPATIENT
Start: 2022-12-28 | End: 2023-01-06 | Stop reason: SDUPTHER

## 2022-12-28 NOTE — PROGRESS NOTES
E66 01: Morbid (severe) obesity due to excess calories (HCC) -     Per CMS/ICD 10 coding guidelines, BMI of 40 or higher; Class 3 obesity is sometimes categorized as "morbid," "extreme," or "severe" obesity        [facility]  coding opportunities          Chart Reviewed number of suggestions sent to Provider: 1     Patients Insurance        Commercial Insurance: [employer]

## 2022-12-28 NOTE — TELEPHONE ENCOUNTER
Call from [facility] who said we sent an appeal for trulicity and Brendon Potts said he only needed step therapy, which is met  He talked to [facility] who said Trulicity is on back order   He will fax the same to us

## 2022-12-28 NOTE — TELEPHONE ENCOUNTER
I l/m for [facility] to call Andrea  Were they able to get Trulicity 3mg  Pt states he received a trulicity 1 5 mg  He will take that and asked if we can sent Trulicity 3mg to [facility]  Pt will call if there is any issue  Trulicity 3mg sent to [facility]

## 2022-12-28 NOTE — TELEPHONE ENCOUNTER
In called [employer] to confirmed they received clinicals for Trulicity  They gave me a fax number for appeals  Clinicals faxed to appeals and marked urgent

## 2022-12-29 ENCOUNTER — VBI (OUTPATIENT)
Dept: ADMINISTRATIVE | Facility: OTHER | Age: 62
End: 2022-12-29

## 2023-01-04 NOTE — TELEPHONE ENCOUNTER
Received fax from [place]   They received our appeal  They could not approve it because it does not need a prior auth  [facility] notified [facility], however the medication is on back order  Clarissa Knutson  Aware (called per pt's request see phone note)  Trulicity was sent to [facility] on 12/28/22  Pt to call office with any issue

## 2023-01-06 ENCOUNTER — HOSPITAL ENCOUNTER (OUTPATIENT)
Dept: RADIOLOGY | Facility: IMAGING CENTER | Age: 63
End: 2023-01-06

## 2023-01-06 ENCOUNTER — TELEPHONE (OUTPATIENT)
Dept: FAMILY MEDICINE CLINIC | Facility: CLINIC | Age: 63
End: 2023-01-06

## 2023-01-06 ENCOUNTER — OFFICE VISIT (OUTPATIENT)
Dept: FAMILY MEDICINE CLINIC | Facility: CLINIC | Age: 63
End: 2023-01-06

## 2023-01-06 ENCOUNTER — TELEPHONE (OUTPATIENT)
Dept: UROLOGY | Facility: MEDICAL CENTER | Age: 63
End: 2023-01-06

## 2023-01-06 VITALS
HEIGHT: 67 IN | RESPIRATION RATE: 16 BRPM | BODY MASS INDEX: 38.61 KG/M2 | HEART RATE: 93 BPM | SYSTOLIC BLOOD PRESSURE: 128 MMHG | TEMPERATURE: 97.6 F | OXYGEN SATURATION: 96 % | DIASTOLIC BLOOD PRESSURE: 76 MMHG | WEIGHT: 246 LBS

## 2023-01-06 DIAGNOSIS — E78.49 OTHER HYPERLIPIDEMIA: ICD-10-CM

## 2023-01-06 DIAGNOSIS — N20.0 NEPHROLITHIASIS: ICD-10-CM

## 2023-01-06 DIAGNOSIS — E11.9 TYPE 2 DIABETES MELLITUS WITHOUT COMPLICATION, WITHOUT LONG-TERM CURRENT USE OF INSULIN (HCC): Primary | ICD-10-CM

## 2023-01-06 DIAGNOSIS — N20.0 NEPHROLITHIASIS: Primary | ICD-10-CM

## 2023-01-06 DIAGNOSIS — I10 ESSENTIAL HYPERTENSION: ICD-10-CM

## 2023-01-06 LAB — SL AMB POCT HEMOGLOBIN AIC: 9.2 (ref ?–6.5)

## 2023-01-06 RX ORDER — DULAGLUTIDE 3 MG/.5ML
3 INJECTION, SOLUTION SUBCUTANEOUS WEEKLY
Qty: 6 ML | Refills: 1 | Status: SHIPPED | OUTPATIENT
Start: 2023-01-06

## 2023-01-06 NOTE — PROGRESS NOTES
Name: Dolly Bergman      : 1960      MRN: [id number]  Encounter Provider: Raul Troy MD  Encounter Date: 2023   Encounter department: [address] [place] [address]  Type 2 diabetes mellitus without complication, without long-term current use of insulin ([facility])  Assessment & Plan:    Lab Results   Component Value Date    HGBA1C 9 2 (A) 2023   Controlled but improving  He is to continue on same medications and increase the Trulicity to 3 mg weekly  Continue to monitor his blood sugar at home  His blood sugar measurements have been improving  Back in 6 weeks for follow-up  Orders:  -     POCT hemoglobin A1c  -     Hemoglobin A1C; Future; Expected date: 2023  -     Comprehensive metabolic panel; Future; Expected date: 2023  -     CBC and differential; Future; Expected date: 2023  -     Microalbumin / creatinine urine ratio; Future; Expected date: 2023  -     Lipid Panel with Direct LDL reflex; Future; Expected date: 2023  -     TSH, 3rd generation with Free T4 reflex; Future; Expected date: 2023  -     Dulaglutide (Trulicity) 3 QC/4 3UI SOPN; Inject 0 5 mL (3 mg total) under the skin once a week  -     Empagliflozin (Jardiance) 25 MG TABS; Take 1 tablet (25 mg total) by mouth every morning    2  Essential hypertension  Assessment & Plan:  Well-controlled without medications  Blood pressure 128/76  We will continue to monitor  3  Other hyperlipidemia  Assessment & Plan:  Continue on Zocor 40 mg daily  We will continue to monitor fasting lipid profile  BMI Counseling: Body mass index is 38 53 kg/m²  The BMI is above normal  Nutrition recommendations include decreasing portion sizes, encouraging healthy choices of fruits and vegetables and decreasing fast food intake  Rationale for BMI follow-up plan is due to patient being overweight or obese           Subjective     Is here today for follow-up multiple medical problems and for diabetes mellitus type 2 not well controlled  His medications were adjusted  He has been doing better with taking his medication and watching his diet  He was not able to get the Trulicity 3 mg supply  Review of Systems   Constitutional: Negative for chills and fever  HENT: Negative for trouble swallowing  Eyes: Negative for visual disturbance  Respiratory: Negative for cough and shortness of breath  Cardiovascular: Negative for chest pain and palpitations  Gastrointestinal: Negative for abdominal pain, blood in stool and vomiting  Endocrine: Negative for cold intolerance and heat intolerance  Genitourinary: Negative for difficulty urinating and dysuria  Musculoskeletal: Negative for gait problem  Skin: Negative for rash  Neurological: Negative for dizziness, syncope and headaches  Hematological: Negative for adenopathy  Psychiatric/Behavioral: Negative for behavioral problems         Past Medical History:   Diagnosis Date   • Anesthesia complication     difficulty awakening- dyspnea   • Anxiety    • Arthritis    • BPH (benign prostatic hyperplasia)    • Cataract     starting   • Cleft hard palate    • Diabetes (HCC)     borderline does not check blood sugar   • Glaucoma suspect, both eyes    • Hyperlipidemia    • Kidney stone     left   • PONV (postoperative nausea and vomiting)    • Right ureteral stone    • Seasonal allergies    • Wears dentures     partial upper   • Wears glasses      Past Surgical History:   Procedure Laterality Date   • CLEFT PALATE REPAIR      multiple surgeries   • FL RETROGRADE PYELOGRAM  8/20/2020   • FL RETROGRADE PYELOGRAM  10/29/2020   • INGUINAL HERNIA REPAIR Right    • IR NEPHROURETERAL ACCESS FOR UROLOGY PCNL  10/28/2020   • JOINT REPLACEMENT Bilateral    • KNEE CARTILAGE SURGERY Left    • [place] ARTHRP KNE CONDYLE&PLATU MEDIAL&LAT COMPARTMENTS Right 3/4/2019    Procedure: TOTAL KNEE ARTHROPLASTY;  Surgeon: Jake Cook MD; [facility];  Service: Orthopedics   • [place] ARTHRP KNE CONDYLE&PLATU MEDIAL&LAT COMPARTMENTS Left 9/13/2019    Procedure: ARTHROPLASTY KNEE TOTAL;  Surgeon: Carson Zepeda MD;  [facility];  Service: Orthopedics   • [place] CYSTO/URETERO W/LITHOTRIPSY &INDWELL STENT INSRT Right 8/4/2017    Procedure: CYSTOSCOPY URETEROSCOPY WITH LITHOTRIPSY HOLMIUM LASER, RETROGRADE PYELOGRAM AND INSERTION STENT URETERAL;  Surgeon: Sheryle Chambers, MD;  [facility];  Service: Urology   • W[place] CYSTO/URETERO W/LITHOTRIPSY &INDWELL STENT INSRT Left 8/20/2020    Procedure: CYSTO, URETEROSCOPY W/ LASER, RETROGRADE PYELOGRAM, STENT;  Surgeon: Magalis Duran MD;  [facility];  Service: Urology   • W[place] PERQ NL/PL LITHOTRP COMPLEX >2 CM MLT LOCATIONS Left 10/29/2020    Procedure: P C N L  antegrade insertion double j stent insertion and removal nephrostomy tube; Surgeon: Magalis Duran MD;  [facility];  Service: Urology   • TOTAL KNEE ARTHROPLASTY Left     l 9/14/2019 right 3/4/2019     Family History   Problem Relation Age of Onset   • Stroke Mother    • Diabetes Mother    • Heart disease Mother    • Hypertension Mother    • Esophageal cancer Father    • Diabetes Sister    • Other Sister    • Colon cancer Brother      Social History     Socioeconomic History   • Marital status: Single     Spouse name: None   • Number of children: None   • Years of education: None   • Highest education level: None   Occupational History   • None   Tobacco Use   • Smoking status: Never   • Smokeless tobacco: Never   • Tobacco comments:     no passive smoke exposure   Vaping Use   • Vaping Use: Never used   Substance and Sexual Activity   • Alcohol use:  Yes     Alcohol/week: 1 0 standard drink     Types: 1 Cans of beer per week     Comment: beer   • Drug use: No   • Sexual activity: Yes     Partners: Female   Other Topics Concern   • None   Social History Narrative   • None     Social Determinants of Health Financial Resource Strain: Not on file   Food Insecurity: Not on file   Transportation Needs: Not on file   Physical Activity: Not on file   Stress: Not on file   Social Connections: Not on file   Intimate Partner Violence: Not on file   Housing Stability: Not on file     Current Outpatient Medications on File Prior to Visit   Medication Sig   • acetaminophen (TYLENOL) 500 mg tablet Take 500-1,000 mg by mouth every 6 (six) hours as needed for mild pain   • allopurinol (ZYLOPRIM) 100 mg tablet Take 1 tablet (100 mg total) by mouth daily   • ascorbic acid (VITAMIN C) 500 MG tablet Take 1 tablet (500 mg total) by mouth daily   • cetirizine (ZyrTEC) 10 mg tablet Take 10 mg by mouth daily   • glucose blood (OneTouch Verio) test strip Check blood sugars once daily  Please substitute with appropriate alternative as covered by patient's insurance  Dx: E11 65   • metFORMIN (GLUCOPHAGE-XR) 500 mg 24 hr tablet Take 2 tablets (1,000 mg total) by mouth daily with dinner   • Multiple Vitamin (MULTIVITAMIN) tablet Take 2 tablets by mouth daily   • Omega-3 Fatty Acids (FISH OIL PO) Take 1 capsule by mouth daily   • OneTouch Delica Lancets 99W MISC Check blood sugars once daily  Please substitute with appropriate alternative as covered by patient's insurance   Dx: E11 65   • simvastatin (ZOCOR) 40 mg tablet TAKE 1 TABLET BY MOUTH EVERYDAY AT BEDTIME   • [DISCONTINUED] Empagliflozin (Jardiance) 25 MG TABS Take 1 tablet (25 mg total) by mouth every morning   • albuterol (ProAir HFA) 90 mcg/act inhaler Inhale 2 puffs every 6 (six) hours as needed for wheezing or shortness of breath (Patient not taking: Reported on 12/21/2022)   • albuterol (Proventil HFA) 90 mcg/act inhaler Inhale 2 puffs every 6 (six) hours as needed for wheezing (Patient not taking: Reported on 12/21/2022)   • azelastine (ASTELIN) 0 1 % nasal spray 1 spray into each nostril 2 (two) times a day Use in each nostril as directed (Patient not taking: Reported on 12/21/2022)   • Blood Glucose Monitoring Suppl (OneTouch Verio Reflect) w/Device KIT Check blood sugars once daily  Please substitute with appropriate alternative as covered by patient's insurance  Dx: E11 65   • fluticasone (FLONASE) 50 mcg/act nasal spray 2 sprays into each nostril daily (Patient not taking: Reported on 12/21/2022)   • ibuprofen (MOTRIN) 200 mg tablet Take 200-800 mg by mouth every 6 (six) hours as needed for mild pain (Patient not taking: Reported on 1/5/2022)   • [DISCONTINUED] Dulaglutide (Trulicity) 3 GC/2 0AN SOPN Inject 0 5 mL (3 mg total) under the skin once a week     Allergies   Allergen Reactions   • Flomax [Tamsulosin] Dizziness     Immunization History   Administered Date(s) Administered   • COVID-19 MODERNA VACC 0 5 ML IM 01/14/2021, 02/11/2021, 11/15/2021   • INFLUENZA 10/16/2017, 10/27/2020, 10/29/2021, 10/31/2022   • Influenza, injectable, quadrivalent, preservative free 0 5 mL 11/02/2018, 10/27/2020   • Influenza, recombinant, quadrivalent,injectable, preservative free 10/03/2019   • Pneumococcal Polysaccharide PPV23 11/04/2021   • Tdap 11/01/2012   • Zoster Vaccine Recombinant 09/03/2021, 12/03/2021       Objective     /76 (BP Location: Left arm, Patient Position: Sitting, Cuff Size: Large)   Pulse 93   Temp 97 6 °F (36 4 °C) (Tympanic)   Resp 16   Ht 5' 7" (1 702 m)   Wt 112 kg (246 lb)   SpO2 96%   BMI 38 53 kg/m²     Physical Exam  Vitals and nursing note reviewed  Constitutional:       Appearance: He is well-developed  HENT:      Head: Normocephalic and atraumatic  Eyes:      Pupils: Pupils are equal, round, and reactive to light  Cardiovascular:      Rate and Rhythm: Normal rate and regular rhythm  Heart sounds: Normal heart sounds  Pulmonary:      Effort: Pulmonary effort is normal       Breath sounds: Normal breath sounds  Abdominal:      General: Bowel sounds are normal       Palpations: Abdomen is soft     Musculoskeletal:         General: Normal range of motion  Cervical back: Normal range of motion and neck supple  Lymphadenopathy:      Cervical: No cervical adenopathy  Skin:     General: Skin is warm  Findings: No rash  Neurological:      Mental Status: He is alert and oriented to person, place, and time  Cranial Nerves: No cranial nerve deficit         Ginny Pond MD

## 2023-01-06 NOTE — TELEPHONE ENCOUNTER
Pt called, went to [facility] pharmacy  Sari Kumar will cost him$385 for a 30 day supply with use of discount card  Pt asking for alternative medication

## 2023-01-06 NOTE — ASSESSMENT & PLAN NOTE
Lab Results   Component Value Date    HGBA1C 9 2 (A) 01/06/2023   Controlled but improving  He is to continue on same medications and increase the Trulicity to 3 mg weekly  Continue to monitor his blood sugar at home  His blood sugar measurements have been improving  Back in 6 weeks for follow-up

## 2023-01-09 DIAGNOSIS — E11.9 TYPE 2 DIABETES MELLITUS WITHOUT COMPLICATION, WITHOUT LONG-TERM CURRENT USE OF INSULIN (HCC): Primary | ICD-10-CM

## 2023-01-13 ENCOUNTER — OFFICE VISIT (OUTPATIENT)
Dept: UROLOGY | Facility: CLINIC | Age: 63
End: 2023-01-13

## 2023-01-13 VITALS
HEIGHT: 67 IN | HEART RATE: 90 BPM | SYSTOLIC BLOOD PRESSURE: 110 MMHG | DIASTOLIC BLOOD PRESSURE: 60 MMHG | WEIGHT: 247 LBS | OXYGEN SATURATION: 97 % | BODY MASS INDEX: 38.77 KG/M2

## 2023-01-13 DIAGNOSIS — Z12.5 SCREENING FOR PROSTATE CANCER: ICD-10-CM

## 2023-01-13 DIAGNOSIS — N20.0 NEPHROLITHIASIS: Primary | ICD-10-CM

## 2023-01-13 NOTE — PATIENT INSTRUCTIONS
Dietary Management of Kidney Stone Disease    The dietary recommendations for most people who make kidney stones (especially the most common calcium oxalate stones) are uncomplicated and are not too tedious or bland  Most importantly, the following recommendations also promote better health for a variety of reasons  FLUIDS:  The single most important change for the majority patients is the need to greatly increase fluid intake  You should at least produce two liters (about two quarts) of urine each day  Depending on the heat outdoors and your level of physical activity, this usually means consuming ten, 10 ounce glasses (100 ounces) of fluid per day  Water is always a good choice, but other drinks including tea, coffee, soda, and juice are also allowed as long as no one beverage becomes the sole source of fluid  CALCIUM:  There is excellent evidence that calcium should not be avoided, but instead moderated  A range of 600 to 1,100 mg of calcium per day, especially consumed at meals is probably a reasonable target  (i e  2-3 dairy servings per day) This might include small servings of yogurt, milk or ice cream   This amount helps avoid over-absorption of oxalate from the digestive tract and also allows for healthy bone maintenance  SODIUM (SALT): Too much salt in your diet (both from the shaker and in the prepared foods that we buy) is bad for your blood pressure, bad for your heart, and also increases the amount of calcium in your urine  A reasonable sodium restriction to 2,000-2,500mg/day (about the amount in one teaspoon) is an excellent target  You should get into the habit of reading the “Nutrients” labels on all the foods that you eat and watch out for the foods that have a high sodium content (snack foods, smoked or processed foods, caned foods)  Fresh and frozen foods usually have the least amount of sodium      PROTEIN:  High protein diets from animal meat (beef, chicken, pork, fish) also increases the rate of kidney stone formation and is equally unhealthy for your heart  All patients should moderate their meat intake to 3-7 ounces per day, and particularly stay away from red meat protein  OXALATE:  Most stone-formers should avoid heavy intake of oxalate-rich foods  These include green roughage (spinach, mustard, kale), strawberries, chocolate, tea, iced tea, and nuts  In addition, heavy, excess doses of Vitamin C can also produce surges in urinary oxalate levels and should be avoided  BARE-BONES RECOMMENDATIONS:  Fluids, fluids, fluids  Low salt diet (your primary care doctor will love you)  Moderate calcium (dairy products), especially with meals  Moderate red meat intake

## 2023-01-13 NOTE — PROGRESS NOTES
1/13/2023      No chief complaint on file  Assessment and Plan    60 y o  male managed by Dr Al Sherwood    1  Prostate cancer screening  2  Calcium oxalate nephrolithiasis  3  Urinary urgency    Rectal exam today smooth prostate 30 g no nodule or asymmetry  PSAs are low stable 0 3-0 4 for several years  Continue annual screening visits  For his stone history I have suggested increased free water intake and decreased iced tea, he will check his crystallite packets specifically for oxalate compounds  His urinary urgency has resolved with diagnosis and treatment management of his diabetes  He needs no new rx today, followup in 1 year with PSA/GILBERT and KUB  History of Present Illness  Iris Kaiser is a 60 y o  male here for evaluation of annual follow-up nephrolithiasis  He underwent left URS and then left PCNL October 2020 for a 3 5cm stone, prior to that right ureteroscopy in 2017  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, he does take allopurinol  On previous 24-hour urines he has persistently low urinary volume output around 1 L  He has tried diligently to increase his intake  Has some mild urgency and occasional and pre and postvoid dribbling  He was offered pelvic floor physical therapy in the past, has not completed  This resolved since diagnosis and treatment for diabetes last year  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, can hold 3 hrs or so now  PSAs are low stable 0 3-0 4 for several years  Here today with updated US/KUB with nearly 1cm of left renal stone (previously 3 5 pre-PCNL)  Right side stone free  Review of Systems   Constitutional: Negative for activity change, appetite change, chills, fever and unexpected weight change  HENT: Negative  Respiratory: Negative  Negative for shortness of breath  Cardiovascular: Negative  Negative for chest pain  Gastrointestinal: Negative for abdominal pain, diarrhea, nausea and vomiting  Endocrine: Negative  Genitourinary: Negative for decreased urine volume, difficulty urinating, dysuria, flank pain, frequency, hematuria, testicular pain and urgency  Musculoskeletal: Negative for back pain and gait problem  Skin: Negative  Allergic/Immunologic: Negative  Neurological: Negative  Hematological: Negative for adenopathy  Does not bruise/bleed easily  Vitals  Vitals:    01/13/23 0933   BP: 110/60   BP Location: Left arm   Patient Position: Sitting   Cuff Size: Adult   Pulse: 90   SpO2: 97%   Weight: 112 kg (247 lb)   Height: 5' 7" (1 702 m)       Physical Exam  Vitals and nursing note reviewed  Constitutional:       General: He is not in acute distress  Appearance: Normal appearance  He is well-developed  He is not diaphoretic  HENT:      Head: Normocephalic and atraumatic  Pulmonary:      Effort: Pulmonary effort is normal       Comments: No cough or audible wheeze  Abdominal:      General: There is no distension  Tenderness: There is no abdominal tenderness  There is no right CVA tenderness or left CVA tenderness  Genitourinary:     Comments: Circumcised penis, normal phallus, orthotopic patent meatus  Testes smooth descended bilaterally into the scrotum nontender with no palpable mass  Digital rectal exam smooth 30g prostate, without appreciable nodule, induration or asymmetry  Musculoskeletal:      Right lower leg: No edema  Left lower leg: No edema  Skin:     General: Skin is warm and dry  Neurological:      Mental Status: He is alert and oriented to person, place, and time        Gait: Gait normal    Psychiatric:         Speech: Speech normal          Behavior: Behavior normal            Past History  Past Medical History:   Diagnosis Date   • Anesthesia complication     difficulty awakening- dyspnea   • Anxiety    • Arthritis    • BPH (benign prostatic hyperplasia)    • Cataract     starting   • Cleft hard palate    • [facility] )     borderline does not check blood sugar   • Glaucoma suspect, both eyes    • Hyperlipidemia    • Kidney stone     left   • PONV (postoperative nausea and vomiting)    • Right ureteral stone    • Seasonal allergies    • Wears dentures     partial upper   • Wears glasses      Social History     Socioeconomic History   • Marital status: Single     Spouse name: None   • Number of children: None   • Years of education: None   • Highest education level: None   Occupational History   • None   Tobacco Use   • Smoking status: Never   • Smokeless tobacco: Never   • Tobacco comments:     no passive smoke exposure   Vaping Use   • Vaping Use: Never used   Substance and Sexual Activity   • Alcohol use:  Yes     Alcohol/week: 1 0 standard drink     Types: 1 Cans of beer per week     Comment: beer   • Drug use: No   • Sexual activity: Yes     Partners: Female   Other Topics Concern   • None   Social History Narrative   • None     Social Determinants of Health     Financial Resource Strain: Not on file   Food Insecurity: Not on file   Transportation Needs: Not on file   Physical Activity: Not on file   Stress: Not on file   Social Connections: Not on file   Intimate Partner Violence: Not on file   Housing Stability: Not on file     Social History     Tobacco Use   Smoking Status Never   Smokeless Tobacco Never   Tobacco Comments    no passive smoke exposure     Family History   Problem Relation Age of Onset   • Stroke Mother    • Diabetes Mother    • Heart disease Mother    • Hypertension Mother    • Esophageal cancer Father    • Diabetes Sister    • Other Sister    • Colon cancer Brother        The following portions of the patient's history were reviewed and updated as appropriate: allergies, current medications, past medical history, past social history, past surgical history and problem list     Results  No results found for this or any previous visit (from the past 1 hour(s)) ]  Lab Results   Component Value Date    PSA 0 3 12/01/2022    PSA 0 4 09/03/2021    PSA 0 4 04/06/2020    PSA 0 2 09/08/2016     Lab Results   Component Value Date    CALCIUM 9 5 12/01/2022    K 4 2 12/01/2022    CO2 25 12/01/2022     12/01/2022    BUN 12 12/01/2022    CREATININE 0 94 12/01/2022     Lab Results   Component Value Date    WBC 8 36 12/01/2022    HGB 15 1 12/01/2022    HCT 45 9 12/01/2022    MCV 86 12/01/2022     12/01/2022

## 2023-01-29 PROBLEM — Z00.00 HEALTHCARE MAINTENANCE: Status: RESOLVED | Noted: 2022-11-30 | Resolved: 2023-01-29

## 2023-02-02 DIAGNOSIS — E11.9 TYPE 2 DIABETES MELLITUS WITHOUT COMPLICATION, WITHOUT LONG-TERM CURRENT USE OF INSULIN (HCC): ICD-10-CM

## 2023-02-03 RX ORDER — EMPAGLIFLOZIN 25 MG/1
TABLET, FILM COATED ORAL
Qty: 30 TABLET | Refills: 1 | Status: SHIPPED | OUTPATIENT
Start: 2023-02-03

## 2023-02-10 ENCOUNTER — RA CDI HCC (OUTPATIENT)
Dept: OTHER | Facility: HOSPITAL | Age: 63
End: 2023-02-10

## 2023-02-10 NOTE — PROGRESS NOTES
NOT on the BPA- please assess using MEAT for 2023 billing        [facility]  coding opportunities          Chart Reviewed number of suggestions sent to Provider: 1     Patients Insurance        Commercial Insurance: [address] [place] [address]

## 2023-02-17 ENCOUNTER — OFFICE VISIT (OUTPATIENT)
Dept: FAMILY MEDICINE CLINIC | Facility: CLINIC | Age: 63
End: 2023-02-17

## 2023-02-17 VITALS
TEMPERATURE: 97.7 F | RESPIRATION RATE: 16 BRPM | WEIGHT: 246 LBS | DIASTOLIC BLOOD PRESSURE: 78 MMHG | HEART RATE: 99 BPM | SYSTOLIC BLOOD PRESSURE: 126 MMHG | OXYGEN SATURATION: 94 % | BODY MASS INDEX: 38.61 KG/M2 | HEIGHT: 67 IN

## 2023-02-17 DIAGNOSIS — I10 ESSENTIAL HYPERTENSION: ICD-10-CM

## 2023-02-17 DIAGNOSIS — E11.9 TYPE 2 DIABETES MELLITUS WITHOUT COMPLICATION, WITHOUT LONG-TERM CURRENT USE OF INSULIN (HCC): Primary | ICD-10-CM

## 2023-02-17 DIAGNOSIS — Z12.5 PROSTATE CANCER SCREENING: ICD-10-CM

## 2023-02-17 DIAGNOSIS — E78.49 OTHER HYPERLIPIDEMIA: ICD-10-CM

## 2023-02-17 NOTE — ASSESSMENT & PLAN NOTE
Lab Results   Component Value Date    HGBA1C 9 2 (A) 01/06/2023   Improving, but need to re evaluate A1C  Continue Trulicity, complete Farxiga and then start Jardiance 25mg

## 2023-02-17 NOTE — PROGRESS NOTES
Name: Mirna Setting      : 1960      MRN: [id number]  Encounter Provider: Jeremy Cota MD  Encounter Date: 2023   Encounter department: [address] [place] [address]  Type 2 diabetes mellitus without complication, without long-term current use of insulin ([facility])  Assessment & Plan:    Lab Results   Component Value Date    HGBA1C 9 2 (A) 2023   Improving, but need to re evaluate A1C  Continue Trulicity, complete Farxiga and then start Jardiance 25mg  Orders:  -     Hemoglobin A1C; Future; Expected date: 2023  -     Comprehensive metabolic panel; Future; Expected date: 2023  -     CBC and differential; Future; Expected date: 2023  -     Microalbumin / creatinine urine ratio; Future; Expected date: 2023  -     Lipid Panel with Direct LDL reflex; Future; Expected date: 2023  -     TSH, 3rd generation with Free T4 reflex; Future; Expected date: 2023    2  Essential hypertension  Assessment & Plan:  Well controlled without medication  3  Other hyperlipidemia  Assessment & Plan:  Continue Zocor and we will continue to monitor  4  Prostate cancer screening  -     PSA, Total Screen; Future           Subjective     Meg Shipman is a 60year old male with PMH of Diabetes type 2, hypertension, and hyperlipidemia presenting for follow up for A1C  Due to a lab error, he was unable to have his lab work done before today's appointment  He has recorded his daily finger stick glucose at home  He has questions about Jardiance vs Aimee Earing He also recently had a mole removed by the dermatologist      Diabetes  Pertinent negatives for diabetes include no chest pain  Review of Systems   Constitutional: Negative for activity change, appetite change and fever  Respiratory: Negative for apnea and shortness of breath  Cardiovascular: Negative for chest pain  Gastrointestinal: Negative for abdominal pain and diarrhea     Skin: Negative for color change  Psychiatric/Behavioral: Negative for agitation         Past Medical History:   Diagnosis Date   • Anesthesia complication     difficulty awakening- dyspnea   • Anxiety    • Arthritis    • BPH (benign prostatic hyperplasia)    • Cataract     starting   • Cleft hard palate    • Diabetes ([address] )     borderline does not check blood sugar   • Glaucoma suspect, both eyes    • Hyperlipidemia    • Kidney stone     left   • PONV (postoperative nausea and vomiting)    • Right ureteral stone    • Seasonal allergies    • Wears dentures     partial upper   • Wears glasses      Past Surgical History:   Procedure Laterality Date   • CLEFT PALATE REPAIR      multiple surgeries   • FL RETROGRADE PYELOGRAM  8/20/2020   • FL RETROGRADE PYELOGRAM  10/29/2020   • INGUINAL HERNIA REPAIR Right    • IR NEPHROURETERAL ACCESS FOR UROLOGY PCNL  10/28/2020   • JOINT REPLACEMENT Bilateral    • KNEE CARTILAGE SURGERY Left    • MT ARTHRP KNE CONDYLE&PLATU MEDIAL&LAT COMPARTMENTS Right 3/4/2019    Procedure: TOTAL KNEE ARTHROPLASTY;  Surgeon: Mable Lockhart MD;  [facility]: [facility];  Service: Orthopedics   • MT ARTHRP KNE CONDYLE&PLATU MEDIAL&LAT COMPARTMENTS Left 9/13/2019    Procedure: ARTHROPLASTY KNEE TOTAL;  Surgeon: Mable Lockhart MD;  Location[facility];  Service: Orthopedics   • MT CYSTO/URETERO W/LITHOTRIPSY &INDWELL STENT INSRT Right 8/4/2017    Procedure: CYSTOSCOPY URETEROSCOPY WITH LITHOTRIPSY HOLMIUM LASER, RETROGRADE PYELOGRAM AND INSERTION STENT URETERAL;  Surgeon: Jim Miller MD;  [facility];  Service: Urology   • MT CYSTO/URETERO W/LITHOTRIPSY &INDWELL STENT INSRT Left 8/20/2020    Procedure: CYSTO, URETEROSCOPY W/ LASER, RETROGRADE PYELOGRAM, STENT;  Surgeon: Van Kolb MD;  [facility];  Service: Urology   • MT PERQ NL/PL LITHOTRP COMPLEX >2 CM MLT LOCATIONS Left 10/29/2020    Procedure: P C N L  antegrade insertion double j stent insertion and removal nephrostomy tube; Surgeon: Cheri Jackson MD;  [facility];  Service: Urology   • TOTAL KNEE ARTHROPLASTY Left     l 9/14/2019 right 3/4/2019     Family History   Problem Relation Age of Onset   • Stroke Mother    • Diabetes Mother    • Heart disease Mother    • Hypertension Mother    • Esophageal cancer Father    • Diabetes Sister    • Other Sister    • Colon cancer Brother      Social History     Socioeconomic History   • Marital status: Single     Spouse name: None   • Number of children: None   • Years of education: None   • Highest education level: None   Occupational History   • None   Tobacco Use   • Smoking status: Never   • Smokeless tobacco: Never   • Tobacco comments:     no passive smoke exposure   Vaping Use   • Vaping Use: Never used   Substance and Sexual Activity   • Alcohol use:  Yes     Alcohol/week: 1 0 standard drink     Types: 1 Cans of beer per week     Comment: beer   • Drug use: No   • Sexual activity: Yes     Partners: Female   Other Topics Concern   • None   Social History Narrative   • None     Social Determinants of Health     Financial Resource Strain: Not on file   Food Insecurity: Not on file   Transportation Needs: Not on file   Physical Activity: Not on file   Stress: Not on file   Social Connections: Not on file   Intimate Partner Violence: Not on file   Housing Stability: Not on file     Current Outpatient Medications on File Prior to Visit   Medication Sig   • acetaminophen (TYLENOL) 500 mg tablet Take 500-1,000 mg by mouth every 6 (six) hours as needed for mild pain   • albuterol (ProAir HFA) 90 mcg/act inhaler Inhale 2 puffs every 6 (six) hours as needed for wheezing or shortness of breath (Patient not taking: Reported on 12/21/2022)   • albuterol (Proventil HFA) 90 mcg/act inhaler Inhale 2 puffs every 6 (six) hours as needed for wheezing (Patient not taking: Reported on 12/21/2022)   • allopurinol (ZYLOPRIM) 100 mg tablet Take 1 tablet (100 mg total) by mouth daily • ascorbic acid (VITAMIN C) 500 MG tablet Take 1 tablet (500 mg total) by mouth daily   • azelastine (ASTELIN) 0 1 % nasal spray 1 spray into each nostril 2 (two) times a day Use in each nostril as directed (Patient not taking: Reported on 12/21/2022)   • Blood Glucose Monitoring Suppl (OneTouch Verio Reflect) w/Device KIT Check blood sugars once daily  Please substitute with appropriate alternative as covered by patient's insurance  Dx: E11 65   • cetirizine (ZyrTEC) 10 mg tablet Take 10 mg by mouth daily   • dapagliflozin (Farxiga) 10 MG tablet Take 1 tablet (10 mg total) by mouth daily   • Dulaglutide (Trulicity) 3 IB/2 0KT SOPN Inject 0 5 mL (3 mg total) under the skin once a week   • fluticasone (FLONASE) 50 mcg/act nasal spray 2 sprays into each nostril daily (Patient not taking: Reported on 12/21/2022)   • glucose blood (OneTouch Verio) test strip Check blood sugars once daily  Please substitute with appropriate alternative as covered by patient's insurance  Dx: E11 65   • ibuprofen (MOTRIN) 200 mg tablet Take 200-800 mg by mouth every 6 (six) hours as needed for mild pain (Patient not taking: Reported on 1/5/2022)   • Jardiance 25 MG TABS TAKE 1 TABLET (25 MG TOTAL) BY MOUTH EVERY MORNING   • metFORMIN (GLUCOPHAGE-XR) 500 mg 24 hr tablet Take 2 tablets (1,000 mg total) by mouth daily with dinner   • Multiple Vitamin (MULTIVITAMIN) tablet Take 2 tablets by mouth daily   • Omega-3 Fatty Acids (FISH OIL PO) Take 1 capsule by mouth daily   • OneTouch Delica Lancets 65S MISC Check blood sugars once daily  Please substitute with appropriate alternative as covered by patient's insurance   Dx: E11 65   • simvastatin (ZOCOR) 40 mg tablet TAKE 1 TABLET BY MOUTH EVERYDAY AT BEDTIME     Allergies   Allergen Reactions   • Flomax [Tamsulosin] Dizziness     Immunization History   Administered Date(s) Administered   • COVID-19 MODERNA VACC 0 5 ML IM 01/14/2021, 02/11/2021, 11/15/2021   • INFLUENZA 10/16/2017, 10/27/2020, 10/29/2021, 10/31/2022   • Influenza, injectable, quadrivalent, preservative free 0 5 mL 11/02/2018, 10/27/2020   • Influenza, recombinant, quadrivalent,injectable, preservative free 10/03/2019   • Pneumococcal Polysaccharide PPV23 11/04/2021   • Tdap 11/01/2012   • Zoster Vaccine Recombinant 09/03/2021, 12/03/2021       Objective     /78 (BP Location: Left arm, Patient Position: Sitting, Cuff Size: Large)   Pulse 99   Temp 97 7 °F (36 5 °C) (Tympanic)   Resp 16   Ht 5' 7" (1 702 m)   Wt 112 kg (246 lb)   SpO2 94%   BMI 38 53 kg/m²     Physical Exam  Vitals and nursing note reviewed  Constitutional:       Appearance: Normal appearance  He is normal weight  HENT:      Head: Normocephalic and atraumatic  Right Ear: Tympanic membrane normal       Left Ear: Tympanic membrane normal       Mouth/Throat:      Mouth: Mucous membranes are moist       Pharynx: Oropharynx is clear  Eyes:      Pupils: Pupils are equal, round, and reactive to light  Cardiovascular:      Rate and Rhythm: Normal rate and regular rhythm  Pulses: Normal pulses  Heart sounds: Normal heart sounds  Pulmonary:      Effort: Pulmonary effort is normal       Breath sounds: Normal breath sounds  Skin:     General: Skin is warm and dry  Neurological:      General: No focal deficit present  Mental Status: He is alert     Psychiatric:         Mood and Affect: Mood normal          Behavior: Behavior normal        Christina Rutherford MD

## 2023-02-18 ENCOUNTER — APPOINTMENT (OUTPATIENT)
Dept: LAB | Facility: IMAGING CENTER | Age: 63
End: 2023-02-18

## 2023-02-18 DIAGNOSIS — Z12.5 PROSTATE CANCER SCREENING: ICD-10-CM

## 2023-02-18 DIAGNOSIS — E11.9 TYPE 2 DIABETES MELLITUS WITHOUT COMPLICATION, WITHOUT LONG-TERM CURRENT USE OF INSULIN (HCC): ICD-10-CM

## 2023-02-18 LAB
ALBUMIN SERPL BCP-MCNC: 4.2 G/DL (ref 3.5–5)
ALP SERPL-CCNC: 45 U/L (ref 46–116)
ALT SERPL W P-5'-P-CCNC: 47 U/L (ref 12–78)
ANION GAP SERPL CALCULATED.3IONS-SCNC: 6 MMOL/L (ref 4–13)
AST SERPL W P-5'-P-CCNC: 34 U/L (ref 5–45)
BASOPHILS # BLD AUTO: 0.1 THOUSANDS/ÂΜL (ref 0–0.1)
BASOPHILS NFR BLD AUTO: 1 % (ref 0–1)
BILIRUB SERPL-MCNC: 0.68 MG/DL (ref 0.2–1)
BUN SERPL-MCNC: 14 MG/DL (ref 5–25)
CALCIUM SERPL-MCNC: 9.8 MG/DL (ref 8.3–10.1)
CHLORIDE SERPL-SCNC: 108 MMOL/L (ref 96–108)
CHOLEST SERPL-MCNC: 120 MG/DL
CO2 SERPL-SCNC: 24 MMOL/L (ref 21–32)
CREAT SERPL-MCNC: 0.86 MG/DL (ref 0.6–1.3)
CREAT UR-MCNC: 174 MG/DL
EOSINOPHIL # BLD AUTO: 0.35 THOUSAND/ÂΜL (ref 0–0.61)
EOSINOPHIL NFR BLD AUTO: 5 % (ref 0–6)
ERYTHROCYTE [DISTWIDTH] IN BLOOD BY AUTOMATED COUNT: 12.8 % (ref 11.6–15.1)
GFR SERPL CREATININE-BSD FRML MDRD: 92 ML/MIN/1.73SQ M
GLUCOSE P FAST SERPL-MCNC: 115 MG/DL (ref 65–99)
HCT VFR BLD AUTO: 52.2 % (ref 36.5–49.3)
HDLC SERPL-MCNC: 39 MG/DL
HGB BLD-MCNC: 16.3 G/DL (ref 12–17)
IMM GRANULOCYTES # BLD AUTO: 0.03 THOUSAND/UL (ref 0–0.2)
IMM GRANULOCYTES NFR BLD AUTO: 0 % (ref 0–2)
LDLC SERPL CALC-MCNC: 47 MG/DL (ref 0–100)
LYMPHOCYTES # BLD AUTO: 2.64 THOUSANDS/ÂΜL (ref 0.6–4.47)
LYMPHOCYTES NFR BLD AUTO: 34 % (ref 14–44)
MCH RBC QN AUTO: 28.7 PG (ref 26.8–34.3)
MCHC RBC AUTO-ENTMCNC: 31.2 G/DL (ref 31.4–37.4)
MCV RBC AUTO: 92 FL (ref 82–98)
MICROALBUMIN UR-MCNC: 18.9 MG/L (ref 0–20)
MICROALBUMIN/CREAT 24H UR: 11 MG/G CREATININE (ref 0–30)
MONOCYTES # BLD AUTO: 0.55 THOUSAND/ÂΜL (ref 0.17–1.22)
MONOCYTES NFR BLD AUTO: 7 % (ref 4–12)
NEUTROPHILS # BLD AUTO: 4.15 THOUSANDS/ÂΜL (ref 1.85–7.62)
NEUTS SEG NFR BLD AUTO: 53 % (ref 43–75)
NRBC BLD AUTO-RTO: 0 /100 WBCS
PLATELET # BLD AUTO: 276 THOUSANDS/UL (ref 149–390)
PMV BLD AUTO: 12 FL (ref 8.9–12.7)
POTASSIUM SERPL-SCNC: 4.3 MMOL/L (ref 3.5–5.3)
PROT SERPL-MCNC: 8.3 G/DL (ref 6.4–8.4)
PSA SERPL-MCNC: 0.5 NG/ML (ref 0–4)
RBC # BLD AUTO: 5.68 MILLION/UL (ref 3.88–5.62)
SODIUM SERPL-SCNC: 138 MMOL/L (ref 135–147)
TRIGL SERPL-MCNC: 172 MG/DL
TSH SERPL DL<=0.05 MIU/L-ACNC: 1.73 UIU/ML (ref 0.45–4.5)
WBC # BLD AUTO: 7.82 THOUSAND/UL (ref 4.31–10.16)

## 2023-02-22 ENCOUNTER — TELEPHONE (OUTPATIENT)
Dept: FAMILY MEDICINE CLINIC | Facility: CLINIC | Age: 63
End: 2023-02-22

## 2023-02-22 LAB
EST. AVERAGE GLUCOSE BLD GHB EST-MCNC: 151 MG/DL
HBA1C MFR BLD: 6.9 %

## 2023-02-22 NOTE — TELEPHONE ENCOUNTER
----- Message from Thierry Perez MD sent at 2/22/2023  7:23 AM EST -----  Fasting glucose and triglycerides are slightly elevated but improved from before  All the rest of blood work came back normal   The A1c is still pending

## 2023-02-22 NOTE — TELEPHONE ENCOUNTER
----- Message from Sukh Slaughter MD sent at 2/22/2023  7:23 AM EST -----  Fasting glucose and triglycerides are slightly elevated but improved from before  All the rest of blood work came back normal   The A1c is still pending

## 2023-02-26 DIAGNOSIS — E78.5 HYPERLIPIDEMIA, UNSPECIFIED HYPERLIPIDEMIA TYPE: ICD-10-CM

## 2023-02-27 RX ORDER — SIMVASTATIN 40 MG
TABLET ORAL
Qty: 90 TABLET | Refills: 1 | Status: SHIPPED | OUTPATIENT
Start: 2023-02-27

## 2023-03-01 ENCOUNTER — TELEPHONE (OUTPATIENT)
Dept: FAMILY MEDICINE CLINIC | Facility: CLINIC | Age: 63
End: 2023-03-01

## 2023-03-01 DIAGNOSIS — E11.9 TYPE 2 DIABETES MELLITUS WITHOUT COMPLICATION, WITHOUT LONG-TERM CURRENT USE OF INSULIN (HCC): ICD-10-CM

## 2023-03-01 RX ORDER — EMPAGLIFLOZIN 25 MG/1
TABLET, FILM COATED ORAL
Qty: 90 TABLET | Refills: 2 | Status: SHIPPED | OUTPATIENT
Start: 2023-03-01

## 2023-03-03 NOTE — TELEPHONE ENCOUNTER
Per [place] approved valid 3/2/23-03/02/24  [facility] pharmacy notified  Pt aware

## 2023-05-05 DIAGNOSIS — E11.9 TYPE 2 DIABETES MELLITUS WITHOUT COMPLICATION, WITHOUT LONG-TERM CURRENT USE OF INSULIN (HCC): ICD-10-CM

## 2023-05-05 RX ORDER — DULAGLUTIDE 3 MG/.5ML
3 INJECTION, SOLUTION SUBCUTANEOUS WEEKLY
Qty: 2 ML | Refills: 1 | Status: SHIPPED | OUTPATIENT
Start: 2023-05-05

## 2023-05-12 ENCOUNTER — OFFICE VISIT (OUTPATIENT)
Dept: FAMILY MEDICINE CLINIC | Facility: CLINIC | Age: 63
End: 2023-05-12

## 2023-05-12 VITALS
HEART RATE: 100 BPM | TEMPERATURE: 98.8 F | BODY MASS INDEX: 37.32 KG/M2 | WEIGHT: 237.8 LBS | DIASTOLIC BLOOD PRESSURE: 78 MMHG | RESPIRATION RATE: 16 BRPM | OXYGEN SATURATION: 96 % | SYSTOLIC BLOOD PRESSURE: 120 MMHG | HEIGHT: 67 IN

## 2023-05-12 DIAGNOSIS — E78.49 OTHER HYPERLIPIDEMIA: ICD-10-CM

## 2023-05-12 DIAGNOSIS — I10 ESSENTIAL HYPERTENSION: ICD-10-CM

## 2023-05-12 DIAGNOSIS — E11.9 TYPE 2 DIABETES MELLITUS WITHOUT COMPLICATION, WITHOUT LONG-TERM CURRENT USE OF INSULIN (HCC): Primary | ICD-10-CM

## 2023-05-12 LAB — SL AMB POCT HEMOGLOBIN AIC: 6.7 (ref ?–6.5)

## 2023-05-12 NOTE — ASSESSMENT & PLAN NOTE
A1c improved to 6 7  Continue to follow low-carb diet and regular exercise  Continue on Trulicity 3 mg weekly, metformin and Jardiance  His blood work in 3 months    Lab Results   Component Value Date    HGBA1C 6 7 (A) 05/12/2023

## 2023-05-12 NOTE — PROGRESS NOTES
Name: Cristobal Brooks      : 1960      MRN: [id number]  Encounter Provider: Shelley Olivera MD  Encounter Date: 2023   Encounter department: [address] [place] [address]  Type 2 diabetes mellitus without complication, without long-term current use of insulin ([facility])  Assessment & Plan:  A1c improved to 6 7  Continue to follow low-carb diet and regular exercise  Continue on Trulicity 3 mg weekly, metformin and Jardiance  His blood work in 3 months  Lab Results   Component Value Date    HGBA1C 6 7 (A) 2023       Orders:  -     POCT hemoglobin A1c  -     Hemoglobin A1C; Future; Expected date: 2023  -     Comprehensive metabolic panel; Future; Expected date: 2023  -     CBC and differential; Future; Expected date: 2023  -     Lipid Panel with Direct LDL reflex; Future; Expected date: 2023  -     TSH, 3rd generation with Free T4 reflex; Future; Expected date: 2023    2  Essential hypertension  Assessment & Plan:  Well-controlled without medications  We will continue to monitor  3  Other hyperlipidemia  Assessment & Plan:  Well-controlled  Continue on simvastatin 40 mg daily  Continue to monitor  4  BMI 37 0-37 9, adult  Assessment & Plan:  Improving  He lost 9 pounds since the last visit  Continue on Trulicity 3 mg weekly  We will continue to monitor  Subjective     Is here today for follow-up multiple medical problems  He has been taking his medications  Denies any side effects  His A1c continues to improve  He lost 9 pounds since last visit  He continues on Trulicity 3 mg weekly  Denies any side effects  Review of Systems   Constitutional: Negative for chills and fever  HENT: Negative for trouble swallowing  Eyes: Negative for visual disturbance  Respiratory: Negative for cough and shortness of breath  Cardiovascular: Negative for chest pain and palpitations     Gastrointestinal: Negative for abdominal pain, blood in stool and vomiting  Endocrine: Negative for cold intolerance and heat intolerance  Genitourinary: Negative for difficulty urinating and dysuria  Musculoskeletal: Negative for gait problem  Skin: Negative for rash  Neurological: Negative for dizziness, syncope and headaches  Hematological: Negative for adenopathy  Psychiatric/Behavioral: Negative for behavioral problems         Past Medical History:   Diagnosis Date   • Anesthesia complication     difficulty awakening- dyspnea   • Anxiety    • Arthritis    • BPH (benign prostatic hyperplasia)    • Cataract     starting   • Cleft hard palate    • Diabetes ([address] )     borderline does not check blood sugar   • Glaucoma suspect, both eyes    • Hyperlipidemia    • Kidney stone     left   • PONV (postoperative nausea and vomiting)    • Right ureteral stone    • Seasonal allergies    • Wears dentures     partial upper   • Wears glasses      Past Surgical History:   Procedure Laterality Date   • CLEFT PALATE REPAIR      multiple surgeries   • FL RETROGRADE PYELOGRAM  8/20/2020   • FL RETROGRADE PYELOGRAM  10/29/2020   • INGUINAL HERNIA REPAIR Right    • IR NEPHROURETERAL ACCESS FOR UROLOGY PCNL  10/28/2020   • JOINT REPLACEMENT Bilateral    • KNEE CARTILAGE SURGERY Left    • RI ARTHRP KNE CONDYLE&PLATU MEDIAL&LAT COMPARTMENTS Right 3/4/2019    Procedure: TOTAL KNEE ARTHROPLASTY;  Surgeon: Jennifer Pitts MD;  [facility];  Service: Orthopedics   • RI ARTHRP KNE CONDYLE&PLATU MEDIAL&LAT COMPARTMENTS Left 9/13/2019    Procedure: ARTHROPLASTY KNEE TOTAL;  Surgeon: Jennifer Pitts MD;  [facility];  Service: Orthopedics   • RI CYSTO/URETERO W/LITHOTRIPSY &INDWELL STENT INSRT Right 8/4/2017    Procedure: CYSTOSCOPY URETEROSCOPY WITH LITHOTRIPSY HOLMIUM LASER, RETROGRADE PYELOGRAM AND INSERTION STENT URETERAL;  Surgeon: Jeannie Hannon MD;  [facility];  Service: Urology   • RI CYSTO/URETERO W/LITHOTRIPSY &INDWELL STENT INSRT Left 8/20/2020    Procedure: CYSTO, URETEROSCOPY W/ LASER, RETROGRADE PYELOGRAM, STENT;  Surgeon: Mariangel Elliott MD;  [facility];  Service: Urology   • [place] PERQ NL/PL LITHOTRP COMPLEX >2 CM MLT LOCATIONS Left 10/29/2020    Procedure: P C N L  antegrade insertion double j stent insertion and removal nephrostomy tube; Surgeon: Mariangel Elliott MD;  [facility];  Service: Urology   • TOTAL KNEE ARTHROPLASTY Left     l 9/14/2019 right 3/4/2019     Family History   Problem Relation Age of Onset   • Stroke Mother    • Diabetes Mother    • Heart disease Mother    • Hypertension Mother    • Esophageal cancer Father    • Diabetes Sister    • Other Sister    • Colon cancer Brother      Social History     Socioeconomic History   • Marital status: Single     Spouse name: None   • Number of children: None   • Years of education: None   • Highest education level: None   Occupational History   • None   Tobacco Use   • Smoking status: Never   • Smokeless tobacco: Never   • Tobacco comments:     no passive smoke exposure   Vaping Use   • Vaping Use: Never used   Substance and Sexual Activity   • Alcohol use:  Yes     Alcohol/week: 1 0 standard drink     Types: 1 Cans of beer per week     Comment: beer   • Drug use: No   • Sexual activity: Yes     Partners: Female   Other Topics Concern   • None   Social History Narrative   • None     Social Determinants of Health     Financial Resource Strain: Not on file   Food Insecurity: Not on file   Transportation Needs: Not on file   Physical Activity: Not on file   Stress: Not on file   Social Connections: Not on file   Intimate Partner Violence: Not on file   Housing Stability: Not on file     Current Outpatient Medications on File Prior to Visit   Medication Sig   • acetaminophen (TYLENOL) 500 mg tablet Take 500-1,000 mg by mouth every 6 (six) hours as needed for mild pain   • allopurinol (ZYLOPRIM) 100 mg tablet Take 1 tablet (100 mg total) by mouth daily   • ascorbic acid (VITAMIN C) 500 MG tablet Take 1 tablet (500 mg total) by mouth daily   • Blood Glucose Monitoring Suppl (OneTouch Verio Reflect) w/Device KIT Check blood sugars once daily  Please substitute with appropriate alternative as covered by patient's insurance  Dx: E11 65   • cetirizine (ZyrTEC) 10 mg tablet Take 10 mg by mouth daily   • glucose blood (OneTouch Verio) test strip Check blood sugars once daily  Please substitute with appropriate alternative as covered by patient's insurance  Dx: E11 65   • Jardiance 25 MG TABS TAKE ONE TABLET BY MOUTH EVERY DAY   • metFORMIN (GLUCOPHAGE-XR) 500 mg 24 hr tablet Take 2 tablets (1,000 mg total) by mouth daily with dinner   • Multiple Vitamin (MULTIVITAMIN) tablet Take 2 tablets by mouth daily   • OneTouch Delica Lancets 04D MISC Check blood sugars once daily  Please substitute with appropriate alternative as covered by patient's insurance   Dx: E11 65   • simvastatin (ZOCOR) 40 mg tablet TAKE 1 TABLET BY MOUTH EVERYDAY AT BEDTIME   • Trulicity 3 DW/5 0PZ injection INJECT 0 5 ML (3 MG TOTAL) UNDER THE SKIN ONCE A WEEK   • albuterol (ProAir HFA) 90 mcg/act inhaler Inhale 2 puffs every 6 (six) hours as needed for wheezing or shortness of breath (Patient not taking: Reported on 12/21/2022)   • albuterol (Proventil HFA) 90 mcg/act inhaler Inhale 2 puffs every 6 (six) hours as needed for wheezing (Patient not taking: Reported on 12/21/2022)   • azelastine (ASTELIN) 0 1 % nasal spray 1 spray into each nostril 2 (two) times a day Use in each nostril as directed (Patient not taking: Reported on 12/21/2022)   • dapagliflozin (Farxiga) 10 MG tablet Take 1 tablet (10 mg total) by mouth daily (Patient not taking: Reported on 5/12/2023)   • fluticasone (FLONASE) 50 mcg/act nasal spray 2 sprays into each nostril daily (Patient not taking: Reported on 12/21/2022)   • ibuprofen (MOTRIN) 200 mg tablet Take 200-800 mg by mouth every 6 (six) hours as needed "for mild pain (Patient not taking: Reported on 1/5/2022)   • Omega-3 Fatty Acids (FISH OIL PO) Take 1 capsule by mouth daily (Patient not taking: Reported on 5/12/2023)     Allergies   Allergen Reactions   • Flomax [Tamsulosin] Dizziness     Immunization History   Administered Date(s) Administered   • COVID-19 MODERNA VACC 0 5 ML IM 01/14/2021, 02/11/2021, 11/15/2021   • INFLUENZA 10/16/2017, 10/27/2020, 10/29/2021, 10/31/2022   • Influenza, injectable, quadrivalent, preservative free 0 5 mL 11/02/2018, 10/27/2020   • Influenza, recombinant, quadrivalent,injectable, preservative free 10/03/2019   • Pneumococcal Polysaccharide PPV23 11/04/2021   • Tdap 11/01/2012   • Zoster Vaccine Recombinant 09/03/2021, 12/03/2021       Objective     /78 (BP Location: Left arm, Patient Position: Sitting, Cuff Size: Large)   Pulse 100   Temp 98 8 °F (37 1 °C) (Tympanic)   Resp 16   Ht 5' 7\" (1 702 m)   Wt 108 kg (237 lb 12 8 oz)   SpO2 96%   BMI 37 24 kg/m²     Physical Exam  Vitals and nursing note reviewed  Constitutional:       Appearance: He is well-developed  HENT:      Head: Normocephalic and atraumatic  Eyes:      Pupils: Pupils are equal, round, and reactive to light  Cardiovascular:      Rate and Rhythm: Normal rate and regular rhythm  Heart sounds: Normal heart sounds  Pulmonary:      Effort: Pulmonary effort is normal       Breath sounds: Normal breath sounds  Abdominal:      General: Bowel sounds are normal       Palpations: Abdomen is soft  Musculoskeletal:         General: Normal range of motion  Cervical back: Normal range of motion and neck supple  Lymphadenopathy:      Cervical: No cervical adenopathy  Skin:     General: Skin is warm  Findings: No rash  Neurological:      Mental Status: He is alert and oriented to person, place, and time  Cranial Nerves: No cranial nerve deficit         Mitchell Aceves MD  "

## 2023-05-12 NOTE — ASSESSMENT & PLAN NOTE
Improving  He lost 9 pounds since the last visit  Continue on Trulicity 3 mg weekly  We will continue to monitor

## 2023-06-18 DIAGNOSIS — E11.9 TYPE 2 DIABETES MELLITUS WITHOUT COMPLICATION, WITHOUT LONG-TERM CURRENT USE OF INSULIN (HCC): ICD-10-CM

## 2023-06-19 RX ORDER — DULAGLUTIDE 3 MG/.5ML
3 INJECTION, SOLUTION SUBCUTANEOUS WEEKLY
Qty: 2 ML | Refills: 1 | Status: SHIPPED | OUTPATIENT
Start: 2023-06-19

## 2023-07-19 NOTE — PATIENT INSTRUCTIONS
Obesity   WHAT YOU SHOULD KNOW:   Obesity is a medical condition caused by too much body fat  Your caregiver will use your height and weight to calculate your body mass index (BMI)  You are obese if your BMI is greater than 30  Obesity increases your risk of medical conditions such as diabetes, heart disease, and certain types of cancer  Obesity is treated with lifestyle changes, and sometimes medicine or surgery  The goal of treatment is to help you lose weight so your health will improve  Even a small decrease in BMI can reduce the risk of health problems caused by obesity  INSTRUCTIONS:   Follow up with your primary healthcare provider [facility]) as directed:  Ask your PHP to help you set a weight loss goal  Keep appointments with your PHP so that he can support you as you lose weight  Write down your questions so you remember to ask them during your visits  How to be successful in losing weight:   · Set small, realistic goals  An example of a small goal is to walk for 20 minutes 5 days a week  Do not try to change everything at once  · Tell friends, family members, and coworkers about your goals  and ask for their support  Ask a friend to lose weight with you, or join a weight-loss support group  · Identify foods or triggers that may cause you to overeat , and find ways to avoid them  Remove tempting high-calorie foods from your home and workplace  Place a bowl of fresh fruit on your kitchen counter  If stress causes you to eat, then find other ways to cope with stress  · Keep a diary to track what you eat and drink, and your daily calorie intake  Also write down how many minutes of physical activity you do each day  Weigh yourself once a week and record it in your diary  Eating changes: You will need to eat 500 to 1000 fewer calories each day than you currently eat to lose 1 to 2 pounds a week  The following changes will help you cut calories:  · Eat smaller portions    Use small plates, no larger than 9 inches in diameter  Fill your plate half full of fruits and vegetables  Measure your food using measuring cups until you know what a serving size looks like  · Eat 3 meals and 1 or 2 snacks each day  Plan your meals in advance  Lisa Rodney and eat at home most of the time  Eat slowly  · Eat fruits and vegetables at every meal   They are low in calories and high in fiber, which makes you feel full  Do not add butter, margarine, or cream sauce to vegetables  Use herbs to season steamed vegetables  · Eat less fat and fewer fried foods  Eat more baked or grilled chicken and fish  These protein sources are lower in calories and fat than red meat  Limit fast food  Dress your salads with olive oil and vinegar instead of bottled dressing  · Limit the amount of sugar you eat  Do not drink sugary beverages  Limit alcohol  Activity changes:  Physical activity is good for your body in many ways  It helps you burn calories and build strong muscles  It decreases stress and depression, and gives you an overall sense of well-being  It can also help you sleep better  Talk to your PHP before you begin an exercise program   · Exercise for at least 30 minutes 5 days a week  Start slowly  Set aside time each day for physical activity that you enjoy and that is convenient for you  It is best to do both weight training and an activity that increases your heart rate, such as walking, bicycling, or swimming  · Find ways to be more active  Do yard work and housecleaning  Walk up the stairs instead of using elevators  Spend your leisure time going to events that require walking, such as outdoor festivals and art fairs  This extra physical activity can help you lose weight and keep it off  Contact your PHP if:   · You have symptoms of gallbladder or liver disease, such as pain in your upper abdomen  · You have knee or hip pain and discomfort while walking       · You have symptoms of diabetes, such as intense hunger and thirst, and frequent urination  · You have symptoms of sleep apnea, such as snoring or daytime sleepiness  · You have questions or concerns about your condition or care  Return to the emergency department if:   · You have a severe headache, confusion, or difficulty speaking  · You have weakness on one side of your body  · You have chest pain, sweating, or shortness of breath  © 2014 [address] is for End User's use only and may not be sold, redistributed or otherwise used for commercial purposes  All illustrations and images included in CareNotes® are the copyrighted property of A D A M , Inc  or Ady Johnson  The above information is an  only  It is not intended as medical advice for individual conditions or treatments  Talk to your doctor, nurse or pharmacist before following any medical regimen to see if it is safe and effective for you      Scheduled date of EGD/colonoscopy (as of today):  7/21/22  Physician performing EGD/colonoscopy:  Dr Edith Saunders  Location of EGD/colonoscopy:  [facility]  Desired bowel prep reviewed with patient:  Golytely/Lucas, diabetic  Instructions reviewed with patient by:  Danny Obrien  Clearances:  n/a Negative

## 2023-08-07 DIAGNOSIS — E11.9 TYPE 2 DIABETES MELLITUS WITHOUT COMPLICATION, WITHOUT LONG-TERM CURRENT USE OF INSULIN (HCC): ICD-10-CM

## 2023-08-07 RX ORDER — EMPAGLIFLOZIN 25 MG/1
TABLET, FILM COATED ORAL
Qty: 90 TABLET | Refills: 1 | Status: SHIPPED | OUTPATIENT
Start: 2023-08-07

## 2023-08-07 RX ORDER — DULAGLUTIDE 3 MG/.5ML
3 INJECTION, SOLUTION SUBCUTANEOUS WEEKLY
Qty: 2 ML | Refills: 1 | Status: SHIPPED | OUTPATIENT
Start: 2023-08-07

## 2023-08-15 ENCOUNTER — APPOINTMENT (OUTPATIENT)
Dept: LAB | Facility: IMAGING CENTER | Age: 63
End: 2023-08-15
Payer: COMMERCIAL

## 2023-08-15 DIAGNOSIS — E11.9 TYPE 2 DIABETES MELLITUS WITHOUT COMPLICATION, WITHOUT LONG-TERM CURRENT USE OF INSULIN (HCC): ICD-10-CM

## 2023-08-15 LAB
ALBUMIN SERPL BCP-MCNC: 3.9 G/DL (ref 3.5–5)
ALP SERPL-CCNC: 41 U/L (ref 46–116)
ALT SERPL W P-5'-P-CCNC: 31 U/L (ref 12–78)
ANION GAP SERPL CALCULATED.3IONS-SCNC: 6 MMOL/L
AST SERPL W P-5'-P-CCNC: 19 U/L (ref 5–45)
BASOPHILS # BLD AUTO: 0.08 THOUSANDS/ÂΜL (ref 0–0.1)
BASOPHILS NFR BLD AUTO: 1 % (ref 0–1)
BILIRUB SERPL-MCNC: 0.71 MG/DL (ref 0.2–1)
BUN SERPL-MCNC: 12 MG/DL (ref 5–25)
CALCIUM SERPL-MCNC: 9.5 MG/DL (ref 8.3–10.1)
CHLORIDE SERPL-SCNC: 113 MMOL/L (ref 96–108)
CHOLEST SERPL-MCNC: 115 MG/DL
CO2 SERPL-SCNC: 24 MMOL/L (ref 21–32)
CREAT SERPL-MCNC: 0.97 MG/DL (ref 0.6–1.3)
CREAT UR-MCNC: 177 MG/DL
EOSINOPHIL # BLD AUTO: 0.41 THOUSAND/ÂΜL (ref 0–0.61)
EOSINOPHIL NFR BLD AUTO: 6 % (ref 0–6)
ERYTHROCYTE [DISTWIDTH] IN BLOOD BY AUTOMATED COUNT: 13.2 % (ref 11.6–15.1)
EST. AVERAGE GLUCOSE BLD GHB EST-MCNC: 146 MG/DL
GFR SERPL CREATININE-BSD FRML MDRD: 82 ML/MIN/1.73SQ M
GLUCOSE P FAST SERPL-MCNC: 103 MG/DL (ref 65–99)
HBA1C MFR BLD: 6.7 %
HCT VFR BLD AUTO: 47.1 % (ref 36.5–49.3)
HDLC SERPL-MCNC: 35 MG/DL
HGB BLD-MCNC: 15.3 G/DL (ref 12–17)
IMM GRANULOCYTES # BLD AUTO: 0.01 THOUSAND/UL (ref 0–0.2)
IMM GRANULOCYTES NFR BLD AUTO: 0 % (ref 0–2)
LDLC SERPL CALC-MCNC: 39 MG/DL (ref 0–100)
LYMPHOCYTES # BLD AUTO: 2.98 THOUSANDS/ÂΜL (ref 0.6–4.47)
LYMPHOCYTES NFR BLD AUTO: 42 % (ref 14–44)
MCH RBC QN AUTO: 28.9 PG (ref 26.8–34.3)
MCHC RBC AUTO-ENTMCNC: 32.5 G/DL (ref 31.4–37.4)
MCV RBC AUTO: 89 FL (ref 82–98)
MICROALBUMIN UR-MCNC: 9.1 MG/L (ref 0–20)
MICROALBUMIN/CREAT 24H UR: 5 MG/G CREATININE (ref 0–30)
MONOCYTES # BLD AUTO: 0.59 THOUSAND/ÂΜL (ref 0.17–1.22)
MONOCYTES NFR BLD AUTO: 8 % (ref 4–12)
NEUTROPHILS # BLD AUTO: 3.07 THOUSANDS/ÂΜL (ref 1.85–7.62)
NEUTS SEG NFR BLD AUTO: 43 % (ref 43–75)
NRBC BLD AUTO-RTO: 0 /100 WBCS
PLATELET # BLD AUTO: 263 THOUSANDS/UL (ref 149–390)
PMV BLD AUTO: 11.6 FL (ref 8.9–12.7)
POTASSIUM SERPL-SCNC: 4.1 MMOL/L (ref 3.5–5.3)
PROT SERPL-MCNC: 7.6 G/DL (ref 6.4–8.4)
RBC # BLD AUTO: 5.3 MILLION/UL (ref 3.88–5.62)
SODIUM SERPL-SCNC: 143 MMOL/L (ref 135–147)
TRIGL SERPL-MCNC: 207 MG/DL
TSH SERPL DL<=0.05 MIU/L-ACNC: 3.31 UIU/ML (ref 0.45–4.5)
WBC # BLD AUTO: 7.14 THOUSAND/UL (ref 4.31–10.16)

## 2023-08-15 PROCEDURE — 80053 COMPREHEN METABOLIC PANEL: CPT

## 2023-08-15 PROCEDURE — 83036 HEMOGLOBIN GLYCOSYLATED A1C: CPT

## 2023-08-15 PROCEDURE — 36415 COLL VENOUS BLD VENIPUNCTURE: CPT

## 2023-08-15 PROCEDURE — 84443 ASSAY THYROID STIM HORMONE: CPT

## 2023-08-15 PROCEDURE — 82043 UR ALBUMIN QUANTITATIVE: CPT

## 2023-08-15 PROCEDURE — 80061 LIPID PANEL: CPT

## 2023-08-15 PROCEDURE — 82570 ASSAY OF URINE CREATININE: CPT

## 2023-08-15 PROCEDURE — 85025 COMPLETE CBC W/AUTO DIFF WBC: CPT

## 2023-08-16 ENCOUNTER — OFFICE VISIT (OUTPATIENT)
Dept: FAMILY MEDICINE CLINIC | Facility: CLINIC | Age: 63
End: 2023-08-16
Payer: COMMERCIAL

## 2023-08-16 VITALS
WEIGHT: 235 LBS | RESPIRATION RATE: 16 BRPM | BODY MASS INDEX: 36.88 KG/M2 | TEMPERATURE: 96.9 F | SYSTOLIC BLOOD PRESSURE: 116 MMHG | DIASTOLIC BLOOD PRESSURE: 72 MMHG | OXYGEN SATURATION: 100 % | HEIGHT: 67 IN | HEART RATE: 97 BPM

## 2023-08-16 DIAGNOSIS — E11.9 TYPE 2 DIABETES MELLITUS WITHOUT COMPLICATION, WITHOUT LONG-TERM CURRENT USE OF INSULIN (HCC): Primary | ICD-10-CM

## 2023-08-16 DIAGNOSIS — E78.49 OTHER HYPERLIPIDEMIA: ICD-10-CM

## 2023-08-16 DIAGNOSIS — E66.01 SEVERE OBESITY (BMI 35.0-39.9) WITH COMORBIDITY (HCC): ICD-10-CM

## 2023-08-16 DIAGNOSIS — I10 ESSENTIAL HYPERTENSION: ICD-10-CM

## 2023-08-16 DIAGNOSIS — N20.0 RENAL STONE: ICD-10-CM

## 2023-08-16 PROCEDURE — 99214 OFFICE O/P EST MOD 30 MIN: CPT | Performed by: FAMILY MEDICINE

## 2023-08-16 NOTE — PROGRESS NOTES
Name: Jose Antonio Suarez      : 1960      MRN: [id number]  Encounter Provider: Damaso Hartman MD  Encounter Date: 2023   Encounter department: [facility]     1. Type 2 diabetes mellitus without complication, without long-term current use of insulin ([facility])  Assessment & Plan:  A1c is well controlled at 6.7. Continue on metformin, Trulicity and Jardiance. Continue to monitor fasting glucose and A1c. Lab Results   Component Value Date    HGBA1C 6.7 (H) 08/15/2023       Orders:  -     Comprehensive metabolic panel; Future  -     Lipid Panel with Direct LDL reflex; Future  -     Hemoglobin A1C; Future    2. Essential hypertension  Assessment & Plan:  Blood pressure is well controlled. Continue same. We will continue to monitor. 3. Other hyperlipidemia  Assessment & Plan:  Component      Latest Ref Rng 2023 8/15/2023   Cholesterol      See Comment mg/dL 120  115    Triglycerides      See Comment mg/dL 172 (H)  207 (H)    HDL      >=40 mg/dL 39 (L)  35 (L)    LDL Calculated      0 - 100 mg/dL 47  39    Not well controlled. Triglyceride came back elevated. Discussed about low-carb and low-fat diet. Continue to monitor fasting lipid profile. 4. Renal stone  -     Uric acid; Future    5. Severe obesity (BMI 35.0-39. 9) with comorbidity ([address])           Subjective     HPI  Review of Systems    Past Medical History:   Diagnosis Date   • Anesthesia complication     difficulty awakening- dyspnea   • Anxiety    • Arthritis    • BPH (benign prostatic hyperplasia)    • Cataract     starting   • Cleft hard palate    • Diabetes ([address])     borderline does not check blood sugar   • Glaucoma suspect, both eyes    • Hyperlipidemia    • Kidney stone     left   • PONV (postoperative nausea and vomiting)    • Right ureteral stone    • Seasonal allergies    • Wears dentures     partial upper   • Wears glasses      Past Surgical History:   Procedure Laterality Date   • CLEFT PALATE REPAIR      multiple surgeries   • FL RETROGRADE PYELOGRAM  8/20/2020   • FL RETROGRADE PYELOGRAM  10/29/2020   • INGUINAL HERNIA REPAIR Right    • IR NEPHROURETERAL ACCESS FOR UROLOGY PCNL  10/28/2020   • JOINT REPLACEMENT Bilateral    • KNEE CARTILAGE SURGERY Left    • [place] ARTHRP KNE CONDYLE&PLATU MEDIAL&LAT COMPARTMENTS Right 3/4/2019    Procedure: TOTAL KNEE ARTHROPLASTY;  Surgeon: Tawny Matthews MD;  [facility];  Service: Orthopedics   • [place] ARTHRP KNE CONDYLE&PLATU MEDIAL&LAT COMPARTMENTS Left 9/13/2019    Procedure: ARTHROPLASTY KNEE TOTAL;  Surgeon: Tawny Matthews MD;  [facility];  Service: Orthopedics   • [place] CYSTO/URETERO W/LITHOTRIPSY &INDWELL STENT INSRT Right 8/4/2017    Procedure: CYSTOSCOPY URETEROSCOPY WITH LITHOTRIPSY HOLMIUM LASER, RETROGRADE PYELOGRAM AND INSERTION STENT URETERAL;  Surgeon: Whit Hoover MD;  [facility];  Service: Urology   • [place] CYSTO/URETERO W/LITHOTRIPSY &INDWELL STENT INSRT Left 8/20/2020    Procedure: CYSTO, URETEROSCOPY W/ LASER, RETROGRADE PYELOGRAM, STENT;  Surgeon: Tito Ambrosio MD;  [facility];  Service: Urology   • [place] PERQ NL/PL LITHOTRP COMPLEX >2 CM MLT LOCATIONS Left 10/29/2020    Procedure: P.C.N.L. antegrade insertion double j stent insertion and removal nephrostomy tube;   Surgeon: Tito Ambrosio MD;  [facility];  Service: Urology   • TOTAL KNEE ARTHROPLASTY Left     l 9/14/2019 right 3/4/2019     Family History   Problem Relation Age of Onset   • Stroke Mother    • Diabetes Mother    • Heart disease Mother    • Hypertension Mother    • Esophageal cancer Father    • Diabetes Sister    • Other Sister    • Colon cancer Brother      Social History     Socioeconomic History   • Marital status: Single     Spouse name: None   • Number of children: None   • Years of education: None   • Highest education level: None   Occupational History   • None   Tobacco Use   • Smoking status: Never • Smokeless tobacco: Never   • Tobacco comments:     no passive smoke exposure   Vaping Use   • Vaping Use: Never used   Substance and Sexual Activity   • Alcohol use: Yes     Alcohol/week: 1.0 standard drink of alcohol     Types: 1 Cans of beer per week     Comment: beer   • Drug use: No   • Sexual activity: Yes     Partners: Female   Other Topics Concern   • None   Social History Narrative   • None     Social Determinants of Health     Financial Resource Strain: Not on file   Food Insecurity: Not on file   Transportation Needs: Not on file   Physical Activity: Not on file   Stress: Not on file   Social Connections: Not on file   Intimate Partner Violence: Not on file   Housing Stability: Not on file     Current Outpatient Medications on File Prior to Visit   Medication Sig   • acetaminophen (TYLENOL) 500 mg tablet Take 500-1,000 mg by mouth every 6 (six) hours as needed for mild pain   • allopurinol (ZYLOPRIM) 100 mg tablet Take 1 tablet (100 mg total) by mouth daily   • ascorbic acid (VITAMIN C) 500 MG tablet Take 1 tablet (500 mg total) by mouth daily   • Blood Glucose Monitoring Suppl (OneTouch Verio Reflect) w/Device KIT Check blood sugars once daily. Please substitute with appropriate alternative as covered by patient's insurance. Dx: E11.65   • cetirizine (ZyrTEC) 10 mg tablet Take 10 mg by mouth daily   • glucose blood (OneTouch Verio) test strip Check blood sugars once daily. Please substitute with appropriate alternative as covered by patient's insurance. Dx: E11.65   • Jardiance 25 MG TABS TAKE ONE TABLET BY MOUTH EVERY DAY   • metFORMIN (GLUCOPHAGE-XR) 500 mg 24 hr tablet Take 2 tablets (1,000 mg total) by mouth daily with dinner   • Multiple Vitamin (MULTIVITAMIN) tablet Take 2 tablets by mouth daily   • OneTouch Delica Lancets 94C MISC Check blood sugars once daily. Please substitute with appropriate alternative as covered by patient's insurance.  Dx: E11.65   • simvastatin (ZOCOR) 40 mg tablet TAKE 1 TABLET BY MOUTH EVERYDAY AT BEDTIME   • Trulicity 3 YZ/9.2DV injection INJECT 0.5 ML (3 MG TOTAL) UNDER THE SKIN ONCE A WEEK   • albuterol (ProAir HFA) 90 mcg/act inhaler Inhale 2 puffs every 6 (six) hours as needed for wheezing or shortness of breath (Patient not taking: Reported on 12/21/2022)   • albuterol (Proventil HFA) 90 mcg/act inhaler Inhale 2 puffs every 6 (six) hours as needed for wheezing (Patient not taking: Reported on 12/21/2022)   • azelastine (ASTELIN) 0.1 % nasal spray 1 spray into each nostril 2 (two) times a day Use in each nostril as directed (Patient not taking: Reported on 12/21/2022)   • dapagliflozin (Farxiga) 10 MG tablet Take 1 tablet (10 mg total) by mouth daily (Patient not taking: Reported on 5/12/2023)   • fluticasone (FLONASE) 50 mcg/act nasal spray 2 sprays into each nostril daily (Patient not taking: Reported on 12/21/2022)   • ibuprofen (MOTRIN) 200 mg tablet Take 200-800 mg by mouth every 6 (six) hours as needed for mild pain (Patient not taking: Reported on 1/5/2022)   • Omega-3 Fatty Acids (FISH OIL PO) Take 1 capsule by mouth daily (Patient not taking: Reported on 5/12/2023)     Allergies   Allergen Reactions   • Flomax [Tamsulosin] Dizziness     Immunization History   Administered Date(s) Administered   • COVID-19 MODERNA VACC 0.5 ML IM 01/14/2021, 02/11/2021, 11/15/2021   • INFLUENZA 10/16/2017, 10/27/2020, 10/29/2021, 10/31/2022   • Influenza, injectable, quadrivalent, preservative free 0.5 mL 11/02/2018, 10/27/2020   • Influenza, recombinant, quadrivalent,injectable, preservative free 10/03/2019   • Pneumococcal Polysaccharide PPV23 11/04/2021   • Tdap 11/01/2012   • Zoster Vaccine Recombinant 09/03/2021, 12/03/2021       Objective     /72 (BP Location: Left arm, Patient Position: Sitting, Cuff Size: Large)   Pulse 97   Temp (!) 96.9 °F (36.1 °C) (Tympanic)   Resp 16   Ht 5' 7" (1.702 m)   Wt 107 kg (235 lb)   SpO2 100%   BMI 36.81 kg/m²     Physical Exam  Vitals and nursing note reviewed. Constitutional:       Appearance: He is well-developed. HENT:      Head: Normocephalic and atraumatic. Eyes:      Pupils: Pupils are equal, round, and reactive to light. Cardiovascular:      Rate and Rhythm: Normal rate and regular rhythm. Heart sounds: Normal heart sounds. Pulmonary:      Effort: Pulmonary effort is normal.      Breath sounds: Normal breath sounds. Abdominal:      General: Bowel sounds are normal.      Palpations: Abdomen is soft. Musculoskeletal:         General: Normal range of motion. Cervical back: Normal range of motion and neck supple. Lymphadenopathy:      Cervical: No cervical adenopathy. Skin:     General: Skin is warm. Findings: No rash. Neurological:      Mental Status: He is alert and oriented to person, place, and time. Cranial Nerves: No cranial nerve deficit. Soni Kelley MD BMI Counseling: Body mass index is 36.81 kg/m². The BMI is above normal. Nutrition recommendations include reducing portion sizes, decreasing overall calorie intake and 3-5 servings of fruits/vegetables daily. Exercise recommendations include moderate aerobic physical activity for 150 minutes/week.

## 2023-08-16 NOTE — ASSESSMENT & PLAN NOTE
Component      Latest Ref Rng 2/18/2023 8/15/2023   Cholesterol      See Comment mg/dL 120  115    Triglycerides      See Comment mg/dL 172 (H)  207 (H)    HDL      >=40 mg/dL 39 (L)  35 (L)    LDL Calculated      0 - 100 mg/dL 47  39    Not well controlled. Triglyceride came back elevated. Discussed about low-carb and low-fat diet. Continue to monitor fasting lipid profile.

## 2023-08-24 DIAGNOSIS — E78.5 HYPERLIPIDEMIA, UNSPECIFIED HYPERLIPIDEMIA TYPE: ICD-10-CM

## 2023-08-24 RX ORDER — SIMVASTATIN 40 MG
TABLET ORAL
Qty: 90 TABLET | Refills: 1 | Status: SHIPPED | OUTPATIENT
Start: 2023-08-24

## 2023-09-22 DIAGNOSIS — E11.9 TYPE 2 DIABETES MELLITUS WITHOUT COMPLICATION, WITHOUT LONG-TERM CURRENT USE OF INSULIN (HCC): ICD-10-CM

## 2023-09-22 RX ORDER — DULAGLUTIDE 3 MG/.5ML
3 INJECTION, SOLUTION SUBCUTANEOUS WEEKLY
Qty: 2 ML | Refills: 1 | Status: SHIPPED | OUTPATIENT
Start: 2023-09-22

## 2023-11-04 DIAGNOSIS — E11.9 TYPE 2 DIABETES MELLITUS WITHOUT COMPLICATION, WITHOUT LONG-TERM CURRENT USE OF INSULIN (HCC): ICD-10-CM

## 2023-11-06 RX ORDER — DULAGLUTIDE 3 MG/.5ML
3 INJECTION, SOLUTION SUBCUTANEOUS WEEKLY
Qty: 2 ML | Refills: 1 | Status: SHIPPED | OUTPATIENT
Start: 2023-11-06

## 2023-11-08 DIAGNOSIS — N20.0 RENAL STONE: ICD-10-CM

## 2023-11-08 RX ORDER — ALLOPURINOL 100 MG/1
100 TABLET ORAL DAILY
Qty: 90 TABLET | Refills: 3 | Status: SHIPPED | OUTPATIENT
Start: 2023-11-08

## 2023-11-10 ENCOUNTER — TELEPHONE (OUTPATIENT)
Dept: FAMILY MEDICINE CLINIC | Facility: CLINIC | Age: 63
End: 2023-11-10

## 2023-11-22 ENCOUNTER — APPOINTMENT (OUTPATIENT)
Dept: URGENT CARE | Age: 63
End: 2023-11-22

## 2023-12-04 ENCOUNTER — RA CDI HCC (OUTPATIENT)
Dept: OTHER | Facility: HOSPITAL | Age: 63
End: 2023-12-04

## 2023-12-04 NOTE — PROGRESS NOTES
[address] [facility] coding opportunities       Chart reviewed, no opportunity found: CHART REVIEWED, NO OPPORTUNITY FOUND        Patients Insurance        Commercial Insurance: Kulwinder Andrade

## 2023-12-08 ENCOUNTER — APPOINTMENT (OUTPATIENT)
Dept: LAB | Facility: IMAGING CENTER | Age: 63
End: 2023-12-08
Payer: COMMERCIAL

## 2023-12-08 DIAGNOSIS — E11.9 TYPE 2 DIABETES MELLITUS WITHOUT COMPLICATION, WITHOUT LONG-TERM CURRENT USE OF INSULIN (HCC): ICD-10-CM

## 2023-12-08 DIAGNOSIS — N20.0 RENAL STONE: ICD-10-CM

## 2023-12-08 LAB
ALBUMIN SERPL BCP-MCNC: 4.6 G/DL (ref 3.5–5)
ALP SERPL-CCNC: 38 U/L (ref 34–104)
ALT SERPL W P-5'-P-CCNC: 25 U/L (ref 7–52)
ANION GAP SERPL CALCULATED.3IONS-SCNC: 9 MMOL/L
AST SERPL W P-5'-P-CCNC: 24 U/L (ref 13–39)
BILIRUB SERPL-MCNC: 0.85 MG/DL (ref 0.2–1)
BUN SERPL-MCNC: 16 MG/DL (ref 5–25)
CALCIUM SERPL-MCNC: 9.6 MG/DL (ref 8.4–10.2)
CHLORIDE SERPL-SCNC: 106 MMOL/L (ref 96–108)
CHOLEST SERPL-MCNC: 137 MG/DL
CO2 SERPL-SCNC: 26 MMOL/L (ref 21–32)
CREAT SERPL-MCNC: 0.98 MG/DL (ref 0.6–1.3)
EST. AVERAGE GLUCOSE BLD GHB EST-MCNC: 143 MG/DL
GFR SERPL CREATININE-BSD FRML MDRD: 81 ML/MIN/1.73SQ M
GLUCOSE P FAST SERPL-MCNC: 117 MG/DL (ref 65–99)
HBA1C MFR BLD: 6.6 %
HDLC SERPL-MCNC: 38 MG/DL
LDLC SERPL CALC-MCNC: 70 MG/DL (ref 0–100)
POTASSIUM SERPL-SCNC: 4.5 MMOL/L (ref 3.5–5.3)
PROT SERPL-MCNC: 7.7 G/DL (ref 6.4–8.4)
SODIUM SERPL-SCNC: 141 MMOL/L (ref 135–147)
TRIGL SERPL-MCNC: 144 MG/DL
URATE SERPL-MCNC: 3.7 MG/DL (ref 3.5–8.5)

## 2023-12-08 PROCEDURE — 83036 HEMOGLOBIN GLYCOSYLATED A1C: CPT

## 2023-12-08 PROCEDURE — 36415 COLL VENOUS BLD VENIPUNCTURE: CPT

## 2023-12-08 PROCEDURE — 84550 ASSAY OF BLOOD/URIC ACID: CPT

## 2023-12-08 PROCEDURE — 80061 LIPID PANEL: CPT

## 2023-12-08 PROCEDURE — 80053 COMPREHEN METABOLIC PANEL: CPT

## 2023-12-12 ENCOUNTER — OFFICE VISIT (OUTPATIENT)
Dept: FAMILY MEDICINE CLINIC | Facility: CLINIC | Age: 63
End: 2023-12-12
Payer: COMMERCIAL

## 2023-12-12 VITALS
HEIGHT: 67 IN | SYSTOLIC BLOOD PRESSURE: 122 MMHG | OXYGEN SATURATION: 97 % | BODY MASS INDEX: 38.14 KG/M2 | TEMPERATURE: 97.4 F | WEIGHT: 243 LBS | HEART RATE: 94 BPM | RESPIRATION RATE: 16 BRPM | DIASTOLIC BLOOD PRESSURE: 80 MMHG

## 2023-12-12 DIAGNOSIS — Z00.01 ABNORMAL WELLNESS EXAM: ICD-10-CM

## 2023-12-12 DIAGNOSIS — E11.9 TYPE 2 DIABETES MELLITUS WITHOUT COMPLICATION, WITHOUT LONG-TERM CURRENT USE OF INSULIN (HCC): Primary | ICD-10-CM

## 2023-12-12 DIAGNOSIS — E78.49 OTHER HYPERLIPIDEMIA: ICD-10-CM

## 2023-12-12 DIAGNOSIS — I10 ESSENTIAL HYPERTENSION: ICD-10-CM

## 2023-12-12 DIAGNOSIS — Z12.5 PROSTATE CANCER SCREENING: ICD-10-CM

## 2023-12-12 DIAGNOSIS — N20.0 RENAL STONE: ICD-10-CM

## 2023-12-12 PROCEDURE — 99396 PREV VISIT EST AGE 40-64: CPT | Performed by: FAMILY MEDICINE

## 2023-12-12 PROCEDURE — 99214 OFFICE O/P EST MOD 30 MIN: CPT | Performed by: FAMILY MEDICINE

## 2023-12-12 RX ORDER — DULAGLUTIDE 4.5 MG/.5ML
4.5 INJECTION, SOLUTION SUBCUTANEOUS
Qty: 2 ML | Refills: 5 | Status: SHIPPED | OUTPATIENT
Start: 2023-12-12 | End: 2023-12-15 | Stop reason: SDUPTHER

## 2023-12-12 NOTE — PROGRESS NOTES
Name: Neelam Bruner      : 1960      MRN: [id number]  Encounter Provider: Shannon Arias MD  Encounter Date: 2023   Encounter department: [facility]     1. Type 2 diabetes mellitus without complication, without long-term current use of insulin ([facility])  Assessment & Plan:  Fasting glucose 117, A1c 6.6. Stable on metformin, Trulicity, Jardiance, and Jocelynn. Controlled on metformin to every other day and I am going to increase Trulicity to 4.5 mg weekly. Discussed with possible side effects. . Will continue to monitor. Lab Results   Component Value Date    HGBA1C 6.6 (H) 2023       Orders:  -     dulaglutide (Trulicity) 4.5 PL/4.0UJ injection; Inject 0.5 mL (4.5 mg total) under the skin every 7 days  -     Albumin / creatinine urine ratio; Future; Expected date: 2023  -     Comprehensive metabolic panel; Future; Expected date: 2023  -     Hemoglobin A1C; Future; Expected date: 2023  -     CBC and differential; Future; Expected date: 2023  -     Lipid Panel with Direct LDL reflex; Future; Expected date: 2023    2. Essential hypertension  Assessment & Plan:  /80. Well controlled without use of medication. Will continue to monitor. 3. Other hyperlipidemia  Assessment & Plan:  HDL 38. Well controlled on simvastatin 40 mg. Continue medication as prescribed. Discussed low fat diet and regular exercise. Will continue to monitor. 4. Renal stone  Assessment & Plan:  Uric acid 3.7. Well controlled on allopurinol 100 mg daily. Continue medication as prescribed. Will continue to monitor. Orders:  -     Uric acid; Future    5. Abnormal wellness exam  Assessment & Plan: It was discussed about immunizations, diet, exercise and safety. 6. Prostate cancer screening  -     PSA, Total Screen; Future        Depression Screening and Follow-up Plan: Patient was screened for depression during today's encounter.  They screened negative with a PHQ-2 score of 0. Subjective      PB is a 57 yo male with a PMH of HTN, HLD, and Type 2 DM, presenting to the office for a 4 mo well visit. Blood work on 12/8/23 shows A1c 6.6, fasting glucose 117, and HDL 38. Blood work was reviewed with patient who expresses understanding. Patient reports trying to maintain a low carbohydrate, low fat diet and regular exercise. Patient reports metformin causing diarrhea and has had accidents so he has been taking 1000 mg daily instead of 2000 mg daily starting 2 weeks ago. Patient reports taking all other medications as prescibred wihtout any complaitns. Patient is UTD on immunizations. Patient reports slight tingling in L toes that occurred 2-3 days out of the week 1 mo ago. Patient states that this has been intermittent and was only noticed at night. Diabetes  Pertinent negatives for hypoglycemia include no dizziness or headaches. Pertinent negatives for diabetes include no chest pain, no fatigue, no polydipsia, no polyuria and no weakness. Review of Systems   Constitutional:  Negative for chills, diaphoresis, fatigue and fever. HENT:  Negative for congestion and rhinorrhea. Respiratory:  Negative for cough and shortness of breath. Cardiovascular:  Negative for chest pain, palpitations and leg swelling. Gastrointestinal:  Negative for abdominal pain, constipation, diarrhea, nausea and vomiting. Endocrine: Negative for polydipsia and polyuria. Genitourinary:  Negative for difficulty urinating and dysuria. Musculoskeletal:  Negative for arthralgias and myalgias. Neurological:  Negative for dizziness, weakness, light-headedness, numbness and headaches.        Current Outpatient Medications on File Prior to Visit   Medication Sig   • acetaminophen (TYLENOL) 500 mg tablet Take 500-1,000 mg by mouth every 6 (six) hours as needed for mild pain   • allopurinol (ZYLOPRIM) 100 mg tablet TAKE 1 TABLET BY MOUTH EVERY DAY   • ascorbic acid (VITAMIN C) 500 MG tablet Take 1 tablet (500 mg total) by mouth daily   • Blood Glucose Monitoring Suppl (OneTouch Verio Reflect) w/Device KIT Check blood sugars once daily. Please substitute with appropriate alternative as covered by patient's insurance. Dx: E11.65   • cetirizine (ZyrTEC) 10 mg tablet Take 10 mg by mouth daily   • glucose blood (OneTouch Verio) test strip Check blood sugars once daily. Please substitute with appropriate alternative as covered by patient's insurance. Dx: E11.65   • Jardiance 25 MG TABS TAKE ONE TABLET BY MOUTH EVERY DAY   • metFORMIN (GLUCOPHAGE-XR) 500 mg 24 hr tablet Take 2 tablets (1,000 mg total) by mouth daily with dinner (Patient taking differently: Take 500 mg by mouth daily with breakfast)   • Multiple Vitamin (MULTIVITAMIN) tablet Take 2 tablets by mouth daily   • OneTouch Delica Lancets 47L MISC Check blood sugars once daily. Please substitute with appropriate alternative as covered by patient's insurance.  Dx: E11.65   • simvastatin (ZOCOR) 40 mg tablet TAKE 1 TABLET BY MOUTH EVERYDAY AT BEDTIME   • [DISCONTINUED] Trulicity 3 VK/7.2XM injection INJECT 0.5 ML (3 MG TOTAL) UNDER THE SKIN ONCE A WEEK   • albuterol (ProAir HFA) 90 mcg/act inhaler Inhale 2 puffs every 6 (six) hours as needed for wheezing or shortness of breath (Patient not taking: Reported on 12/21/2022)   • albuterol (Proventil HFA) 90 mcg/act inhaler Inhale 2 puffs every 6 (six) hours as needed for wheezing (Patient not taking: Reported on 12/21/2022)   • azelastine (ASTELIN) 0.1 % nasal spray 1 spray into each nostril 2 (two) times a day Use in each nostril as directed (Patient not taking: Reported on 12/21/2022)   • dapagliflozin (Farxiga) 10 MG tablet Take 1 tablet (10 mg total) by mouth daily (Patient not taking: Reported on 5/12/2023)   • fluticasone (FLONASE) 50 mcg/act nasal spray 2 sprays into each nostril daily (Patient not taking: Reported on 12/21/2022)   • ibuprofen (MOTRIN) 200 mg tablet Take 200-800 mg by mouth every 6 (six) hours as needed for mild pain (Patient not taking: Reported on 1/5/2022)   • Omega-3 Fatty Acids (FISH OIL PO) Take 1 capsule by mouth daily (Patient not taking: Reported on 5/12/2023)       Objective     /80 (BP Location: Left arm, Patient Position: Sitting, Cuff Size: Large)   Pulse 94   Temp (!) 97.4 °F (36.3 °C) (Tympanic)   Resp 16   Ht 5' 7" (1.702 m)   Wt 110 kg (243 lb)   SpO2 97%   BMI 38.06 kg/m²     Physical Exam  Vitals and nursing note reviewed. Constitutional:       Appearance: Normal appearance. HENT:      Head: Normocephalic and atraumatic. Right Ear: Tympanic membrane normal.      Left Ear: Tympanic membrane normal.      Mouth/Throat:      Mouth: Mucous membranes are moist.   Eyes:      Pupils: Pupils are equal, round, and reactive to light. Cardiovascular:      Rate and Rhythm: Normal rate and regular rhythm. Pulses: Normal pulses. Heart sounds: Normal heart sounds. Pulmonary:      Effort: Pulmonary effort is normal.      Breath sounds: Normal breath sounds. Abdominal:      Palpations: Abdomen is soft. Musculoskeletal:         General: No swelling, tenderness, deformity or signs of injury. Normal range of motion. Skin:     General: Skin is warm. Capillary Refill: Capillary refill takes less than 2 seconds. Neurological:      General: No focal deficit present. Mental Status: He is alert and oriented to person, place, and time. Sensory: Sensation is intact. No sensory deficit. Motor: Motor function is intact. No weakness. Coordination: Coordination is intact.  Coordination normal.      Gait: Gait normal.   Psychiatric:         Mood and Affect: Mood normal.         Behavior: Behavior normal.       Jessica Allen MD

## 2023-12-12 NOTE — ASSESSMENT & PLAN NOTE
Uric acid 3.7. Well controlled on allopurinol 100 mg daily. Continue medication as prescribed. Will continue to monitor.

## 2023-12-12 NOTE — ASSESSMENT & PLAN NOTE
Fasting glucose 117, A1c 6.6. Stable on metformin, Trulicity, Jardiance, and Jocelynn. Controlled on metformin to every other day and I am going to increase Trulicity to 4.5 mg weekly. Discussed with possible side effects. . Will continue to monitor.   Lab Results   Component Value Date    HGBA1C 6.6 (H) 12/08/2023

## 2023-12-12 NOTE — ASSESSMENT & PLAN NOTE
HDL 38. Well controlled on simvastatin 40 mg. Continue medication as prescribed. Discussed low fat diet and regular exercise. Will continue to monitor.

## 2023-12-15 DIAGNOSIS — E11.9 TYPE 2 DIABETES MELLITUS WITHOUT COMPLICATION, WITHOUT LONG-TERM CURRENT USE OF INSULIN (HCC): ICD-10-CM

## 2023-12-15 RX ORDER — DULAGLUTIDE 4.5 MG/.5ML
4.5 INJECTION, SOLUTION SUBCUTANEOUS
Qty: 2 ML | Refills: 5 | Status: SHIPPED | OUTPATIENT
Start: 2023-12-15

## 2023-12-15 NOTE — TELEPHONE ENCOUNTER
[facility] . Benjamínulicity refill sent per protocol.

## 2023-12-26 DIAGNOSIS — E11.9 TYPE 2 DIABETES MELLITUS WITHOUT COMPLICATION, WITHOUT LONG-TERM CURRENT USE OF INSULIN (HCC): ICD-10-CM

## 2023-12-26 RX ORDER — EMPAGLIFLOZIN 25 MG/1
TABLET, FILM COATED ORAL
Qty: 90 TABLET | Refills: 1 | Status: SHIPPED | OUTPATIENT
Start: 2023-12-26

## 2023-12-28 ENCOUNTER — OFFICE VISIT (OUTPATIENT)
Dept: OBGYN CLINIC | Facility: CLINIC | Age: 63
End: 2023-12-28
Payer: COMMERCIAL

## 2023-12-28 VITALS
DIASTOLIC BLOOD PRESSURE: 64 MMHG | BODY MASS INDEX: 37.32 KG/M2 | HEIGHT: 67 IN | SYSTOLIC BLOOD PRESSURE: 117 MMHG | WEIGHT: 237.8 LBS

## 2023-12-28 DIAGNOSIS — G56.21 CUBITAL TUNNEL SYNDROME ON RIGHT: Primary | ICD-10-CM

## 2023-12-28 PROCEDURE — 99203 OFFICE O/P NEW LOW 30 MIN: CPT | Performed by: SURGERY

## 2023-12-28 NOTE — PROGRESS NOTES
ORTHOPAEDIC HAND, WRIST, AND ELBOW OFFICE  VISIT       ASSESSMENT/PLAN:      63 y o male who presents with Right cubital tunnel syndrome    Physical exam performed and we discussed the findings.   We discussed his elbow pain can be cause by neuritis as well as possible tendonitis. His sensation in his hand is normal.   We discussed conservative treatment options of soft splinting and wrist splinting. Patient declined  We also discussed surgery but patient declined this as well  We agreed to monitor his symptoms and he is free to return if his symptoms worsen.    The patient verbalized understanding of exam findings and treatment plan. We engaged in the shared decision-making process and treatment options were discussed at length with the patient. Surgical and conservative management discussed today along with risks and benefits.    Diagnoses and all orders for this visit:    Cubital tunnel syndrome on right        Follow Up:  Return if symptoms worsen or fail to improve.          ____________________________________________________________________________________________________________________________________________      CHIEF COMPLAINT:  Chief Complaint   Patient presents with    Right Elbow - Pain, Numbness     Sharp pain       SUBJECTIVE:  Ollie Friedman is a 63 y.o. year old male who presents for evaluation of Right elbow pain    Patient states his symptoms started 4 years ago after his knee replacement in 2019. He was using a walker and his last two digits of his right hand became numb. He was advised by the surgeon that this should resolve in 6 months. He states his symptoms have never resolved but have not gotten worse either. He had an EMG but was never told the results  No numbness or tingling in his first three digits     About a Month and a half ago he had a sharp pain at his Right medial elbow with an electric shock as well. That pain resolved quickly but radiates to his lateral elbow now. Pain is not  constant but comes at random times with activities. Pain will stop him and he needs to massage the elbow and it will resolve.   He denies any pain with gripping activities and no tenderness  He is also reports his Left hand last two digits are numb      I have personally reviewed all the relevant PMH, PSH, SH, FH, Medications and allergies      PAST MEDICAL HISTORY:  Past Medical History:   Diagnosis Date    Anesthesia complication     difficulty awakening- dyspnea    Anxiety     Arthritis     BPH (benign prostatic hyperplasia)     Cataract     starting    Cleft hard palate     Diabetes (HCC)     borderline does not check blood sugar    Glaucoma suspect, both eyes     Hyperlipidemia     Kidney stone     left    PONV (postoperative nausea and vomiting)     Right ureteral stone     Seasonal allergies     Wears dentures     partial upper    Wears glasses        PAST SURGICAL HISTORY:  Past Surgical History:   Procedure Laterality Date    CLEFT PALATE REPAIR      multiple surgeries    FL RETROGRADE PYELOGRAM  8/20/2020    FL RETROGRADE PYELOGRAM  10/29/2020    INGUINAL HERNIA REPAIR Right     IR NEPHROURETERAL ACCESS FOR UROLOGY PCNL  10/28/2020    JOINT REPLACEMENT Bilateral     KNEE CARTILAGE SURGERY Left     NY ARTHRP KNE CONDYLE&PLATU MEDIAL&LAT COMPARTMENTS Right 3/4/2019    Procedure: TOTAL KNEE ARTHROPLASTY;  Surgeon: Andreea Singh MD;  Location: [facility];  Service: Orthopedics    NY ARTHRP KNE CONDYLE&PLATU MEDIAL&LAT COMPARTMENTS Left 9/13/2019    Procedure: ARTHROPLASTY KNEE TOTAL;  Surgeon: Andreea Singh MD;  Location: [facility];  Service: Orthopedics    NY CYSTO/URETERO W/LITHOTRIPSY &INDWELL STENT INSRT Right 8/4/2017    Procedure: CYSTOSCOPY URETEROSCOPY WITH LITHOTRIPSY HOLMIUM LASER, RETROGRADE PYELOGRAM AND INSERTION STENT URETERAL;  Surgeon: Slick Kim MD;  Location: [facility];  Service: Urology    NY CYSTO/URETERO W/LITHOTRIPSY &INDWELL STENT INSRT Left 8/20/2020    Procedure:  CYSTO, URETEROSCOPY W/ LASER, RETROGRADE PYELOGRAM, STENT;  Surgeon: Syed Hayes MD;  Location: [facility];  Service: Urology    WI PERQ NL/PL LITHOTRP COMPLEX >2 CM MLT LOCATIONS Left 10/29/2020    Procedure: P.C.N.L. antegrade insertion double j stent insertion and removal nephrostomy tube;  Surgeon: Syed Hayes MD;  Location: [facility];  Service: Urology    TOTAL KNEE ARTHROPLASTY Left     l 9/14/2019 right 3/4/2019       FAMILY HISTORY:  Family History   Problem Relation Age of Onset    Stroke Mother     Diabetes Mother     Heart disease Mother     Hypertension Mother     Esophageal cancer Father     Diabetes Sister     Other Sister     Colon cancer Brother        SOCIAL HISTORY:  Social History     Tobacco Use    Smoking status: Never    Smokeless tobacco: Never    Tobacco comments:     no passive smoke exposure   Vaping Use    Vaping status: Never Used   Substance Use Topics    Alcohol use: Yes     Alcohol/week: 1.0 standard drink of alcohol     Types: 1 Cans of beer per week     Comment: beer    Drug use: No       MEDICATIONS:    Current Outpatient Medications:     acetaminophen (TYLENOL) 500 mg tablet, Take 500-1,000 mg by mouth every 6 (six) hours as needed for mild pain, Disp: , Rfl:     allopurinol (ZYLOPRIM) 100 mg tablet, TAKE 1 TABLET BY MOUTH EVERY DAY, Disp: 90 tablet, Rfl: 3    ascorbic acid (VITAMIN C) 500 MG tablet, Take 1 tablet (500 mg total) by mouth daily, Disp: 60 tablet, Rfl: 0    Blood Glucose Monitoring Suppl (OneTouch Verio Reflect) w/Device KIT, Check blood sugars once daily. Please substitute with appropriate alternative as covered by patient's insurance. Dx: E11.65, Disp: 1 kit, Rfl: 0    cetirizine (ZyrTEC) 10 mg tablet, Take 10 mg by mouth daily, Disp: , Rfl:     dulaglutide (Trulicity) 4.5 MG/0.5ML injection, Inject 0.5 mL (4.5 mg total) under the skin every 7 days, Disp: 2 mL, Rfl: 5    glucose blood (OneTouch Verio) test strip, Check blood sugars once daily. Please  substitute with appropriate alternative as covered by patient's insurance. Dx: E11.65, Disp: 100 each, Rfl: 3    Jardiance 25 MG TABS, TAKE ONE TABLET BY MOUTH EVERY DAY, Disp: 90 tablet, Rfl: 1    metFORMIN (GLUCOPHAGE-XR) 500 mg 24 hr tablet, Take 2 tablets (1,000 mg total) by mouth daily with dinner (Patient taking differently: Take 500 mg by mouth daily with breakfast), Disp: 180 tablet, Rfl: 1    Multiple Vitamin (MULTIVITAMIN) tablet, Take 2 tablets by mouth daily, Disp: , Rfl:     OneTouch Delica Lancets 33G MISC, Check blood sugars once daily. Please substitute with appropriate alternative as covered by patient's insurance. Dx: E11.65, Disp: 100 each, Rfl: 3    simvastatin (ZOCOR) 40 mg tablet, TAKE 1 TABLET BY MOUTH EVERYDAY AT BEDTIME, Disp: 90 tablet, Rfl: 1    albuterol (ProAir HFA) 90 mcg/act inhaler, Inhale 2 puffs every 6 (six) hours as needed for wheezing or shortness of breath (Patient not taking: Reported on 12/21/2022), Disp: 8.5 g, Rfl: 0    albuterol (Proventil HFA) 90 mcg/act inhaler, Inhale 2 puffs every 6 (six) hours as needed for wheezing (Patient not taking: Reported on 12/21/2022), Disp: 6.7 g, Rfl: 5    azelastine (ASTELIN) 0.1 % nasal spray, 1 spray into each nostril 2 (two) times a day Use in each nostril as directed (Patient not taking: Reported on 12/21/2022), Disp: 1 mL, Rfl: 0    dapagliflozin (Farxiga) 10 MG tablet, Take 1 tablet (10 mg total) by mouth daily (Patient not taking: Reported on 5/12/2023), Disp: 30 tablet, Rfl: 3    fluticasone (FLONASE) 50 mcg/act nasal spray, 2 sprays into each nostril daily (Patient not taking: Reported on 12/21/2022), Disp: 16 g, Rfl: 0    ibuprofen (MOTRIN) 200 mg tablet, Take 200-800 mg by mouth every 6 (six) hours as needed for mild pain (Patient not taking: Reported on 1/5/2022), Disp: , Rfl:     Omega-3 Fatty Acids (FISH OIL PO), Take 1 capsule by mouth daily (Patient not taking: Reported on 5/12/2023), Disp: , Rfl:     ALLERGIES:  Allergies    Allergen Reactions    Flomax [Tamsulosin] Dizziness           REVIEW OF SYSTEMS:  Review of Systems   Constitutional:  Negative for chills and fever.   HENT:  Negative for ear pain and sore throat.    Eyes:  Negative for pain and visual disturbance.   Respiratory:  Negative for cough and shortness of breath.    Cardiovascular:  Negative for chest pain and palpitations.   Gastrointestinal:  Negative for abdominal pain and vomiting.   Genitourinary:  Negative for dysuria and hematuria.   Musculoskeletal:  Negative for arthralgias and back pain.   Skin:  Negative for color change and rash.   Neurological:  Positive for numbness. Negative for seizures and syncope.   All other systems reviewed and are negative.      VITALS:  Vitals:    12/28/23 1031   BP: 117/64       LABS:  HgA1c:   Lab Results   Component Value Date    HGBA1C 6.6 (H) 12/08/2023     BMP:   Lab Results   Component Value Date    CALCIUM 9.6 12/08/2023    K 4.5 12/08/2023    CO2 26 12/08/2023     12/08/2023    BUN 16 12/08/2023    CREATININE 0.98 12/08/2023       _____________________________________________________  PHYSICAL EXAMINATION:  General: well developed and well nourished, alert, oriented times 3, and appears comfortable  Psychiatric: Normal  HEENT: Normocephalic, Atraumatic Trachea Midline, No torticollis  Pulmonary: No audible wheezing or respiratory distress   Abdomen/GI: Non tender, non distended   Cardiovascular: No pitting edema, 2+ radial pulse   Skin: No masses, erythema, lacerations, fluctation, ulcerations  Neurovascular: Sensation Intact to the Median, Ulnar, Radial Nerve, Motor Intact to the Median, Ulnar, Radial Nerve, and Pulses Intact  Musculoskeletal: Normal, except as noted in detailed exam and in HPI.      MUSCULOSKELETAL EXAMINATION:  SILT  Composite fist  Full ARM of wrist  No thernar wasting noted  No lateral elbow tenderness  Full AROM of the wrist    Cubital tunnel compression test:  Right : negative    Carpal  tunnel compression test  Right : negative    2 point discrimination  Right hand  5 mm throughout  ___________________________________________________  STUDIES REVIEWED:  EMG from 2019 reviewed  Mild Carpal and Cubital neuropathy        PROCEDURES PERFORMED:  Procedures  No Procedures performed today    _____________________________________________________      Scribe Attestation      I,:  Ronak Nguyen am acting as a scribe while in the presence of the attending physician.:       I,:  Levy Ayala MD personally performed the services described in this documentation    as scribed in my presence.:

## 2024-02-21 PROBLEM — Z12.5 PROSTATE CANCER SCREENING: Status: RESOLVED | Noted: 2019-09-26 | Resolved: 2024-02-21

## 2024-02-21 PROBLEM — N39.0 FREQUENT UTI: Status: RESOLVED | Noted: 2020-06-02 | Resolved: 2024-02-21

## 2024-02-21 PROBLEM — R05.9 COUGH: Status: RESOLVED | Noted: 2020-04-03 | Resolved: 2024-02-21

## 2024-02-27 DIAGNOSIS — E78.5 HYPERLIPIDEMIA, UNSPECIFIED HYPERLIPIDEMIA TYPE: ICD-10-CM

## 2024-02-27 RX ORDER — SIMVASTATIN 40 MG
TABLET ORAL
Qty: 90 TABLET | Refills: 1 | Status: SHIPPED | OUTPATIENT
Start: 2024-02-27

## 2024-03-04 ENCOUNTER — TELEPHONE (OUTPATIENT)
Age: 64
End: 2024-03-04

## 2024-04-08 DIAGNOSIS — E11.9 TYPE 2 DIABETES MELLITUS WITHOUT COMPLICATION, WITHOUT LONG-TERM CURRENT USE OF INSULIN (HCC): ICD-10-CM

## 2024-04-08 RX ORDER — DULAGLUTIDE 4.5 MG/.5ML
4.5 INJECTION, SOLUTION SUBCUTANEOUS
Qty: 2 ML | Refills: 5 | Status: SHIPPED | OUTPATIENT
Start: 2024-04-08

## 2024-04-19 ENCOUNTER — RA CDI HCC (OUTPATIENT)
Dept: OTHER | Facility: HOSPITAL | Age: 64
End: 2024-04-19

## 2024-04-30 ENCOUNTER — OFFICE VISIT (OUTPATIENT)
Dept: FAMILY MEDICINE CLINIC | Facility: CLINIC | Age: 64
End: 2024-04-30
Payer: COMMERCIAL

## 2024-04-30 ENCOUNTER — APPOINTMENT (OUTPATIENT)
Dept: LAB | Facility: IMAGING CENTER | Age: 64
End: 2024-04-30
Payer: COMMERCIAL

## 2024-04-30 VITALS
OXYGEN SATURATION: 96 % | DIASTOLIC BLOOD PRESSURE: 76 MMHG | HEART RATE: 96 BPM | HEIGHT: 67 IN | SYSTOLIC BLOOD PRESSURE: 124 MMHG | BODY MASS INDEX: 37.79 KG/M2 | WEIGHT: 240.8 LBS | RESPIRATION RATE: 16 BRPM | TEMPERATURE: 96.5 F

## 2024-04-30 DIAGNOSIS — N20.0 RENAL STONE: ICD-10-CM

## 2024-04-30 DIAGNOSIS — E11.9 TYPE 2 DIABETES MELLITUS WITHOUT COMPLICATION, WITHOUT LONG-TERM CURRENT USE OF INSULIN (HCC): Primary | ICD-10-CM

## 2024-04-30 DIAGNOSIS — E11.9 TYPE 2 DIABETES MELLITUS WITHOUT COMPLICATION, WITHOUT LONG-TERM CURRENT USE OF INSULIN (HCC): ICD-10-CM

## 2024-04-30 DIAGNOSIS — M15.9 PRIMARY OSTEOARTHRITIS INVOLVING MULTIPLE JOINTS: ICD-10-CM

## 2024-04-30 DIAGNOSIS — E78.2 MODERATE MIXED HYPERLIPIDEMIA NOT REQUIRING STATIN THERAPY: ICD-10-CM

## 2024-04-30 DIAGNOSIS — Z12.5 PROSTATE CANCER SCREENING: ICD-10-CM

## 2024-04-30 DIAGNOSIS — I10 ESSENTIAL HYPERTENSION: ICD-10-CM

## 2024-04-30 LAB
BASOPHILS # BLD AUTO: 0.09 THOUSANDS/ÂΜL (ref 0–0.1)
BASOPHILS NFR BLD AUTO: 1 % (ref 0–1)
CHOLEST SERPL-MCNC: 133 MG/DL
CREAT UR-MCNC: 108.1 MG/DL
EOSINOPHIL # BLD AUTO: 0.38 THOUSAND/ÂΜL (ref 0–0.61)
EOSINOPHIL NFR BLD AUTO: 6 % (ref 0–6)
ERYTHROCYTE [DISTWIDTH] IN BLOOD BY AUTOMATED COUNT: 13.2 % (ref 11.6–15.1)
EST. AVERAGE GLUCOSE BLD GHB EST-MCNC: 148 MG/DL
HBA1C MFR BLD: 6.8 %
HCT VFR BLD AUTO: 49.8 % (ref 36.5–49.3)
HDLC SERPL-MCNC: 38 MG/DL
HGB BLD-MCNC: 16 G/DL (ref 12–17)
IMM GRANULOCYTES # BLD AUTO: 0.02 THOUSAND/UL (ref 0–0.2)
IMM GRANULOCYTES NFR BLD AUTO: 0 % (ref 0–2)
LDLC SERPL CALC-MCNC: 61 MG/DL (ref 0–100)
LYMPHOCYTES # BLD AUTO: 2.21 THOUSANDS/ÂΜL (ref 0.6–4.47)
LYMPHOCYTES NFR BLD AUTO: 32 % (ref 14–44)
MCH RBC QN AUTO: 28.7 PG (ref 26.8–34.3)
MCHC RBC AUTO-ENTMCNC: 32.1 G/DL (ref 31.4–37.4)
MCV RBC AUTO: 89 FL (ref 82–98)
MICROALBUMIN UR-MCNC: 7.8 MG/L
MICROALBUMIN/CREAT 24H UR: 7 MG/G CREATININE (ref 0–30)
MONOCYTES # BLD AUTO: 0.59 THOUSAND/ÂΜL (ref 0.17–1.22)
MONOCYTES NFR BLD AUTO: 9 % (ref 4–12)
NEUTROPHILS # BLD AUTO: 3.56 THOUSANDS/ÂΜL (ref 1.85–7.62)
NEUTS SEG NFR BLD AUTO: 52 % (ref 43–75)
NRBC BLD AUTO-RTO: 0 /100 WBCS
PLATELET # BLD AUTO: 267 THOUSANDS/UL (ref 149–390)
PMV BLD AUTO: 11.4 FL (ref 8.9–12.7)
PSA SERPL-MCNC: 0.57 NG/ML (ref 0–4)
RBC # BLD AUTO: 5.58 MILLION/UL (ref 3.88–5.62)
TRIGL SERPL-MCNC: 168 MG/DL
TSH SERPL DL<=0.05 MIU/L-ACNC: 1.72 UIU/ML (ref 0.45–4.5)
URATE SERPL-MCNC: 3.6 MG/DL (ref 3.5–8.5)
WBC # BLD AUTO: 6.85 THOUSAND/UL (ref 4.31–10.16)

## 2024-04-30 PROCEDURE — 85025 COMPLETE CBC W/AUTO DIFF WBC: CPT

## 2024-04-30 PROCEDURE — 84443 ASSAY THYROID STIM HORMONE: CPT

## 2024-04-30 PROCEDURE — 80061 LIPID PANEL: CPT

## 2024-04-30 PROCEDURE — 84550 ASSAY OF BLOOD/URIC ACID: CPT

## 2024-04-30 PROCEDURE — 99214 OFFICE O/P EST MOD 30 MIN: CPT | Performed by: FAMILY MEDICINE

## 2024-04-30 PROCEDURE — 82043 UR ALBUMIN QUANTITATIVE: CPT

## 2024-04-30 PROCEDURE — 3078F DIAST BP <80 MM HG: CPT | Performed by: FAMILY MEDICINE

## 2024-04-30 PROCEDURE — 83036 HEMOGLOBIN GLYCOSYLATED A1C: CPT

## 2024-04-30 PROCEDURE — G0103 PSA SCREENING: HCPCS

## 2024-04-30 PROCEDURE — 82570 ASSAY OF URINE CREATININE: CPT

## 2024-04-30 PROCEDURE — 3074F SYST BP LT 130 MM HG: CPT | Performed by: FAMILY MEDICINE

## 2024-04-30 PROCEDURE — 80053 COMPREHEN METABOLIC PANEL: CPT

## 2024-04-30 PROCEDURE — 36415 COLL VENOUS BLD VENIPUNCTURE: CPT

## 2024-04-30 PROCEDURE — 3044F HG A1C LEVEL LT 7.0%: CPT | Performed by: FAMILY MEDICINE

## 2024-04-30 PROCEDURE — 3061F NEG MICROALBUMINURIA REV: CPT | Performed by: FAMILY MEDICINE

## 2024-04-30 NOTE — ASSESSMENT & PLAN NOTE
Controlled.  Continue same.  Will continue to monitor.  Discussed about low-carb diet and regular exercise.  Lab Results   Component Value Date    HGBA1C 6.8 (H) 04/30/2024

## 2024-04-30 NOTE — PROGRESS NOTES
Name: Ollie Friedman      : 1960      MRN: [id number]  Encounter Provider: Niko Chou MD  Encounter Date: 2024   Encounter department:  [facility]    Assessment & Plan     1. Type 2 diabetes mellitus without complication, without long-term current use of insulin (HCC)  Assessment & Plan:  Controlled.  Continue same.  Will continue to monitor.  Discussed about low-carb diet and regular exercise.  Lab Results   Component Value Date    HGBA1C 6.8 (H) 2024     Orders:  -     Albumin / creatinine urine ratio; Future; Expected date: 2024  -     Comprehensive metabolic panel; Future; Expected date: 2024  -     Hemoglobin A1C; Future; Expected date: 2024  -     CBC and differential; Future; Expected date: 2024  -     Lipid Panel with Direct LDL reflex; Future; Expected date: 2024  -     TSH, 3rd generation with Free T4 reflex; Future; Expected date: 2024    2. Essential hypertension  Assessment & Plan:  /76. Well controlled without use of medication. Will continue to monitor.      3. Moderate mixed hyperlipidemia not requiring statin therapy  Assessment & Plan:  Well controlled on simvastatin 40 mg. Continue medication as prescribed. Discussed low fat diet and regular exercise. Will continue to monitor.      4. Primary osteoarthritis involving multiple joints  Assessment & Plan:  Continue with Tylenol as needed.             Subjective     Is here today for follow-up multiple medical problems.  He has been taking his medications.  He denies any side effects of medications.  He did not get his blood yet.  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  He denies any complaint.    Diabetes  Pertinent negatives for hypoglycemia include no dizziness or headaches. Pertinent negatives for diabetes include no chest pain.     Review of Systems   Constitutional:  Negative for chills and fever.   HENT:  Negative for trouble swallowing.     Eyes:  Negative for visual disturbance.   Respiratory:  Negative for cough and shortness of breath.    Cardiovascular:  Negative for chest pain and palpitations.   Gastrointestinal:  Negative for abdominal pain, blood in stool and vomiting.   Endocrine: Negative for cold intolerance and heat intolerance.   Genitourinary:  Negative for difficulty urinating and dysuria.   Musculoskeletal:  Negative for gait problem.   Skin:  Negative for rash.   Neurological:  Negative for dizziness, syncope and headaches.   Hematological:  Negative for adenopathy.   Psychiatric/Behavioral:  Negative for behavioral problems.        Past Medical History:   Diagnosis Date   • Anesthesia complication     difficulty awakening- dyspnea   • Anxiety    • Arthritis    • BPH (benign prostatic hyperplasia)    • Cataract     starting   • Cleft hard palate    • Diabetes (HCC)     borderline does not check blood sugar   • Glaucoma suspect, both eyes    • Hyperlipidemia    • Kidney stone     left   • PONV (postoperative nausea and vomiting)    • Right ureteral stone    • Seasonal allergies    • Wears dentures     partial upper   • Wears glasses      Past Surgical History:   Procedure Laterality Date   • CLEFT PALATE REPAIR      multiple surgeries   • FL RETROGRADE PYELOGRAM  8/20/2020   • FL RETROGRADE PYELOGRAM  10/29/2020   • INGUINAL HERNIA REPAIR Right    • IR NEPHROURETERAL ACCESS FOR UROLOGY PCNL  10/28/2020   • JOINT REPLACEMENT Bilateral    • KNEE CARTILAGE SURGERY Left    • ND ARTHRP KNE CONDYLE&PLATU MEDIAL&LAT COMPARTMENTS Right 3/4/2019    Procedure: TOTAL KNEE ARTHROPLASTY;  Surgeon: Andreea Singh MD;  [facility]: [facility];  Service: Orthopedics   • ND ARTHRP KNE CONDYLE&PLATU MEDIAL&LAT COMPARTMENTS Left 9/13/2019    Procedure: ARTHROPLASTY KNEE TOTAL;  Surgeon: Andreea Singh MD;  Location: [facility];  Service: Orthopedics   • ND CYSTO/URETERO W/LITHOTRIPSY &INDWELL STENT INSRT Right 8/4/2017    Procedure: CYSTOSCOPY  URETEROSCOPY WITH LITHOTRIPSY HOLMIUM LASER, RETROGRADE PYELOGRAM AND INSERTION STENT URETERAL;  Surgeon: Slick Kim MD;  Location[facility];  Service: Urology   • TN CYSTO/URETERO W/LITHOTRIPSY &INDWELL STENT INSRT Left 8/20/2020    Procedure: CYSTO, URETEROSCOPY W/ LASER, RETROGRADE PYELOGRAM, STENT;  Surgeon: Syed Hayes MD;  [facility];  Service: Urology   • TN PERQ NL/PL LITHOTRP COMPLEX >2 CM MLT LOCATIONS Left 10/29/2020    Procedure: P.C.N.L. antegrade insertion double j stent insertion and removal nephrostomy tube;  Surgeon: Syed Hayes MD;  Location[facility];  Service: Urology   • TOTAL KNEE ARTHROPLASTY Left     l 9/14/2019 right 3/4/2019     Family History   Problem Relation Age of Onset   • Stroke Mother    • Diabetes Mother    • Heart disease Mother    • Hypertension Mother    • Esophageal cancer Father    • Diabetes Sister    • Other Sister    • Colon cancer Brother      Social History     Socioeconomic History   • Marital status: Single     Spouse name: None   • Number of children: None   • Years of education: None   • Highest education level: None   Occupational History   • None   Tobacco Use   • Smoking status: Never   • Smokeless tobacco: Never   • Tobacco comments:     no passive smoke exposure   Vaping Use   • Vaping status: Never Used   Substance and Sexual Activity   • Alcohol use: Yes     Alcohol/week: 1.0 standard drink of alcohol     Types: 1 Cans of beer per week     Comment: beer   • Drug use: No   • Sexual activity: Yes     Partners: Female   Other Topics Concern   • None   Social History Narrative   • None     Social Determinants of Health     Financial Resource Strain: Not on file   Food Insecurity: Not on file   Transportation Needs: Not on file   Physical Activity: Not on file   Stress: Not on file   Social Connections: Not on file   Intimate Partner Violence: Not on file   Housing Stability: Not on file     Current Outpatient Medications on File  Prior to Visit   Medication Sig   • acetaminophen (TYLENOL) 500 mg tablet Take 500-1,000 mg by mouth every 6 (six) hours as needed for mild pain   • allopurinol (ZYLOPRIM) 100 mg tablet TAKE 1 TABLET BY MOUTH EVERY DAY   • ascorbic acid (VITAMIN C) 500 MG tablet Take 1 tablet (500 mg total) by mouth daily   • Blood Glucose Monitoring Suppl (OneTouch Verio Reflect) w/Device KIT Check blood sugars once daily. Please substitute with appropriate alternative as covered by patient's insurance. Dx: E11.65   • cetirizine (ZyrTEC) 10 mg tablet Take 10 mg by mouth daily   • Jardiance 25 MG TABS TAKE ONE TABLET BY MOUTH EVERY DAY   • Multiple Vitamin (MULTIVITAMIN) tablet Take 2 tablets by mouth daily   • OneTouch Delica Lancets 33G MISC Check blood sugars once daily. Please substitute with appropriate alternative as covered by patient's insurance. Dx: E11.65   • simvastatin (ZOCOR) 40 mg tablet TAKE 1 TABLET BY MOUTH EVERYDAY AT BEDTIME   • Trulicity 4.5 MG/0.5ML injection INJECT 0.5 ML (4.5 MG TOTAL) UNDER THE SKIN EVERY 7 DAYS   • albuterol (ProAir HFA) 90 mcg/act inhaler Inhale 2 puffs every 6 (six) hours as needed for wheezing or shortness of breath (Patient not taking: Reported on 12/21/2022)   • albuterol (Proventil HFA) 90 mcg/act inhaler Inhale 2 puffs every 6 (six) hours as needed for wheezing (Patient not taking: Reported on 12/21/2022)   • azelastine (ASTELIN) 0.1 % nasal spray 1 spray into each nostril 2 (two) times a day Use in each nostril as directed (Patient not taking: Reported on 12/21/2022)   • dapagliflozin (Farxiga) 10 MG tablet Take 1 tablet (10 mg total) by mouth daily (Patient not taking: Reported on 5/12/2023)   • fluticasone (FLONASE) 50 mcg/act nasal spray 2 sprays into each nostril daily (Patient not taking: Reported on 12/21/2022)   • glucose blood (OneTouch Verio) test strip Check blood sugars once daily. Please substitute with appropriate alternative as covered by patient's insurance. Dx: E11.65  "  • ibuprofen (MOTRIN) 200 mg tablet Take 200-800 mg by mouth every 6 (six) hours as needed for mild pain (Patient not taking: Reported on 1/5/2022)   • Omega-3 Fatty Acids (FISH OIL PO) Take 1 capsule by mouth daily (Patient not taking: Reported on 5/12/2023)   • [DISCONTINUED] metFORMIN (GLUCOPHAGE-XR) 500 mg 24 hr tablet Take 2 tablets (1,000 mg total) by mouth daily with dinner (Patient not taking: Reported on 4/30/2024)     Allergies   Allergen Reactions   • Flomax [Tamsulosin] Dizziness     Immunization History   Administered Date(s) Administered   • COVID-19 MODERNA VACC 0.5 ML IM 01/14/2021, 02/11/2021, 11/15/2021   • INFLUENZA 10/16/2017, 10/27/2020, 10/29/2021, 10/31/2022, 11/10/2023   • Influenza, injectable, quadrivalent, preservative free 0.5 mL 11/02/2018, 10/27/2020   • Influenza, recombinant, quadrivalent,injectable, preservative free 10/03/2019   • Pneumococcal Polysaccharide PPV23 11/04/2021   • Tdap 11/01/2012   • Zoster Vaccine Recombinant 09/03/2021, 12/03/2021       Objective     /76 (BP Location: Left arm, Patient Position: Sitting, Cuff Size: Large)   Pulse 96   Temp (!) 96.5 °F (35.8 °C) (Tympanic)   Resp 16   Ht 5' 7\" (1.702 m)   Wt 109 kg (240 lb 12.8 oz)   SpO2 96%   BMI 37.71 kg/m²     Physical Exam  Vitals and nursing note reviewed.   Constitutional:       Appearance: He is well-developed.   HENT:      Head: Normocephalic and atraumatic.   Eyes:      Pupils: Pupils are equal, round, and reactive to light.   Cardiovascular:      Rate and Rhythm: Normal rate and regular rhythm.      Heart sounds: Normal heart sounds.   Pulmonary:      Effort: Pulmonary effort is normal.      Breath sounds: Normal breath sounds.   Abdominal:      General: Bowel sounds are normal.      Palpations: Abdomen is soft.   Musculoskeletal:      Cervical back: Normal range of motion and neck supple.   Lymphadenopathy:      Cervical: No cervical adenopathy.   Skin:     General: Skin is warm.      " Findings: No rash.   Neurological:      Mental Status: He is alert and oriented to person, place, and time.       Niko Chou MD

## 2024-04-30 NOTE — ASSESSMENT & PLAN NOTE
Well controlled on simvastatin 40 mg. Continue medication as prescribed. Discussed low fat diet and regular exercise. Will continue to monitor.

## 2024-05-01 ENCOUNTER — TELEPHONE (OUTPATIENT)
Dept: FAMILY MEDICINE CLINIC | Facility: CLINIC | Age: 64
End: 2024-05-01

## 2024-05-01 NOTE — TELEPHONE ENCOUNTER
----- Message from Niko Chou MD sent at 4/30/2024  6:01 PM EDT -----  A1c came back fair control at 6.8.  Triglycerides slightly elevated.  All the rest of blood work came back stable

## 2024-05-08 LAB
ALBUMIN SERPL BCP-MCNC: 4.6 G/DL (ref 3.5–5)
ALP SERPL-CCNC: 38 U/L (ref 34–104)
ALT SERPL W P-5'-P-CCNC: 22 U/L (ref 7–52)
ANION GAP SERPL CALCULATED.3IONS-SCNC: 9 MMOL/L (ref 4–13)
AST SERPL W P-5'-P-CCNC: 20 U/L (ref 13–39)
BILIRUB SERPL-MCNC: 0.83 MG/DL (ref 0.2–1)
BUN SERPL-MCNC: 15 MG/DL (ref 5–25)
CALCIUM SERPL-MCNC: 9.4 MG/DL (ref 8.4–10.2)
CHLORIDE SERPL-SCNC: 108 MMOL/L (ref 96–108)
CO2 SERPL-SCNC: 26 MMOL/L (ref 21–32)
CREAT SERPL-MCNC: 0.85 MG/DL (ref 0.6–1.3)
GFR SERPL CREATININE-BSD FRML MDRD: 92 ML/MIN/1.73SQ M
GLUCOSE P FAST SERPL-MCNC: 113 MG/DL (ref 65–99)
POTASSIUM SERPL-SCNC: 4.7 MMOL/L (ref 3.5–5.3)
PROT SERPL-MCNC: 7.8 G/DL (ref 6.4–8.4)
SODIUM SERPL-SCNC: 143 MMOL/L (ref 135–147)

## 2024-06-27 DIAGNOSIS — E11.9 TYPE 2 DIABETES MELLITUS WITHOUT COMPLICATION, WITHOUT LONG-TERM CURRENT USE OF INSULIN (HCC): ICD-10-CM

## 2024-06-27 RX ORDER — EMPAGLIFLOZIN 25 MG/1
TABLET, FILM COATED ORAL
Qty: 30 TABLET | Refills: 5 | Status: SHIPPED | OUTPATIENT
Start: 2024-06-27

## 2024-07-31 ENCOUNTER — APPOINTMENT (OUTPATIENT)
Dept: URGENT CARE | Age: 64
End: 2024-07-31

## 2024-08-26 DIAGNOSIS — E11.9 TYPE 2 DIABETES MELLITUS WITHOUT COMPLICATION, WITHOUT LONG-TERM CURRENT USE OF INSULIN (HCC): ICD-10-CM

## 2024-08-27 RX ORDER — DULAGLUTIDE 4.5 MG/.5ML
4.5 INJECTION, SOLUTION SUBCUTANEOUS
Qty: 2 ML | Refills: 5 | Status: SHIPPED | OUTPATIENT
Start: 2024-08-27

## 2024-09-19 DIAGNOSIS — E78.5 HYPERLIPIDEMIA, UNSPECIFIED HYPERLIPIDEMIA TYPE: ICD-10-CM

## 2024-09-19 RX ORDER — SIMVASTATIN 40 MG
TABLET ORAL
Qty: 90 TABLET | Refills: 1 | Status: SHIPPED | OUTPATIENT
Start: 2024-09-19

## 2024-11-05 ENCOUNTER — TELEPHONE (OUTPATIENT)
Age: 64
End: 2024-11-05

## 2024-11-05 DIAGNOSIS — I10 ESSENTIAL HYPERTENSION: ICD-10-CM

## 2024-11-05 DIAGNOSIS — E78.2 MODERATE MIXED HYPERLIPIDEMIA NOT REQUIRING STATIN THERAPY: Primary | ICD-10-CM

## 2024-11-05 DIAGNOSIS — E11.9 TYPE 2 DIABETES MELLITUS WITHOUT COMPLICATION, WITHOUT LONG-TERM CURRENT USE OF INSULIN (HCC): ICD-10-CM

## 2024-11-05 NOTE — TELEPHONE ENCOUNTER
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 He would like to know if he should have BW prior to that appt.     Please advise and f/u with pt as necessary.

## 2024-11-08 ENCOUNTER — APPOINTMENT (OUTPATIENT)
Dept: LAB | Facility: IMAGING CENTER | Age: 64
End: 2024-11-08
Payer: MEDICARE

## 2024-11-08 DIAGNOSIS — E11.9 TYPE 2 DIABETES MELLITUS WITHOUT COMPLICATION, WITHOUT LONG-TERM CURRENT USE OF INSULIN (HCC): ICD-10-CM

## 2024-11-08 DIAGNOSIS — I10 ESSENTIAL HYPERTENSION: ICD-10-CM

## 2024-11-08 DIAGNOSIS — E78.2 MODERATE MIXED HYPERLIPIDEMIA NOT REQUIRING STATIN THERAPY: ICD-10-CM

## 2024-11-08 LAB
ALBUMIN SERPL BCG-MCNC: 4.4 G/DL (ref 3.5–5)
ALP SERPL-CCNC: 41 U/L (ref 34–104)
ALT SERPL W P-5'-P-CCNC: 27 U/L (ref 7–52)
ANION GAP SERPL CALCULATED.3IONS-SCNC: 9 MMOL/L (ref 4–13)
AST SERPL W P-5'-P-CCNC: 26 U/L (ref 13–39)
BASOPHILS # BLD AUTO: 0.07 THOUSANDS/ÂΜL (ref 0–0.1)
BASOPHILS NFR BLD AUTO: 1 % (ref 0–1)
BILIRUB SERPL-MCNC: 0.69 MG/DL (ref 0.2–1)
BUN SERPL-MCNC: 12 MG/DL (ref 5–25)
CALCIUM SERPL-MCNC: 9 MG/DL (ref 8.4–10.2)
CHLORIDE SERPL-SCNC: 106 MMOL/L (ref 96–108)
CHOLEST SERPL-MCNC: 124 MG/DL
CO2 SERPL-SCNC: 26 MMOL/L (ref 21–32)
CREAT SERPL-MCNC: 0.9 MG/DL (ref 0.6–1.3)
EOSINOPHIL # BLD AUTO: 0.42 THOUSAND/ÂΜL (ref 0–0.61)
EOSINOPHIL NFR BLD AUTO: 6 % (ref 0–6)
ERYTHROCYTE [DISTWIDTH] IN BLOOD BY AUTOMATED COUNT: 13 % (ref 11.6–15.1)
EST. AVERAGE GLUCOSE BLD GHB EST-MCNC: 146 MG/DL
GFR SERPL CREATININE-BSD FRML MDRD: 89 ML/MIN/1.73SQ M
GLUCOSE P FAST SERPL-MCNC: 113 MG/DL (ref 65–99)
HBA1C MFR BLD: 6.7 %
HCT VFR BLD AUTO: 48.5 % (ref 36.5–49.3)
HDLC SERPL-MCNC: 34 MG/DL
HGB BLD-MCNC: 15.7 G/DL (ref 12–17)
IMM GRANULOCYTES # BLD AUTO: 0.02 THOUSAND/UL (ref 0–0.2)
IMM GRANULOCYTES NFR BLD AUTO: 0 % (ref 0–2)
LDLC SERPL CALC-MCNC: 57 MG/DL (ref 0–100)
LYMPHOCYTES # BLD AUTO: 2.34 THOUSANDS/ÂΜL (ref 0.6–4.47)
LYMPHOCYTES NFR BLD AUTO: 34 % (ref 14–44)
MCH RBC QN AUTO: 28.8 PG (ref 26.8–34.3)
MCHC RBC AUTO-ENTMCNC: 32.4 G/DL (ref 31.4–37.4)
MCV RBC AUTO: 89 FL (ref 82–98)
MONOCYTES # BLD AUTO: 0.52 THOUSAND/ÂΜL (ref 0.17–1.22)
MONOCYTES NFR BLD AUTO: 8 % (ref 4–12)
NEUTROPHILS # BLD AUTO: 3.47 THOUSANDS/ÂΜL (ref 1.85–7.62)
NEUTS SEG NFR BLD AUTO: 51 % (ref 43–75)
NRBC BLD AUTO-RTO: 0 /100 WBCS
PLATELET # BLD AUTO: 261 THOUSANDS/UL (ref 149–390)
PMV BLD AUTO: 12.8 FL (ref 8.9–12.7)
POTASSIUM SERPL-SCNC: 4.3 MMOL/L (ref 3.5–5.3)
PROT SERPL-MCNC: 7.3 G/DL (ref 6.4–8.4)
RBC # BLD AUTO: 5.45 MILLION/UL (ref 3.88–5.62)
SODIUM SERPL-SCNC: 141 MMOL/L (ref 135–147)
TRIGL SERPL-MCNC: 166 MG/DL
TSH SERPL DL<=0.05 MIU/L-ACNC: 1.82 UIU/ML (ref 0.45–4.5)
WBC # BLD AUTO: 6.84 THOUSAND/UL (ref 4.31–10.16)

## 2024-11-08 PROCEDURE — 84443 ASSAY THYROID STIM HORMONE: CPT

## 2024-11-08 PROCEDURE — 80053 COMPREHEN METABOLIC PANEL: CPT

## 2024-11-08 PROCEDURE — 83036 HEMOGLOBIN GLYCOSYLATED A1C: CPT

## 2024-11-08 PROCEDURE — 85025 COMPLETE CBC W/AUTO DIFF WBC: CPT

## 2024-11-08 PROCEDURE — 36415 COLL VENOUS BLD VENIPUNCTURE: CPT

## 2024-11-08 PROCEDURE — 80061 LIPID PANEL: CPT

## 2024-11-08 NOTE — TELEPHONE ENCOUNTER
Pt called and stated he was at the lab to get his blood work and the PSA was not ordered.  Pt last PSA was 4/2024.  Confirmed with Shanthi that it was to soon to repeat.    Pt had no further questions.

## 2024-11-14 ENCOUNTER — TELEPHONE (OUTPATIENT)
Dept: ADMINISTRATIVE | Facility: OTHER | Age: 64
End: 2024-11-14

## 2024-11-14 ENCOUNTER — OFFICE VISIT (OUTPATIENT)
Dept: FAMILY MEDICINE CLINIC | Facility: CLINIC | Age: 64
End: 2024-11-14
Payer: MEDICARE

## 2024-11-14 VITALS
RESPIRATION RATE: 16 BRPM | TEMPERATURE: 97.4 F | BODY MASS INDEX: 37.67 KG/M2 | DIASTOLIC BLOOD PRESSURE: 86 MMHG | HEIGHT: 67 IN | SYSTOLIC BLOOD PRESSURE: 136 MMHG | HEART RATE: 96 BPM | OXYGEN SATURATION: 97 % | WEIGHT: 240 LBS

## 2024-11-14 DIAGNOSIS — I10 ESSENTIAL HYPERTENSION: ICD-10-CM

## 2024-11-14 DIAGNOSIS — E78.49 OTHER HYPERLIPIDEMIA: ICD-10-CM

## 2024-11-14 DIAGNOSIS — E66.01 SEVERE OBESITY (BMI 35.0-39.9) WITH COMORBIDITY (HCC): ICD-10-CM

## 2024-11-14 DIAGNOSIS — Z00.00 HEALTHCARE MAINTENANCE: ICD-10-CM

## 2024-11-14 DIAGNOSIS — E11.9 TYPE 2 DIABETES MELLITUS WITHOUT COMPLICATION, WITHOUT LONG-TERM CURRENT USE OF INSULIN (HCC): Primary | ICD-10-CM

## 2024-11-14 DIAGNOSIS — Z12.5 PROSTATE CANCER SCREENING: ICD-10-CM

## 2024-11-14 PROCEDURE — 99214 OFFICE O/P EST MOD 30 MIN: CPT | Performed by: NURSE PRACTITIONER

## 2024-11-14 PROCEDURE — G0402 INITIAL PREVENTIVE EXAM: HCPCS | Performed by: NURSE PRACTITIONER

## 2024-11-14 NOTE — LETTER
Diabetic Eye Exam Form    Date Requested: 24  Patient: Ollie Friedman  Patient : 1960   Referring Provider: Niko Chou MD      DIABETIC Eye Exam Date _______________________________      Type of Exam MUST be documented for Diabetic Eye Exams. Please CHECK ONE.     Retinal Exam       Dilated Retinal Exam       OCT       Optomap-Iris Exam      Fundus Photography       Left Eye - Please check Retinopathy or No Retinopathy        Exam did show retinopathy    Exam did not show retinopathy       Right Eye - Please check Retinopathy or No Retinopathy       Exam did show retinopathy    Exam did not show retinopathy       Comments __________________________________________________________    Practice Providing Exam ______________________________________________    Exam Performed By (print name) _______________________________________      Provider Signature ___________________________________________________      These reports are needed for  compliance.  Please fax this completed form and a copy of the Diabetic Eye Exam report to our office located at [address] [place] [address] as soon as possible via Fax [phone] attention Vasu: Phone [phone]  We thank you for your assistance in treating our mutual patient.

## 2024-11-14 NOTE — PROGRESS NOTES
Diabetic Foot Exam    Patient's shoes and socks removed.    Right Foot/Ankle   Right Foot Inspection  Skin Exam: skin normal and skin intact. No dry skin, no warmth, no callus, no erythema, no maceration, no abnormal color, no pre-ulcer, no ulcer and no callus.     Toe Exam: ROM and strength within normal limits.     Sensory   Monofilament testing: intact    Vascular  The right DP pulse is 2+.     Left Foot/Ankle  Left Foot Inspection  Skin Exam: skin normal and skin intact. No dry skin, no warmth, no erythema, no maceration, normal color, no pre-ulcer, no ulcer and no callus.     Toe Exam: ROM and strength within normal limits.     Sensory   Monofilament testing: intact    Vascular  The left DP pulse is 2+.     Assign Risk Category  No deformity present  No loss of protective sensation  No weak pulses  Risk: 0  Name: Ollie Friedman      : 1960      MRN: [id number]  Encounter Provider: AMARA Carrion  Encounter Date: 2024   Encounter department: [facility]    Assessment & Plan  Type 2 diabetes mellitus without complication, without long-term current use of insulin (HCC)  - Well controlled.  - Continue Jardiance 25 mg daily and Trulicity 4.5 mg weekly.  - Continue to monitor blood glucose at home.   - Encourage low carb diet and regular exercise.  - Foot exam performed in office today.  - He completed his diabetic eye exam.   - Will continue to monitor.   Lab Results   Component Value Date    HGBA1C 6.7 (H) 2024       Orders:    Empagliflozin (Jardiance) 25 MG TABS; Take 1 tablet (25 mg total) by mouth daily    Albumin / creatinine urine ratio; Future    Comprehensive metabolic panel; Future    Hemoglobin A1C; Future    Lipid Panel with Direct LDL reflex; Future    Essential hypertension  - Fairly well controlled without medication.  - Will continue to monitor.   Orders:    CBC and differential; Future    Comprehensive metabolic panel; Future    Lipid Panel  with Direct LDL reflex; Future    TSH, 3rd generation with Free T4 reflex; Future    Other hyperlipidemia  Component      Latest Ref Rng 4/30/2024 11/8/2024   Cholesterol      See Comment mg/dL 133  124    Triglycerides      See Comment mg/dL 168 (H)  166 (H)    HDL      >=40 mg/dL 38 (L)  34 (L)    LDL Calculated      0 - 100 mg/dL 61  57      - Well controlled.  - Continue simvastatin 40 mg daily.   - Encourage low fat diet and regular exercise.  - Will continue to monitor fasting lipid panel.        Severe obesity (BMI 35.0-39.9) with comorbidity (HCC)  - Encourage healthy diet and regular exercise.         Healthcare maintenance  - Reviewed immunizations and screenings.   - UTD with dental and vision exams.   - UTD with colonoscopy.          Prostate cancer screening    Orders:    PSA, Total Screen; Future      Depression Screening and Follow-up Plan: Patient was screened for depression during today's encounter. They screened negative with a PHQ-2 score of 0.      Preventive health issues were discussed with patient, and age appropriate screening tests were ordered as noted in patient's After Visit Summary. Personalized health advice and appropriate referrals for health education or preventive services given if needed, as noted in patient's After Visit Summary.    History of Present Illness     Patient with PMH of HTN, HLD, and type 2 diabetes presents to office today for annual wellness physical. He is taking his prescribed medications and reports no side effects. He had his blood work done which showed well controlled hemoglobin A1c of 6.7. The rest of his labs are stable. He denies any concerns or complaints today.        Diabetes  Pertinent negatives for hypoglycemia include no dizziness or headaches. Pertinent negatives for diabetes include no chest pain and no fatigue.      Patient Care Team:  Niko Chou MD as PCP - General  Tani Dowell MD    Review of Systems   Constitutional:  Negative for  fatigue and fever.   HENT:  Negative for congestion, rhinorrhea and trouble swallowing.    Eyes:  Negative for visual disturbance.   Respiratory:  Negative for cough and shortness of breath.    Cardiovascular:  Negative for chest pain and palpitations.   Gastrointestinal:  Negative for abdominal pain and blood in stool.   Endocrine: Negative for cold intolerance and heat intolerance.   Genitourinary:  Negative for difficulty urinating, dysuria and frequency.   Musculoskeletal:  Negative for gait problem.   Skin:  Negative for rash.   Neurological:  Negative for dizziness, syncope and headaches.   Hematological:  Negative for adenopathy.   Psychiatric/Behavioral:  Negative for behavioral problems.      Medical History Reviewed by provider this encounter:  Tobacco  Allergies  Meds  Problems  Med Hx  Surg Hx  Fam Hx       Annual Wellness Visit Questionnaire   Ollie is here for his Welcome to Medicare visit.     Health Risk Assessment:   Patient rates overall health as good. Patient feels that their physical health rating is same. Patient is very satisfied with their life. Eyesight was rated as same. Hearing was rated as same. Patient feels that their emotional and mental health rating is same. Patients states they are sometimes angry. Patient states they are sometimes unusually tired/fatigued. Pain experienced in the last 7 days has been none. Patient states that he has experienced no weight loss or gain in last 6 months.     Depression Screening:   PHQ-2 Score: 0      Fall Risk Screening:   In the past year, patient has experienced: no history of falling in past year      Home Safety:  Patient does not have trouble with stairs inside or outside of their home. Patient has working smoke alarms and has working carbon monoxide detector. Home safety hazards include: none.     Nutrition:   Current diet is Regular.     Medications:   Patient is currently taking over-the-counter supplements. OTC medications include:  see medication list. Patient is able to manage medications.     Activities of Daily Living (ADLs)/Instrumental Activities of Daily Living (IADLs):   Walk and transfer into and out of bed and chair?: Yes  Dress and groom yourself?: Yes    Bathe or shower yourself?: Yes    Feed yourself? Yes  Do your laundry/housekeeping?: Yes  Manage your money, pay your bills and track your expenses?: Yes  Make your own meals?: Yes    Do your own shopping?: Yes    Previous Hospitalizations:   Any hospitalizations or ED visits within the last 12 months?: No      Advance Care Planning:   Living will: No    Durable POA for healthcare: No      Cognitive Screening:   Provider or family/friend/caregiver concerned regarding cognition?: No    PREVENTIVE SCREENINGS      Cardiovascular Screening:    General: Screening Not Indicated and History Lipid Disorder      Diabetes Screening:     General: Screening Not Indicated and History Diabetes      Colorectal Cancer Screening:     General: Screening Current      Prostate Cancer Screening:    General: Screening Current      Osteoporosis Screening:    General: Patient Declines      Abdominal Aortic Aneurysm (AAA) Screening:    Risk factors include: age between 65-74 yo        Lung Cancer Screening:     General: Screening Not Indicated      Hepatitis C Screening:    General: Screening Current    Screening, Brief Intervention, and Referral to Treatment (SBIRT)    Screening  Typical number of drinks in a day: 0  Typical number of drinks in a week: 0  Interpretation: Low risk drinking behavior.    Single Item Drug Screening:  How often have you used an illegal drug (including marijuana) or a prescription medication for non-medical reasons in the past year? never    Single Item Drug Screen Score: 0  Interpretation: Negative screen for possible drug use disorder    Brief Intervention  Alcohol & drug use screenings were reviewed. No concerns regarding substance use disorder identified.     Other  "Counseling Topics:   Calcium and vitamin D intake and regular weightbearing exercise.        Vision Screening    Right eye Left eye Both eyes   Without correction      With correction 20/25 20/20 20/20       Objective   /86 (BP Location: Left arm, Patient Position: Sitting, Cuff Size: Large)   Pulse 96   Temp (!) 97.4 °F (36.3 °C) (Tympanic)   Resp 16   Ht 5' 7\" (1.702 m)   Wt 109 kg (240 lb)   SpO2 97%   BMI 37.59 kg/m²     Physical Exam  Vitals and nursing note reviewed.   Constitutional:       General: He is not in acute distress.     Appearance: Normal appearance. He is not ill-appearing.   HENT:      Head: Normocephalic and atraumatic.      Right Ear: Tympanic membrane, ear canal and external ear normal.      Left Ear: Tympanic membrane, ear canal and external ear normal.      Nose: Nose normal.      Mouth/Throat:      Mouth: Mucous membranes are moist.      Pharynx: No posterior oropharyngeal erythema.   Eyes:      Conjunctiva/sclera: Conjunctivae normal.      Pupils: Pupils are equal, round, and reactive to light.   Cardiovascular:      Rate and Rhythm: Normal rate and regular rhythm.      Pulses: no weak pulses.           Dorsalis pedis pulses are 2+ on the right side and 2+ on the left side.      Heart sounds: Normal heart sounds.   Pulmonary:      Effort: Pulmonary effort is normal.      Breath sounds: Normal breath sounds.   Abdominal:      General: Bowel sounds are normal.      Palpations: Abdomen is soft.   Musculoskeletal:         General: Normal range of motion.      Cervical back: Normal range of motion.   Feet:      Right foot:      Skin integrity: No ulcer, skin breakdown, erythema, warmth, callus or dry skin.      Left foot:      Skin integrity: No ulcer, skin breakdown, erythema, warmth, callus or dry skin.   Lymphadenopathy:      Cervical: No cervical adenopathy.   Skin:     General: Skin is warm and dry.   Neurological:      Mental Status: He is alert and oriented to person, place, " and time.   Psychiatric:         Mood and Affect: Mood normal.         Behavior: Behavior normal.

## 2024-11-14 NOTE — PROGRESS NOTES
Diabetic Foot Exam    Patient's shoes and socks removed.    Right Foot/Ankle   Right Foot Inspection  Skin Exam: skin normal and skin intact. No dry skin, no warmth, no callus, no erythema, no maceration, no abnormal color, no pre-ulcer, no ulcer and no callus.     Toe Exam: ROM and strength within normal limits.     Sensory   Monofilament testing: intact    Left Foot/Ankle  Left Foot Inspection  Skin Exam: skin normal and skin intact. No dry skin, no warmth, no erythema, no maceration, normal color, no pre-ulcer, no ulcer and no callus.     Toe Exam: ROM and strength within normal limits.     Sensory   Monofilament testing: intact    Assign Risk Category  {Diabetic Foot Exam Documentation Incomplete}

## 2024-11-14 NOTE — TELEPHONE ENCOUNTER
Upon review of the In Basket request and the patient's chart, initial outreach has been made via fax to facility. Please see Contacts section for details.     Thank you  Vasu Pacheco MA

## 2024-11-14 NOTE — TELEPHONE ENCOUNTER
----- Message from Shanthi ROSALES sent at 11/14/2024 10:14 AM EST -----  Regarding: dm eye exam  11/14/24 10:26 AM    Hello, our patient Ollie Friedman has had Diabetic Eye Exam completed/performed. Please assist in updating the patient chart by making an External outreach to [place] [facility] facility located in [place]. The date of service is 2024.    Thank you,  Shanthi Jang, RN  [facility]

## 2024-11-14 NOTE — ASSESSMENT & PLAN NOTE
- Reviewed immunizations and screenings.   - UTD with dental and vision exams.   - UTD with colonoscopy.           No

## 2024-11-14 NOTE — ASSESSMENT & PLAN NOTE
- Well controlled.  - Continue Jardiance 25 mg daily and Trulicity 4.5 mg weekly.  - Continue to monitor blood glucose at home.   - Encourage low carb diet and regular exercise.  - Foot exam performed in office today.  - He completed his diabetic eye exam.   - Will continue to monitor.   Lab Results   Component Value Date    HGBA1C 6.7 (H) 11/08/2024       Orders:    Empagliflozin (Jardiance) 25 MG TABS; Take 1 tablet (25 mg total) by mouth daily    Albumin / creatinine urine ratio; Future    Comprehensive metabolic panel; Future    Hemoglobin A1C; Future    Lipid Panel with Direct LDL reflex; Future

## 2024-11-14 NOTE — ASSESSMENT & PLAN NOTE
- Fairly well controlled without medication.  - Will continue to monitor.   Orders:    CBC and differential; Future    Comprehensive metabolic panel; Future    Lipid Panel with Direct LDL reflex; Future    TSH, 3rd generation with Free T4 reflex; Future

## 2024-11-14 NOTE — ASSESSMENT & PLAN NOTE
Component      Latest Ref Rng 4/30/2024 11/8/2024   Cholesterol      See Comment mg/dL 133  124    Triglycerides      See Comment mg/dL 168 (H)  166 (H)    HDL      >=40 mg/dL 38 (L)  34 (L)    LDL Calculated      0 - 100 mg/dL 61  57      - Well controlled.  - Continue simvastatin 40 mg daily.   - Encourage low fat diet and regular exercise.  - Will continue to monitor fasting lipid panel.

## 2024-11-18 ENCOUNTER — RESULTS FOLLOW-UP (OUTPATIENT)
Dept: FAMILY MEDICINE CLINIC | Facility: CLINIC | Age: 64
End: 2024-11-18

## 2024-11-18 NOTE — TELEPHONE ENCOUNTER
----- Message from Niko Chou MD sent at 11/18/2024  6:43 AM EST -----  A1c improved to 6.7.  Triglycerides slightly elevated otherwise blood work is stable.  Continue to monitor

## 2024-11-21 NOTE — TELEPHONE ENCOUNTER
Upon review of the In Basket request we have found as a result of outreach that patient did not have the requested item(s) completed.     Any additional questions or concerns should be emailed to the Practice Liaisons via the appropriate education email address, please do not reply via In Basket.    Thank you  Vasu Pacheco MA   [facility]

## 2024-11-27 DIAGNOSIS — N20.0 RENAL STONE: ICD-10-CM

## 2024-12-03 RX ORDER — ALLOPURINOL 100 MG/1
100 TABLET ORAL DAILY
Qty: 90 TABLET | Refills: 3 | Status: SHIPPED | OUTPATIENT
Start: 2024-12-03

## 2024-12-12 ENCOUNTER — APPOINTMENT (OUTPATIENT)
Dept: URGENT CARE | Age: 64
End: 2024-12-12

## 2024-12-14 PROBLEM — Z12.5 PROSTATE CANCER SCREENING: Status: RESOLVED | Noted: 2024-11-14 | Resolved: 2024-12-14

## 2024-12-14 PROBLEM — Z00.00 HEALTHCARE MAINTENANCE: Status: RESOLVED | Noted: 2024-11-14 | Resolved: 2024-12-14

## 2025-01-17 DIAGNOSIS — E11.9 TYPE 2 DIABETES MELLITUS WITHOUT COMPLICATION, WITHOUT LONG-TERM CURRENT USE OF INSULIN (HCC): ICD-10-CM

## 2025-01-17 RX ORDER — DULAGLUTIDE 4.5 MG/.5ML
INJECTION, SOLUTION SUBCUTANEOUS
Qty: 2 ML | Refills: 5 | Status: SHIPPED | OUTPATIENT
Start: 2025-01-17

## 2025-01-21 NOTE — PROGRESS NOTES
Daily Note     Today's date: 2019  Patient name: Dariel Garcia  : 1960  MRN: [id number]  Referring provider: Marii Simeon MD  Dx:   Encounter Diagnosis     ICD-10-CM    1  Acute pain of right knee M25 561    2  Aftercare following right knee joint replacement surgery Z47 1     Z96 651                   Subjective: No new complaints  Pt reports he is feeling good today  Objective: See treatment diary below      Assessment: Tolerated treatment well  Patient demonstrated fatigue post treatment, exhibited good technique with therapeutic exercises and would benefit from continued PT      Plan: Continue per plan of care       Precautions: Right knee TKA    Daily Treatment Diary     Manual              PROM JF                                                                    Exercise Diary  5/28 5/31 6/3/19 6/6/19 6/13/19 6/17/19 6/20 6/24 6/27 7/1   SLR 3*10 7# 3*10 7# 7# 3x10 7# 3x10 7 5# 3*10 7 5# 7 5# 3*10 7 5# 3*10 7 5# 3*10 7 5#  3x10   Sidelying hip abd 3*10 3*10 3x10 1# 3x10 1# 3x10 1# 3x10 2 5 3*10 2 5# 3*10 2 5# 3*10 2 5#  3x10   Concetric/eccentric LAQ PT resist 2*10 b/l 2*10 b/l 2x10 b/l 2x10 b/l 3/10 3x10 3x10 3*10 3*10 3x10 b/l   Foot taps 2*10 Frw + sideways 2*10 Frw + sideways 2x10 frwd/sideways 2x10 ea 2*10 ea 2x10 ea 3*10 ea 3*10 ea 3*10 ea 3x10 ea   Leg press  3*10 b/l + SLS 3*10 b/l + SLS 3x10 b/l/SLS B 125-155#/  SLS 65# 3x10 ea B 125-155#/  SLS 75-95# 3x10 ea [id number]# 3x10; 65#-85# 3x10  [id number]# 3x10; 65#-85# 3x10  [id number]# 3x10; 65#-85# 3x1 [id number]# 3x10; 65#-85# 3x1 B 125,155,  200# 3x10  SL 65,75,85#  3x10   Monster walks + lateral   3 laps gtb gtb  3 laps ea gtb 3 laps ea gtb 3 laps ea gtb 3 laps ea gtb 3 laps ea gtb 3 laps ea gtb 3 laps ea Blue TB  3 laps ea   TKE  40# 3*10 40# 3x10x5" 40# 3x10x5" 40# 3x10x5" 40# 3x10x5" 40# 3x10x5" 40# 3x10x5" 40# 3x10x5" 40#  3x10 5"   Bosu Squats  3*10 3x10 3x10 3*10 3x10 3*10 3*10 3*10 3x10   LAQ    blk 3x10 blk ED Attending Physician Note      Patient : Kenna Austin Age: 89 year old Sex: female   MRN: [id number] Encounter Date: 1/21/2025    History of Present Illness   Time Seen: 1:32 PM    Chief Complaint   Patient presents with    Altered Mental Status       History: Kenna Austin is a 89 year old female with pmh of dementia presents for evaluation of altered mentation.  Collateral obtained from prehospital EMS/SNF report.  Nursing home reports that today she had reportedly slurred speech and behaving abnormally.  Abnormal behavior began this morning, was aggressive.  No fever.  No fall or trauma.  No new medication or medication changes.  Was not eating her breakfast. Reportedly at her baseline which is aox1.    PMD: Pcp, No  EM Caveat: unable to obtain 2/2 dementia    Physical Exam     Vitals:    01/21/25 1355 01/21/25 1508   BP: 136/83 (!) 163/98   Pulse: 76 89   Resp: 16 16   Temp: 99.1 °F (37.3 °C)    TempSrc: Rectal    SpO2: 99% 100%      Physical Exam  General: Well-appearing in no acute distress.  Head: Atraumatic. Normocephalic.  No scalp laceration or skull hematoma.  Eyes and ENT: PERRL bilaterally. EOM's intact. Moist mucous membranes, oropharynx without erythema or exudate.  No Armijo sign or raccoon eyes.  No midface tenderness to palpation or jaw malocclusion.  Cardiovascular: Regular rate and rhythm. Brisk capillary refill. No lower extremity pitting edema.  Respiratory: Speaking full sentences without accessory muscle use. Clear, vesicular breath sounds bilaterally with no focal findings.  Abdominal: Soft, non-distended. No reproducible abdominal tenderness. No rigidity, guarding, or rebound. No palpable masses. No peritoneal signs.  Musculoskeletal: No bony tenderness to palpation.   Skin:  Warm, dry.  Neurological: Alert and oriented x1 (reportedly baseline).  Agitated though redirectable, yelling expletives though redirectable flailing all extremities without focal deficit.  No aphasia.  No dysarthria.  No  3x10 blk 3x10 blk 3x10      STS SLS        2*10 b/l 3*10 b/l 3x10 b/l   Upright bike          7'                                                                                                                            Modalities pronator drift.  No neglect.  Psychiatric: As above.    Medical Decision Making     Elderly female with history as above presents for evaluation of altered mentation.  Started this morning, LKN likely sometime last night.  Hemodynamically stable nontoxic-appearing with exam as above.  Looks dry.  No external evidence of trauma or head injury, no focal deficit on neurologic exam.  Clinically, less suspicious for CVA or acute intracranial pathology and out of window for thrombolytics or intervention.  Concern is more so for electrolyte or metabolic derangement, questionable infectious etiology, plan for labs, urine, imaging including chest x-ray and CT head, disposition.    Workup reviewed.  Urine traumatic catheterization, blood and leuks likely secondary to this with questionable signs of infection.  Added on CT abdomen without contrast to evaluate for urolithiasis.  Leukocytosis, no bandemia, fever or focal infectious signs or symptoms may be stress to margination.  TERRANCE likely prerenal getting fluids, bladder was completely emptied on straight catheterization, so likely not postobstructive.  Hypernatremic to 159 from 139 a few years ago, suspect likely chronic and hypovolemic already received 1 L IV fluids.  Remainder of workup no acute findings.  Imaging personally visualized.  Chest x-ray no pneumothorax lobar infiltrate or other acute findings.  CT with volume loss and questionable normal pressure hydrocephalus?  CT with questionable findings that may be related to early cholecystitis.  Distended gallbladder with gallstones and CBD dilation.  No other acute findings.    Given empiric antibiotics.  Discussed closely with an updated son via telephone, Jeffrey Austin at [phone].  Jeffrey says that he is her designated decision maker and I asked him what mom would want in a life-threatening emergency as she has dementia.  He says that she is DNR/DNI would not want any emergent or life-sustaining interventions, he  thinks she would be okay with IV antibiotics and fluids.  He is not sure with regards to surgery but believes she may not want it.  I related her current condition including altered mentation, abnormal electrolytes and questionable infectious process in the urine and gallbladder. Related to him that mom is ill and I would recommend he/family come see her, he understands, says he has another sibling but they live in [place].    Discussed closely with multidisciplinary team, neurosurgical team for Dr. Roberts, neurology Dr. Fernandez, surgery Dr. Vallejo, and nephrology, Dr. Cui.     Dr. Vallejo came to the bedside in the ER and evaluated the patient.  Does not recommend emergent surgical intervention and they will obtain a HIDA scan.  Agrees with plan as above.    Neurosurgical team recommends neurology evaluation.  Dr. Fernandez reviewed workup and believes her CT changes are more likely related to volume loss and does not believe NPH is at play, recommends treating her medical issues as above and no further treatment from neurology perspective. He does not recommend neurosurgical evaluation. Updated neurosurgery, agreeable.    D/w Dr. Cui, agrees with plan as above, no additional recommendations at this time. No seizure like activity or focal deficit. Serially re-evaluated. HDS. At her baseline mentation with regards to her dementia.    Discussed closely with hospitalist, Dr. Bryson, who recommends inpatient admission. Care transitioned.    ED Course     ED Course as of 01/21/25 1654   Tue Jan 21, 2025   1529 EKG normal sinus rhythm at 90 with LVH pattern narrow QRS left axis deviation, no ST elevation QTc 450 otherwise normal intervals.  Significant motion artifact at baseline. Unchanged compared to EKG from 10/31/21. [TA]      ED Course User Index  [TA] Chloe Reynoso MD       ED Events     ED Medication Orders (From admission, onward)      Ordered Start     Status Ordering Provider    01/21/25 1605 01/21/25 1615   piperacillin-tazobactam (ZOSYN) 4.5 g in sodium chloride 0.9 % 100 mL IVPB  ONCE         Last MAR action: TOMEKA Cho          CRITICAL CARE:    The high probability of sudden, clinically significant deterioration in the patient's condition required the highest level of my preparedness to intervene urgently.    The services I provided to this patient were to treat and/or prevent clinically significant deterioration that could result in: hemodynamic instability, respiratory failure.  Services included the following: chart data review, reviewing nursing notes and/or old charts, documentation time, consultant collaboration regarding findings and treatment options, medication orders and management, direct patient care, re-evaluations, vital sign assessments and ordering, interpreting and reviewing diagnostic studies/lab tests.    Aggregate critical care time was 35 minutes, which includes only time during which I was engaged in work directly related to the patient's care, as described above, whether at the bedside or elsewhere in the Emergency Department.  It did not include time spent performing other reported procedures or the services of residents, students, nurses or physician assistants.    Sepsis Documentation      1) Severe Sepsis Identified?No, not suspected due to bacterial illness  Other time: 1505    2) Is Lactate >/= 4 or Hypotension (SBP<90, MAP<65 x2 in 3hrs) Present? No    3) Documentation Caveats:    General Exclusions (Check any that apply): No bacterial etiology suspected to cause sepsis syndrome  Abnormal values:  Doubt bacterial infection as primary cause of: Leukocytosis, suspected due to Leukocytosis due to stress response and Creatinine > 2.0, suspected due to Dehydration due to decreased oral intake      Social determinant of health affecting care: as above    Patient's plan of care - including HPI, physical examination findings, workup, diagnosis, differential diagnosis, and treatment  plan - was discussed with the patient and any family members who were available for discussion. If the patient is being discharged home, it was recommended they closely follow up with their primary care physician in the next 24 to 48 hours and return immediately to the ER if there are any new, worsening, or concerning symptoms. If the patient is being admitted or transferred, this was also discussed. All further questions were answered at this time. Patient and any available family members verbalized understanding and agreement with the plan.    In addition to history obtained from the patient, I have obtained additional information from available independent historians such as family members and/or visitors.  In addition, I have reviewed prior external records from each unique source, EMS records and any documents or data that accompanied the patient to the emergency department today.  In consideration of this patient’s care, I have considered the need for laboratory or imaging exams, the possibility of hospitalization as well any prescription medications such as antibiotics and pain medication.     I have discussed the patient’s care with other providers including but not limited to PharmD, PA, ERT, RN and MDs when appropriate.     Procedures   Procedures    Diagnosis and Disposition   Clinical Impression:   ED Diagnosis   1. Normal pressure hydrocephalus  (CMD)        2. Acute cholecystitis        3. Hypernatremia        4. Acute kidney injury (CMD)            Current Discharge Medication List        Prior to Admission Medications    Details   amLODIPine (NORVASC) 5 MG tablet       Multiple Vitamin (Multi-Vitamins) Tab Take 1 tablet by mouth daily.      omeprazole (PrilOSEC) 20 MG capsule       QUEtiapine (SEROquel) 25 MG tablet       QUEtiapine (SEROquel) 50 MG tablet       sertraline (ZOLOFT) 100 MG tablet       ACETAMINOPHEN PO       ASPIRIN PO       Docusate Sodium (COLACE PO)       FERROUS SULFATE PO        LORATADINE PO       MEMANTINE HCL PO            Current Discharge Medication List        Admit 1/21/2025  4:42 PM  Telemetry Bed?: Yes  Admitting Physician: EVA VALLADARES [[id number]]  Is this a telephone or verbal order?: This is a telephone order from the admitting physician    Additional Documentation, Lab & Radiology Results     Past Medical History:   Diagnosis Date    Arthritis     Constipation     Essential (primary) hypertension     Osteoporosis     Uncomplicated senile dementia (CMS/HCC)        No past surgical history on file.    No family history on file.         No Known Allergies    Results for orders placed or performed during the hospital encounter of 01/21/25   Comprehensive Metabolic Panel    Specimen: Blood, Venous   Result Value Ref Range    Fasting Status      Sodium 159 (H) 135 - 145 mmol/L    Potassium 4.6 3.4 - 5.1 mmol/L    Chloride 131 (H) 97 - 110 mmol/L    Carbon Dioxide 26 21 - 32 mmol/L    Anion Gap 7 7 - 19 mmol/L    Glucose 133 (H) 70 - 99 mg/dL    BUN 75 (H) 6 - 20 mg/dL    Creatinine 2.32 (H) 0.51 - 0.95 mg/dL    Glomerular Filtration Rate 19 (L) >=60    BUN/Cr 32 (H) 7 - 25    Calcium 9.7 8.4 - 10.2 mg/dL    Bilirubin, Total 0.3 0.2 - 1.0 mg/dL    GOT/AST 25 <=37 Units/L    GPT/ALT 19 <64 Units/L    Alkaline Phosphatase 106 45 - 117 Units/L    Albumin 3.6 3.4 - 5.0 g/dL    Protein, Total 9.3 (H) 6.4 - 8.2 g/dL    Globulin 5.7 (H) 2.0 - 4.0 g/dL    A/G Ratio 0.6 (L) 1.0 - 2.4   TROPONIN I, HIGH SENSITIVITY    Specimen: Blood, Venous   Result Value Ref Range    Troponin I, High Sensitivity 45 <52 ng/L   Urinalysis & Reflex Microscopy With Culture If Indicated    Specimen: Urine, Catheterized - Straight   Result Value Ref Range    COLOR, URINALYSIS Yellow     APPEARANCE, URINALYSIS Turbid     GLUCOSE, URINALYSIS Negative Negative mg/dL    BILIRUBIN, URINALYSIS Negative Negative    KETONES, URINALYSIS Negative Negative mg/dL    SPECIFIC GRAVITY, URINALYSIS 1.018 1.005 - 1.030     OCCULT BLOOD, URINALYSIS Small (A) Negative    PH, URINALYSIS 7.5 (H) 5.0 - 7.0    PROTEIN, URINALYSIS 300 (A) Negative mg/dL    UROBILINOGEN, URINALYSIS 0.2 0.2, 1.0 mg/dL    NITRITE, URINALYSIS Negative Negative    LEUKOCYTE ESTERASE, URINALYSIS Large (A) Negative    SQUAMOUS EPITHELIAL, URINALYSIS 1 to 5 None Seen, 1 to 5 /hpf    ERYTHROCYTES, URINALYSIS 26 to 100 (A) None Seen, 1 to 2 /hpf    LEUKOCYTES, URINALYSIS >100 (A) None Seen, 1 to 5 /hpf    BACTERIA, URINALYSIS Large (A) None Seen /hpf    HYALINE CASTS, URINALYSIS None Seen None Seen, 1 to 5 /lpf   COVID/Flu/RSV panel    Specimen: Nasal, Mid-turbinate; Swab   Result Value Ref Range    Rapid SARS-COV-2 by PCR Not Detected Not Detected / Detected / Presumptive Positive / Inhibitors present    Influenza A by PCR Not Detected Not Detected    Influenza B by PCR Not Detected Not Detected    RSV BY PCR Not Detected Not Detected    Isolation Guidelines      Procedural Comment     CBC with Automated Differential (performable only)    Specimen: Blood, Venous   Result Value Ref Range    WBC 15.4 (H) 4.2 - 11.0 K/mcL    RBC 4.65 4.00 - 5.20 mil/mcL    HGB 13.4 12.0 - 15.5 g/dL    HCT 44.1 36.0 - 46.5 %    MCV 94.8 78.0 - 100.0 fl    MCH 28.8 26.0 - 34.0 pg    MCHC 30.4 (L) 32.0 - 36.5 g/dL    RDW-CV 15.2 (H) 11.0 - 15.0 %    RDW-SD 53.3 (H) 39.0 - 50.0 fL     140 - 450 K/mcL    NRBC 0 <=0 /100 WBC    Neutrophil, Percent 85 %    Lymphocytes, Percent 8 %    Mono, Percent 6 %    Eosinophils, Percent 0 %    Basophils, Percent 0 %    Immature Granulocytes 1 %    Absolute Neutrophils 13.1 (H) 1.8 - 7.7 K/mcL    Absolute Lymphocytes 1.3 1.0 - 4.0 K/mcL    Absolute Monocytes 0.9 0.3 - 0.9 K/mcL    Absolute Eosinophils  0.1 0.0 - 0.5 K/mcL    Absolute Basophils 0.1 0.0 - 0.3 K/mcL    Absolute Immature Granulocytes 0.1 0.0 - 0.2 K/mcL       Imaging Results              CT HEAD WO CONTRAST (Final result)  Result time 01/21/25 15:50:56      Final result                    Impression:    1. No acute intracranial process.  2. Cerebral atrophy and chronic microvascular ischemic changes.  3. Ventriculomegaly slightly disproportionate to the degree of sulcal  atrophy, raising possibility of communicating hydrocephalus (NPH).  Correlate with exam and clinical history.  4. If clinical concern for acute ischemia recommend MRI.          Electronically Signed by: DARYN JUAREZ MD   Signed on: 1/21/2025 3:50 PM   Workstation ID: [id number]               Narrative:    EXAM: CT HEAD WO CONTRAST     HISTORY: Altered mental status.     COMPARISON: 10/31/2019.    TECHNIQUE: CT head was obtained from foramen magnum to vertex without IV  contrast. Axial, coronal, and sagittal reformatted images obtained.  Automated exposure control and/or iterative reconstructive techniques were  utilized as radiation dose reduction strategies on this patient.     FINDINGS:         Cerebrum: Confluent hypoattenuation in the subcortical and periventricular  white matter is noted. Age-related cerebral atrophy. No CT evidence of  acute cortical infarct. No intracranial hemorrhage, mass, or midline shift.  Ventriculomegaly slightly disproportionate to the degree of sulcal atrophy,  slightly progressed from 2019. Transverse diameter of the occipital horns  measures 7.3 cm,.  Cerebellum: No edema, hemorrhage, mass, acute infarction, or inappropriate  atrophy.  Brainstem: No edema, hemorrhage, mass, acute infarction, or inappropriate  atrophy.  CSF Spaces: As above. No intraventricular hemorrhage or mass.  Skull: No bony fracture. No mass or other significant visible lesion.   Sinuses: Visualized paranasal sinuses are grossly well aerated without  aggressive features or air-fluid levels. S-shaped curvature of the nasal  septum.  Orbits: Bilateral cataract intervention. Sinus and ocular calcifications.  Other: Atherosclerotic calcifications at the skull base. Streak artifact  from dental hardware limits regional  assessment.                                        CT ABDOMEN PELVIS FOR KIDNEY STONES WO CONTRAST (Final result)  Result time 01/21/25 15:57:27      Final result                   Impression:    1. Cholelithiasis with mild gallbladder distention and minimal wall  thickening. Note dilated proximal CBD with distal tapering. Findings could  reflect early cholecystitis in the appropriate clinical setting, with  radiolucent CBD stone not excluded given CBD dilatation. Consider  ultrasound to elucidate.  2. No bowel obstruction or ileus. Normal appendix.  3. Uncomplicated diverticulosis.  4. Nonobstructive right nephrolithiasis.  5. Age-indeterminate T12 wedge deformity.                     Electronically Signed by: DARYN JUAREZ MD   Signed on: 1/21/2025 3:57 PM   Workstation ID: [id number]               Narrative:    EXAM: CT ABDOMEN PELVIS FOR KIDNEY STONES WO CONTRAST    HISTORY: Altered mental status.    COMPARISON: None.    TECHNIQUE: CT abdomen and pelvis was obtained without IV contrast.  Axial,  sagittal, and coronal reformats were provided. Automated exposure control  and/or iterative reconstructive techniques were utilized as radiation dose  reduction strategies on this patient.    FINDINGS:    Examination is limited without IV contrast. Within confines the following  observations are made.    Lower chest: Bibasilar atelectasis/scarring. Coronary artery  calcifications.  Hepatobiliary: Liver is normal in size and morphology without focal hepatic  lesion. Gallbladder is minimally distended noting cholelithiasis. Mild  dilatation of the proximal CBD measuring approximately 1.1 cm. Distal CBD  is decompressed.  Pancreas: Homogeneous without mass, ductal dilatation, or regional  inflammation.  Spleen: Unremarkable.  Kidneys/Bladder: Symmetric without hydronephrosis, mass, or perinephric  stranding. Tiny nonobstructing right renal calculi. Tiny subcentimeter  hypodensities presumably representing cysts,  with others too small to  characterize. No solid mass.  Adrenals: Mild adreniform thickening measuring up to 7 mm on the left and 8  mm on the right.  Lymph nodes: No bulky abdominal or pelvic lymphadenopathy.  Retroperitoneum: No retroperitoneal hemorrhage.  Vessels: Severe aortic and branch vessel atherosclerotic calcification  without aneurysm or periaortic stranding.  Stomach/Bowel/Peritoneal cavity:  Limited without oral or IV contrast. Within confines no pathologic  dilatation of stomach, small bowel, or colon to suggest obstruction. Normal  appendix (axial image 95). Extensive diverticulosis without CT evidence of  acute diverticulitis. Rectosigmoid fecal impaction.  Pelvic organs: Uterus is present. Assessment of the adnexa is limited due  to streak artifact from bilateral hip prostheses. No visible pelvic or  adnexal mass within limitations.  Abdominal wall: No abdominal wall mass. Supraumbilical hernia containing  fat, with a defect measuring 2.2 cm transverse. Tiny fat-containing  umbilical hernia.  Bones: Bilateral hip arthroplasty with resultant regional streak artifact.  Dextroscoliosis of the lumbar spine. No lytic or destructive osseous  lesion. Degenerative changes of the lumbar spine. Age-indeterminate T12  wedge deformity.                                       XR CHEST AP OR PA (Final result)  Result time 01/21/25 14:14:34      Final result                   Impression:    As above.    Electronically Signed by: FLACO REED M.D.   Signed on: 1/21/2025 2:14 PM   Workstation ID: [id number]               Narrative:    Single view of the chest:    HISTORY: Mental status change    COMPARISON: 10/31/2021    FINDINGS:  Normal heart size. No infiltrate. No edema or pleural effusion.    Atelectasis, scarring at the lower lobes left more than right side.    Significant glenohumeral joint space narrowing.                                      _________________  Chloe Reynoso MD  [facility]  [facility]  1/21/2025 4:54 PM     Chloe Reynoso MD  01/21/25 1127       Chloe Reynoso MD  01/21/25 1704

## 2025-02-21 NOTE — PROGRESS NOTES
Assessment/Plan:  Type 2 diabetes mellitus without complication, without long-term current use of insulin (HCC)    Lab Results   Component Value Date    HGBA1C 6 9 (H) 12/16/2020     Well controlled  Discussed about low carb diet and regular exercise  Continue same medications for now  It was discussed about possibly starting him on Ozempic  Healthcare maintenance    It was discussed about immunizations, diet, exercise and safety measures  Diagnoses and all orders for this visit:    Healthcare maintenance    Type 2 diabetes mellitus without complication, without long-term current use of insulin (HCC)  -     CBC and differential; Future  -     Comprehensive metabolic panel; Future  -     Hemoglobin A1C; Future  -     Lipid Panel with Direct LDL reflex; Future  -     TSH, 3rd generation with Free T4 reflex; Future  -     Microalbumin / creatinine urine ratio    Hyperlipidemia, unspecified hyperlipidemia type    Prostate cancer screening  -     PSA, Total Screen; Future    Renal stone  -     Uric acid; Future    Encounter for colorectal cancer screening  -     Ambulatory referral to General Surgery; Future    Encounter for immunization  -     Zoster Vaccine Recombinant IM    Morbid obesity with BMI of 40 0-44 9, adult ([facility] )          There are no Patient Instructions on file for this visit  Return in about 3 months (around 12/3/2021)  Subjective:      Patient ID: Ryan Saini is a 57 y o  male  Chief Complaint   Patient presents with    Diabetes    Hyperlipidemia       He is here today for wellness exam   He has been taking his medications  He denies any side effects from his medications  He is due for blood work  He denies any complaint today        The following portions of the patient's history were reviewed and updated as appropriate: allergies, current medications, past family history, past medical history, past social history, past surgical history and problem list     Review of Systems The patient has been examined and the H&P has been reviewed:    I concur with the findings and no changes have occurred since H&P was written.    Surgery risks, benefits and alternative options discussed and understood by patient/family.      Of surgery was discussed with the patient clinic.  Infection, bleeding, hematoma, seroma, bowel injury, mesh infection, and recurrence    There are no hospital problems to display for this patient.     Constitutional: Negative for chills and fever  HENT: Negative for trouble swallowing  Eyes: Negative for visual disturbance  Respiratory: Negative for cough and shortness of breath  Cardiovascular: Negative for chest pain and palpitations  Gastrointestinal: Negative for abdominal pain, blood in stool and vomiting  Endocrine: Negative for cold intolerance and heat intolerance  Genitourinary: Negative for difficulty urinating and dysuria  Musculoskeletal: Negative for gait problem  Skin: Negative for rash  Neurological: Negative for dizziness, syncope and headaches  Hematological: Negative for adenopathy  Psychiatric/Behavioral: Negative for behavioral problems  Current Outpatient Medications   Medication Sig Dispense Refill    allopurinol (ZYLOPRIM) 100 mg tablet TAKE 1 TABLET BY MOUTH EVERY DAY 90 tablet 3    ascorbic acid (VITAMIN C) 500 MG tablet Take 1 tablet (500 mg total) by mouth daily 60 tablet 0    cetirizine (ZyrTEC) 10 mg tablet Take 10 mg by mouth daily      metFORMIN (GLUCOPHAGE-XR) 500 mg 24 hr tablet TAKE 1 TABLET BY MOUTH EVERY DAY WITH BREAKFAST 90 tablet 1    Multiple Vitamin (MULTIVITAMIN) tablet Take 2 tablets by mouth daily      Omega-3 Fatty Acids (FISH OIL PO) Take 1 capsule by mouth daily      simvastatin (ZOCOR) 40 mg tablet TAKE 1 TABLET BY MOUTH EVERYDAY AT BEDTIME 90 tablet 0    acetaminophen (TYLENOL) 500 mg tablet Take 500-1,000 mg by mouth every 6 (six) hours as needed for mild pain      fluticasone (FLONASE) 50 mcg/act nasal spray 2 sprays into each nostril daily (Patient not taking: Reported on 2/11/2021) 16 g 0    ibuprofen (MOTRIN) 200 mg tablet Take 200-800 mg by mouth every 6 (six) hours as needed for mild pain      oxyCODONE (Roxicodone) 5 mg immediate release tablet Take 1-2 tablets every 6 hours prn (Patient not taking: Reported on 11/11/2020) 8 tablet 0     No current facility-administered medications for this visit  Objective:    /84 (BP Location: Left arm, Patient Position: Sitting, Cuff Size: Large)   Pulse 76   Temp (!) 97 3 °F (36 3 °C) (Tympanic)   Resp 16   Ht 5' 7" (1 702 m)   Wt 117 kg (258 lb)   SpO2 94%   BMI 40 41 kg/m²        Physical Exam  Vitals and nursing note reviewed  Constitutional:       Appearance: He is well-developed  HENT:      Head: Normocephalic and atraumatic  Eyes:      Pupils: Pupils are equal, round, and reactive to light  Cardiovascular:      Rate and Rhythm: Normal rate and regular rhythm  Heart sounds: Normal heart sounds  Pulmonary:      Effort: Pulmonary effort is normal       Breath sounds: Normal breath sounds  Abdominal:      General: Bowel sounds are normal       Palpations: Abdomen is soft  Musculoskeletal:         General: Normal range of motion  Cervical back: Normal range of motion and neck supple  Lymphadenopathy:      Cervical: No cervical adenopathy  Skin:     General: Skin is warm  Findings: No rash  Neurological:      Mental Status: He is alert and oriented to person, place, and time  Cranial Nerves: No cranial nerve deficit  Sanjay Sampson MD BMI Counseling: Body mass index is 40 41 kg/m²  The BMI is above normal  Nutrition recommendations include reducing portion sizes, decreasing overall calorie intake and 3-5 servings of fruits/vegetables daily  Exercise recommendations include moderate aerobic physical activity for 150 minutes/week

## 2025-03-25 DIAGNOSIS — E78.5 HYPERLIPIDEMIA, UNSPECIFIED HYPERLIPIDEMIA TYPE: ICD-10-CM

## 2025-03-26 RX ORDER — SIMVASTATIN 40 MG
40 TABLET ORAL
Qty: 90 TABLET | Refills: 1 | Status: SHIPPED | OUTPATIENT
Start: 2025-03-26

## 2025-04-21 DIAGNOSIS — E11.9 TYPE 2 DIABETES MELLITUS WITHOUT COMPLICATION, WITHOUT LONG-TERM CURRENT USE OF INSULIN (HCC): ICD-10-CM

## 2025-04-21 RX ORDER — EMPAGLIFLOZIN 25 MG/1
25 TABLET, FILM COATED ORAL DAILY
Qty: 90 TABLET | Refills: 1 | Status: SHIPPED | OUTPATIENT
Start: 2025-04-21

## 2025-04-25 ENCOUNTER — APPOINTMENT (OUTPATIENT)
Dept: LAB | Facility: IMAGING CENTER | Age: 65
End: 2025-04-25
Payer: COMMERCIAL

## 2025-04-25 DIAGNOSIS — Z12.5 PROSTATE CANCER SCREENING: ICD-10-CM

## 2025-04-25 DIAGNOSIS — E11.9 TYPE 2 DIABETES MELLITUS WITHOUT COMPLICATION, WITHOUT LONG-TERM CURRENT USE OF INSULIN (HCC): ICD-10-CM

## 2025-04-25 DIAGNOSIS — I10 ESSENTIAL HYPERTENSION: ICD-10-CM

## 2025-04-25 LAB
ALBUMIN SERPL BCG-MCNC: 4.3 G/DL (ref 3.5–5)
ALP SERPL-CCNC: 42 U/L (ref 34–104)
ALT SERPL W P-5'-P-CCNC: 26 U/L (ref 7–52)
ANION GAP SERPL CALCULATED.3IONS-SCNC: 9 MMOL/L (ref 4–13)
AST SERPL W P-5'-P-CCNC: 22 U/L (ref 13–39)
BASOPHILS # BLD AUTO: 0.08 THOUSANDS/ÂΜL (ref 0–0.1)
BASOPHILS NFR BLD AUTO: 1 % (ref 0–1)
BILIRUB SERPL-MCNC: 1.01 MG/DL (ref 0.2–1)
BUN SERPL-MCNC: 14 MG/DL (ref 5–25)
CALCIUM SERPL-MCNC: 9.2 MG/DL (ref 8.4–10.2)
CHLORIDE SERPL-SCNC: 107 MMOL/L (ref 96–108)
CHOLEST SERPL-MCNC: 130 MG/DL (ref ?–200)
CO2 SERPL-SCNC: 26 MMOL/L (ref 21–32)
CREAT SERPL-MCNC: 0.85 MG/DL (ref 0.6–1.3)
CREAT UR-MCNC: 123.2 MG/DL
EOSINOPHIL # BLD AUTO: 0.42 THOUSAND/ÂΜL (ref 0–0.61)
EOSINOPHIL NFR BLD AUTO: 6 % (ref 0–6)
ERYTHROCYTE [DISTWIDTH] IN BLOOD BY AUTOMATED COUNT: 13.2 % (ref 11.6–15.1)
EST. AVERAGE GLUCOSE BLD GHB EST-MCNC: 154 MG/DL
GFR SERPL CREATININE-BSD FRML MDRD: 91 ML/MIN/1.73SQ M
GLUCOSE P FAST SERPL-MCNC: 111 MG/DL (ref 65–99)
HBA1C MFR BLD: 7 %
HCT VFR BLD AUTO: 49.2 % (ref 36.5–49.3)
HDLC SERPL-MCNC: 35 MG/DL
HGB BLD-MCNC: 16.3 G/DL (ref 12–17)
IMM GRANULOCYTES # BLD AUTO: 0.02 THOUSAND/UL (ref 0–0.2)
IMM GRANULOCYTES NFR BLD AUTO: 0 % (ref 0–2)
LDLC SERPL CALC-MCNC: 63 MG/DL (ref 0–100)
LYMPHOCYTES # BLD AUTO: 2.17 THOUSANDS/ÂΜL (ref 0.6–4.47)
LYMPHOCYTES NFR BLD AUTO: 33 % (ref 14–44)
MCH RBC QN AUTO: 29.1 PG (ref 26.8–34.3)
MCHC RBC AUTO-ENTMCNC: 33.1 G/DL (ref 31.4–37.4)
MCV RBC AUTO: 88 FL (ref 82–98)
MICROALBUMIN UR-MCNC: <7 MG/L
MONOCYTES # BLD AUTO: 0.57 THOUSAND/ÂΜL (ref 0.17–1.22)
MONOCYTES NFR BLD AUTO: 9 % (ref 4–12)
NEUTROPHILS # BLD AUTO: 3.39 THOUSANDS/ÂΜL (ref 1.85–7.62)
NEUTS SEG NFR BLD AUTO: 51 % (ref 43–75)
NRBC BLD AUTO-RTO: 0 /100 WBCS
PLATELET # BLD AUTO: 239 THOUSANDS/UL (ref 149–390)
PMV BLD AUTO: 11.8 FL (ref 8.9–12.7)
POTASSIUM SERPL-SCNC: 4.3 MMOL/L (ref 3.5–5.3)
PROT SERPL-MCNC: 7.2 G/DL (ref 6.4–8.4)
PSA SERPL-MCNC: 0.61 NG/ML (ref 0–4)
RBC # BLD AUTO: 5.61 MILLION/UL (ref 3.88–5.62)
SODIUM SERPL-SCNC: 142 MMOL/L (ref 135–147)
TRIGL SERPL-MCNC: 159 MG/DL (ref ?–150)
TSH SERPL DL<=0.05 MIU/L-ACNC: 0.92 UIU/ML (ref 0.45–4.5)
WBC # BLD AUTO: 6.65 THOUSAND/UL (ref 4.31–10.16)

## 2025-04-25 PROCEDURE — 84443 ASSAY THYROID STIM HORMONE: CPT

## 2025-04-25 PROCEDURE — 82043 UR ALBUMIN QUANTITATIVE: CPT

## 2025-04-25 PROCEDURE — 80061 LIPID PANEL: CPT

## 2025-04-25 PROCEDURE — 36415 COLL VENOUS BLD VENIPUNCTURE: CPT

## 2025-04-25 PROCEDURE — 82570 ASSAY OF URINE CREATININE: CPT

## 2025-04-25 PROCEDURE — 80053 COMPREHEN METABOLIC PANEL: CPT

## 2025-04-25 PROCEDURE — G0103 PSA SCREENING: HCPCS

## 2025-04-25 PROCEDURE — 85025 COMPLETE CBC W/AUTO DIFF WBC: CPT

## 2025-04-25 PROCEDURE — 83036 HEMOGLOBIN GLYCOSYLATED A1C: CPT

## 2025-04-29 ENCOUNTER — OFFICE VISIT (OUTPATIENT)
Dept: FAMILY MEDICINE CLINIC | Facility: CLINIC | Age: 65
End: 2025-04-29
Payer: COMMERCIAL

## 2025-04-29 VITALS
TEMPERATURE: 96.1 F | SYSTOLIC BLOOD PRESSURE: 142 MMHG | HEART RATE: 94 BPM | BODY MASS INDEX: 37.48 KG/M2 | HEIGHT: 67 IN | WEIGHT: 238.8 LBS | OXYGEN SATURATION: 98 % | RESPIRATION RATE: 16 BRPM | DIASTOLIC BLOOD PRESSURE: 92 MMHG

## 2025-04-29 DIAGNOSIS — I10 ESSENTIAL HYPERTENSION: ICD-10-CM

## 2025-04-29 DIAGNOSIS — E78.49 OTHER HYPERLIPIDEMIA: ICD-10-CM

## 2025-04-29 DIAGNOSIS — E11.9 TYPE 2 DIABETES MELLITUS WITHOUT COMPLICATION, WITHOUT LONG-TERM CURRENT USE OF INSULIN (HCC): Primary | ICD-10-CM

## 2025-04-29 DIAGNOSIS — E66.01 SEVERE OBESITY (BMI 35.0-39.9) WITH COMORBIDITY (HCC): ICD-10-CM

## 2025-04-29 PROCEDURE — 99214 OFFICE O/P EST MOD 30 MIN: CPT | Performed by: FAMILY MEDICINE

## 2025-04-29 PROCEDURE — G2211 COMPLEX E/M VISIT ADD ON: HCPCS | Performed by: FAMILY MEDICINE

## 2025-04-29 RX ORDER — LISINOPRIL 10 MG/1
10 TABLET ORAL DAILY
Qty: 90 TABLET | Refills: 1 | Status: SHIPPED | OUTPATIENT
Start: 2025-04-29

## 2025-04-29 RX ORDER — TIRZEPATIDE 5 MG/.5ML
5 INJECTION, SOLUTION SUBCUTANEOUS WEEKLY
Qty: 2 ML | Refills: 2 | Status: SHIPPED | OUTPATIENT
Start: 2025-04-29

## 2025-04-29 NOTE — PROGRESS NOTES
Name: Ollie Friedman      : 1960      MRN: [id number]  Encounter Provider: Niko Chou MD  Encounter Date: 2025   Encounter department: [facility]  :  Assessment & Plan  Type 2 diabetes mellitus without complication, without long-term current use of insulin (HCC)    Lab Results   Component Value Date    HGBA1C 7.0 (H) 2025   - Inadequately controlled.   - Discussed low carb diet and exercise.   - Initiate tirzepatide (Mounjaro) 5 mg / 0.5 mL injections qweekly   - Continue Jardiance 25 mg PO daily and   - Discontinue Trulicity 4.4 mg/0.5 mL injections.     Orders:  •  Tirzepatide (Mounjaro) 5 MG/0.5ML SOAJ; Inject 5 mg under the skin once a week  •  Hemoglobin A1C; Future  •  Comprehensive metabolic panel; Future  •  CBC and differential; Future  •  Lipid Panel with Direct LDL reflex; Future  •  TSH, 3rd generation with Free T4 reflex; Future    Essential hypertension  - Inadequately controlled. BP elevated in office 138/92. Remained elevated upon repeat measurements.  - Initiate lisinopril 10 mg PO daily  - Discussed possible side effects.   Orders:  •  lisinopril (ZESTRIL) 10 mg tablet; Take 1 tablet (10 mg total) by mouth daily    Other hyperlipidemia  - Continue simvastatin 40 mg PO daily        Severe obesity (BMI 35.0-39.9) with comorbidity (HCC)    - Initiate Mounjaro 5 mg /0.5 mL injection qweekly.            History of Present Illness   Elie Friedman is a 63 y/o male with a PMH of Type 2 DM, obesity, HLD, HTN,  and is presenting for follow up for multiple medical problems. He has been tolerating all medications well and denies any adverse side effects. He would like to discuss possibly switching from Trulicity to Mounjaro. Blood pressure in the office today was 138/92. BP retaken multiple times in office with and without pt shirt on and the reading remained the same. Recent labs reveal A1c increased to 7.0, HDL of 35 and TGs 159. No additional complaints  "today.        Review of Systems   Constitutional:  Negative for chills, fatigue and fever.   HENT:  Positive for postnasal drip.    Eyes:  Negative for pain and visual disturbance.   Respiratory:  Negative for cough and shortness of breath.    Cardiovascular:  Negative for chest pain, palpitations and leg swelling.   Gastrointestinal:  Negative for abdominal pain and vomiting.   Genitourinary:  Negative for dysuria, frequency, hematuria and urgency.   Musculoskeletal:  Negative for arthralgias and back pain.   Skin:  Negative for color change and rash.   Neurological:  Negative for dizziness, tremors and light-headedness.   All other systems reviewed and are negative.      Objective   /92 (BP Location: Left arm, Patient Position: Sitting, Cuff Size: Standard)   Pulse 94   Temp (!) 96.1 °F (35.6 °C) (Tympanic)   Resp 16   Ht 5' 7\" (1.702 m)   Wt 108 kg (238 lb 12.8 oz)   SpO2 98%   BMI 37.40 kg/m²      Physical Exam  Vitals and nursing note reviewed.   Constitutional:       General: He is not in acute distress.     Appearance: He is well-developed.   HENT:      Head: Normocephalic and atraumatic.      Right Ear: Tympanic membrane, ear canal and external ear normal.      Left Ear: Tympanic membrane, ear canal and external ear normal.   Eyes:      Conjunctiva/sclera: Conjunctivae normal.   Cardiovascular:      Rate and Rhythm: Normal rate and regular rhythm.      Heart sounds: No murmur heard.  Pulmonary:      Effort: Pulmonary effort is normal. No respiratory distress.      Breath sounds: Normal breath sounds.   Abdominal:      Palpations: Abdomen is soft.      Tenderness: There is no abdominal tenderness.   Musculoskeletal:         General: No swelling.      Cervical back: Neck supple.   Skin:     General: Skin is warm and dry.      Capillary Refill: Capillary refill takes less than 2 seconds.   Neurological:      Mental Status: He is alert.   Psychiatric:         Mood and Affect: Mood normal.         "

## 2025-04-29 NOTE — ASSESSMENT & PLAN NOTE
- Inadequately controlled. BP elevated in office 138/92. Remained elevated upon repeat measurements.  - Initiate lisinopril 10 mg PO daily  - Discussed possible side effects.   Orders:  •  lisinopril (ZESTRIL) 10 mg tablet; Take 1 tablet (10 mg total) by mouth daily

## 2025-04-29 NOTE — ASSESSMENT & PLAN NOTE
Lab Results   Component Value Date    HGBA1C 7.0 (H) 04/25/2025   - Inadequately controlled.   - Discussed low carb diet and exercise.   - Initiate tirzepatide (Mounjaro) 5 mg / 0.5 mL injections qweekly   - Continue Jardiance 25 mg PO daily and   - Discontinue Trulicity 4.4 mg/0.5 mL injections.     Orders:  •  Tirzepatide (Mounjaro) 5 MG/0.5ML SOAJ; Inject 5 mg under the skin once a week  •  Hemoglobin A1C; Future  •  Comprehensive metabolic panel; Future  •  CBC and differential; Future  •  Lipid Panel with Direct LDL reflex; Future  •  TSH, 3rd generation with Free T4 reflex; Future

## 2025-04-30 ENCOUNTER — TELEPHONE (OUTPATIENT)
Age: 65
End: 2025-04-30

## 2025-04-30 NOTE — TELEPHONE ENCOUNTER
PA for     Tirzepatide (Mounjaro) 5 MG/0.5ML SOAJ   SUBMITTED to [employer]    via    [x]CMM-KEY: [id number]  []Surescripts-Case ID #   []Availity-Auth ID # NDC #   []Faxed to plan   []Other website   []Phone call Case ID #     [x]PA sent as URGENT    All office notes, labs and other pertaining documents and studies sent. Clinical questions answered. Awaiting determination from insurance company.     Turnaround time for your insurance to make a decision on your Prior Authorization can take 7-21 business days.

## 2025-05-01 NOTE — TELEPHONE ENCOUNTER
PA for     Tirzepatide (Mounjaro) 5 MG/0.5ML SOAJ   APPROVED     Date(s) approved 2/28/2025 - 4/29/2026    Case # [id number]    Patient advised by          []MyChart Message  []Phone call   []LMOM  []L/M to call office as no active Communication consent on file  []Unable to leave detailed message as VM not approved on Communication consent       Pharmacy advised by    [x]Fax  []Phone call  []Secure Chat    Specialty Pharmacy    []      Approval letter scanned into Media Yes

## 2025-05-22 ENCOUNTER — TELEPHONE (OUTPATIENT)
Dept: FAMILY MEDICINE CLINIC | Facility: CLINIC | Age: 65
End: 2025-05-22

## 2025-05-22 DIAGNOSIS — J20.9 ACUTE BRONCHITIS, UNSPECIFIED ORGANISM: ICD-10-CM

## 2025-05-22 NOTE — TELEPHONE ENCOUNTER
Pt called refill line to inform PCP he took the lisinopril once and is not taking it again.     Pt states he is experiencing dizziness and blurred vision. He has been taking his BP at home and says it has been normal.

## 2025-05-22 NOTE — TELEPHONE ENCOUNTER
Reason for call:   [x] Refill   [] Prior Auth  [] Other:     Office:   [x] PCP/Provider -   [] Specialty/Provider -     Medication:   - Albuterol 90 mcg inhaler- inhale 2 puffs every 6 hours as needed      [facility]    Local Pharmacy   Does the patient have enough for 3 days?   [] Yes   [x] No - Send as HP to POD    Mail Away Pharmacy   Does the patient have enough for 10 days?   [] Yes   [] No - Send as HP to POD

## 2025-05-22 NOTE — TELEPHONE ENCOUNTER
Pharmacy requesting refill of albuterol (ProAir HFA) 90 mcg/act inhaler. Last refill was 2022.    Pt last seen 4/29/25, has appt 6/24/25.

## 2025-05-22 NOTE — TELEPHONE ENCOUNTER
Patient called the refill line wanting to let the doctor know that he took the lisinopril once and did not taking it again. Patient states that he experienced dizziness and blurred vision. Patient states that he has been checking his blood pressures at home and they have all been normal.

## 2025-05-23 RX ORDER — ALBUTEROL SULFATE 90 UG/1
2 INHALANT RESPIRATORY (INHALATION) EVERY 6 HOURS PRN
Qty: 8.5 G | Refills: 0 | Status: SHIPPED | OUTPATIENT
Start: 2025-05-23

## 2025-05-23 NOTE — TELEPHONE ENCOUNTER
Will have patient bring his blood pressure measurements with him next visit.  Okay to stop lisinopril.

## 2025-05-28 NOTE — TELEPHONE ENCOUNTER
"LM for pt to call office back   Please let pt know per Dr. Chou: have patient bring his blood pressure measurements with him next visit. Okay to stop lisinopril\"  "

## 2025-06-02 DIAGNOSIS — E11.9 TYPE 2 DIABETES MELLITUS WITHOUT COMPLICATION, WITHOUT LONG-TERM CURRENT USE OF INSULIN (HCC): ICD-10-CM

## 2025-06-02 RX ORDER — DULAGLUTIDE 4.5 MG/.5ML
INJECTION, SOLUTION SUBCUTANEOUS
Qty: 2 ML | Refills: 5 | Status: SHIPPED | OUTPATIENT
Start: 2025-06-02

## 2025-06-16 DIAGNOSIS — E11.9 TYPE 2 DIABETES MELLITUS WITHOUT COMPLICATION, WITHOUT LONG-TERM CURRENT USE OF INSULIN (HCC): ICD-10-CM

## 2025-06-16 RX ORDER — TIRZEPATIDE 5 MG/.5ML
5 INJECTION, SOLUTION SUBCUTANEOUS WEEKLY
Qty: 2 ML | Refills: 4 | Status: SHIPPED | OUTPATIENT
Start: 2025-06-16

## 2025-06-19 DIAGNOSIS — J20.9 ACUTE BRONCHITIS, UNSPECIFIED ORGANISM: ICD-10-CM

## 2025-06-20 RX ORDER — ALBUTEROL SULFATE 90 UG/1
2 INHALANT RESPIRATORY (INHALATION) EVERY 6 HOURS PRN
Qty: 8.5 G | Refills: 4 | Status: SHIPPED | OUTPATIENT
Start: 2025-06-20

## 2025-06-27 DIAGNOSIS — E78.5 HYPERLIPIDEMIA, UNSPECIFIED HYPERLIPIDEMIA TYPE: ICD-10-CM

## 2025-06-27 RX ORDER — SIMVASTATIN 40 MG
40 TABLET ORAL
Qty: 90 TABLET | Refills: 1 | Status: SHIPPED | OUTPATIENT
Start: 2025-06-27

## 2025-07-10 DIAGNOSIS — E11.9 TYPE 2 DIABETES MELLITUS WITHOUT COMPLICATION, WITHOUT LONG-TERM CURRENT USE OF INSULIN (HCC): ICD-10-CM

## 2025-07-11 RX ORDER — TIRZEPATIDE 5 MG/.5ML
5 INJECTION, SOLUTION SUBCUTANEOUS WEEKLY
Qty: 2 ML | Refills: 4 | OUTPATIENT
Start: 2025-07-11

## 2025-07-22 DIAGNOSIS — E11.9 TYPE 2 DIABETES MELLITUS WITHOUT COMPLICATION, WITHOUT LONG-TERM CURRENT USE OF INSULIN (HCC): ICD-10-CM

## 2025-07-24 RX ORDER — TIRZEPATIDE 5 MG/.5ML
5 INJECTION, SOLUTION SUBCUTANEOUS WEEKLY
Qty: 2 ML | Refills: 4 | OUTPATIENT
Start: 2025-07-24

## 2025-08-04 ENCOUNTER — OFFICE VISIT (OUTPATIENT)
Dept: FAMILY MEDICINE CLINIC | Facility: CLINIC | Age: 65
End: 2025-08-04
Payer: COMMERCIAL

## 2025-08-04 VITALS
TEMPERATURE: 97.8 F | OXYGEN SATURATION: 98 % | HEART RATE: 86 BPM | RESPIRATION RATE: 16 BRPM | SYSTOLIC BLOOD PRESSURE: 124 MMHG | DIASTOLIC BLOOD PRESSURE: 80 MMHG | WEIGHT: 230.6 LBS | BODY MASS INDEX: 36.19 KG/M2 | HEIGHT: 67 IN

## 2025-08-04 DIAGNOSIS — I10 ESSENTIAL HYPERTENSION: ICD-10-CM

## 2025-08-04 DIAGNOSIS — E11.9 TYPE 2 DIABETES MELLITUS WITHOUT COMPLICATION, WITHOUT LONG-TERM CURRENT USE OF INSULIN (HCC): Primary | ICD-10-CM

## 2025-08-04 DIAGNOSIS — E78.49 OTHER HYPERLIPIDEMIA: ICD-10-CM

## 2025-08-04 DIAGNOSIS — E66.9 OBESITY (BMI 30-39.9): ICD-10-CM

## 2025-08-04 LAB — SL AMB POCT HEMOGLOBIN AIC: 6.6 (ref ?–6.5)

## 2025-08-04 PROCEDURE — 83036 HEMOGLOBIN GLYCOSYLATED A1C: CPT | Performed by: FAMILY MEDICINE

## 2025-08-04 PROCEDURE — G2211 COMPLEX E/M VISIT ADD ON: HCPCS | Performed by: FAMILY MEDICINE

## 2025-08-04 PROCEDURE — 99214 OFFICE O/P EST MOD 30 MIN: CPT | Performed by: FAMILY MEDICINE

## 2025-08-04 RX ORDER — TIRZEPATIDE 7.5 MG/.5ML
7.5 INJECTION, SOLUTION SUBCUTANEOUS WEEKLY
Qty: 2 ML | Refills: 3 | Status: SHIPPED | OUTPATIENT
Start: 2025-08-04

## 2025-08-04 RX ORDER — TIRZEPATIDE 5 MG/.5ML
5 INJECTION, SOLUTION SUBCUTANEOUS WEEKLY
Qty: 2 ML | Refills: 4 | Status: CANCELLED | OUTPATIENT
Start: 2025-08-04

## (undated) DEVICE — SPINNING CEMENT MIXING BOWL

## (undated) DEVICE — CATH URETERAL 5FR X 70 CM FLEX TIP POLYUR BARD

## (undated) DEVICE — SCD SEQUENTIAL COMPRESSION COMFORT SLEEVE MEDIUM KNEE LENGTH: Brand: KENDALL SCD

## (undated) DEVICE — ACE WRAP 6 IN UNSTERILE

## (undated) DEVICE — ENDOSCOPIC VALVE WITH ADAPTER.: Brand: SURSEAL® II

## (undated) DEVICE — INFLATION DEVICE BASIX 30ATM

## (undated) DEVICE — SILVER-COATED ANTIMICROBIAL BARRIER DRESSING: Brand: ACTICOAT   4" X 8"

## (undated) DEVICE — LASER HOLMIUM FIBER 365 MIC

## (undated) DEVICE — HEMOSTATIC MATRIX SURGIFLO 8ML W/THROMBIN

## (undated) DEVICE — CHLORAPREP HI-LITE 26ML ORANGE

## (undated) DEVICE — GLOVE SRG BIOGEL 8

## (undated) DEVICE — SINGLE PORT MANIFOLD: Brand: NEPTUNE 2

## (undated) DEVICE — U-DRAPE: Brand: CONVERTORS

## (undated) DEVICE — BASKET STONE RTRVL PLTN CL 3FR 115CM

## (undated) DEVICE — ABDOMINAL PAD: Brand: DERMACEA

## (undated) DEVICE — GUIDEWIRE STRGHT TIP 0.035 IN  SOLO PLUS

## (undated) DEVICE — TRAY FOLEY 16FR URIMETER SURESTEP

## (undated) DEVICE — BETHLEHEM UNIV TOTAL KNEE, KIT: Brand: CARDINAL HEALTH

## (undated) DEVICE — SNAP KOVER: Brand: UNBRANDED

## (undated) DEVICE — DUAL CUT SAGITTAL BLADE

## (undated) DEVICE — 3M™ TEGADERM™ TRANSPARENT FILM DRESSING FRAME STYLE, 1627, 4 IN X 10 IN (10 CM X 25 CM), 20/CT 4CT/CASE: Brand: 3M™ TEGADERM™

## (undated) DEVICE — THE SIMPULSE SOLO SYSTEM WITH ULTREX RETRACTABLE SPLASH SHIELD TIP: Brand: SIMPULSE SOLO

## (undated) DEVICE — FIBER HOLMIUM MP 200 MICRON SMARTSCOPE

## (undated) DEVICE — TUBING SUCTION 5MM X 12 FT

## (undated) DEVICE — NEEDLE 18 G X 1 1/2 SAFETY

## (undated) DEVICE — HOOD: Brand: FLYTE, SURGICOOL

## (undated) DEVICE — SPECIMEN CONTAINER STERILE PEEL PACK

## (undated) DEVICE — SYRINGE 20ML LL

## (undated) DEVICE — INTENDED FOR TISSUE SEPARATION, AND OTHER PROCEDURES THAT REQUIRE A SHARP SURGICAL BLADE TO PUNCTURE OR CUT.: Brand: BARD-PARKER SAFETY BLADES SIZE 10, STERILE

## (undated) DEVICE — URETERAL DUAL LUMEN CATH

## (undated) DEVICE — STERILE SURGICAL LUBRICANT,  TUBE: Brand: SURGILUBE

## (undated) DEVICE — COBAN 4 IN STERILE

## (undated) DEVICE — PACK TUR

## (undated) DEVICE — GUIDEWIRE AMPLATZ SS .038 180CM

## (undated) DEVICE — SUT MONOCRYL 3-0 PS-2 18 IN Y497G

## (undated) DEVICE — SPONGE PVP SCRUB WING STERILE

## (undated) DEVICE — SUT VICRYL PLUS 2-0 CTB-1 27 IN VCPB259H

## (undated) DEVICE — GAUZE SPONGES,16 PLY: Brand: CURITY

## (undated) DEVICE — INTENDED FOR TISSUE SEPARATION, AND OTHER PROCEDURES THAT REQUIRE A SHARP SURGICAL BLADE TO PUNCTURE OR CUT.: Brand: BARD-PARKER SAFETY BLADES SIZE 11, STERILE

## (undated) DEVICE — 4-PORT MANIFOLD: Brand: NEPTUNE 2

## (undated) DEVICE — DRAPE EQUIPMENT RF WAND

## (undated) DEVICE — RECIPROCATING BLADE, DOUBLE SIDED (70.0 X 0.96 X 12.5MM)

## (undated) DEVICE — Device

## (undated) DEVICE — CAPIT KNEE ATTUNE RP CEMENT - DEPUY

## (undated) DEVICE — SUT VICRYL PLUS 1 CTB-1 36 IN VCPB947H

## (undated) DEVICE — PADDING CAST 4 IN  COTTON STRL

## (undated) DEVICE — IMPERVIOUS STOCKINETTE: Brand: DEROYAL

## (undated) DEVICE — CUFF TOURNIQUET 30 X 4 IN QUICK CONNECT DISP 1BLA

## (undated) DEVICE — GLOVE INDICATOR PI UNDERGLOVE SZ 8.5 BLUE

## (undated) DEVICE — GLOVE SRG BIOGEL 7.5

## (undated) DEVICE — PENCIL ELECTROSURG E-Z CLEAN -0035H

## (undated) DEVICE — CASSETTE DRAPE: Brand: UNBRANDED

## (undated) DEVICE — PROBE KIT 3.9 X 350MM  SWISS LITHOCLAST TRILOGY

## (undated) DEVICE — GLOVE SRG BIOGEL 7

## (undated) DEVICE — BETHLEHEM UNIVERSAL MINOR GEN: Brand: CARDINAL HEALTH

## (undated) DEVICE — UROCATCH BAG

## (undated) DEVICE — CUFF TOURNIQUET 34 X 4 IN QUICK CONNECT DISP 1BLA

## (undated) DEVICE — CYSTO TUBING TUR Y IRRIGATION

## (undated) DEVICE — DRAPE CRANI

## (undated) DEVICE — 3M™ IOBAN™ 2 ANTIMICROBIAL INCISE DRAPE 6650EZ: Brand: IOBAN™ 2

## (undated) DEVICE — ARTHROSCOPY FLOOR MAT

## (undated) DEVICE — JP 3-SPRING RES W/10FR PVC DRAIN/TR: Brand: CARDINAL HEALTH

## (undated) DEVICE — PADDING,UNDERCAST,COTTON, 4"X4YD STERILE: Brand: MEDLINE

## (undated) DEVICE — CATH BAL DIL NEP 10MM 15CM X-FRC N30 WO INFL DEV